# Patient Record
Sex: FEMALE | Race: WHITE | Employment: OTHER | URBAN - NONMETROPOLITAN AREA
[De-identification: names, ages, dates, MRNs, and addresses within clinical notes are randomized per-mention and may not be internally consistent; named-entity substitution may affect disease eponyms.]

---

## 2000-01-01 LAB
BDY SITE: NORMAL
COLLECTED BY:: NORMAL
SAO2 % BLD: 96 % (ref 94–97)

## 2017-07-06 ENCOUNTER — OFFICE VISIT (OUTPATIENT)
Dept: CARDIOLOGY | Age: 82
End: 2017-07-06

## 2017-07-06 VITALS
WEIGHT: 161.19 LBS | HEART RATE: 62 BPM | BODY MASS INDEX: 27.52 KG/M2 | DIASTOLIC BLOOD PRESSURE: 70 MMHG | HEIGHT: 64 IN | SYSTOLIC BLOOD PRESSURE: 148 MMHG

## 2017-07-06 DIAGNOSIS — I48.91 ATRIAL FIBRILLATION, UNSPECIFIED TYPE (HCC): Primary | ICD-10-CM

## 2017-07-06 DIAGNOSIS — R06.02 SOB (SHORTNESS OF BREATH): ICD-10-CM

## 2017-07-06 PROCEDURE — G8419 CALC BMI OUT NRM PARAM NOF/U: HCPCS | Performed by: INTERNAL MEDICINE

## 2017-07-06 PROCEDURE — 1090F PRES/ABSN URINE INCON ASSESS: CPT | Performed by: INTERNAL MEDICINE

## 2017-07-06 PROCEDURE — 99213 OFFICE O/P EST LOW 20 MIN: CPT | Performed by: INTERNAL MEDICINE

## 2017-07-06 PROCEDURE — 93000 ELECTROCARDIOGRAM COMPLETE: CPT | Performed by: INTERNAL MEDICINE

## 2017-07-06 PROCEDURE — G8598 ASA/ANTIPLAT THER USED: HCPCS | Performed by: INTERNAL MEDICINE

## 2017-07-06 PROCEDURE — 4040F PNEUMOC VAC/ADMIN/RCVD: CPT | Performed by: INTERNAL MEDICINE

## 2017-07-06 PROCEDURE — G8400 PT W/DXA NO RESULTS DOC: HCPCS | Performed by: INTERNAL MEDICINE

## 2017-07-06 PROCEDURE — 1123F ACP DISCUSS/DSCN MKR DOCD: CPT | Performed by: INTERNAL MEDICINE

## 2017-07-06 PROCEDURE — G8427 DOCREV CUR MEDS BY ELIG CLIN: HCPCS | Performed by: INTERNAL MEDICINE

## 2017-07-06 PROCEDURE — 1036F TOBACCO NON-USER: CPT | Performed by: INTERNAL MEDICINE

## 2017-07-06 RX ORDER — ISOSORBIDE DINITRATE 20 MG/1
TABLET ORAL
Qty: 60 TABLET | Refills: 3 | Status: SHIPPED | OUTPATIENT
Start: 2017-07-06 | End: 2017-07-07 | Stop reason: SDUPTHER

## 2017-07-06 RX ORDER — DOCUSATE SODIUM 100 MG/1
100 CAPSULE, LIQUID FILLED ORAL 2 TIMES DAILY PRN
COMMUNITY
End: 2017-09-28 | Stop reason: ALTCHOICE

## 2017-07-07 RX ORDER — ISOSORBIDE DINITRATE 20 MG/1
TABLET ORAL
Qty: 180 TABLET | Refills: 1 | Status: SHIPPED | OUTPATIENT
Start: 2017-07-07 | End: 2018-09-19

## 2017-07-12 ENCOUNTER — TELEPHONE (OUTPATIENT)
Dept: CARDIOLOGY | Age: 82
End: 2017-07-12

## 2017-07-12 RX ORDER — HYDROCHLOROTHIAZIDE 25 MG/1
25 TABLET ORAL DAILY
COMMUNITY

## 2017-07-25 ENCOUNTER — HOSPITAL ENCOUNTER (OUTPATIENT)
Dept: NON INVASIVE DIAGNOSTICS | Age: 82
Discharge: HOME | End: 2017-07-25
Payer: MEDICARE

## 2017-07-25 VITALS — BODY MASS INDEX: 27.31 KG/M2 | HEIGHT: 64 IN | WEIGHT: 160 LBS

## 2017-07-25 DIAGNOSIS — C21.0 MALIGNANT NEOPLASM OF ANUS (HCC): ICD-10-CM

## 2017-07-25 DIAGNOSIS — I48.91 ATRIAL FIBRILLATION, UNSPECIFIED TYPE (HCC): ICD-10-CM

## 2017-07-25 DIAGNOSIS — R06.02 SOB (SHORTNESS OF BREATH): ICD-10-CM

## 2017-07-25 LAB
LV EF: 60 %
LVEF MODALITY: NORMAL

## 2017-07-25 PROCEDURE — A9500 TC99M SESTAMIBI: HCPCS | Performed by: INTERNAL MEDICINE

## 2017-07-25 PROCEDURE — 93306 TTE W/DOPPLER COMPLETE: CPT

## 2017-07-25 PROCEDURE — 93017 CV STRESS TEST TRACING ONLY: CPT | Performed by: INTERNAL MEDICINE

## 2017-07-25 PROCEDURE — 78452 HT MUSCLE IMAGE SPECT MULT: CPT

## 2017-07-25 PROCEDURE — 3430000000 HC RX DIAGNOSTIC RADIOPHARMACEUTICAL: Performed by: INTERNAL MEDICINE

## 2017-07-25 PROCEDURE — 6360000002 HC RX W HCPCS

## 2017-07-25 RX ADMIN — Medication 8.9 MILLICURIE: at 12:25

## 2017-07-25 RX ADMIN — Medication 35 MILLICURIE: at 13:15

## 2017-07-26 ENCOUNTER — TELEPHONE (OUTPATIENT)
Dept: CARDIOLOGY CLINIC | Age: 82
End: 2017-07-26

## 2017-07-26 DIAGNOSIS — R94.39 ABNORMAL STRESS TEST: Primary | ICD-10-CM

## 2017-09-05 ENCOUNTER — OFFICE VISIT (OUTPATIENT)
Dept: CARDIOLOGY CLINIC | Age: 82
End: 2017-09-05
Payer: MEDICARE

## 2017-09-05 VITALS
BODY MASS INDEX: 27.28 KG/M2 | HEART RATE: 68 BPM | HEIGHT: 64 IN | SYSTOLIC BLOOD PRESSURE: 142 MMHG | DIASTOLIC BLOOD PRESSURE: 74 MMHG | WEIGHT: 159.8 LBS

## 2017-09-05 DIAGNOSIS — I20.0 UNSTABLE ANGINA (HCC): ICD-10-CM

## 2017-09-05 DIAGNOSIS — R94.39 ABNORMAL STRESS TEST: Primary | ICD-10-CM

## 2017-09-05 DIAGNOSIS — I25.10 ASHD (ARTERIOSCLEROTIC HEART DISEASE): ICD-10-CM

## 2017-09-05 DIAGNOSIS — I10 ESSENTIAL HYPERTENSION: ICD-10-CM

## 2017-09-05 PROCEDURE — G8400 PT W/DXA NO RESULTS DOC: HCPCS | Performed by: INTERNAL MEDICINE

## 2017-09-05 PROCEDURE — 1090F PRES/ABSN URINE INCON ASSESS: CPT | Performed by: INTERNAL MEDICINE

## 2017-09-05 PROCEDURE — 99213 OFFICE O/P EST LOW 20 MIN: CPT | Performed by: INTERNAL MEDICINE

## 2017-09-05 PROCEDURE — G8427 DOCREV CUR MEDS BY ELIG CLIN: HCPCS | Performed by: INTERNAL MEDICINE

## 2017-09-05 PROCEDURE — G8598 ASA/ANTIPLAT THER USED: HCPCS | Performed by: INTERNAL MEDICINE

## 2017-09-05 PROCEDURE — 1036F TOBACCO NON-USER: CPT | Performed by: INTERNAL MEDICINE

## 2017-09-05 PROCEDURE — 4040F PNEUMOC VAC/ADMIN/RCVD: CPT | Performed by: INTERNAL MEDICINE

## 2017-09-05 PROCEDURE — G8419 CALC BMI OUT NRM PARAM NOF/U: HCPCS | Performed by: INTERNAL MEDICINE

## 2017-09-05 PROCEDURE — 1123F ACP DISCUSS/DSCN MKR DOCD: CPT | Performed by: INTERNAL MEDICINE

## 2017-09-08 ENCOUNTER — PREP FOR PROCEDURE (OUTPATIENT)
Dept: CARDIOLOGY | Age: 82
End: 2017-09-08

## 2017-09-11 ENCOUNTER — HOSPITAL ENCOUNTER (OUTPATIENT)
Dept: INPATIENT UNIT | Age: 82
Discharge: HOME | End: 2017-09-11
Attending: INTERNAL MEDICINE | Admitting: INTERNAL MEDICINE
Payer: MEDICARE

## 2017-09-11 VITALS
RESPIRATION RATE: 16 BRPM | WEIGHT: 159 LBS | BODY MASS INDEX: 27.14 KG/M2 | SYSTOLIC BLOOD PRESSURE: 125 MMHG | TEMPERATURE: 97.6 F | DIASTOLIC BLOOD PRESSURE: 70 MMHG | HEIGHT: 64 IN | HEART RATE: 81 BPM | OXYGEN SATURATION: 97 %

## 2017-09-11 LAB
ABO: NORMAL
ANION GAP SERPL CALCULATED.3IONS-SCNC: 13 MEQ/L (ref 8–16)
ANTIBODY SCREEN: NORMAL
BUN BLDV-MCNC: 22 MG/DL (ref 7–22)
CALCIUM SERPL-MCNC: 9.8 MG/DL (ref 8.5–10.5)
CHLORIDE BLD-SCNC: 99 MEQ/L (ref 98–111)
CHOLESTEROL, TOTAL: 153 MG/DL (ref 100–199)
CO2: 27 MEQ/L (ref 23–33)
CREAT SERPL-MCNC: 0.9 MG/DL (ref 0.4–1.2)
EKG ATRIAL RATE: 56 BPM
EKG ATRIAL RATE: 66 BPM
EKG P AXIS: 62 DEGREES
EKG P AXIS: 67 DEGREES
EKG P-R INTERVAL: 206 MS
EKG P-R INTERVAL: 232 MS
EKG Q-T INTERVAL: 406 MS
EKG Q-T INTERVAL: 434 MS
EKG QRS DURATION: 82 MS
EKG QRS DURATION: 84 MS
EKG QTC CALCULATION (BAZETT): 418 MS
EKG QTC CALCULATION (BAZETT): 425 MS
EKG R AXIS: -12 DEGREES
EKG R AXIS: -18 DEGREES
EKG T AXIS: 16 DEGREES
EKG T AXIS: 21 DEGREES
EKG VENTRICULAR RATE: 56 BPM
EKG VENTRICULAR RATE: 66 BPM
GFR SERPL CREATININE-BSD FRML MDRD: 60 ML/MIN/1.73M2
GLUCOSE BLD-MCNC: 95 MG/DL (ref 70–108)
HCT VFR BLD CALC: 34.4 % (ref 37–47)
HDLC SERPL-MCNC: 52 MG/DL
HEMOGLOBIN: 11.6 GM/DL (ref 12–16)
LDL CHOLESTEROL CALCULATED: 77 MG/DL
MCH RBC QN AUTO: 29.6 PG (ref 27–31)
MCHC RBC AUTO-ENTMCNC: 33.6 GM/DL (ref 33–37)
MCV RBC AUTO: 88.2 FL (ref 81–99)
PDW BLD-RTO: 14.4 % (ref 11.5–14.5)
PLATELET # BLD: 162 THOU/MM3 (ref 130–400)
PMV BLD AUTO: 8.5 MCM (ref 7.4–10.4)
POTASSIUM SERPL-SCNC: 3.9 MEQ/L (ref 3.5–5.2)
RBC # BLD: 3.9 MILL/MM3 (ref 4.2–5.4)
RH FACTOR: NORMAL
SODIUM BLD-SCNC: 139 MEQ/L (ref 135–145)
TRIGL SERPL-MCNC: 122 MG/DL (ref 0–199)
WBC # BLD: 4.1 THOU/MM3 (ref 4.8–10.8)

## 2017-09-11 PROCEDURE — 36415 COLL VENOUS BLD VENIPUNCTURE: CPT

## 2017-09-11 PROCEDURE — 93571 IV DOP VEL&/PRESS C FLO 1ST: CPT | Performed by: INTERNAL MEDICINE

## 2017-09-11 PROCEDURE — C1887 CATHETER, GUIDING: HCPCS

## 2017-09-11 PROCEDURE — C1725 CATH, TRANSLUMIN NON-LASER: HCPCS

## 2017-09-11 PROCEDURE — 93458 L HRT ARTERY/VENTRICLE ANGIO: CPT | Performed by: INTERNAL MEDICINE

## 2017-09-11 PROCEDURE — 2500000003 HC RX 250 WO HCPCS

## 2017-09-11 PROCEDURE — 86850 RBC ANTIBODY SCREEN: CPT

## 2017-09-11 PROCEDURE — 92928 PRQ TCAT PLMT NTRAC ST 1 LES: CPT | Performed by: INTERNAL MEDICINE

## 2017-09-11 PROCEDURE — 6360000002 HC RX W HCPCS

## 2017-09-11 PROCEDURE — 6370000000 HC RX 637 (ALT 250 FOR IP)

## 2017-09-11 PROCEDURE — C1769 GUIDE WIRE: HCPCS

## 2017-09-11 PROCEDURE — 2780000010 HC IMPLANT OTHER

## 2017-09-11 PROCEDURE — 80061 LIPID PANEL: CPT

## 2017-09-11 PROCEDURE — 86901 BLOOD TYPING SEROLOGIC RH(D): CPT

## 2017-09-11 PROCEDURE — 85027 COMPLETE CBC AUTOMATED: CPT

## 2017-09-11 PROCEDURE — 2580000003 HC RX 258: Performed by: INTERNAL MEDICINE

## 2017-09-11 PROCEDURE — 93005 ELECTROCARDIOGRAM TRACING: CPT

## 2017-09-11 PROCEDURE — 80048 BASIC METABOLIC PNL TOTAL CA: CPT

## 2017-09-11 PROCEDURE — C1894 INTRO/SHEATH, NON-LASER: HCPCS

## 2017-09-11 PROCEDURE — 6370000000 HC RX 637 (ALT 250 FOR IP): Performed by: INTERNAL MEDICINE

## 2017-09-11 PROCEDURE — C1874 STENT, COATED/COV W/DEL SYS: HCPCS

## 2017-09-11 PROCEDURE — 86900 BLOOD TYPING SEROLOGIC ABO: CPT

## 2017-09-11 PROCEDURE — C9600 PERC DRUG-EL COR STENT SING: HCPCS | Performed by: INTERNAL MEDICINE

## 2017-09-11 RX ORDER — CLOPIDOGREL BISULFATE 75 MG/1
75 TABLET ORAL DAILY
Status: DISCONTINUED | OUTPATIENT
Start: 2017-09-12 | End: 2017-09-12 | Stop reason: HOSPADM

## 2017-09-11 RX ORDER — SODIUM CHLORIDE 9 MG/ML
INJECTION, SOLUTION INTRAVENOUS CONTINUOUS
Status: DISCONTINUED | OUTPATIENT
Start: 2017-09-11 | End: 2017-09-11 | Stop reason: SDUPTHER

## 2017-09-11 RX ORDER — ATROPINE SULFATE 0.4 MG/ML
0.5 AMPUL (ML) INJECTION
Status: ACTIVE | OUTPATIENT
Start: 2017-09-11 | End: 2017-09-11

## 2017-09-11 RX ORDER — CLOPIDOGREL BISULFATE 75 MG/1
75 TABLET ORAL DAILY
Qty: 30 TABLET | Refills: 3 | Status: SHIPPED | OUTPATIENT
Start: 2017-09-12 | End: 2017-09-12 | Stop reason: SDUPTHER

## 2017-09-11 RX ORDER — ASPIRIN 81 MG/1
81 TABLET, CHEWABLE ORAL DAILY
Status: DISCONTINUED | OUTPATIENT
Start: 2017-09-12 | End: 2017-09-12 | Stop reason: HOSPADM

## 2017-09-11 RX ORDER — DIAZEPAM 5 MG/1
5 TABLET ORAL
Status: ACTIVE | OUTPATIENT
Start: 2017-09-11 | End: 2017-09-11

## 2017-09-11 RX ORDER — ACETAMINOPHEN 325 MG/1
650 TABLET ORAL EVERY 4 HOURS PRN
Status: DISCONTINUED | OUTPATIENT
Start: 2017-09-11 | End: 2017-09-12 | Stop reason: HOSPADM

## 2017-09-11 RX ORDER — SODIUM CHLORIDE 0.9 % (FLUSH) 0.9 %
10 SYRINGE (ML) INJECTION PRN
Status: DISCONTINUED | OUTPATIENT
Start: 2017-09-11 | End: 2017-09-12 | Stop reason: HOSPADM

## 2017-09-11 RX ORDER — ONDANSETRON 2 MG/ML
4 INJECTION INTRAMUSCULAR; INTRAVENOUS EVERY 6 HOURS PRN
Status: DISCONTINUED | OUTPATIENT
Start: 2017-09-11 | End: 2017-09-12 | Stop reason: HOSPADM

## 2017-09-11 RX ORDER — SODIUM CHLORIDE 0.9 % (FLUSH) 0.9 %
10 SYRINGE (ML) INJECTION EVERY 12 HOURS SCHEDULED
Status: DISCONTINUED | OUTPATIENT
Start: 2017-09-11 | End: 2017-09-11 | Stop reason: SDUPTHER

## 2017-09-11 RX ORDER — SODIUM CHLORIDE 0.9 % (FLUSH) 0.9 %
10 SYRINGE (ML) INJECTION EVERY 12 HOURS SCHEDULED
Status: DISCONTINUED | OUTPATIENT
Start: 2017-09-11 | End: 2017-09-12 | Stop reason: HOSPADM

## 2017-09-11 RX ORDER — MORPHINE SULFATE 2 MG/ML
2 INJECTION, SOLUTION INTRAMUSCULAR; INTRAVENOUS
Status: ACTIVE | OUTPATIENT
Start: 2017-09-11 | End: 2017-09-11

## 2017-09-11 RX ORDER — DIPHENHYDRAMINE HCL 25 MG
25 TABLET ORAL
Status: ACTIVE | OUTPATIENT
Start: 2017-09-11 | End: 2017-09-11

## 2017-09-11 RX ORDER — SODIUM CHLORIDE 9 MG/ML
75 INJECTION, SOLUTION INTRAVENOUS CONTINUOUS
Status: DISCONTINUED | OUTPATIENT
Start: 2017-09-11 | End: 2017-09-12 | Stop reason: HOSPADM

## 2017-09-11 RX ORDER — SODIUM CHLORIDE 0.9 % (FLUSH) 0.9 %
10 SYRINGE (ML) INJECTION PRN
Status: DISCONTINUED | OUTPATIENT
Start: 2017-09-11 | End: 2017-09-11 | Stop reason: SDUPTHER

## 2017-09-11 RX ADMIN — ACETAMINOPHEN 650 MG: 325 TABLET ORAL at 18:45

## 2017-09-11 RX ADMIN — SODIUM CHLORIDE: 9 INJECTION, SOLUTION INTRAVENOUS at 11:48

## 2017-09-11 ASSESSMENT — PAIN SCALES - GENERAL
PAINLEVEL_OUTOF10: 0
PAINLEVEL_OUTOF10: 7
PAINLEVEL_OUTOF10: 0

## 2017-09-12 RX ORDER — CLOPIDOGREL BISULFATE 75 MG/1
75 TABLET ORAL DAILY
Qty: 30 TABLET | Refills: 1 | Status: SHIPPED | OUTPATIENT
Start: 2017-09-12 | End: 2018-01-22 | Stop reason: SDUPTHER

## 2017-09-22 LAB
BILIRUBIN URINE: NORMAL MG/DL
BLOOD, URINE: NORMAL
CLARITY: CLEAR
COLOR: NORMAL
GLUCOSE URINE: NORMAL
KETONES, URINE: NEGATIVE
LEUKOCYTE ESTERASE, URINE: NORMAL
NITRITE, URINE: POSITIVE
PH UA: 6 (ref 4.5–8)
PROTEIN UA: NORMAL
SPECIFIC GRAVITY UA: 1 (ref 1–1.03)
UROBILINOGEN, URINE: NORMAL

## 2017-09-28 ENCOUNTER — OFFICE VISIT (OUTPATIENT)
Dept: CARDIOLOGY CLINIC | Age: 82
End: 2017-09-28
Payer: MEDICARE

## 2017-09-28 VITALS
BODY MASS INDEX: 26.99 KG/M2 | HEIGHT: 64 IN | SYSTOLIC BLOOD PRESSURE: 144 MMHG | DIASTOLIC BLOOD PRESSURE: 62 MMHG | HEART RATE: 62 BPM | WEIGHT: 158.1 LBS

## 2017-09-28 DIAGNOSIS — Z95.820 S/P ANGIOPLASTY WITH STENT: Primary | ICD-10-CM

## 2017-09-28 PROCEDURE — G8419 CALC BMI OUT NRM PARAM NOF/U: HCPCS | Performed by: INTERNAL MEDICINE

## 2017-09-28 PROCEDURE — G8427 DOCREV CUR MEDS BY ELIG CLIN: HCPCS | Performed by: INTERNAL MEDICINE

## 2017-09-28 PROCEDURE — G8400 PT W/DXA NO RESULTS DOC: HCPCS | Performed by: INTERNAL MEDICINE

## 2017-09-28 PROCEDURE — 1123F ACP DISCUSS/DSCN MKR DOCD: CPT | Performed by: INTERNAL MEDICINE

## 2017-09-28 PROCEDURE — G8598 ASA/ANTIPLAT THER USED: HCPCS | Performed by: INTERNAL MEDICINE

## 2017-09-28 PROCEDURE — 1090F PRES/ABSN URINE INCON ASSESS: CPT | Performed by: INTERNAL MEDICINE

## 2017-09-28 PROCEDURE — 1036F TOBACCO NON-USER: CPT | Performed by: INTERNAL MEDICINE

## 2017-09-28 PROCEDURE — 4040F PNEUMOC VAC/ADMIN/RCVD: CPT | Performed by: INTERNAL MEDICINE

## 2017-09-28 PROCEDURE — 99213 OFFICE O/P EST LOW 20 MIN: CPT | Performed by: INTERNAL MEDICINE

## 2017-09-28 PROCEDURE — 93000 ELECTROCARDIOGRAM COMPLETE: CPT | Performed by: INTERNAL MEDICINE

## 2017-09-28 RX ORDER — FAMOTIDINE 20 MG/1
20 TABLET, FILM COATED ORAL 2 TIMES DAILY
Qty: 180 TABLET | Refills: 3 | Status: SHIPPED | OUTPATIENT
Start: 2017-09-28 | End: 2017-09-28 | Stop reason: SDUPTHER

## 2017-09-28 RX ORDER — FAMOTIDINE 20 MG/1
20 TABLET, FILM COATED ORAL 2 TIMES DAILY
Qty: 60 TABLET | Refills: 0 | Status: SHIPPED | OUTPATIENT
Start: 2017-09-28 | End: 2017-11-28 | Stop reason: SDUPTHER

## 2017-09-28 RX ORDER — NITROGLYCERIN 0.4 MG/1
0.4 TABLET SUBLINGUAL EVERY 5 MIN PRN
COMMUNITY

## 2017-09-28 RX ORDER — NITROFURANTOIN 25; 75 MG/1; MG/1
100 CAPSULE ORAL 2 TIMES DAILY
COMMUNITY
Start: 2017-07-14 | End: 2017-10-18 | Stop reason: ALTCHOICE

## 2017-09-28 RX ORDER — FAMOTIDINE 20 MG/1
20 TABLET, FILM COATED ORAL 2 TIMES DAILY
COMMUNITY
End: 2019-04-19

## 2017-10-06 LAB
BILIRUBIN URINE: ABNORMAL MG/DL
BLOOD, URINE: ABNORMAL
CLARITY: ABNORMAL
COLOR: ABNORMAL
GLUCOSE URINE: ABNORMAL
KETONES, URINE: ABNORMAL
LEUKOCYTE ESTERASE, URINE: ABNORMAL
NITRITE, URINE: POSITIVE
PH UA: 5 (ref 4.5–8)
PROTEIN UA: ABNORMAL
SPECIFIC GRAVITY UA: 1.01 (ref 1–1.03)
UROBILINOGEN, URINE: ABNORMAL

## 2017-10-10 ENCOUNTER — HOSPITAL ENCOUNTER (OUTPATIENT)
Dept: CARDIAC REHAB | Age: 82
Setting detail: THERAPIES SERIES
Discharge: HOME | End: 2017-10-10
Payer: MEDICARE

## 2017-10-10 VITALS — WEIGHT: 154.44 LBS | HEIGHT: 64 IN | BODY MASS INDEX: 26.37 KG/M2

## 2017-10-10 PROCEDURE — G0422 INTENS CARDIAC REHAB W/EXERC: HCPCS

## 2017-10-10 PROCEDURE — G0423 INTENS CARDIAC REHAB NO EXER: HCPCS

## 2017-10-10 NOTE — PROGRESS NOTES
Video Education Report - ICR/CR      Name:  Shelby Duverney    Date:  10/10/2017  MRN: [id number]     Session #:  2  Session Length: 45 min    Recommended Videos    Additional Videos   [x]01 [employer] - Program Overview  []17 Hypertension & Heart Disease  []02 Overview of Pritikin Eating Plan   []18 Cooking Breakfasts and Snacks  []03 Becoming a Pritikin    []19 Planning Your Eating Strategy  []04 Diseases of Our Time - Part 1   []20 Label Reading - Part 2  []05 Calorie Density     []21 Cooking Soups and Desserts  []06 Label Reading - Part 1    []22 How Our Thoughts Can Heal Our Hearts  []07 Move it      []23 Targeting Your Nutrition Priorities  []08 Healthy Minds, Bodies, Hearts   []24 Healthy Salads & Dressings  []09 Dining Out - Part 1    []25 Dining Out - Part 2  []10 Heart Disease Risk Reduction   []26 Cooking Dinner and Sides  []71 Metabolic Syndrome and Belly Fat  []27 Sleep Disorders  []12 Facts on Fat     []28 Menu Workshop  []13 Diseases of Our Time - Part 2   []29 Decoding Lab Results  []14 Biology of Weight Control   []30 Vitamins and Minerals  []15 Biomechanical Limitations   []31 Exercise Action Plan  []16 Nutrition Action Plan    []32 Body Composition         []33 Improving Performance         []34 Fueling a Healthy Body         []35 Introduction to Yoga         []36 Aging- Enhancing the Quality of Your Life         []37 Smoking Cessation      Comments:  Completed video, reviewed patient binder          Electronically signed by Sung Abdalla on 10/10/2017 at 11:01 AM  Staff Signature/Date/Time

## 2017-10-16 ENCOUNTER — HOSPITAL ENCOUNTER (OUTPATIENT)
Dept: CARDIAC REHAB | Age: 82
Setting detail: THERAPIES SERIES
Discharge: HOME | End: 2017-10-16
Payer: MEDICARE

## 2017-10-16 PROCEDURE — G0422 INTENS CARDIAC REHAB W/EXERC: HCPCS

## 2017-10-16 PROCEDURE — G0423 INTENS CARDIAC REHAB NO EXER: HCPCS

## 2017-10-18 ENCOUNTER — APPOINTMENT (OUTPATIENT)
Dept: CARDIAC REHAB | Age: 82
End: 2017-10-18
Payer: MEDICARE

## 2017-10-18 ENCOUNTER — HOSPITAL ENCOUNTER (EMERGENCY)
Age: 82
Discharge: HOME | End: 2017-10-18
Payer: MEDICARE

## 2017-10-18 VITALS
RESPIRATION RATE: 16 BRPM | WEIGHT: 159 LBS | TEMPERATURE: 97 F | OXYGEN SATURATION: 97 % | DIASTOLIC BLOOD PRESSURE: 95 MMHG | SYSTOLIC BLOOD PRESSURE: 137 MMHG | HEIGHT: 64 IN | HEART RATE: 68 BPM | BODY MASS INDEX: 27.14 KG/M2

## 2017-10-18 DIAGNOSIS — R11.2 NON-INTRACTABLE VOMITING WITH NAUSEA, UNSPECIFIED VOMITING TYPE: ICD-10-CM

## 2017-10-18 DIAGNOSIS — N39.0 RECURRENT UTI: ICD-10-CM

## 2017-10-18 DIAGNOSIS — R30.0 DYSURIA: Primary | ICD-10-CM

## 2017-10-18 LAB
BACTERIA: ABNORMAL /HPF
BILIRUBIN URINE: NEGATIVE
BLOOD, URINE: ABNORMAL
CASTS 2: ABNORMAL /LPF
CASTS UA: ABNORMAL /LPF
CHARACTER, URINE: CLEAR
COLOR: ABNORMAL
CRYSTALS, UA: ABNORMAL
EPITHELIAL CELLS, UA: ABNORMAL /HPF
GLUCOSE URINE: NEGATIVE MG/DL
KETONES, URINE: NEGATIVE
LEUKOCYTE ESTERASE, URINE: ABNORMAL
MISCELLANEOUS 2: ABNORMAL
NITRITE, URINE: POSITIVE
PH UA: 6.5
PROTEIN UA: NEGATIVE
RBC URINE: ABNORMAL /HPF
RENAL EPITHELIAL, UA: ABNORMAL
SPECIFIC GRAVITY, URINE: 1.01 (ref 1–1.03)
UROBILINOGEN, URINE: 0.2 EU/DL
WBC UA: ABNORMAL /HPF
YEAST: ABNORMAL

## 2017-10-18 PROCEDURE — 99213 OFFICE O/P EST LOW 20 MIN: CPT

## 2017-10-18 PROCEDURE — 81003 URINALYSIS AUTO W/O SCOPE: CPT

## 2017-10-18 PROCEDURE — 81001 URINALYSIS AUTO W/SCOPE: CPT

## 2017-10-18 PROCEDURE — 87086 URINE CULTURE/COLONY COUNT: CPT

## 2017-10-18 PROCEDURE — 99213 OFFICE O/P EST LOW 20 MIN: CPT | Performed by: NURSE PRACTITIONER

## 2017-10-18 RX ORDER — CEFDINIR 300 MG/1
300 CAPSULE ORAL 2 TIMES DAILY
Qty: 20 CAPSULE | Refills: 0 | Status: SHIPPED | OUTPATIENT
Start: 2017-10-18 | End: 2017-10-28

## 2017-10-18 RX ORDER — ONDANSETRON 4 MG/1
4 TABLET, ORALLY DISINTEGRATING ORAL EVERY 8 HOURS PRN
Qty: 12 TABLET | Refills: 0 | Status: SHIPPED | OUTPATIENT
Start: 2017-10-18 | End: 2017-10-31

## 2017-10-18 ASSESSMENT — ENCOUNTER SYMPTOMS: ABDOMINAL PAIN: 0

## 2017-10-18 ASSESSMENT — PAIN SCALES - GENERAL: PAINLEVEL_OUTOF10: 6

## 2017-10-18 NOTE — ED NOTES
Pt. Released in stable condition, ambulated per self to private car. Instructed pt to follow-up with family doctor as needed for recheck or go directly to the emergency department for any concerns/worsening conditions. Pt. Verbalized understanding of instructions. No questions at this time. RX in hand.       Jade Sagastume RN  10/18/17 [id number]

## 2017-10-18 NOTE — ED PROVIDER NOTES
[facility]  Urgent Care Encounter      CHIEF COMPLAINT       Chief Complaint   Patient presents with    Hematuria     frequency, has appointment with urologist 10/31, 9/22 was given macrobid and on 10/6 was given cefuroxime       Nurses Notes reviewed and I agree except as noted in the HPI. HISTORY OF PRESENT ILLNESS   Hodan Hauser is a 80 y.o. female who presents With complaints of urinary frequency and dysuria. Onset between 3 and 4 weeks ago, unchanged. Symptoms intermittent. Denies hematuria. Denies abdominal pain. Denies back pain. Past medical history of recurrent UTI. Patient has completed Macrobid and cefuroxime without relief. Minimal relief with over-the-counter medicine. Patient has appointment with [address] [place] [address] urology on 10/31/17. REVIEW OF SYSTEMS     Review of Systems   Constitutional: Negative for fever. Gastrointestinal: Negative for abdominal pain. Genitourinary: Positive for dysuria and frequency. Negative for decreased urine volume, difficulty urinating, flank pain, genital sores, hematuria, menstrual problem, pelvic pain, urgency, vaginal bleeding, vaginal discharge and vaginal pain. PAST MEDICAL HISTORY         Diagnosis Date    Anesthesia 2012    after hemorrhoid surgery felt like \"elephant on my chest\" heart cath done    CAD (coronary artery disease) 2012    Cancer ([facility] [address]     cervical rectal    GERD (gastroesophageal reflux disease)     Hyperlipidemia     Hypertension     Osteoarthritis     Shortness of breath 09/2017    UTI (urinary tract infection) 05/20/2017    [facility]inic in [address] [place] [address]     Patient  has a past surgical history that includes Hemorrhoid surgery (1989, 08/17/2012); Colonoscopy (5/25/12); Hysterectomy (1966); Appendectomy (1969); other surgical history (1/3/2014); Rectal surgery; Cardiac catheterization (8/2012); and Coronary angioplasty with stent (09/11/2017).     CURRENT MEDICATIONS       Previous Medications ASPIRIN 81 MG EC TABLET    Take 81 mg by mouth daily. CALCIUM CARBONATE-VITAMIN D (CALCIUM + D) 600-200 MG-UNIT TABS    Take 1 tablet by mouth as needed     CHOLECALCIFEROL (VITAMIN D3) 5000 UNITS TABS    Take by mouth as needed     CLOPIDOGREL (PLAVIX) 75 MG TABLET    Take 1 tablet by mouth daily    COENZYME Q10 (COQ-10 PO)    Take 200 mg by mouth as needed     CRANBERRY PO    Take 1 tablet by mouth as needed     FAMOTIDINE (PEPCID) 20 MG TABLET    Take 20 mg by mouth 2 times daily    FAMOTIDINE (PEPCID) 20 MG TABLET    Take 1 tablet by mouth 2 times daily    FEXOFENADINE HCL (ALLEGRA ALLERGY PO)    Take 180 mg by mouth daily    HYDROCHLOROTHIAZIDE (HYDRODIURIL) 25 MG TABLET    Take 25 mg by mouth daily    IBUPROFEN (ADVIL PO)    Take by mouth as needed     ISOSORBIDE DINITRATE (ISORDIL) 20 MG TABLET    Take 1 tablet by mouth 3  times daily    MAGNESIUM (MAGNESIUM-OXIDE) 250 MG TABS TABLET    Take 250 mg by mouth daily     METHYLSULFONYLMETHANE (MSM) 1000 MG CAPS    Take by mouth as needed     METOPROLOL TARTRATE (LOPRESSOR) 25 MG TABLET    Take 1 tablet by mouth 2 times daily    NITROGLYCERIN (NITROSTAT) 0.4 MG SL TABLET    Place 0.4 mg under the tongue every 5 minutes as needed for Chest pain up to max of 3 total doses. If no relief after 1 dose, call 911. NONFORMULARY    as needed Home defense supplement     POLYETHYLENE GLYCOL 3350 (MIRALAX PO)    Take by mouth as needed    PRAMIPEXOLE (MIRAPEX) 0.125 MG TABLET    Take by mouth nightly 4 tab    SIMVASTATIN (ZOCOR) 40 MG TABLET    Take 0.5 tablets by mouth nightly    ZOLPIDEM (AMBIEN) 5 MG TABLET    Take 10 mg by mouth nightly as needed        ALLERGIES     Patient is is allergic to other; lipitor; ramipril; and sulfa antibiotics. FAMILY HISTORY     Patient's family history includes Alzheimer's Disease in her mother; Arthritis in her father; Cancer in her brother; Diabetes in her maternal aunt and maternal aunt;  Heart Disease in her brother, brother, mother, sister, and sister; Stroke in her sister. SOCIAL HISTORY     Patient  reports that she quit smoking about 45 years ago. Her smoking use included Cigarettes. She has a 12.00 pack-year smoking history. She has never used smokeless tobacco. She reports that she does not drink alcohol or use drugs. PHYSICAL EXAM     ED TRIAGE VITALS  BP: (!) 137/95, Temp: 97 °F (36.1 °C), Pulse: 68, Resp: 16, SpO2: 97 %  Physical Exam   Constitutional: She appears well-developed and well-nourished. Non-toxic appearance. She does not have a sickly appearance. She does not appear ill. No distress. HENT:   Head: Normocephalic and atraumatic. Abdominal: Soft. Normal appearance. She exhibits no distension and no mass. There is no tenderness. There is no CVA tenderness. Skin: She is not diaphoretic. Nursing note and vitals reviewed. DIAGNOSTIC RESULTS   Labs:No results found for this visit on 10/18/17. IMAGING:  No orders to display     URGENT CARE COURSE:     Vitals:    10/18/17 1629   BP: (!) 137/95   Pulse: 68   Resp: 16   Temp: 97 °F (36.1 °C)   TempSrc: Temporal   SpO2: 97%   Weight: 159 lb (72.1 kg)   Height: 5' 4\" (1.626 m)       Medications - No data to display  PROCEDURES:  None  FINAL IMPRESSION      1. Dysuria    2. Recurrent UTI    3. Non-intractable vomiting with nausea, unspecified vomiting type        DISPOSITION/PLAN   DISPOSITION Decision to Discharge     Urine sent to main lab. Unable to run UA in office due to [address]. Rx Omnicef. Patient states she did not notice the warning to not take anti-acid medications with her most recent antibiotic. Rx Omnicef. Patient allergic to Bactrim. Fluoroquinolones not a good choice due to H/o afib and CKD. She has already been on Macrobid w/o relief. Advised to keep f/u with Urology. If worse go to ER. PATIENT REFERRED TO:  Tobias Valenzuela MD  [facility] [address] [facility] [address] [phone]    In 1 week  Follow up if no better.   Follow up with Urology. Increase fluids. if worse go to ER.     DISCHARGE MEDICATIONS:  New Prescriptions    CEFDINIR (OMNICEF) 300 MG CAPSULE    Take 1 capsule by mouth 2 times daily for 10 days    ONDANSETRON (ZOFRAN ODT) 4 MG DISINTEGRATING TABLET    Take 1 tablet by mouth every 8 hours as needed for Nausea or Vomiting     Current Discharge Medication List          CATRINA Boss NP  10/18/17 7939

## 2017-10-20 ENCOUNTER — HOSPITAL ENCOUNTER (OUTPATIENT)
Dept: CARDIAC REHAB | Age: 82
Setting detail: THERAPIES SERIES
Discharge: HOME | End: 2017-10-20
Payer: MEDICARE

## 2017-10-20 LAB — URINE CULTURE REFLEX: NORMAL

## 2017-10-20 PROCEDURE — G0422 INTENS CARDIAC REHAB W/EXERC: HCPCS

## 2017-10-20 PROCEDURE — G0423 INTENS CARDIAC REHAB NO EXER: HCPCS

## 2017-10-20 NOTE — PROGRESS NOTES
Jhon MENDENHALL.:  1935    Acct Number: [de-identified]   MRN:  [id number]                             [facility] COOKING SCHOOL WORKSHOP               Date: 10/20/2017    Session # ________    Omero Lozano class covered:      () Adding Flavor     () Fast Breakfasts     () Salads and Dressings     (x) Soups and Sauces     () Simple Sides     () Appetizers and Snacks     () Delicious Desserts     () Plant Based Proteins     () Fast Evening Meals     () Weekend Breakfasts     () Efficiency Cooking          Patients were shown how to choose, prep, and cook; substitutions and other options were given. Samples were offered. Recipes were given and questions answered. The patient above was in the [employer] for 45 minutes.       Electronically signed by Roberto Lynch on 10/20/2017 at 3:10 PM

## 2017-10-23 ENCOUNTER — HOSPITAL ENCOUNTER (OUTPATIENT)
Dept: CARDIAC REHAB | Age: 82
Setting detail: THERAPIES SERIES
Discharge: HOME | End: 2017-10-23
Payer: MEDICARE

## 2017-10-23 PROCEDURE — G0423 INTENS CARDIAC REHAB NO EXER: HCPCS

## 2017-10-23 PROCEDURE — G0422 INTENS CARDIAC REHAB W/EXERC: HCPCS

## 2017-10-23 NOTE — PROGRESS NOTES
Hospital Facility-Based Program  Phase 2 Cardiac Rehab Weekly Progress Report      Patient prescribed exercise:  2:00 class. 3 times per week in rehab, 1-4 times per week at home for the amount of sessions/weeks specified by insurance. Current Levels: Treadmill: 1. 2mph/0% for 5 minutes, Schwinn Airdyne: Level 0.4 for 5 minutes, UBE: Level 0.3 for 5 minutes. Progression Discussion: Increase Aerobic exercise 15 minutes to work on endurance. Attempt to increase intensity by 5-20% for each modality this week. Try to increase intensities until Yenny rates the exercises a 13-17 on Nava RPE.

## 2017-10-25 ENCOUNTER — APPOINTMENT (OUTPATIENT)
Dept: CARDIAC REHAB | Age: 82
End: 2017-10-25
Payer: MEDICARE

## 2017-10-25 ENCOUNTER — HOSPITAL ENCOUNTER (OUTPATIENT)
Dept: CARDIAC REHAB | Age: 82
Setting detail: THERAPIES SERIES
Discharge: HOME | End: 2017-10-25
Payer: MEDICARE

## 2017-10-27 ENCOUNTER — HOSPITAL ENCOUNTER (OUTPATIENT)
Dept: CARDIAC REHAB | Age: 82
Setting detail: THERAPIES SERIES
Discharge: HOME | End: 2017-10-27
Payer: MEDICARE

## 2017-10-27 PROCEDURE — G0422 INTENS CARDIAC REHAB W/EXERC: HCPCS

## 2017-10-27 PROCEDURE — G0423 INTENS CARDIAC REHAB NO EXER: HCPCS

## 2017-10-27 NOTE — PROGRESS NOTES
Maximiliano WILSONO.B.:  1935    Acct Number: [de-identified]   MRN:  [id number]                             [facility] COOKING SCHOOL WORKSHOP               Date: 10/27/2017    Session # ________    [place] class covered:      () Adding Flavor     () Fast Breakfasts     () Salads and Dressings     () Soups and Sauces     (x) Simple Sides     () Appetizers and Snacks     () Delicious Desserts     () Plant Based Proteins     () Fast Evening Meals     () Weekend Breakfasts     () Efficiency Cooking          Patients were shown how to choose, prep, and cook; substitutions and other options were given. Samples were offered. Recipes were given and questions answered. The patient above was in the [employer] for 50 minutes.       Electronically signed by Elle Hernandez on 10/27/2017 at 3:26 PM

## 2017-10-30 ENCOUNTER — HOSPITAL ENCOUNTER (OUTPATIENT)
Dept: CARDIAC REHAB | Age: 82
Setting detail: THERAPIES SERIES
Discharge: HOME | End: 2017-10-30
Payer: MEDICARE

## 2017-10-30 PROCEDURE — G0423 INTENS CARDIAC REHAB NO EXER: HCPCS

## 2017-10-30 PROCEDURE — G0422 INTENS CARDIAC REHAB W/EXERC: HCPCS

## 2017-10-31 ENCOUNTER — OFFICE VISIT (OUTPATIENT)
Dept: UROLOGY | Age: 82
End: 2017-10-31
Payer: MEDICARE

## 2017-10-31 VITALS
SYSTOLIC BLOOD PRESSURE: 138 MMHG | BODY MASS INDEX: 26.29 KG/M2 | WEIGHT: 154 LBS | HEIGHT: 64 IN | DIASTOLIC BLOOD PRESSURE: 80 MMHG

## 2017-10-31 DIAGNOSIS — N39.46 MIXED INCONTINENCE: ICD-10-CM

## 2017-10-31 DIAGNOSIS — R39.15 URGENCY OF URINATION: ICD-10-CM

## 2017-10-31 DIAGNOSIS — N39.0 RECURRENT UTI: Primary | ICD-10-CM

## 2017-10-31 LAB
BILIRUBIN URINE: NEGATIVE
BLOOD URINE, POC: NORMAL
CHARACTER, URINE: CLEAR
COLOR, URINE: YELLOW
GLUCOSE URINE: NEGATIVE MG/DL
KETONES, URINE: NEGATIVE
LEUKOCYTE CLUMPS, URINE: NORMAL
NITRITE, URINE: NEGATIVE
PH, URINE: 6.5
POST VOID RESIDUAL (PVR): 15 ML
PROTEIN, URINE: NEGATIVE MG/DL
SPECIFIC GRAVITY, URINE: 1.01 (ref 1–1.03)
UROBILINOGEN, URINE: 0.2 EU/DL

## 2017-10-31 PROCEDURE — 1123F ACP DISCUSS/DSCN MKR DOCD: CPT | Performed by: NURSE PRACTITIONER

## 2017-10-31 PROCEDURE — G8484 FLU IMMUNIZE NO ADMIN: HCPCS | Performed by: NURSE PRACTITIONER

## 2017-10-31 PROCEDURE — 4040F PNEUMOC VAC/ADMIN/RCVD: CPT | Performed by: NURSE PRACTITIONER

## 2017-10-31 PROCEDURE — 0509F URINE INCON PLAN DOCD: CPT | Performed by: NURSE PRACTITIONER

## 2017-10-31 PROCEDURE — 1090F PRES/ABSN URINE INCON ASSESS: CPT | Performed by: NURSE PRACTITIONER

## 2017-10-31 PROCEDURE — 51798 US URINE CAPACITY MEASURE: CPT | Performed by: NURSE PRACTITIONER

## 2017-10-31 PROCEDURE — G8598 ASA/ANTIPLAT THER USED: HCPCS | Performed by: NURSE PRACTITIONER

## 2017-10-31 PROCEDURE — 1036F TOBACCO NON-USER: CPT | Performed by: NURSE PRACTITIONER

## 2017-10-31 PROCEDURE — G8419 CALC BMI OUT NRM PARAM NOF/U: HCPCS | Performed by: NURSE PRACTITIONER

## 2017-10-31 PROCEDURE — G8400 PT W/DXA NO RESULTS DOC: HCPCS | Performed by: NURSE PRACTITIONER

## 2017-10-31 PROCEDURE — G8427 DOCREV CUR MEDS BY ELIG CLIN: HCPCS | Performed by: NURSE PRACTITIONER

## 2017-10-31 PROCEDURE — 81003 URINALYSIS AUTO W/O SCOPE: CPT | Performed by: NURSE PRACTITIONER

## 2017-10-31 PROCEDURE — 99215 OFFICE O/P EST HI 40 MIN: CPT | Performed by: NURSE PRACTITIONER

## 2017-10-31 NOTE — PROGRESS NOTES
[facility] PROFESSIONAL SERVS  [facility]  [address]  [address] [facility]  Dept: [phone]  Loc: [phone]  Visit Date: 10/31/2017      HPI:     Hodan Hauser is a 80 y.o. with past medical history of Osteoarthritis, hypertension, hyperlipidemia, cervical & rectal cancer, CAD who present today for recurrent UTI. Onset of symptoms have occurred within the past year, she reports over 5 urinary tract infections within the past year. She reports dysuria when infection is present. She reports urinary frequency, nocturia of 2-3 times nightly, occasional leakage of urine which can occur throughout the day and with stress. She occasionally wears protection. Denies ever straining to get her stream started. She denies incomplete bladder emptying. She has been told that she has a prolapsed bladder. Has 3 children, vaginal delivery. Treatments tried include most recently started d-mannose & cranberry extract 3 days ago. Urinalysis today shows trace blood, moderate leukocytes. PVR was 15 ML. She received chemotherapy and radiation for her rectal cancer. She comes in today by herself. History is obtained from the patient the medical record    Current Outpatient Prescriptions   Medication Sig Dispense Refill    D-MANNOSE PO Take 500 mg by mouth daily 3-tablets daily      UNABLE TO FIND Take 50 mg by mouth Oregano Oil      Polyethylene Glycol 3350 (MIRALAX PO) Take by mouth as needed      nitroGLYCERIN (NITROSTAT) 0.4 MG SL tablet Place 0.4 mg under the tongue every 5 minutes as needed for Chest pain up to max of 3 total doses. If no relief after 1 dose, call 911.       famotidine (PEPCID) 20 MG tablet Take 20 mg by mouth 2 times daily      famotidine (PEPCID) 20 MG tablet Take 1 tablet by mouth 2 times daily 60 tablet 0    clopidogrel (PLAVIX) 75 MG tablet Take 1 tablet by mouth daily 30 tablet 1    hydrochlorothiazide (HYDRODIURIL) 25 MG tablet Take 25 mg by mouth daily      isosorbide dinitrate (ISORDIL) 20 MG tablet Take 1 tablet by mouth 3  times daily 180 tablet 1    metoprolol tartrate (LOPRESSOR) 25 MG tablet Take 1 tablet by mouth 2 times daily 180 tablet 3    simvastatin (ZOCOR) 40 MG tablet Take 0.5 tablets by mouth nightly 45 tablet 3    pramipexole (MIRAPEX) 0.125 MG tablet Take by mouth nightly 4 tab      NONFORMULARY as needed Home defense supplement       CRANBERRY PO Take 1 tablet by mouth as needed       Methylsulfonylmethane (MSM) 1000 MG CAPS Take by mouth as needed       Ibuprofen (ADVIL PO) Take by mouth as needed       Cholecalciferol (VITAMIN D3) 5000 UNITS TABS Take by mouth as needed       zolpidem (AMBIEN) 5 MG tablet Take 10 mg by mouth nightly as needed       magnesium (MAGNESIUM-OXIDE) 250 MG TABS tablet Take 250 mg by mouth daily       Calcium Carbonate-Vitamin D (CALCIUM + D) 600-200 MG-UNIT TABS Take 1 tablet by mouth as needed       Coenzyme Q10 (COQ-10 PO) Take 200 mg by mouth as needed       aspirin 81 MG EC tablet Take 81 mg by mouth daily. No current facility-administered medications for this visit. Past Medical History  Fiordaliza Ramirez  has a past medical history of Anesthesia; CAD (coronary artery disease); Cancer [facility]); GERD (gastroesophageal reflux disease); Hyperlipidemia; Hypertension; Osteoarthritis; Shortness of breath; and UTI (urinary tract infection). Past Surgical History  The patient  has a past surgical history that includes Hemorrhoid surgery (1989, 08/17/2012); Colonoscopy (5/25/12); Hysterectomy (1966); Appendectomy (1969); other surgical history (1/3/2014); Rectal surgery; Cardiac catheterization (8/2012); and Coronary angioplasty with stent (09/11/2017). Family History  This patient's family history includes Alzheimer's Disease in her mother; Arthritis in her father; Cancer in her brother; Diabetes in her maternal aunt and maternal aunt;  Heart Disease in her brother, brother, mother, sister, and sister; Stroke in her sister. Social History  Marlon Fortune  reports that she quit smoking about 45 years ago. Her smoking use included Cigarettes. She has a 12.00 pack-year smoking history. She has never used smokeless tobacco. She reports that she does not drink alcohol or use drugs. Subjective:     Review of Systems  No problems with ears, nose or throat. No problems with eyes. No chest pain, shortness of breath, abdominal pain, extremity pain or weakness, and no neurological deficits. No rashes.  symptoms per HPI. The remainder of the review of symptoms is negative. Objective:     PE:   Vitals:    10/31/17 1253   BP: 138/80   Weight: 154 lb (69.9 kg)   Height: 5' 4\" (1.626 m)       Constitutional: Alert and oriented times 3, no acute distress and cooperative to examination with appropriate mood and affect. HENT:   Head:        Normocephalic and atraumatic. Mouth/Throat:         Mucous membranes are normal.   Eyes:         EOM are normal. No scleral icterus. PERRLA. Neck:        Supple, symmetrical, trachea midline  Pulmonary/Chest:      Chest symmetric with normal A/P diameter,Normal respiratory rate and rhthym. No use of accessory muscles. Abdominal:         Soft. No tenderness. Bowel sounds present. Musculoskeletal:         Normal range of motion. No edema or tenderness of lower extremities. Extremities: No cyanosis, clubbing, or edema present. Neurological:        Alert and oriented. No cranial nerve deficit. [facility]:        Normal mood and affect.       Labs   Urine dip demonstrates   Results for POC orders placed in visit on 10/31/17   POCT Urinalysis No Micro (Auto)   Result Value Ref Range    Glucose, Ur Negative NEGATIVE mg/dl    Bilirubin Urine Negative     Ketones, Urine Negative NEGATIVE    Specific Gravity, Urine 1.015 1.002 - 1.03    Blood, UA POC Trace-lysed NEGATIVE    pH, Urine 6.50 5.0 - 9.0    Protein, Urine Negative NEGATIVE mg/dl    Urobilinogen, Urine 0.20 0.0 - 1.0 eu/dl Nitrite, Urine Negative NEGATIVE    Leukocyte Clumps, Urine Moderate NEGATIVE    Color, Urine Yellow YELLOW-STR    Character, Urine Clear [id number].JO   poct post void residual   Result Value Ref Range    post void residual 15 ml       Recent BUN/Creatinine:  Lab Results   Component Value Date    BUN 22 09/11/2017    CREATININE 0.9 09/11/2017       Radiology  The patient has had a CT abdomen pelvis with contrast which I have reviewed along with its accompanying report. The study demonstrates   FINDINGS: Lung bases:       No infiltrates or effusions. Large hiatal hernia. Coronary artery calcifications. Normal heart size. Abdomen pelvis   The liver, spleen, and pancreas are normal. Gallbladder is unremarkable.       The adrenals and kidneys are unremarkable other than mild pelviectasis bilaterally likely due to extrarenal pelves. No lymphadenopathy. Diverticulosis without evidence of diverticulitis. No evidence of bowel obstruction. Urinary bladder is unremarkable. Uterus is absent.       Bones   Severe levoscoliosis. No suspicious bone lesions. Impression   Stable CT abdomen pelvis. No lymphadenopathy. No acute findings       Assessment & Plan:     Recurrent UTI  Mixed incontinence  History of rectal and cervical cancer    Tamara Jones is a 72-year-old female with recurrent UTI. She has experienced over 5 documented urinary tract infections over the past year. Her urinalysis today shows trace blood, moderate leukocytes. Urine will be sent for culture. Will notify patient if culture is positive for infection. She most recently started d-mannose and cranberry extract. I recommended to continue taking this to see if this offers her benefit. As a next step, will try Hiprix. Reports some urinary leakage, incontinence. May be candidate for Myrbetriq, we'll touch base with this at next appointment. Return in about 4 weeks (around 11/28/2017) for recurrent UTI.     Kunal Hassan [address] [facility]

## 2017-11-01 ENCOUNTER — HOSPITAL ENCOUNTER (OUTPATIENT)
Dept: CARDIAC REHAB | Age: 82
Setting detail: THERAPIES SERIES
Discharge: HOME | End: 2017-11-01
Payer: MEDICARE

## 2017-11-01 PROCEDURE — G0422 INTENS CARDIAC REHAB W/EXERC: HCPCS

## 2017-11-01 PROCEDURE — G0423 INTENS CARDIAC REHAB NO EXER: HCPCS

## 2017-11-01 NOTE — PROGRESS NOTES
Win MENDENHALL.:  1935    Acct Number: [de-identified]   MRN:  [id number]                             [facility] HEALTHY MIND-SET WORKSHOP               Date: 2017    Session # __________      Jeanice Nissen class covered:    ( )  Stress and Health Workshop:  [facility] patient will learn about the body's adaptive response that is triggered by a variety of stressors. Patient will gain insight into the toll that chronic stress takes on their health, both emotionally and physically. (x ) Taking Charge of Stress Workshop:  [facility] patient will learn and practice a variety of stress management techniques. Patient will be able to effectively apply coping mechanisms in perceived stressful situations. ( ) New Thoughts New Behaviors Workshop: [facility] patient will learn and practice techniques for developing effective health and lifestyle goals. Patient will be able to effectively apply the goal setting process learned to develop at least one new personal goal.     ( ) Managing Moods & Relationships Workshop:  [facility] patient will learn how emotional and chronic stress factors can impact their hearts. They will learn healthy ways to handle stress and utilize positive coping mechanisms. In addition, [facility] patient will learn ways to improve communication skills. Oren Olson actively participated and verbalized understanding. Total time in the Healthy Mind-Set class was 60 minutes.       Electronically signed by AMANDA Purdy on 2017 at 2:56 PM

## 2017-11-03 ENCOUNTER — TELEPHONE (OUTPATIENT)
Dept: UROLOGY | Age: 82
End: 2017-11-03

## 2017-11-03 ENCOUNTER — HOSPITAL ENCOUNTER (OUTPATIENT)
Dept: CARDIAC REHAB | Age: 82
Setting detail: THERAPIES SERIES
Discharge: HOME | End: 2017-11-03
Payer: MEDICARE

## 2017-11-03 PROCEDURE — G0423 INTENS CARDIAC REHAB NO EXER: HCPCS

## 2017-11-03 PROCEDURE — G0422 INTENS CARDIAC REHAB W/EXERC: HCPCS

## 2017-11-03 RX ORDER — CIPROFLOXACIN 500 MG/1
500 TABLET, FILM COATED ORAL 2 TIMES DAILY
Qty: 20 TABLET | Refills: 0 | Status: SHIPPED | OUTPATIENT
Start: 2017-11-03 | End: 2017-11-13

## 2017-11-03 NOTE — TELEPHONE ENCOUNTER
Please let patient now that her urine culture was positive for infection. Antibiotics sent to pharmacy, take until completion.     [employer]

## 2017-11-06 ENCOUNTER — HOSPITAL ENCOUNTER (OUTPATIENT)
Dept: CARDIAC REHAB | Age: 82
Setting detail: THERAPIES SERIES
Discharge: HOME | End: 2017-11-06
Payer: MEDICARE

## 2017-11-06 PROCEDURE — G0422 INTENS CARDIAC REHAB W/EXERC: HCPCS

## 2017-11-06 PROCEDURE — G0423 INTENS CARDIAC REHAB NO EXER: HCPCS

## 2017-11-06 NOTE — TELEPHONE ENCOUNTER
Patient notified and voiced understanding. She stated that she is calling [facility] to cancel the cipro Rx. I called the prescription into [place] for her.

## 2017-11-06 NOTE — TELEPHONE ENCOUNTER
Recommend taking antibiotic to try and erradicate infection as she has a history of Recurrent UTI. No other susceptible oral antibiotics.

## 2017-11-06 NOTE — PROGRESS NOTES
Hospital Facility-Based Program  Phase 2 Cardiac Rehab Weekly Progress Report      Patient prescribed exercise:  2:00 class. 3 times per week in rehab, 1-4 times per week at home for the amount of sessions/weeks specified by insurance. Current Levels: Treadmill: 1. 4mph/0% for 10 minutes, Schwinn Airdyne: Level 0.5 for 10 minutes,UBE: Level 0.3 for 5 minutes. Progression Discussion: Increase Aerobic exercise 15 minutes to work on endurance. Attempt to increase intensity by 5-20% for each modality this week. Try to increase intensities until Yenny rates the exercises a 13-17 on Nava RPE.

## 2017-11-06 NOTE — PROGRESS NOTES
Video Education Report - ICR/CR      Name:  Win Zapata    Date:  11/6/2017  MRN: [id number]     Session #:    Session Length: 45 min    Recommended Videos    Additional Videos   []01 [employer] - Program Overview  []17 Hypertension & Heart Disease  []02 Overview of Pritikin Eating Plan   []18 Cooking Breakfasts and Snacks  []03 Becoming a Pritikin    []19 Planning Your Eating Strategy  [x]04 Diseases of Our Time - Part 1   []20 Label Reading - Part 2  []05 Calorie Density     []21 Cooking Soups and Desserts  []06 Label Reading - Part 1    []22 How Our Thoughts Can Heal Our Hearts  []07 Move it      []23 Targeting Your Nutrition Priorities  []08 Healthy Minds, Bodies, Hearts   []24 Healthy Salads & Dressings  []09 Dining Out - Part 1    []25 Dining Out - Part 2  []10 Heart Disease Risk Reduction   []26 Cooking Dinner and Sides  []94 Metabolic Syndrome and Belly Fat  []27 Sleep Disorders  []12 Facts on Fat     []28 Menu Workshop  []13 Diseases of Our Time - Part 2   []29 Decoding Lab Results  []14 Biology of Weight Control   []30 Vitamins and Minerals  []15 Biomechanical Limitations   []31 Exercise Action Plan  []16 Nutrition Action Plan    []32 Body Composition         []33 Improving Performance         []34 Fueling a Healthy Body         []35 Introduction to Yoga         []36 Aging- Enhancing the Quality of Your Life         []37 Smoking Cessation      Comments:  Video completed      Electronically signed by Alvino Gaxiola on 11/6/2017 at 1:18 PM  Staff Signature/Date/Time

## 2017-11-09 NOTE — PLAN OF CARE
DASI: 5.62 METS DASI: ** METS DASI: ** METS DASI: ** METS DASI: ** METS   Return to Work  [employer] on returning to work? [] Yes              [] No   [] Disabled     [x] Retired         Orthopedic Limitations/  [x] Yes    [] No  If yes please list:  arthritis     Orthopedic Limitations  *If patient has orthopedic issue:   Actions/  accomodations needed to make Aubrey Escobedo successful : Orthopedic Limitations   Orthopedic Limitations   Orthopedic Limitations     Fall Risk  Fall risk assessed? [x] Yes      [] No    Balance Issues? [] Yes      [x] No     [] Walker [] Cane    [x] Safety issues reviewed      Fall Risk  *If patient is a fall risk, action needed to accommodate: N/A [address] Exercise  [x] Yes    [] No  Type: walking  Frequency: 3x/wk  Duration: 15 min Home Exercise  [x] Yes    [] No  Type: walking  Frequency:3x/wk  Duration: 15 min Home Exercise  [] Yes    [] No  Type: **  Frequency: **  Duration: ** Home Exercise  [] Yes    [] No  Type: **  Frequency: **  Duration: ** Home Exercise  [] Yes    [] No  Type: **  Frequency: **  Duration: **   Angina with Activity? [] Yes    [x] No  Angina Management: na Angina with Activity? [] Yes    [x] No  Angina Management: ** Angina with Activity? [] Yes    [] No  Angina Management: ** Angina with Activity? [] Yes    [] No  Angina Management: ** Angina with Activity?   [] Yes    [] No  Angina Management: **   EXERCISE PLAN EXERCISE PLAN EXERCISE PLAN EXERCISE PLAN EXERCISE PLAN   *Interventions* *Interventions* *Interventions* *Interventions* *Interventions*   Exercise Prescription  (per physician & CR staff) Exercise Prescription  (per physician & CR staff) Exercise Prescription  (per physician & CR staff) Exercise Prescription  (per physician & CR staff) Exercise Prescription  (per physician & CR staff)   Cardiovascular Cardiovascular Cardiovascular Cardiovascular Cardiovascular   Mode:    [x] Treadmill (TM)  [x] Cm Choudhury (AD)  [x] Arms Ergometer (AE)  [] NuStep  [] Elliptical (E) MODE:    [x] Treadmill (TM)  [x] Schwinn Airdyne (AD)  [x] Arms Ergometer (AE)  [] NuStep  [] Elliptical (E) MODE:    [] Treadmill (TM)  [] Schwinn Airdyne (AD)  [] Arms Ergometer (AE)  [] NuStep  [] Elliptical (E) MODE:    [] Treadmill (TM)  [] Schwinn Airdyne (AD)  [] Arms Ergometer (AE)  [] NuStep  [] Elliptical (E) MODE:    [] Treadmill (TM)  [] Schwinn Airdyne (AD)  [] Arms Ergometer (AE)  [] NuStep  [] Elliptical (E)   Initial Workloads  TM: [email or web address] 1.6 METs  AD: 0.4 level = 1.8 METs  NS: 16  Niño= 1.6 METs  AE: 0.2 level = 1.4 METs Current Workloads  TM: 1.6 @ 0%= 2.2 METs  AD: 0.6 level = 2.2 METs  AE: 0.4 level = 1.8 METs Current Workloads  TM:  @ %=  METs  AD:  level =  METs  NS:   Niño=  METs  AE:  level =  METs Current Workloads  TM:  @ %=  METs  AD:  level =  METs  NS:   Niño=  METs  AE:  level =  METs Current Workloads  TM:  @ %=  METs  AD:  level =  METs  NS:   Niño=  METs  AE:  level =  METs     Frequency:    ICR: 3x/week  Home: 2-3x/wk Frequency:   ICR: 3x/week  Home: 3x/wk Frequency:  ICR: 3x/week  Home: 3-4x/wk Frequency:  ICR: 3x/week  Home: 3-4x/wk Frequency:  Tamara Ache will continue exercise at  5-7 days/week   Duration:   Total aerobic exercise = 20-25 min    5-8 min/mode Duration:  Total aerobic exercises = 25-30 min     10 min/mode Duration:  Total aerobic exercises = ** min     **min/mode Duration:  Total aerobic exercises = ** min     **min/mode Duration:  Total erobic exercise =  60-90 min   Intensity:   MET Level = 1.6-1.8  RPE = 12-15 Intensity:  Max MET Level = 2.2  RPE = 12-15 Intensity:  Max MET Level = **  RPE = 12-15 Intensity:  Max MET Level = **  RPE = 12-15 Intensity:  Max MET Level = ** RPE = 12-15   Progression: increase aerobic activity up to 12 min over next 4 weeks by increasing time 2-3 min/week.  Progression:  Increase time to 15 min over the next couple weeks Progression:   Progression: Progression:  Increase time/intensity when RPE <13, and HR is in [facility]   [x] Yes      [] No  Upper and Lower body strength training 2x/wk    Wt: 2#       Reps:  8-15    *Increase wt. after completing 15 reps with an RPE of <12/13. [x] Yes      [] No  Upper and Lower body strength training 2x/wk    Wt: 3#       Reps: 8-15    *Increase wt. after completing 15 reps with an RPE of <12/13. [] Yes      [] No  Upper and Lower body strength training 2x/wk    Wt: **#       Reps:  **    *Increase wt. after completing 15 reps with an RPE of <12/13. [] Yes      [] No  Upper and Lower body strength training 2x/wk    Wt: **#       Reps:  **    *Increase wt. after completing 15 reps with an RPE of <12/13. Continue Strength Training at home   [] Exercise Log & Strength training handout given     Wt: **#       Reps:  **    *Increase wt. after completing 15 reps with an RPE of <12/13. Flexibility Flexibility Flexibility Flexibility Flexibility   [x] Yes      [] No  25 min session of Core Strength & Flexibility 1x/per week  Attends Core Strength & Flexibility   [x] Yes      [] No Attends Core Strength & Flexibility   [] Yes      [] No Attends Core Strength & Flexibility   [] Yes      [] No Continue Core Strength & Flexibility at home   Home Exercise  *Yenny verbalizes planning to continue walking 3-4 days/week for 15-25 min on days not in rehab. Home Exercise  *Yenny verbalizes planning to continue home walking Home Exercise  *Yenny verbalizes planning to ** ** days/week for ** min on days not in rehab. Home Exercise  *Yenny verbalizes planning to ** ** days/week for ** min on days not in rehab.  Home Exercise  *Yenny verbalizes his/her plan to ** ** days/week for ** min @ **   *Education* *Education* *Education* *Education* *Education*   RPE Scale  [x] Yes      [] No  Exercise Safety  [x] Yes      [] No  Equipment Orientation  [x] Yes      [] No  S/S to Report  [x] Yes      [] No  Warm Up/Cool Down  [x] Yes      [] No  Physically Active  [x] Yes      [] No    All Exercise Education Completed  [] Yes      [] No   *Goals* *Goals* *Goals* *Goals* *Goals*   Initial Exercise Goals Exercise Goals  Exercise Goals   Exercise Goals  Exercise Goals   Yenny plans to:  [x] Attend exercise sessions 3x/wk  [x] initiate home exercise 2-3x/wk for 10-20 min  [x] Increase 6 min walk distance by 10%  [] ** Yenny plans to:  [x] Attend exercise sessions 3x/wk  [x] continue home exercise 2-3x/wk for 20-30 min  [] ** Yenny's plans to:  [x] Attend exercise sessions 3x/wk  [x] continue home exercise 3-4x/wk for 30-45 min  [x] Determine plan of exercise following rehab  [] ** Yenny's plans to:  **   Yenny achieved exercise goals?    Yes    [] No  If no, why?  **  [] Increased 6 min walk distance by 10%  [] Currently exercising 30-60 min/day, 5-7days/wk   [] Plans to continue exercise on own  [] Plans to join a local fitness center to continue exercise  [] Does not plan to continue to exercise after rehab   Return to ADL or Hobbies:  Magda He would like to improve strength and endurance so she is able to return to lifting Return to ADL or Hobbies:  Magda He would like to improve strength and endurance so he/she is able to return to lifting Return to ADL or Hobbies:  Magda He would like to improve strength and endurance so he/she is able to return to ** Return to ADL or Hobbies:  Magda He would like to improve strength and endurance so he/she is able to return to ** Return to ADL or Hobbies:  Magda He would like to improve strength and endurance so he/she is able to return to **             Individual Cardiac Treatment Plan - Nutrition  NUTRITION  ASSESSMENT/PLAN NUTRITION  REASSESSMENT NUTRITION   REASSESSMENT NUTRITION   REASSESSMENT NUTRITION  DISCHARGE/FOLLOW-UP   Stages of Change Stages of Change Stages of Change Stages of Change Stages of Change   [] Pre Contemplation  [] Management Referral  [] Other: Professional Referral  Please check if needed. [] Dietician Consult   [] Wt. Management Referral  [] Other:   *Education* *Education* *Education* *Education* *Education*   Nutritional Education Recommended    [x] 1:1 Registered Dietitian    Workshops  [x] Label Reading   [x] Menu  [x] Targeting Nutrition Priorities  [x] Fueling a Healthy Body   Nutritional Education Attended/Date    None yet Nutritional Education Attended/Date Nutritional Education Attended/Date All Sessions Completed? [] Yes  [] No   Cooking School    [x] 11 sessions recommended    Cooking School  Sessions Completed    10/20/17  [x]Soups & Simple     Sauces  10/27/17  [x]Simple Sides  11/3/17  [x]Appetizers &     [employer]   Cooking School  Sessions Completed    []Adding Flavor  []Fast & Healthy     Breakfasts  []Salads & Dressings  []Delicious Desserts  []Plant Proteins  []Fast Evening Meals  [] Weekend Breakfasts  []Cook once, Eat       twice [facility] [place]  Sessions Completed     Cooking School    # of sessions completed:  **   *Goals* *Goals* *Goals* *Goals* *Goals*   Yenny's nutritional goals are as follows:  Complete and return diet survey Yenny's nutritional goals are as follows:  [x] Attend Nutrition Workshops  [x] Attend 1:1   [x] Attend Cooking Classes  [] ** Yenny's nutritional goals are as follows:  [] Attend Nutrition Workshops  [] Attend 1:1   [] Attend Cooking Classes  [] Complete and return diet survey  [] ** Yenny's nutritional goals are as follows:  [] Attend Nutrition Workshops  [] Attend 1:1   [] Attend Cooking Classes  [] ** Yenny achieved nutritional goals   [] Yes    [] No  If no, why?   Use knowledge gained to continue Pritikin eating plan at home       Individual Cardiac Treatment Plan - Psychosocial  PSYCHOSOCIAL  ASSESSMENT/PLAN PSYCHOSOCIAL  REASSESSMENT PSYCHOSOCIAL   REASSESSMENT PSYCHOSOCIAL   REASSESSMENT PSYCHOSOCIAL  DISCHARGE/FOLLOW-UP   Stages of Change Stages of Change Stages of Change Stages of Change Stages of Change   [] Pre Contemplation  [] Contemplation  [] Preparation  [] Action  [x] Maintenance  [] Relapse [] Pre Contemplation  [] Contemplation  [] Preparation  [] Action  [x] Maintenance  [] Relapse [] Pre Contemplation  [] Contemplation  [] Preparation  [] Action  [] Maintenance  [] Relapse [] Pre Contemplation  [] Contemplation  [] Preparation  [] Action  [] Maintenance  [] Relapse [] Pre Contemplation  [] Contemplation  [] Preparation  [] Action  [] Maintenance  [] Relapse   PSYCHOSOCIAL ASSESSMENT PSYCHOSOCIAL ASSESSMENT PSYCHOSOCIAL ASSESSMENT PSYCHOSOCIAL ASSESSMENT PSYCHOSOCIAL ASSESSMENT   Behavioral Outcomes Behavioral Outcomes Behavioral Outcomes Behavioral Outcomes Behavioral Outcomes   Tool Used:  Beckie & Virgilio, Quality of Life Index, Cardiac Version IV  *Given to patient to complete. Tool Used:    Beckie & Virgilio, Quality of Life Index, Cardiac Version IV   QOL Index Score: 23.92  HF:23.18  S&E:23.57  P&S: 23.5  Family: 27.88   Tool Used:     Beckie & Virgilio, Quality of Life Index, Cardiac Version IV  QOL Index Score: **    HF:**  S&E:**  P&S: **  Family: **   PHQ-9 score 3  Depression Severity  [x]Minimal  []Mild   []Moderate  []Moderately Severe  []Severe    PHQ-9 score **  Depression Severity  []Minimal  []Mild   []Moderate  []Moderately Severe []Severe   Does patient have Family Support? [x] Yes      [] No  No signs of marital/family distress       Using a scale of 0-10, 0=none, 10=very:   Rate your depression: 0  Rate your anxiety:  0  Using a scale of 0-10, 0=none, 10=very:   Rate your depression: 0  Rate your anxiety:  0 Using a scale of 0-10, 0=none, 10=very:   Rate your depression: **  Rate your anxiety:  ** Using a scale of 0-10, 0=none, 10=very:   Rate your depression: **  Rate your anxiety:  ** Using a scale of 0-10, 0=none, 10=very:   Rate your depression: **  Rate your anxiety:  **   Signs and Symptoms of Depression Present?     [] Yes [x] No Signs and Symptoms of Depression Present? [] Yes      [x] No Signs and Symptoms of Depression Present? [] Yes      [] No  If yes, please explain:  ** Signs and Symptoms of Depression Present? [] Yes      [] No  If yes, please explain:  ** Signs and Symptoms of Depression Present? [] Yes      [] No  If yes, please explain:  **   Signs and Symptoms of Anxiety Present? [] Yes      [x] No   Signs and Symptoms of Anxiety Present? [] Yes      [x] No Signs and Symptoms of Anxiety Present? [] Yes      [] No  If yes, please explain:  ** Signs and Symptoms of Anxiety Present? [] Yes      [] No  If yes, please explain:  ** Signs and Symptoms of Anxiety Present? [] Yes      [] No  If yes, please explain:  **   [] Patient has poor eye contact   [] Flat affect present. [] Signs of anxiety, anger or hostility    [] Signs social isolation present. []  Signs of alcohol or substance abuse       PSYCHOSOCIAL PLAN PSYCHOSOCIAL PLAN PSYCHOSOCIAL PLAN PSYCHOSOCIAL PLAN PSYCHOSOCIAL PLAN   *Interventions* *Interventions* *Interventions* *Interventions* *Interventions*   *Please check if needed  [] Psych Consult  [] Physician Referral  [x] Stress Management Skills *Please check if needed  [] Psych Consult  [] Physician Referral  [x] Stress Management Skills *Please check if needed  [] Psych Consult  [] Physician Referral  [] Stress Management Skills *Please check if needed  [] Psych Consult  [] Physician Referral  [] Stress Management Skills *Please check if needed  [] Psych Consult  [] Physician Referral  [] Stress Management Skills   Is patient currently taking anti-depressant or anti-anxiety medications? [] Yes      [x] No Change in anti-depressant or anti-anxiety medications? [] Yes      [x] No   Change in anti-depressant or anti-anxiety medications? [] Yes      [] No  If yes, please list medications:  ** Change in anti-depressant or anti-anxiety medications?   [] Yes      [] No  If yes, please Preparation  [] Action  [] Maintenance  [] Relapse [] Pre Contemplation  [] Contemplation  [] Preparation  [] Action  [] Maintenance  [] Relapse   RISK FACTOR/EDUCATION ASSESSMENT RISK FACTOR/EDUCATION ASSESSMENT RISK FACTOR/EDUCATION ASSESSMENT RISK FACTOR/EDUCATION ASSESSMENT RISK FACTOR /EDUCATION ASSESSMENT   Hypertension  [x] Yes      [] No    Resting BP: 122/76  Peak Ex BP:124/60  Medication: metoprolol Hypertension  Resting BP: 132/82  Peak Ex BP:142/70  Medication Changes:  [] Yes      [x] No Hypertension  Resting BP: **  Peak Ex BP:**  Medication Changes:  [] Yes      [] No Hypertension  Resting BP: **  Peak Ex BP:**  Medication Changes:  [] Yes      [] No Hypertension  Resting BP: **  Peak Ex BP:**  Medication Changes:  [] Yes      [] No   Lipids  HLD/DLD  [x] Yes      [] No  TOTAL CHOL: 153  HDL:  53  LDL:  77  TRI  Medication: zocor Lipids  Medication Changes:  [] Yes      [x] No     Lipids  Medication Changes:  [] Yes      [] No     Lipids  Medication Changes:  [] Yes      [] No     Lipids    TOTAL CHOL: **  HDL:  **  LDL:  **  TRIG:  **  Medication Changes:  [] Yes      [] No   Diabetes  [] Yes      [x] No         Tobacco Use  [] Current  [x] Former  [] Never    Years smoked: 15    Date Quit:   Quit date set:  [] Yes      [] No    Smokeless Tobacco use:   [] Yes      [x] No   Tobacco Use  Change in smoking status   [] Yes      [x] No       Tobacco Use  Change in smoking status   [] Yes      [] No    Quit date: **   Tobacco Use  Change in smoking status   [] Yes      [] No    Quit date: ** Tobacco Use  Change in smoking status   [] Yes      [] No    Quit date: **            Learning Barriers  Please select one:  [] Speech  [] Literacy  [] Hearing  [] Cognitive  [] Vision  [x] Ready to Learn Learning Barriers Addressed:   Ready to learn   Learning Barriers Addressed:   [] Yes      [] No   Learning Barriers Addressed:  [] Yes      [] No Learning Barriers Addressed:  [] Yes      [] No     RISK FACTOR/EDUCATION PLAN RISK FACTOR/EDUCATION PLAN RISK FACTOR/EDUCATION PLAN RISK FACTOR/EDUCATION PLAN RISK FACTOR/EDUCATION PLAN   *Interventions* *Interventions* *Interventions* *Interventions* *Interventions*   Recommended Educational Videos    [x] Intake & The Pritikin Solution Program Overview  [x] Overview of The Pritikin Eating Plan  [x] Becoming A Pritikin   [x] Diseases of Our Time-Part 1  [x] Calorie Density  [x] Label Reading-Part 1  [x] Move It! [x] Healthy Minds, Bodies, Hearts  [x] Dining Out-Part 1  [x] Heart Disease Risk Reduction  [x] Metabolic Syndrome & Belly Fat  [x] Facts on Fat  [x] Diseases of Our Times-Part 2  [x] Biology of Weight Control  [x] Biomechanical Limitations  [x] Nurtition Action Plan   Completed Videos      10/10/17  Intake & The Pritikin Solution Program Overview    10/16/17  Heart Disease Risk Reduction    10/23/17  Overview of The Pritikin Eating Plan    10/30/17  Becoming A Pritikin     11/6/17  Diseases of Our Time-Part 1 Completed Videos Completed Videos Recommended Educational Videos Completed    [] Yes      [] No    **If not completed, Why? **          Smoking Cessation/Relaspe Prevention Intervention needed? [] Yes      [x] No       Professional Referrals:  *Please check if needed  [] Diabetes Clinic  [] Lipid Clinic   [] Other:     Professional Referrals:  *Please check if needed  [] Diabetes Clinic  [] Lipid Clinic   [] Other:   Preventative Medication Preventative Medication Preventative Medication Preventative Medication Preventative Medication   Aspirin  [x] Yes    [] No  Blood Thinner: Clopidogrel/Effient/Brillinta  [x] Yes    [] No  Beta Blocker  [x] Yes    [] No  Ace Inhibitor  [] Yes    [x] No  Statin/Lipid Lowering  [x] Yes    [] No Medication Changes? [] Yes    [x] No Medication Changes? [] Yes    [] No Medication Changes? [] Yes    [] No Medication Changes?   [] Yes    [] No   *Education* *Education* *Education* *Education* *Education*   Does education sessions  [x] Takes medications as prescribed 100% of the time   [] ** Yenny achieved risk factor goals?   [] Yes    [] No  If no, why?  **     Monitored telemetry has revealed NSR  [] documented arrhythmia at increasing workloads  [] associated symptoms  Monitored telemetry has revealed NSR  [] documented arrhythmia at increasing workloads  [] associated symptoms ** Monitored telemetry has revealed  [] documented arrhythmia at increasing workloads  [] associated symptoms ** Monitored telemetry has revealed **  [] documented arrhythmia at increasing workloads  [] associated symptoms ** Monitored telemetry has revealed **  [] documented arrhythmia at increasing workloads  [] associated symptoms **   Physician Response    [x] Cardiac rehab is reasonably and medically necessary for continuous cardiac monitoring surveillance  of patient's cardiac activity  [x] Initiate continuous telemerty monitoring and notify me with any concerns  [] Other   Physician Response    [x] Cardiac rehab is reasonably and medically necessary for continuous cardiac monitoring surveillance  of patient's cardiac activity  [x] Continue continuous telemerty monitoring and notify me with any concerns  [] Other     Physician Response      [x] Cardiac rehab is reasonably and medically necessary for continuous cardiac monitoring surveillance  of patient's cardiac activity  [x] Continue continuous telemerty monitoring and notify me with any concerns   [] Other     Physician Response    [x] Cardiac rehab is reasonably and medically necessary for continuous cardiac monitoring surveillance  of patient's cardiac activity  [x] Continue continuous telemerty monitoring and notify me with any concerns   [] Other      Electronically signed by Iliana Barcenas on 10/10/2017 at 10:59 AM  Rehab Staff Signature Electronically signed by Iliana Barcenas on 11/9/2017 at 8:22 AM    Rehab Staff Signature **  Rehab Staff   Signature **  Rehab Staff Signature **  Rehab Staff Signature

## 2017-11-13 NOTE — PROGRESS NOTES
[facility] Facility-Based Program  Phase 2 Cardiac Rehab Weekly Progress Report      Patient prescribed exercise:  1:00 class. 3 times per week in rehab, 1-4 times per week at home for the amount of sessions/weeks specified by insurance. Current Levels: Treadmill: 1. 6mph/0% for 10 minutes, Schwinn Airdyne: Level 0.6 for 10 minutes, UBE: Level 0.4 for 5 minutes. Progression Discussion: Increase Aerobic exercise 15 minutes to work on endurance. Attempt to increase intensity by 5-20% for each modality this week. Try to increase intensities until Yenny rates the exercises a 13-17 on Nava RPE.

## 2017-11-15 ENCOUNTER — HOSPITAL ENCOUNTER (OUTPATIENT)
Dept: CARDIAC REHAB | Age: 82
Setting detail: THERAPIES SERIES
Discharge: HOME | End: 2017-11-15
Payer: MEDICARE

## 2017-11-15 PROCEDURE — G0422 INTENS CARDIAC REHAB W/EXERC: HCPCS

## 2017-11-15 PROCEDURE — G0423 INTENS CARDIAC REHAB NO EXER: HCPCS

## 2017-11-15 NOTE — PROGRESS NOTES
Video Education Report - ICR/CR      Name:  Jose R Kumar     Date:  11/15/2017  MRN: [id number]     Session #:    Session Length: 45 min    Recommended Videos    Additional Videos   []01 Pritikin [employer] - Program Overview  []17 Hypertension & Heart Disease  []02 Overview of Pritikin Eating Plan   []18 Cooking Breakfasts and Snacks  []03 Becoming a Pritikin    []19 Planning Your Eating Strategy  []04 Diseases of Our Time - Part 1   [x]20 Label Reading - Part 2  []05 Calorie Density     []21 Cooking Soups and Desserts  []06 Label Reading - Part 1    []22 How Our Thoughts Can Heal Our Hearts  []07 Move it      []23 Targeting Your Nutrition Priorities  []08 Healthy Minds, Bodies, Hearts   []24 Healthy Salads & Dressings  []09 Dining Out - Part 1    []25 Dining Out - Part 2  []10 Heart Disease Risk Reduction   []26 Cooking Dinner and Sides  []41 Metabolic Syndrome and Belly Fat  []27 Sleep Disorders  []12 Facts on Fat     []28 Menu Workshop  []13 Diseases of Our Time - Part 2   []29 Decoding Lab Results  []14 Biology of Weight Control   []30 Vitamins and Minerals  []15 Biomechanical Limitations   []31 Exercise Action Plan  []16 Nutrition Action Plan    []32 Body Composition         []33 Improving Performance         []34 Fueling a Healthy Body         []35 Introduction to Yoga         []36 Aging- Enhancing the Quality of Your Life         []37 Smoking Cessation      Comments:  Video completed,        Electronically signed by Kai Velasquez on 11/15/2017 at 1:40 PM  Staff Signature/Date/Time

## 2017-11-17 ENCOUNTER — HOSPITAL ENCOUNTER (OUTPATIENT)
Dept: CARDIAC REHAB | Age: 82
Setting detail: THERAPIES SERIES
Discharge: HOME | End: 2017-11-17
Payer: MEDICARE

## 2017-11-17 PROCEDURE — G0422 INTENS CARDIAC REHAB W/EXERC: HCPCS

## 2017-11-17 PROCEDURE — G0423 INTENS CARDIAC REHAB NO EXER: HCPCS

## 2017-11-17 NOTE — PROGRESS NOTES
Maximiliano MCKENNA.O.B.:  1935    Acct Number: [de-identified]   MRN:  [id number]                             [facility] COOKING SCHOOL WORKSHOP             Date: 2017    Session # ________    Wilbert Waynoka class covered:      () Adding Flavor     () Fast Breakfasts     () Salads and Dressings     () Soups and Sauces     () Simple Sides     () Appetizers and Snacks     () Delicious Desserts     (x) Plant Based Proteins     () Fast Evening Meals     () Weekend Breakfasts     () Efficiency Cooking        Patients were shown how to choose, prep, and cook; substitutions and other options were given. Samples were offered. Recipes were given and questions answered. The patient above was in the [employer] for 45 minutes.       Electronically signed by Elle Hernandez on 2017 at 3:16 PM

## 2017-11-20 ENCOUNTER — HOSPITAL ENCOUNTER (OUTPATIENT)
Dept: CARDIAC REHAB | Age: 82
Setting detail: THERAPIES SERIES
Discharge: HOME | End: 2017-11-20
Payer: MEDICARE

## 2017-11-20 PROCEDURE — G0423 INTENS CARDIAC REHAB NO EXER: HCPCS

## 2017-11-20 PROCEDURE — G0422 INTENS CARDIAC REHAB W/EXERC: HCPCS

## 2017-11-20 NOTE — PROGRESS NOTES
Video Education Report - ICR/CR      Name:  Monika Brooks     Date:  11/20/2017  MRN: [id number]     Session #:    Session Length: 45 min    Recommended Videos    Additional Videos   []01 [employer] - Program Overview  []17 Hypertension & Heart Disease  []02 Overview of Pritikin Eating Plan   []18 Cooking Breakfasts and Snacks  []03 Becoming a Pritikin    []19 Planning Your Eating Strategy  []04 Diseases of Our Time - Part 1   []20 Label Reading - Part 2  [x]05 Calorie Density     []21 Cooking Soups and Desserts  []06 Label Reading - Part 1    []22 How Our Thoughts Can Heal Our Hearts  []07 Move it      []23 Targeting Your Nutrition Priorities  []08 Healthy Minds, Bodies, Hearts   []24 Healthy Salads & Dressings  []09 Dining Out - Part 1    []25 Dining Out - Part 2  []10 Heart Disease Risk Reduction   []26 Cooking Dinner and Sides  []42 Metabolic Syndrome and Belly Fat  []27 Sleep Disorders  []12 Facts on Fat     []28 Menu Workshop  []13 Diseases of Our Time - Part 2   []29 Decoding Lab Results  []14 Biology of Weight Control   []30 Vitamins and Minerals  []15 Biomechanical Limitations   []31 Exercise Action Plan  []16 Nutrition Action Plan    []32 Body Composition         []33 Improving Performance         []34 Fueling a Healthy Body         []35 Introduction to Yoga         []36 Aging- Enhancing the Quality of Your Life         []37 Smoking Cessation      Comments:  Video completed,       Electronically signed by Kimberly Limon on 11/20/2017 at 1:11 PM  Staff Signature/Date/Time

## 2017-11-20 NOTE — PROGRESS NOTES
Hospital Facility-Based Program  Phase 2 Cardiac Rehab Weekly Progress Report      Patient prescribed exercise:  2:00 class. 3 times per week in rehab, 1-4 times per week at home for the amount of sessions/weeks specified by insurance. Current Levels: Treadmill: 1. 4mph/0% for 10 minutes, Schwinn Airdyne: Level 0.5 for 10 minutes, UBE: Level 0.3 for 5 minutes. Progression Discussion: Maintain/Increase Aerobic exercise 25-30 minutes to work on endurance. Attempt to increase intensity by 5-20% for each modality this week. Try to increase intensities until Yenny rates the exercises a 13-17 on Nava RPE.

## 2017-11-21 ENCOUNTER — TELEPHONE (OUTPATIENT)
Dept: UROLOGY | Age: 82
End: 2017-11-21

## 2017-11-21 DIAGNOSIS — R35.0 FREQUENCY OF URINATION: Primary | ICD-10-CM

## 2017-11-21 NOTE — TELEPHONE ENCOUNTER
Pt. Honey Potash her last pill for antibiotic on Thursday 11/16/2017 for a UTI. Pt. Used a strip this morning and tested her urine and states is still turning purple. Pt. Is wondering what should do now. Pt. Is wondering if she can get a refill on antibiotic or what she should do. Pt[facility].     DOLV:10/31/2017  DONV:11/28/2017

## 2017-11-22 ENCOUNTER — APPOINTMENT (OUTPATIENT)
Dept: CARDIAC REHAB | Age: 82
End: 2017-11-22
Payer: MEDICARE

## 2017-11-24 ENCOUNTER — APPOINTMENT (OUTPATIENT)
Dept: CARDIAC REHAB | Age: 82
End: 2017-11-24
Payer: MEDICARE

## 2017-11-27 ENCOUNTER — HOSPITAL ENCOUNTER (OUTPATIENT)
Dept: CARDIAC REHAB | Age: 82
Setting detail: THERAPIES SERIES
Discharge: HOME | End: 2017-11-27
Payer: MEDICARE

## 2017-11-27 ENCOUNTER — TELEPHONE (OUTPATIENT)
Dept: UROLOGY | Age: 82
End: 2017-11-27

## 2017-11-27 PROCEDURE — G0422 INTENS CARDIAC REHAB W/EXERC: HCPCS

## 2017-11-27 PROCEDURE — G0423 INTENS CARDIAC REHAB NO EXER: HCPCS

## 2017-11-27 NOTE — TELEPHONE ENCOUNTER
11/27/17  Patient checking status of urine culture done early last week at [facility] to see if she needs to start any antibiotic prior to 11/28/17 appointment? Please advise.   [facility]   Thanks/blm

## 2017-11-27 NOTE — PROGRESS NOTES
Hospital Facility-Based Program  Phase 2 Cardiac Rehab Weekly Progress Report      Patient prescribed exercise:  2:00 class. 3 times per week in rehab, 1-4 times per week at home for the amount of sessions/weeks specified by insurance. Current Levels: Treadmill: 1. 4mph/2% for 12 minutes, Schwinn Airdyne: Level 0.5 for 12 minutes, UBE: Level 0.3 for 5 minutes. Progression Discussion: Increase Aerobic exercise 15 minutes to work on endurance. Attempt to increase intensity by 5-20% for each modality this week. Try to increase intensities until Yenny rates the exercises a 13-17 on Nava RPE.

## 2017-11-28 ENCOUNTER — OFFICE VISIT (OUTPATIENT)
Dept: UROLOGY | Age: 82
End: 2017-11-28
Payer: MEDICARE

## 2017-11-28 VITALS — WEIGHT: 154 LBS | HEIGHT: 64 IN | BODY MASS INDEX: 26.29 KG/M2

## 2017-11-28 DIAGNOSIS — R35.0 URINARY FREQUENCY: ICD-10-CM

## 2017-11-28 DIAGNOSIS — N39.0 RECURRENT UTI: Primary | ICD-10-CM

## 2017-11-28 LAB
BILIRUBIN URINE: NEGATIVE
BLOOD URINE, POC: NEGATIVE
CHARACTER, URINE: CLEAR
COLOR, URINE: YELLOW
GLUCOSE URINE: NEGATIVE MG/DL
KETONES, URINE: NEGATIVE
LEUKOCYTE CLUMPS, URINE: NORMAL
NITRITE, URINE: NEGATIVE
PH, URINE: 6
PROTEIN, URINE: NEGATIVE MG/DL
SPECIFIC GRAVITY, URINE: 1.01 (ref 1–1.03)
UROBILINOGEN, URINE: 0.2 EU/DL

## 2017-11-28 PROCEDURE — G8484 FLU IMMUNIZE NO ADMIN: HCPCS | Performed by: NURSE PRACTITIONER

## 2017-11-28 PROCEDURE — 1036F TOBACCO NON-USER: CPT | Performed by: NURSE PRACTITIONER

## 2017-11-28 PROCEDURE — 81003 URINALYSIS AUTO W/O SCOPE: CPT | Performed by: NURSE PRACTITIONER

## 2017-11-28 PROCEDURE — G8400 PT W/DXA NO RESULTS DOC: HCPCS | Performed by: NURSE PRACTITIONER

## 2017-11-28 PROCEDURE — G8427 DOCREV CUR MEDS BY ELIG CLIN: HCPCS | Performed by: NURSE PRACTITIONER

## 2017-11-28 PROCEDURE — G8598 ASA/ANTIPLAT THER USED: HCPCS | Performed by: NURSE PRACTITIONER

## 2017-11-28 PROCEDURE — G8419 CALC BMI OUT NRM PARAM NOF/U: HCPCS | Performed by: NURSE PRACTITIONER

## 2017-11-28 PROCEDURE — 1123F ACP DISCUSS/DSCN MKR DOCD: CPT | Performed by: NURSE PRACTITIONER

## 2017-11-28 PROCEDURE — 99213 OFFICE O/P EST LOW 20 MIN: CPT | Performed by: NURSE PRACTITIONER

## 2017-11-28 PROCEDURE — 4040F PNEUMOC VAC/ADMIN/RCVD: CPT | Performed by: NURSE PRACTITIONER

## 2017-11-28 PROCEDURE — 1090F PRES/ABSN URINE INCON ASSESS: CPT | Performed by: NURSE PRACTITIONER

## 2017-11-28 NOTE — PROGRESS NOTES
defense supplement       CRANBERRY PO Take 1 tablet by mouth as needed       Methylsulfonylmethane (MSM) 1000 MG CAPS Take by mouth as needed       Ibuprofen (ADVIL PO) Take by mouth as needed       Cholecalciferol (VITAMIN D3) 5000 UNITS TABS Take by mouth as needed       zolpidem (AMBIEN) 5 MG tablet Take 10 mg by mouth nightly as needed       magnesium (MAGNESIUM-OXIDE) 250 MG TABS tablet Take 250 mg by mouth daily       Calcium Carbonate-Vitamin D (CALCIUM + D) 600-200 MG-UNIT TABS Take 1 tablet by mouth as needed       Coenzyme Q10 (COQ-10 PO) Take 200 mg by mouth as needed       aspirin 81 MG EC tablet Take 81 mg by mouth daily. No current facility-administered medications for this visit. Past Medical History  Christy Mendez  has a past medical history of Anesthesia; CAD (coronary artery disease); Cancer [facility]); GERD (gastroesophageal reflux disease); Hyperlipidemia; Hypertension; Osteoarthritis; Shortness of breath; and UTI (urinary tract infection). Past Surgical History  The patient  has a past surgical history that includes Hemorrhoid surgery (1989, 08/17/2012); Colonoscopy (5/25/12); Hysterectomy (1966); Appendectomy (1969); other surgical history (1/3/2014); Rectal surgery; Cardiac catheterization (8/2012); and Coronary angioplasty with stent (09/11/2017). Family History  This patient's family history includes Alzheimer's Disease in her mother; Arthritis in her father; Cancer in her brother; Diabetes in her maternal aunt and maternal aunt; Heart Disease in her brother, brother, mother, sister, and sister; Stroke in her sister. Social History  Christy Mendez  reports that she quit smoking about 45 years ago. Her smoking use included Cigarettes. She has a 12.00 pack-year smoking history. She has never used smokeless tobacco. She reports that she does not drink alcohol or use drugs. Subjective:     Review of Systems  No problems with ears, nose or throat. No problems with eyes.   No chest pain, shortness of breath, abdominal pain, extremity pain or weakness, and no neurological deficits. No rashes.  symptoms per HPI. The remainder of the review of symptoms is negative. Objective:     PE:   Vitals:    11/28/17 1403   Weight: 154 lb (69.9 kg)   Height: 5' 4\" (1.626 m)       Constitutional: Alert and oriented times 3, no acute distress and cooperative to examination with appropriate mood and affect. HENT:   Head:        Normocephalic and atraumatic. Mouth/Throat:         Mucous membranes are normal.   Eyes:         EOM are normal. No scleral icterus. PERRLA. Neck:        Supple, symmetrical, trachea midline  Pulmonary/Chest:      Chest symmetric with normal A/P diameter,Normal respiratory rate and rhthym. No use of accessory muscles. Abdominal:         Soft. No tenderness. Bowel sounds present. Musculoskeletal:         Normal range of motion. No edema or tenderness of lower extremities. Extremities: No cyanosis, clubbing, or edema present. Neurological:        Alert and oriented. No cranial nerve deficit. [facility]:        Normal mood and affect.       Labs   Urine dip demonstrates   Results for POC orders placed in visit on 11/28/17   POCT Urinalysis No Micro (Auto)   Result Value Ref Range    Glucose, Ur Negative NEGATIVE mg/dl    Bilirubin Urine Negative     Ketones, Urine Negative NEGATIVE    Specific Gravity, Urine 1.010 1.002 - 1.03    Blood, UA POC Negative NEGATIVE    pH, Urine 6.00 5.0 - 9.0    Protein, Urine Negative NEGATIVE mg/dl    Urobilinogen, Urine 0.20 0.0 - 1.0 eu/dl    Nitrite, Urine Negative NEGATIVE    Leukocyte Clumps, Urine Moderate NEGATIVE    Color, Urine Yellow YELLOW-STR    Character, Urine Clear [id number].JO       Recent BUN/Creatinine:  Lab Results   Component Value Date    BUN 22 09/11/2017    CREATININE 0.9 09/11/2017       Radiology  The patient has had a CT abdomen pelvis with contrast which I have reviewed along with its accompanying report. The study demonstrates   FINDINGS: Lung bases:       No infiltrates or effusions. Large hiatal hernia. Coronary artery calcifications. Normal heart size. Abdomen pelvis   The liver, spleen, and pancreas are normal. Gallbladder is unremarkable.       The adrenals and kidneys are unremarkable other than mild pelviectasis bilaterally likely due to extrarenal pelves. No lymphadenopathy. Diverticulosis without evidence of diverticulitis. No evidence of bowel obstruction. Urinary bladder is unremarkable. Uterus is absent.       Bones   Severe levoscoliosis. No suspicious bone lesions. Impression   Stable CT abdomen pelvis. No lymphadenopathy. No acute findings       Assessment & Plan:     Recurrent UTI  Mixed incontinence  History of rectal and cervical cancer    Yenny follows up today for recurrent UTI, frequency and urgency. She is doing about the same. She had a negative urine culture performed last week, however she continued to take an old antibiotic she had from several years ago. Currently she complains of dull achy pain, no dysuria. Urinalysis shows moderate leukocytes, will send for culture. I advised her if she is experiencing UTI type symptoms to call our office, we can order a urine culture and if there is a positive infection we can identify the organism and treat properly with antibiotics. Discussed that this is a more sensitive, specific test compared to her home urine test.     Samples of Myrbetriq 25 mg given for frequency, urgency    Return in about 3 months (around 2/28/2018) for Recurrent UTI, OAB.     Berna Manzo CNP  Urology

## 2017-11-29 ENCOUNTER — HOSPITAL ENCOUNTER (OUTPATIENT)
Dept: CARDIAC REHAB | Age: 82
Setting detail: THERAPIES SERIES
Discharge: HOME | End: 2017-11-29
Payer: MEDICARE

## 2017-11-29 PROCEDURE — G0423 INTENS CARDIAC REHAB NO EXER: HCPCS

## 2017-11-29 PROCEDURE — G0422 INTENS CARDIAC REHAB W/EXERC: HCPCS

## 2017-11-29 NOTE — PROGRESS NOTES
Video Education Report - ICR/CR      Name:  Naomi Vizcarra     Date:  11/29/2017  MRN: [id number]     Session #:    Session Length: 40 min    Recommended Videos    Additional Videos   []01 [employer] - Program Overview  []17 Hypertension & Heart Disease  []02 Overview of Pritikin Eating Plan   []18 Cooking Breakfasts and Snacks  []03 Becoming a Pritikin    []19 Planning Your Eating Strategy  []04 Diseases of Our Time - Part 1   []20 Label Reading - Part 2  []05 Calorie Density     []21 Cooking Soups and Desserts  []06 Label Reading - Part 1    []22 How Our Thoughts Can Heal Our Hearts  []07 Move it      []23 Targeting Your Nutrition Priorities  []08 Healthy Minds, Bodies, Hearts   []24 Healthy Salads & Dressings  [x]09 Dining Out - Part 1    []25 Dining Out - Part 2  []10 Heart Disease Risk Reduction   []26 Cooking Dinner and Sides  []85 Metabolic Syndrome and Belly Fat  []27 Sleep Disorders  []12 Facts on Fat     []28 Menu Workshop  []13 Diseases of Our Time - Part 2   []29 Decoding Lab Results  []14 Biology of Weight Control   []30 Vitamins and Minerals  []15 Biomechanical Limitations   []31 Exercise Action Plan  []16 Nutrition Action Plan    []32 Body Composition         []33 Improving Performance         []34 Fueling a Healthy Body         []35 Introduction to Yoga         []36 Aging- Enhancing the Quality of Your Life         []37 Smoking Cessation      Comments:  Video completed.       Electronically signed by Jhon Valdivia RD, SHEKHAR on 11/29/2017 at 3:21 PM  Staff Signature/Date/Time

## 2017-12-01 ENCOUNTER — HOSPITAL ENCOUNTER (OUTPATIENT)
Dept: CARDIAC REHAB | Age: 82
Setting detail: THERAPIES SERIES
Discharge: HOME | End: 2017-12-01
Payer: MEDICARE

## 2017-12-01 PROCEDURE — G0423 INTENS CARDIAC REHAB NO EXER: HCPCS

## 2017-12-01 PROCEDURE — G0422 INTENS CARDIAC REHAB W/EXERC: HCPCS

## 2017-12-01 NOTE — PROGRESS NOTES
Alyse Duverney YOB: 1935    Acct Number: [de-identified]   MRN:  [id number]                             [facility] COOKING SCHOOL WORKSHOP             Date: 2017    Session # ________    [place] class covered:      () Adding Flavor     () Fast Breakfasts     () Salads and Dressings     () Soups and Sauces     () Simple Sides     () Appetizers and Snacks     () Delicious Desserts     () Plant Based Proteins     (x) Fast Evening Meals     () Weekend Breakfasts     () Efficiency Cooking        Patients were shown how to choose, prep, and cook; substitutions and other options were given. Samples were offered. Recipes were given and questions answered. The patient above was in the [employer] for 45 minutes.       Electronically signed by Kelley Costa on 2017 at 3:16 PM

## 2017-12-04 ENCOUNTER — HOSPITAL ENCOUNTER (OUTPATIENT)
Dept: CARDIAC REHAB | Age: 82
Setting detail: THERAPIES SERIES
Discharge: HOME | End: 2017-12-04
Payer: MEDICARE

## 2017-12-04 PROCEDURE — G0422 INTENS CARDIAC REHAB W/EXERC: HCPCS

## 2017-12-04 PROCEDURE — G0423 INTENS CARDIAC REHAB NO EXER: HCPCS

## 2017-12-04 NOTE — PLAN OF CARE
[address] [place] [address] Facility-Based Program  Individualized Cardiac Treatment Plan    Patient Name:  Win Zapata  :  1935  Age:  80 y.o. MRN:  [id number]  Diagnosis: PCI to LAD  Date of Event: 17   Physician:  Dr. Leonela Briseno  Next Office Visit:  3/2018  Date Entered Program: 10/10/17  Risk Stratifications: [] Low [x] Intermediate [] High  Allergies: Allergies   Allergen Reactions    Other Other (See Comments)     Movi Prep,    Facial and lip swelling    Lipitor Swelling    Ramipril Swelling     tongue    Sulfa Antibiotics Rash       Individual Cardiac Treatment Plan -EXERCISE  INITIAL 30 DAY 60 DAY 90 DAY FINAL DAY   EXERCISE  ASSESSMENT/PLAN EXERCISE  REASSESSMENT EXERCISE   REASSESSMENT EXERCISE   REASSESSMENT EXERCISE  DISCHARGE/FOLLOW-UP   Date: 10/10/17 Date:  17 Date: 17 Date: Date:   Session #1 Session # 25 Session # 32 Session # ** Session # **  Last session completed on **   Stages of Change Stages of Change Stages of Change Stages of Change Stages of Change   [] Pre Contemplation  [] Contemplation  [x] Preparation  [] Action  [] Maintenance  [] Relapse [] Pre Contemplation  [] Contemplation  [] Preparation  [x] Action  [] Maintenance  [] Relapse [] Pre Contemplation  [] Contemplation  [] Preparation  [x] Action  [] Maintenance  [] Relapse [] Pre Contemplation  [] Contemplation  [] Preparation  [] Action  [] Maintenance  [] Relapse [] Pre Contemplation  [] Contemplation  [] Preparation  [] Action  [] Maintenance  [] Relapse   EXERCISE ASSESSMENT EXERCISE ASSESSMENT EXERCISE ASSESSMENT EXERCISE ASSESSMENT EXERCISE ASSESSMENT   6 Min Walk Test  Distance walked:   0.07 miles  370 ft.  1.54 METs  Max HR:65 BPM      RPE:  12    THR:    Rhythm:  Sinus Leandra Hayward was not in rehab today due to illness.    6 Min Walk Test  Distance walked:   ** miles  ** ft  ** METs  Max HR:** BPM      RPE:  **  %Change ft= **    Rhythm: Progression:  Increase time to 15 min over the next couple weeks Progression:  Increase time to 15 min/mode over the next 4 weeks. Progression: Progression:  Increase time/intensity when RPE <13, and HR is in [facility]   [x] Yes      [] No  Upper and Lower body strength training 2x/wk    Wt: 2#       Reps:  8-15    *Increase wt. after completing 15 reps with an RPE of <12/13. [x] Yes      [] No  Upper and Lower body strength training 2x/wk    Wt: 3#       Reps: 8-15    *Increase wt. after completing 15 reps with an RPE of <12/13. [x] Yes      [] No  Upper and Lower body strength training 2x/wk    Wt: 3#       Reps:  8-15    *Increase wt. after completing 15 reps with an RPE of <12/13. [] Yes      [] No  Upper and Lower body strength training 2x/wk    Wt: **#       Reps:  **    *Increase wt. after completing 15 reps with an RPE of <12/13. Continue Strength Training at home   [] Exercise Log & Strength training handout given     Wt: **#       Reps:  **    *Increase wt. after completing 15 reps with an RPE of <12/13. Flexibility Flexibility Flexibility Flexibility Flexibility   [x] Yes      [] No  25 min session of Core Strength & Flexibility 1x/per week  Attends Core Strength & Flexibility   [x] Yes      [] No Attends Core Strength & Flexibility   [x] Yes      [] No Attends Core Strength & Flexibility   [] Yes      [] No Continue Core Strength & Flexibility at home   Home Exercise  Jese verbalizes planning to continue walking 3-4 days/week for 15-25 min on days not in rehab. Home Exercise  *Yenny verbalizes planning to continue home walking [address] verbalizes she is not planning to exercise on days not in rehab. Home Exercise  *Yenny verbalizes planning to ** ** days/week for ** min on days not in rehab.  Home Exercise  *Yenny verbalizes his/her plan to ** ** days/week for ** min @ **   *Education* *Education* *Education* *Education* *Education*   RPE Scale  [x] Yes      [] No  Exercise Safety  [x] Yes      [] No  Equipment Orientation  [x] Yes      [] No  S/S to Report  [x] Yes      [] No  Warm Up/Cool Down  [x] Yes      [] No  Physically Active  [x] Yes      [] No    All Exercise Education Completed  [] Yes      [] No   *Goals* *Goals* *Goals* *Goals* *Goals*   Initial Exercise Goals Exercise Goals  Exercise Goals   Exercise Goals  Exercise Goals   Yenny plans to:  [x] Attend exercise sessions 3x/wk  [x] initiate home exercise 2-3x/wk for 10-20 min  [x] Increase 6 min walk distance by 10%  [] ** Yenny plans to:  [x] Attend exercise sessions 3x/wk  [x] continue home exercise 2-3x/wk for 20-30 min  [] ** Yenny's plans to:  [x] Attend exercise sessions 3x/wk  [x] continue home exercise 3-4x/wk for 30-45 min  [x] Determine plan of exercise following rehab  [] ** Yenny's plans to:  **   Yenny achieved exercise goals?    Yes    [] No  If no, why?  **  [] Increased 6 min walk distance by 10%  [] Currently exercising 30-60 min/day, 5-7days/wk   [] Plans to continue exercise on own  [] Plans to join a local fitness center to continue exercise  [] Does not plan to continue to exercise after rehab   Return to ADL or Hobbies:  Graciela Zhus would like to improve strength and endurance so she is able to return to lifting Return to ADL or Hobbies:  Graciela Hipps would like to improve strength and endurance so he/she is able to return to lifting Return to ADL or Hobbies:  Graciela Hipps would like to improve strength and endurance so he/she is able to return to doing daily household activities Return to ADL or Hobbies:  Toccopola Hipps would like to improve strength and endurance so he/she is able to return to ** Return to ADL or Hobbies:  Graciela Hipps would like to improve strength and endurance so he/she is able to return to **             Individual Cardiac Treatment Plan - Nutrition  NUTRITION  ASSESSMENT/PLAN NUTRITION  REASSESSMENT NUTRITION   REASSESSMENT NUTRITION   REASSESSMENT NUTRITION  DISCHARGE/FOLLOW-UP   Stages of Change Stages of Change Stages of Change Stages of Change Stages of Change   [] Pre Contemplation  [] Contemplation  [x] Preparation  [] Action  [] Maintenance  [] Relapse [] Pre Contemplation  [] Contemplation  [] Preparation  [x] Action  [] Maintenance  [] Relapse [] Pre Contemplation  [] Contemplation  [] Preparation  [x] Action  [] Maintenance  [] Relapse [] Pre Contemplation  [] Contemplation  [] Preparation  [] Action  [] Maintenance  [] Relapse [] Pre Contemplation  [] Contemplation  [] Preparation  [] Action  [] Maintenance  [] Relapse   NUTRITION ASSESSMENT NUTRITION ASSESSMENT NUTRITION ASSESSMENT NUTRITION ASSESSMENT NUTRITION ASSESSMENT   Weight Management  Weight: 154.7        Height: 5'4   BMI: 26.6  Weight Management  Weight: 156.8                 Weight Management  Weight: 155.4                 Weight Management  Weight: ** Weight Management  Weight: **                    BMI: **   Eating Plan  Current eating habits: none Eating Plan  Changes: same Eating Plan  Changes: Consume less salt and fat Eating Plan  Changes: Eating Plan Improvements:   Alcohol Use  [x] none          [] daily  [] weekly      [] special        Diet Assessment Tool:  RATE YOUR PLATE  *Given to patient to complete and return. Diet Assessment Tool:    Score: 45/69       Diet Assessment Tool: RATE YOUR PLATE  Score: **/63   NUTRITION PLAN NUTRITION PLAN NUTRITION PLAN NUTRITION PLAN NUTRITION PLAN   *Interventions* *Interventions* *Interventions* *Interventions* *Interventions*   Initial Survey given Goal Setting Discussion:   [x] Yes      [] No  Increase fruits and veggies, reduce snacks, lower fat intake   Follow Up Survey Reviewed & Goals Updated:     Professional Referral  Please check if needed. [] Dietician Consult   [] Wt. Management Referral  [] Other:  Professional Referral  Please check if needed.   [] Dietician Consult   [] Wt. Change in anti-depressant or anti-anxiety medications? [] Yes      [x] No   Change in anti-depressant or anti-anxiety medications? [] Yes      [] No  If yes, please list medications:  ** Change in anti-depressant or anti-anxiety medications? [] Yes      [] No  If yes, please list medications:  **   *Education* *Education* *Education* *Education* *Education*   Healthy Mind-Set Workshops Recommended  [x] Stress & Health  [x] Taking Charge of Stress  [x] New Thoughts, New Behaviors  [x] Managing Moods & Relationships Healthy Mind-Set Workshops Attended/Date    11/1/17  Taking Charge of Stress Healthy Mind-Set Workshops Attended/Date    NA Healthy Mind-Set Workshops Attended/Date Healthy Mind-Set Workshops  Completed  [] Yes      [] No   *Goals* *Goals* *Goals* *Goals* *Goals*   Yenny's psychosocial goals are as follows:  Complete HADS & Beckie Poole, Quality of Life Index, Cardiac Version IV Yenny's psychosocial goals are as follows:  [] Attend Healthy Mind-Set Workshops  [] Reduce depression symptom severity by 1 level  [] ** Yenny's psychosocial goals are as follows:  [x] Attend Healthy Mind-Set Workshops  [] Reduce depression symptom severity by 1 level  [] ** Yenny's psychosocial goals are as follows:  [] Attend Healthy Mind-Set Workshops  [] Reduce depression symptom severity by 1 level  [] ** Yenny achieved psychosocial goals?   [] Yes    [] No  If no, why?  **  [] Use methods learned to continue to reduce depression symptom severity by 1 level  [] **     Individual Cardiac Treatment Plan - Other:  Risk Factor/Education  RISK FACTOR  ASSESSMENT/PLAN RISK FACTOR  REASSESSMENT  RISK FACTOR  REASSESSMENT RISK FACTOR  REASSESSMENT RISK FACTOR   DISCHARGE/FOLLOW-UP   Stages of Change Stages of Change Stages of Change Stages of Change Stages of Change   [] Pre Contemplation  [] Contemplation  [x] Preparation  [] Action  [] Maintenance  [] Relapse [] Pre Contemplation  [] Contemplation  [] follows:    [x] Optimal BP <140/90  [] Blood Sugar <120  [x] Attend recommended video education sessions  [x] Takes medications as prescribed 100% of the time   [] ** Yenny's risk factor/education goals are as follows:    [x] Optimal BP <140/90  [] Blood Sugar <120  [x] Attend recommended video education sessions  [x] Takes medications as prescribed 100% of the time   [] ** Yenny achieved risk factor goals?   [] Yes    [] No  If no, why?  **     Monitored telemetry has revealed NSR  [] documented arrhythmia at increasing workloads  [] associated symptoms  Monitored telemetry has revealed NSR  [] documented arrhythmia at increasing workloads  [] associated symptoms ** Monitored telemetry has revealed NSR  [] documented arrhythmia at increasing workloads  [] associated symptoms ** Monitored telemetry has revealed **  [] documented arrhythmia at increasing workloads  [] associated symptoms ** Monitored telemetry has revealed **  [] documented arrhythmia at increasing workloads  [] associated symptoms **   Physician Response    [x] Cardiac rehab is reasonably and medically necessary for continuous cardiac monitoring surveillance  of patient's cardiac activity  [x] Initiate continuous telemerty monitoring and notify me with any concerns  [] Other   Physician Response    [x] Cardiac rehab is reasonably and medically necessary for continuous cardiac monitoring surveillance  of patient's cardiac activity  [x] Continue continuous telemerty monitoring and notify me with any concerns  [] Other     Physician Response      [x] Cardiac rehab is reasonably and medically necessary for continuous cardiac monitoring surveillance  of patient's cardiac activity  [x] Continue continuous telemerty monitoring and notify me with any concerns   [] Other     Physician Response    [x] Cardiac rehab is reasonably and medically necessary for continuous cardiac monitoring surveillance  of patient's cardiac activity  [x] Continue continuous

## 2017-12-06 ENCOUNTER — HOSPITAL ENCOUNTER (OUTPATIENT)
Dept: CARDIAC REHAB | Age: 82
Setting detail: THERAPIES SERIES
Discharge: HOME | End: 2017-12-06
Payer: MEDICARE

## 2017-12-06 PROCEDURE — G0422 INTENS CARDIAC REHAB W/EXERC: HCPCS

## 2017-12-06 PROCEDURE — G0423 INTENS CARDIAC REHAB NO EXER: HCPCS

## 2017-12-06 NOTE — PROGRESS NOTES
Alyse Duverney YOB: 1935    Acct Number: [de-identified]   MRN:  [id number]                             [facility] NUTRITION WORKSHOP             Date: 2017    Session # __________    Flynn Donald class covered:    1. () 1:1 Counseling Session  Receptiveness to education/goals:  ( ) Agreeable ( ) No Interest   ( ) Refused  Evaluation of education:  ( ) Indicates understanding   ( ) Needs reinforcement   () Unsuccessful   Readiness to change:    ( ) Pre-contemplative   ( ) Contemplative - ambivalent about change    ( ) Action - ready to set action plan and implement   ( ) Maintenance - has made change and is trying, and or practicing different alternative behaviors   Notes:    2. () Label Reading          3. (X) Menus      4. () Targeting Nutrition Priorities        5. () Fueling a Healthy Body    Tamara Jones was in the Workshop with the Dietitian for 50 minutes. The content was presented via PowerPoint, lecture, and patient participation based format. Patient voiced understanding.      Electronically signed by Irvin Garza RD, LD on 2017 at 2:58 PM

## 2017-12-08 ENCOUNTER — HOSPITAL ENCOUNTER (OUTPATIENT)
Dept: CARDIAC REHAB | Age: 82
Setting detail: THERAPIES SERIES
Discharge: HOME | End: 2017-12-08
Payer: MEDICARE

## 2017-12-08 PROCEDURE — G0423 INTENS CARDIAC REHAB NO EXER: HCPCS

## 2017-12-08 PROCEDURE — G0422 INTENS CARDIAC REHAB W/EXERC: HCPCS

## 2017-12-11 ENCOUNTER — HOSPITAL ENCOUNTER (OUTPATIENT)
Dept: CARDIAC REHAB | Age: 82
Setting detail: THERAPIES SERIES
Discharge: HOME | End: 2017-12-11
Payer: MEDICARE

## 2017-12-11 PROCEDURE — G0422 INTENS CARDIAC REHAB W/EXERC: HCPCS

## 2017-12-11 PROCEDURE — G0423 INTENS CARDIAC REHAB NO EXER: HCPCS

## 2017-12-11 NOTE — PROGRESS NOTES
Hospital Facility-Based Program  Phase 2 Cardiac Rehab Weekly Progress Report      Patient prescribed exercise:  1:00 class. 3 times per week in rehab, 1-4 times per week at home for the amount of sessions/weeks specified by insurance. Current Levels: Treadmill: 1. 8mph/2% for 15 minutes, Schwinn Airdyne: Level 0.6 for 15 minutes, NuStep:    Niño for   minutes, UBE: Level 0.4 for 5 minutes. Progression Discussion: Maintain/Increase Aerobic exercise to work on endurance. Attempt to increase intensity by 5-20% for each modality this week. Try to increase intensities until Yenny rates the exercises a 13-17 on Nava RPE.

## 2017-12-13 ENCOUNTER — HOSPITAL ENCOUNTER (OUTPATIENT)
Dept: CARDIAC REHAB | Age: 82
Setting detail: THERAPIES SERIES
Discharge: HOME | End: 2017-12-13
Payer: MEDICARE

## 2017-12-13 PROCEDURE — G0423 INTENS CARDIAC REHAB NO EXER: HCPCS

## 2017-12-13 PROCEDURE — G0422 INTENS CARDIAC REHAB W/EXERC: HCPCS

## 2017-12-15 ENCOUNTER — APPOINTMENT (OUTPATIENT)
Dept: CARDIAC REHAB | Age: 82
End: 2017-12-15
Payer: MEDICARE

## 2017-12-15 ENCOUNTER — HOSPITAL ENCOUNTER (OUTPATIENT)
Dept: CARDIAC REHAB | Age: 82
Setting detail: THERAPIES SERIES
Discharge: HOME | End: 2017-12-15
Payer: MEDICARE

## 2017-12-18 ENCOUNTER — HOSPITAL ENCOUNTER (OUTPATIENT)
Dept: CARDIAC REHAB | Age: 82
Setting detail: THERAPIES SERIES
Discharge: HOME | End: 2017-12-18
Payer: MEDICARE

## 2017-12-18 PROCEDURE — G0423 INTENS CARDIAC REHAB NO EXER: HCPCS

## 2017-12-18 PROCEDURE — G0422 INTENS CARDIAC REHAB W/EXERC: HCPCS

## 2017-12-20 ENCOUNTER — HOSPITAL ENCOUNTER (OUTPATIENT)
Dept: CARDIAC REHAB | Age: 82
Setting detail: THERAPIES SERIES
Discharge: HOME | End: 2017-12-20
Payer: MEDICARE

## 2017-12-22 ENCOUNTER — HOSPITAL ENCOUNTER (OUTPATIENT)
Dept: CARDIAC REHAB | Age: 82
Setting detail: THERAPIES SERIES
Discharge: HOME | End: 2017-12-22
Payer: MEDICARE

## 2017-12-22 PROCEDURE — G0422 INTENS CARDIAC REHAB W/EXERC: HCPCS

## 2017-12-22 PROCEDURE — G0423 INTENS CARDIAC REHAB NO EXER: HCPCS

## 2017-12-25 ENCOUNTER — APPOINTMENT (OUTPATIENT)
Dept: CARDIAC REHAB | Age: 82
End: 2017-12-25
Payer: MEDICARE

## 2017-12-27 ENCOUNTER — HOSPITAL ENCOUNTER (OUTPATIENT)
Dept: CARDIAC REHAB | Age: 82
Setting detail: THERAPIES SERIES
Discharge: HOME | End: 2017-12-27
Payer: MEDICARE

## 2017-12-27 ENCOUNTER — APPOINTMENT (OUTPATIENT)
Dept: CARDIAC REHAB | Age: 82
End: 2017-12-27
Payer: MEDICARE

## 2017-12-29 ENCOUNTER — HOSPITAL ENCOUNTER (OUTPATIENT)
Dept: CARDIAC REHAB | Age: 82
Setting detail: THERAPIES SERIES
Discharge: HOME | End: 2017-12-29
Payer: MEDICARE

## 2017-12-29 ENCOUNTER — APPOINTMENT (OUTPATIENT)
Dept: CARDIAC REHAB | Age: 82
End: 2017-12-29
Payer: MEDICARE

## 2018-01-03 ENCOUNTER — HOSPITAL ENCOUNTER (OUTPATIENT)
Dept: CARDIAC REHAB | Age: 83
Setting detail: THERAPIES SERIES
Discharge: HOME | End: 2018-01-03
Payer: MEDICARE

## 2018-01-03 NOTE — PLAN OF CARE
10/10/2017 at 10:59 AM  Rehab Staff Signature Electronically signed by Guadalupe Adler on 11/9/2017 at 8:22 AM     Rehab Staff Signature Electronically signed by Romi Garcia on 12/4/2017 at 1:18 PM  Rehab Staff   Signature **  Rehab Staff Signature            Cosigned by: Steffanie Sicard, DO at 10/11/2017 11:18 AM   Revision History      Date/Time User Provider Type Action   10/11/2017 11:18 AM Steffanie Sicard, DO Physician Cosign   10/10/2017 10:59 AM Guadalupe Adler Exercise Physiologist Sign       Cosigned by: Steffanie Sicard, DO at 11/9/2017  5:54 PM   Revision History      Date/Time User Provider Type Action   11/9/2017  5:54 PM Steffanie Sicard, DO Physician Cosign   11/9/2017  8:28 AM Guadalupe Adler Exercise Physiologist Addend   11/9/2017  8:22 AM Guadalupe Adler Exercise Physiologist Sign   View Details Report        Cosigned by: Steffanie Sicard, DO at 12/5/2017  5:38 PM   Revision History      Date/Time User Provider Type Action   12/5/2017  5:38 PM Steffanie Sicard, DO Physician [address]   12/4/2017  1:44 PM Susan Esteves Exercise Physiologist Sign

## 2018-01-05 ENCOUNTER — APPOINTMENT (OUTPATIENT)
Dept: CARDIAC REHAB | Age: 83
End: 2018-01-05
Payer: MEDICARE

## 2018-01-08 ENCOUNTER — APPOINTMENT (OUTPATIENT)
Dept: CARDIAC REHAB | Age: 83
End: 2018-01-08
Payer: MEDICARE

## 2018-01-10 ENCOUNTER — APPOINTMENT (OUTPATIENT)
Dept: CARDIAC REHAB | Age: 83
End: 2018-01-10
Payer: MEDICARE

## 2018-01-12 ENCOUNTER — APPOINTMENT (OUTPATIENT)
Dept: CARDIAC REHAB | Age: 83
End: 2018-01-12
Payer: MEDICARE

## 2018-01-22 RX ORDER — CLOPIDOGREL BISULFATE 75 MG/1
TABLET ORAL
Qty: 30 TABLET | Refills: 4 | Status: SHIPPED | OUTPATIENT
Start: 2018-01-22 | End: 2018-07-09 | Stop reason: SDUPTHER

## 2018-02-09 ENCOUNTER — OFFICE VISIT (OUTPATIENT)
Dept: UROLOGY | Age: 83
End: 2018-02-09
Payer: MEDICARE

## 2018-02-09 VITALS
BODY MASS INDEX: 26.29 KG/M2 | HEIGHT: 64 IN | DIASTOLIC BLOOD PRESSURE: 89 MMHG | SYSTOLIC BLOOD PRESSURE: 150 MMHG | WEIGHT: 154 LBS

## 2018-02-09 DIAGNOSIS — N39.46 MIXED INCONTINENCE: ICD-10-CM

## 2018-02-09 DIAGNOSIS — N39.0 RECURRENT UTI: Primary | ICD-10-CM

## 2018-02-09 DIAGNOSIS — N32.81 OVERACTIVE BLADDER: ICD-10-CM

## 2018-02-09 LAB
BILIRUBIN URINE: NEGATIVE
BLOOD URINE, POC: NEGATIVE
CHARACTER, URINE: CLEAR
COLOR, URINE: YELLOW
GLUCOSE URINE: NEGATIVE MG/DL
KETONES, URINE: NEGATIVE
LEUKOCYTE CLUMPS, URINE: NORMAL
NITRITE, URINE: NEGATIVE
PH, URINE: 5.5
POST VOID RESIDUAL (PVR): 0 ML
PROTEIN, URINE: NEGATIVE MG/DL
SPECIFIC GRAVITY, URINE: 1.01 (ref 1–1.03)
UROBILINOGEN, URINE: 0.2 EU/DL

## 2018-02-09 PROCEDURE — 4040F PNEUMOC VAC/ADMIN/RCVD: CPT | Performed by: NURSE PRACTITIONER

## 2018-02-09 PROCEDURE — 51798 US URINE CAPACITY MEASURE: CPT | Performed by: NURSE PRACTITIONER

## 2018-02-09 PROCEDURE — 1123F ACP DISCUSS/DSCN MKR DOCD: CPT | Performed by: NURSE PRACTITIONER

## 2018-02-09 PROCEDURE — G8400 PT W/DXA NO RESULTS DOC: HCPCS | Performed by: NURSE PRACTITIONER

## 2018-02-09 PROCEDURE — 0509F URINE INCON PLAN DOCD: CPT | Performed by: NURSE PRACTITIONER

## 2018-02-09 PROCEDURE — 81003 URINALYSIS AUTO W/O SCOPE: CPT | Performed by: NURSE PRACTITIONER

## 2018-02-09 PROCEDURE — 1090F PRES/ABSN URINE INCON ASSESS: CPT | Performed by: NURSE PRACTITIONER

## 2018-02-09 PROCEDURE — 99213 OFFICE O/P EST LOW 20 MIN: CPT | Performed by: NURSE PRACTITIONER

## 2018-02-09 PROCEDURE — 1036F TOBACCO NON-USER: CPT | Performed by: NURSE PRACTITIONER

## 2018-02-09 PROCEDURE — G8427 DOCREV CUR MEDS BY ELIG CLIN: HCPCS | Performed by: NURSE PRACTITIONER

## 2018-02-09 PROCEDURE — G8598 ASA/ANTIPLAT THER USED: HCPCS | Performed by: NURSE PRACTITIONER

## 2018-02-09 PROCEDURE — G8484 FLU IMMUNIZE NO ADMIN: HCPCS | Performed by: NURSE PRACTITIONER

## 2018-02-09 PROCEDURE — G8419 CALC BMI OUT NRM PARAM NOF/U: HCPCS | Performed by: NURSE PRACTITIONER

## 2018-02-09 NOTE — PROGRESS NOTES
[facility] PROFESSIONAL SERVS  [facility]  [address] [address]  [address]  Dept: [phone]  Loc: [phone]  Visit Date: 2/9/2018      HPI:     Miguel Ángel Way follows up today for recurrent UTI, urinary frequency/urgency. Symptoms are the same. Denies any UTI's since previous visit. She remains on cranberry extract and d-mannose. She was given samples of Myrbetriq at previous visit for urinary frequency, urgency and incontinence, however she did not take the medication due to not wanting to take another pill. Urinalysis shows trace leukocytes. PVR is 0 ml. She received chemotherapy and radiation for her rectal cancer in the past.  She comes in today by herself. History is obtained from the patient the medical record    Current Outpatient Prescriptions   Medication Sig Dispense Refill    clopidogrel (PLAVIX) 75 MG tablet TAKE 1 TABLET BY MOUTH ONE TIME A DAY  30 tablet 4    Probiotic Product (SOLUBLE FIBER/PROBIOTICS PO) Take by mouth      OIL OF OREGANO PO Take by mouth      D-MANNOSE PO Take 500 mg by mouth daily 3-tablets daily      UNABLE TO FIND Take 50 mg by mouth Oregano Oil      Polyethylene Glycol 3350 (MIRALAX PO) Take by mouth as needed      nitroGLYCERIN (NITROSTAT) 0.4 MG SL tablet Place 0.4 mg under the tongue every 5 minutes as needed for Chest pain up to max of 3 total doses. If no relief after 1 dose, call 911.       famotidine (PEPCID) 20 MG tablet Take 20 mg by mouth 2 times daily      hydrochlorothiazide (HYDRODIURIL) 25 MG tablet Take 25 mg by mouth daily      isosorbide dinitrate (ISORDIL) 20 MG tablet Take 1 tablet by mouth 3  times daily 180 tablet 1    metoprolol tartrate (LOPRESSOR) 25 MG tablet Take 1 tablet by mouth 2 times daily 180 tablet 3    simvastatin (ZOCOR) 40 MG tablet Take 0.5 tablets by mouth nightly 45 tablet 3    pramipexole (MIRAPEX) 0.125 MG tablet Take by mouth nightly 4 tab      NONFORMULARY as needed Home defense supplement      

## 2018-03-01 ENCOUNTER — TELEPHONE (OUTPATIENT)
Dept: UROLOGY | Age: 83
End: 2018-03-01

## 2018-03-01 RX ORDER — NITROFURANTOIN 25; 75 MG/1; MG/1
100 CAPSULE ORAL 2 TIMES DAILY
Qty: 10 CAPSULE | Refills: 0 | Status: SHIPPED | OUTPATIENT
Start: 2018-03-01 | End: 2018-03-05 | Stop reason: ALTCHOICE

## 2018-03-02 ENCOUNTER — HOSPITAL ENCOUNTER (OUTPATIENT)
Age: 83
Discharge: HOME | End: 2018-03-02
Payer: MEDICARE

## 2018-03-02 DIAGNOSIS — N39.0 RECURRENT UTI: ICD-10-CM

## 2018-03-02 DIAGNOSIS — R35.0 URINARY FREQUENCY: ICD-10-CM

## 2018-03-02 PROCEDURE — 87186 SC STD MICRODIL/AGAR DIL: CPT

## 2018-03-02 PROCEDURE — 87086 URINE CULTURE/COLONY COUNT: CPT

## 2018-03-02 PROCEDURE — 87147 CULTURE TYPE IMMUNOLOGIC: CPT

## 2018-03-02 PROCEDURE — 87077 CULTURE AEROBIC IDENTIFY: CPT

## 2018-03-05 ENCOUNTER — TELEPHONE (OUTPATIENT)
Dept: UROLOGY | Age: 83
End: 2018-03-05

## 2018-03-05 LAB
ORGANISM: ABNORMAL
URINE CULTURE, ROUTINE: ABNORMAL
URINE CULTURE, ROUTINE: ABNORMAL

## 2018-03-05 RX ORDER — DOXYCYCLINE HYCLATE 100 MG
100 TABLET ORAL 2 TIMES DAILY
Qty: 20 TABLET | Refills: 0 | Status: SHIPPED | OUTPATIENT
Start: 2018-03-05 | End: 2018-03-15

## 2018-03-16 ENCOUNTER — OFFICE VISIT (OUTPATIENT)
Dept: CARDIOLOGY CLINIC | Age: 83
End: 2018-03-16
Payer: MEDICARE

## 2018-03-16 VITALS
DIASTOLIC BLOOD PRESSURE: 74 MMHG | HEIGHT: 64 IN | BODY MASS INDEX: 26.19 KG/M2 | WEIGHT: 153.4 LBS | SYSTOLIC BLOOD PRESSURE: 138 MMHG | HEART RATE: 66 BPM

## 2018-03-16 DIAGNOSIS — I25.10 CAD IN NATIVE ARTERY: ICD-10-CM

## 2018-03-16 DIAGNOSIS — Z95.820 S/P ANGIOPLASTY WITH STENT: ICD-10-CM

## 2018-03-16 DIAGNOSIS — Z01.810 PREOP CARDIOVASCULAR EXAM: Primary | ICD-10-CM

## 2018-03-16 PROCEDURE — G8598 ASA/ANTIPLAT THER USED: HCPCS | Performed by: INTERNAL MEDICINE

## 2018-03-16 PROCEDURE — 99213 OFFICE O/P EST LOW 20 MIN: CPT | Performed by: INTERNAL MEDICINE

## 2018-03-16 PROCEDURE — 93000 ELECTROCARDIOGRAM COMPLETE: CPT | Performed by: INTERNAL MEDICINE

## 2018-03-16 PROCEDURE — 1090F PRES/ABSN URINE INCON ASSESS: CPT | Performed by: INTERNAL MEDICINE

## 2018-03-16 PROCEDURE — G8400 PT W/DXA NO RESULTS DOC: HCPCS | Performed by: INTERNAL MEDICINE

## 2018-03-16 PROCEDURE — G8484 FLU IMMUNIZE NO ADMIN: HCPCS | Performed by: INTERNAL MEDICINE

## 2018-03-16 PROCEDURE — 1036F TOBACCO NON-USER: CPT | Performed by: INTERNAL MEDICINE

## 2018-03-16 PROCEDURE — G8427 DOCREV CUR MEDS BY ELIG CLIN: HCPCS | Performed by: INTERNAL MEDICINE

## 2018-03-16 PROCEDURE — 4040F PNEUMOC VAC/ADMIN/RCVD: CPT | Performed by: INTERNAL MEDICINE

## 2018-03-16 PROCEDURE — 1123F ACP DISCUSS/DSCN MKR DOCD: CPT | Performed by: INTERNAL MEDICINE

## 2018-03-16 PROCEDURE — G8419 CALC BMI OUT NRM PARAM NOF/U: HCPCS | Performed by: INTERNAL MEDICINE

## 2018-03-16 RX ORDER — FEXOFENADINE HCL 180 MG/1
180 TABLET ORAL DAILY
COMMUNITY
End: 2020-03-03

## 2018-03-16 ASSESSMENT — ENCOUNTER SYMPTOMS
SHORTNESS OF BREATH: 0
EYE REDNESS: 0
ABDOMINAL DISTENTION: 0
APNEA: 0
SORE THROAT: 0
EYE ITCHING: 0
DIARRHEA: 0
COUGH: 0
BACK PAIN: 0
BLOOD IN STOOL: 0
ABDOMINAL PAIN: 0
CHEST TIGHTNESS: 0
EYE PAIN: 0
CONSTIPATION: 0
EYE DISCHARGE: 0
NAUSEA: 0
SINUS PRESSURE: 0

## 2018-03-16 NOTE — PROGRESS NOTES
New patient here - former Shelby Solum patient -  needing cardiac clearance - EGD and sigmoidscopy with Dr. Jade Glasgow 3-23-18    EKG done today    Patient denies cp, sob, dizziness and palpitations     Patient complains of peripheral edema

## 2018-03-16 NOTE — PROGRESS NOTES
daily, Disp: 180 tablet, Rfl: 3    simvastatin (ZOCOR) 40 MG tablet, Take 0.5 tablets by mouth nightly, Disp: 45 tablet, Rfl: 3    pramipexole (MIRAPEX) 0.125 MG tablet, Take by mouth nightly 4 tab, Disp: , Rfl:     NONFORMULARY, as needed Home defense supplement , Disp: , Rfl:     CRANBERRY PO, Take 1 tablet by mouth as needed , Disp: , Rfl:     Methylsulfonylmethane (MSM) 1000 MG CAPS, Take by mouth as needed , Disp: , Rfl:     Cholecalciferol (VITAMIN D3) 5000 UNITS TABS, Take by mouth as needed , Disp: , Rfl:     zolpidem (AMBIEN) 5 MG tablet, Take 10 mg by mouth nightly as needed , Disp: , Rfl:     magnesium (MAGNESIUM-OXIDE) 250 MG TABS tablet, Take 250 mg by mouth daily , Disp: , Rfl:     Calcium Carbonate-Vitamin D (CALCIUM + D) 600-200 MG-UNIT TABS, Take 1 tablet by mouth as needed , Disp: , Rfl:     Coenzyme Q10 (COQ-10 PO), Take 200 mg by mouth as needed , Disp: , Rfl:     aspirin 81 MG EC tablet, Take 81 mg by mouth daily. , Disp: , Rfl:     Past Medical History  Annie Taylor  has a past medical history of Anesthesia; CAD (coronary artery disease); Cancer [facility]); GERD (gastroesophageal reflux disease); Hyperlipidemia; Hypertension; Osteoarthritis; Shortness of breath; and UTI (urinary tract infection). Social History  Annie Taylor  reports that she quit smoking about 45 years ago. Her smoking use included Cigarettes. She has a 12.00 pack-year smoking history. She has never used smokeless tobacco. She reports that she does not drink alcohol or use drugs. Family History  Yenny family history includes Alzheimer's Disease in her mother; Arthritis in her father; Cancer in her brother; Diabetes in her maternal aunt and maternal aunt; Heart Disease in her brother, brother, mother, sister, and sister; Stroke in her sister. There is no family history of bicuspid aortic valve, aneurysms, heart transplant, pacemakers, defibrillators, or sudden cardiac death.       Past Surgical History   Past Surgical The maximum aortic valve gradient is 16 mmHg, the mean gradient is 9 mmHg,   and the peak velocity is 197 cm/s. Structurally normal mitral valve. Mild mitral regurgitation is present. Tricuspid valve is structurally normal.   Mild tricuspid regurgitation. Signature      ----------------------------------------------------------------   Electronically signed by Lulú Stern DO (Interpreting   physician) on 07/25/2017 at 08:24 PM   ----------------------------------------------------------------      Findings      Mitral Valve   Structurally normal mitral valve. Mild mitral regurgitation is present. Aortic Valve      Leaflets exhibited mildly increased thickness and mildly reduced cuspal   separation of the aortic valve. Trivial aortic regurgitation is noted. There is mild aortic stenosis with valve area of 1.64 sq cm. The maximum aortic valve gradient is 16 mmHg, the mean gradient is 9 mmHg,   and the peak velocity is 197 cm/s. Tricuspid Valve   Tricuspid valve is structurally normal.   Mild tricuspid regurgitation. Pulmonic Valve   Pulmonic valve is structurally normal.   Trivial pulmonic regurgitation visualized. Left Atrium   Normal size left atrium. Left Ventricle   Systolic function was normal.   Ejection fraction is visually estimated at 60%. Right Atrium   The right atrium is of normal size. Right Ventricle   Right ventricular systolic pressure of 41 mm Hg consistent with mild   pulmonary hypertension. Pericardial Effusion   There is a trivial pericardial effusion noted.       Aorta / Great Vessels   Dilation of ascending aorta at 4.0 cm     M-Mode/2D Measurements & Calculations      LV Diastolic    LV Systolic Dimension: 2.9  AV Cusp Separation: 0.9 cmLA   Dimension: 4.7  cm                          Dimension: 3.2 cmAO Root   cm              LV Volume Diastolic: 405 ml Dimension: 2.9 cm   LV FS:38.3 %    LV Volume Systolic: 75.2 ml   LV PW           LV EDV/LV EDV Index: 471   Diastolic: 1.1  VA/76 V^6MS ESV/LV ESV   cm              Index: 32.2 ml/18 m^2       RV Diastolic Dimension: 2.6 cm   Septum          EF Calculated: 12.9 %   Diastolic: 1 cm                             LA/Aorta: 1.1                                               Ascending Aorta: 4 cm                      LVOT: 2 cm     Doppler Measurements & Calculations      MV Peak E-Wave:     AV Peak Velocity: 197   LVOT Peak Velocity: 103 cm/s   79.5 cm/s           cm/s                    LVOT Mean Velocity: 76.5 cm/s   MV Peak A-Wave:     AV Peak Gradient: 15.52 LVOT Peak Gradient: 4 mmHgLVOT   90.8 cm/s           mmHg                    Mean Gradient: 3 mmHg   MV E/A Ratio: 0.88  AV Mean Velocity: 143   MV Peak Gradient:   cm/s                    TV Peak E-Wave: 41.5 cm/s   2.53 mmHg           AV Mean Gradient: 9     TV Peak A-Wave: 42.4 cm/s                       mmHg   MV Deceleration     AV VTI: 45 cm           TV Peak Gradient: 0.69 mmHg   Time: 183 msec      AV Area                 TR Velocity:278 cm/s                       (Continuity):1.7 cm^2   TR Gradient:30.91 mmHg      MV E' Septal        LVOT VTI: 24.4 cm   Velocity: 4 cm/s    AV P1/2t: 1015 msec   MV A' Septal        IVRT: 113 msec          MA ED Velocity: 71.8 cm/s   Velocity: 7.99 cm/s   MV E' Lateral   Velocity: 4.68 cm/s AV DVI (VTI): 0.54AV   MV A' Lateral       DVI (Vmax):0.52   Velocity: 10.4 cm/s   E/E' septal: 19.88   E/E' lateral: 16.99   MR Velocity: 285   cm/s     [email or web address] [id number]%3d       Assessment/Plan   Pre-operative CV exam  Possible upcoming EGD/Colonoscopy, unclear date (Low risk)  RCRI = 1 (Low to intermediate risk patient)  No active cardiac complaints. No cardiac issues on exams. Able to do > 4 METS.   Patient cannot stop DAPT with recent stent to her LAD in 9/2017--may proceed without further

## 2018-04-20 ENCOUNTER — HOSPITAL ENCOUNTER (OUTPATIENT)
Age: 83
Discharge: HOME | End: 2018-04-20
Payer: MEDICARE

## 2018-04-20 ENCOUNTER — TELEPHONE (OUTPATIENT)
Dept: UROLOGY | Age: 83
End: 2018-04-20

## 2018-04-20 DIAGNOSIS — R35.0 FREQUENT URINATION: Primary | ICD-10-CM

## 2018-04-20 DIAGNOSIS — R35.0 FREQUENT URINATION: ICD-10-CM

## 2018-04-20 LAB
BACTERIA: ABNORMAL /HPF
BILIRUBIN URINE: NEGATIVE
BLOOD, URINE: ABNORMAL
CASTS 2: ABNORMAL /LPF
CASTS UA: ABNORMAL /LPF
CHARACTER, URINE: ABNORMAL
COLOR: ABNORMAL
CRYSTALS, UA: ABNORMAL
EPITHELIAL CELLS, UA: ABNORMAL /HPF
GLUCOSE URINE: NEGATIVE MG/DL
KETONES, URINE: NEGATIVE
LEUKOCYTE ESTERASE, URINE: ABNORMAL
MISCELLANEOUS 2: ABNORMAL
NITRITE, URINE: POSITIVE
PH UA: 5
PROTEIN UA: 30
RBC URINE: ABNORMAL /HPF
RENAL EPITHELIAL, UA: ABNORMAL
SPECIFIC GRAVITY, URINE: 1.02 (ref 1–1.03)
UROBILINOGEN, URINE: 1 EU/DL
WBC UA: > 200 /HPF
YEAST: ABNORMAL

## 2018-04-20 PROCEDURE — 87186 SC STD MICRODIL/AGAR DIL: CPT

## 2018-04-20 PROCEDURE — 87086 URINE CULTURE/COLONY COUNT: CPT

## 2018-04-20 PROCEDURE — 87077 CULTURE AEROBIC IDENTIFY: CPT

## 2018-04-20 PROCEDURE — 87184 SC STD DISK METHOD PER PLATE: CPT

## 2018-04-20 PROCEDURE — 81001 URINALYSIS AUTO W/SCOPE: CPT

## 2018-04-20 RX ORDER — CIPROFLOXACIN 500 MG/1
500 TABLET, FILM COATED ORAL 2 TIMES DAILY
Qty: 14 TABLET | Refills: 0 | Status: SHIPPED | OUTPATIENT
Start: 2018-04-20 | End: 2018-04-23 | Stop reason: ALTCHOICE

## 2018-04-23 ENCOUNTER — TELEPHONE (OUTPATIENT)
Dept: UROLOGY | Age: 83
End: 2018-04-23

## 2018-04-23 LAB
ORGANISM: ABNORMAL
ORGANISM: ABNORMAL
URINE CULTURE REFLEX: ABNORMAL
URINE CULTURE REFLEX: ABNORMAL

## 2018-04-23 RX ORDER — DOXYCYCLINE HYCLATE 100 MG
100 TABLET ORAL 2 TIMES DAILY
Qty: 20 TABLET | Refills: 0 | Status: SHIPPED | OUTPATIENT
Start: 2018-04-23 | End: 2018-05-03

## 2018-05-02 ENCOUNTER — HOSPITAL ENCOUNTER (OUTPATIENT)
Dept: CT IMAGING | Age: 83
Discharge: HOME | End: 2018-05-02
Payer: MEDICARE

## 2018-05-02 DIAGNOSIS — C21.0 MALIGNANT NEOPLASM OF ANUS (HCC): ICD-10-CM

## 2018-05-02 LAB
ALBUMIN SERPL-MCNC: 4.3 G/DL (ref 3.5–5.1)
ALP BLD-CCNC: 63 U/L (ref 38–126)
ALT SERPL-CCNC: 12 U/L (ref 11–66)
ANION GAP SERPL CALCULATED.3IONS-SCNC: 14 MEQ/L (ref 8–16)
ANISOCYTOSIS: ABNORMAL
AST SERPL-CCNC: 17 U/L (ref 5–40)
BASOPHILS # BLD: 0.9 %
BASOPHILS ABSOLUTE: 0 THOU/MM3 (ref 0–0.1)
BILIRUB SERPL-MCNC: 0.4 MG/DL (ref 0.3–1.2)
BUN BLDV-MCNC: 28 MG/DL (ref 7–22)
CALCIUM SERPL-MCNC: 9.6 MG/DL (ref 8.5–10.5)
CHLORIDE BLD-SCNC: 97 MEQ/L (ref 98–111)
CO2: 25 MEQ/L (ref 23–33)
CREAT SERPL-MCNC: 1 MG/DL (ref 0.4–1.2)
EOSINOPHIL # BLD: 2.2 %
EOSINOPHILS ABSOLUTE: 0.1 THOU/MM3 (ref 0–0.4)
GFR SERPL CREATININE-BSD FRML MDRD: 53 ML/MIN/1.73M2
GLUCOSE BLD-MCNC: 93 MG/DL (ref 70–108)
HCT VFR BLD CALC: 33.4 % (ref 37–47)
HEMOGLOBIN: 11.3 GM/DL (ref 12–16)
LYMPHOCYTES # BLD: 19.4 %
LYMPHOCYTES ABSOLUTE: 0.8 THOU/MM3 (ref 1–4.8)
MCH RBC QN AUTO: 28.8 PG (ref 27–31)
MCHC RBC AUTO-ENTMCNC: 33.8 GM/DL (ref 33–37)
MCV RBC AUTO: 85.3 FL (ref 81–99)
MONOCYTES # BLD: 9.4 %
MONOCYTES ABSOLUTE: 0.4 THOU/MM3 (ref 0.4–1.3)
NUCLEATED RED BLOOD CELLS: 0 /100 WBC
PDW BLD-RTO: 14.7 % (ref 11.5–14.5)
PLATELET # BLD: 198 THOU/MM3 (ref 130–400)
PMV BLD AUTO: 8 FL (ref 7.4–10.4)
POC CREATININE WHOLE BLOOD: 1 MG/DL (ref 0.5–1.2)
POTASSIUM SERPL-SCNC: 4.2 MEQ/L (ref 3.5–5.2)
RBC # BLD: 3.91 MILL/MM3 (ref 4.2–5.4)
SEG NEUTROPHILS: 68.1 %
SEGMENTED NEUTROPHILS ABSOLUTE COUNT: 2.9 THOU/MM3 (ref 1.8–7.7)
SODIUM BLD-SCNC: 136 MEQ/L (ref 135–145)
TOTAL PROTEIN: 7.7 G/DL (ref 6.1–8)
WBC # BLD: 4.2 THOU/MM3 (ref 4.8–10.8)

## 2018-05-02 PROCEDURE — 82565 ASSAY OF CREATININE: CPT

## 2018-05-02 PROCEDURE — 80053 COMPREHEN METABOLIC PANEL: CPT

## 2018-05-02 PROCEDURE — 85025 COMPLETE CBC W/AUTO DIFF WBC: CPT

## 2018-05-02 PROCEDURE — 6360000004 HC RX CONTRAST MEDICATION: Performed by: INTERNAL MEDICINE

## 2018-05-02 PROCEDURE — 74177 CT ABD & PELVIS W/CONTRAST: CPT

## 2018-05-02 PROCEDURE — 36415 COLL VENOUS BLD VENIPUNCTURE: CPT

## 2018-05-02 RX ADMIN — IOPAMIDOL 85 ML: 755 INJECTION, SOLUTION INTRAVENOUS at 12:18

## 2018-05-02 RX ADMIN — IOHEXOL 50 ML: 240 INJECTION, SOLUTION INTRATHECAL; INTRAVASCULAR; INTRAVENOUS; ORAL at 12:18

## 2018-06-16 ENCOUNTER — HOSPITAL ENCOUNTER (EMERGENCY)
Age: 83
Discharge: HOME | End: 2018-06-16
Payer: MEDICARE

## 2018-06-16 VITALS
BODY MASS INDEX: 26.61 KG/M2 | DIASTOLIC BLOOD PRESSURE: 90 MMHG | OXYGEN SATURATION: 96 % | HEART RATE: 67 BPM | SYSTOLIC BLOOD PRESSURE: 183 MMHG | TEMPERATURE: 98.1 F | RESPIRATION RATE: 16 BRPM | WEIGHT: 155 LBS

## 2018-06-16 DIAGNOSIS — N39.3 STRESS INCONTINENCE IN FEMALE: ICD-10-CM

## 2018-06-16 DIAGNOSIS — N30.01 ACUTE CYSTITIS WITH HEMATURIA: Primary | ICD-10-CM

## 2018-06-16 LAB
BILIRUBIN URINE: NEGATIVE
BLOOD, URINE: ABNORMAL
CHARACTER, URINE: ABNORMAL
COLOR: YELLOW
GLUCOSE, URINE: NEGATIVE MG/DL
KETONES, URINE: NEGATIVE
LEUKOCYTES, UA: ABNORMAL
NITRATE, UA: NEGATIVE
PH UA: 5.5 (ref 5–9)
PROTEIN UA: 30 MG/DL
REFLEX TO URINE C & S: ABNORMAL
SPECIFIC GRAVITY UA: 1.02 (ref 1–1.03)
UROBILINOGEN, URINE: 0.2 EU/DL (ref 0–1)

## 2018-06-16 PROCEDURE — 99213 OFFICE O/P EST LOW 20 MIN: CPT

## 2018-06-16 PROCEDURE — 87186 SC STD MICRODIL/AGAR DIL: CPT

## 2018-06-16 PROCEDURE — 87184 SC STD DISK METHOD PER PLATE: CPT

## 2018-06-16 PROCEDURE — 99213 OFFICE O/P EST LOW 20 MIN: CPT | Performed by: NURSE PRACTITIONER

## 2018-06-16 PROCEDURE — 87077 CULTURE AEROBIC IDENTIFY: CPT

## 2018-06-16 PROCEDURE — 87086 URINE CULTURE/COLONY COUNT: CPT

## 2018-06-16 PROCEDURE — 81003 URINALYSIS AUTO W/O SCOPE: CPT

## 2018-06-16 RX ORDER — DOXYCYCLINE HYCLATE 100 MG
100 TABLET ORAL 2 TIMES DAILY
Qty: 14 TABLET | Refills: 0 | Status: SHIPPED | OUTPATIENT
Start: 2018-06-16 | End: 2018-06-23

## 2018-06-16 RX ORDER — NICOTINE 14MG/24HR
1 PATCH, TRANSDERMAL 24 HOURS TRANSDERMAL 2 TIMES DAILY
Qty: 28 CAPSULE | Refills: 0 | COMMUNITY
Start: 2018-06-16 | End: 2018-06-30

## 2018-06-16 ASSESSMENT — ENCOUNTER SYMPTOMS
SHORTNESS OF BREATH: 0
WHEEZING: 0
STRIDOR: 0
COUGH: 0
APNEA: 0
CHEST TIGHTNESS: 0
NAUSEA: 1
COLOR CHANGE: 0
CHOKING: 0

## 2018-06-16 ASSESSMENT — PAIN DESCRIPTION - DESCRIPTORS: DESCRIPTORS: ACHING;BURNING

## 2018-06-16 ASSESSMENT — PAIN DESCRIPTION - PAIN TYPE: TYPE: ACUTE PAIN

## 2018-06-16 ASSESSMENT — PAIN DESCRIPTION - LOCATION: LOCATION: ABDOMEN

## 2018-06-16 ASSESSMENT — PAIN SCALES - GENERAL: PAINLEVEL_OUTOF10: 4

## 2018-06-16 ASSESSMENT — PAIN DESCRIPTION - ORIENTATION: ORIENTATION: LOWER

## 2018-06-18 LAB
ORGANISM: ABNORMAL
URINE CULTURE REFLEX: ABNORMAL

## 2018-06-29 ENCOUNTER — TELEPHONE (OUTPATIENT)
Dept: UROLOGY | Age: 83
End: 2018-06-29

## 2018-06-29 RX ORDER — NITROFURANTOIN 25; 75 MG/1; MG/1
100 CAPSULE ORAL 2 TIMES DAILY
Qty: 20 CAPSULE | Refills: 0 | Status: SHIPPED | OUTPATIENT
Start: 2018-06-29 | End: 2018-07-02 | Stop reason: SDUPTHER

## 2018-06-29 NOTE — TELEPHONE ENCOUNTER
Sybil was sensitive to infection. Will trial Macrobid which is also sensitive, script sent to pharmacy.

## 2018-07-02 RX ORDER — NITROFURANTOIN 25; 75 MG/1; MG/1
100 CAPSULE ORAL 2 TIMES DAILY
Qty: 20 CAPSULE | Refills: 0 | Status: SHIPPED | OUTPATIENT
Start: 2018-07-02 | End: 2018-07-12

## 2018-07-02 NOTE — TELEPHONE ENCOUNTER
Patient called back information was given,her script went to [facility] and will not get it for a week. Sorry she wants you to send to [facility].  Thanks

## 2018-07-09 RX ORDER — CLOPIDOGREL BISULFATE 75 MG/1
TABLET ORAL
Qty: 90 TABLET | Refills: 2 | Status: SHIPPED | OUTPATIENT
Start: 2018-07-09 | End: 2018-10-08 | Stop reason: SDUPTHER

## 2018-07-20 ENCOUNTER — TELEPHONE (OUTPATIENT)
Dept: UROLOGY | Age: 83
End: 2018-07-20

## 2018-07-20 ENCOUNTER — HOSPITAL ENCOUNTER (OUTPATIENT)
Age: 83
Discharge: HOME | End: 2018-07-20
Payer: MEDICARE

## 2018-07-20 DIAGNOSIS — R35.0 FREQUENT URINATION: ICD-10-CM

## 2018-07-20 DIAGNOSIS — R35.0 FREQUENT URINATION: Primary | ICD-10-CM

## 2018-07-20 LAB
BACTERIA: ABNORMAL /HPF
BILIRUBIN URINE: NEGATIVE
BLOOD, URINE: NEGATIVE
CASTS 2: ABNORMAL /LPF
CASTS UA: ABNORMAL /LPF
CHARACTER, URINE: CLEAR
COLOR: YELLOW
CRYSTALS, UA: ABNORMAL
EPITHELIAL CELLS, UA: ABNORMAL /HPF
GLUCOSE URINE: NEGATIVE MG/DL
KETONES, URINE: NEGATIVE
LEUKOCYTE ESTERASE, URINE: ABNORMAL
MISCELLANEOUS 2: ABNORMAL
NITRITE, URINE: NEGATIVE
PH UA: 7
PROTEIN UA: NEGATIVE
RBC URINE: ABNORMAL /HPF
RENAL EPITHELIAL, UA: ABNORMAL
SPECIFIC GRAVITY, URINE: 1.01 (ref 1–1.03)
UROBILINOGEN, URINE: 0.2 EU/DL
WBC UA: ABNORMAL /HPF
YEAST: ABNORMAL

## 2018-07-20 PROCEDURE — 87186 SC STD MICRODIL/AGAR DIL: CPT

## 2018-07-20 PROCEDURE — 81001 URINALYSIS AUTO W/SCOPE: CPT

## 2018-07-20 PROCEDURE — 87086 URINE CULTURE/COLONY COUNT: CPT

## 2018-07-20 RX ORDER — CIPROFLOXACIN 500 MG/1
500 TABLET, FILM COATED ORAL 2 TIMES DAILY
Qty: 20 TABLET | Refills: 0 | Status: SHIPPED | OUTPATIENT
Start: 2018-07-20 | End: 2018-07-24

## 2018-07-20 NOTE — TELEPHONE ENCOUNTER
Patient called and is complaining of frequent urination and urgency with an achy feeling. Patient denies burning, fever or chills. She stated that she was given an antibiotic by Shy Cueva and has finished it last Friday. I advised the patient to go to the lab to have her urine tested and I will ask the provider on call if we can call in an antibiotic to [facility]. Patient voiced understanding.

## 2018-07-23 ENCOUNTER — TELEPHONE (OUTPATIENT)
Dept: UROLOGY | Age: 83
End: 2018-07-23

## 2018-07-23 DIAGNOSIS — N39.0 URINARY TRACT INFECTION WITHOUT HEMATURIA, SITE UNSPECIFIED: ICD-10-CM

## 2018-07-23 DIAGNOSIS — A49.8 PSEUDOMONAS AERUGINOSA INFECTION: Primary | ICD-10-CM

## 2018-07-23 LAB
ORGANISM: ABNORMAL
URINE CULTURE REFLEX: ABNORMAL
URINE CULTURE REFLEX: ABNORMAL

## 2018-07-23 NOTE — TELEPHONE ENCOUNTER
Rossi Kumar,    Patient states Cipro is causing nausea and tingling in her fingers. She needs a different antibiotic. I have added Cipro to her allergy list.    Please advise, thank you.

## 2018-07-24 RX ORDER — CEFDINIR 300 MG/1
300 CAPSULE ORAL 2 TIMES DAILY
Qty: 14 CAPSULE | Refills: 0 | Status: SHIPPED | OUTPATIENT
Start: 2018-07-24 | End: 2018-07-31

## 2018-07-24 NOTE — TELEPHONE ENCOUNTER
Please call patient and let her know that I have sent in Sergio to [place].     Thank you  Yuryi Nunes

## 2018-08-01 ENCOUNTER — TELEPHONE (OUTPATIENT)
Dept: CARDIOLOGY CLINIC | Age: 83
End: 2018-08-01

## 2018-08-31 ENCOUNTER — HOSPITAL ENCOUNTER (OUTPATIENT)
Age: 83
Discharge: HOME | End: 2018-08-31
Payer: MEDICARE

## 2018-08-31 ENCOUNTER — TELEPHONE (OUTPATIENT)
Dept: UROLOGY | Age: 83
End: 2018-08-31

## 2018-08-31 DIAGNOSIS — N39.0 URINARY TRACT INFECTION WITHOUT HEMATURIA, SITE UNSPECIFIED: ICD-10-CM

## 2018-08-31 DIAGNOSIS — N39.0 URINARY TRACT INFECTION WITHOUT HEMATURIA, SITE UNSPECIFIED: Primary | ICD-10-CM

## 2018-08-31 LAB
BACTERIA: ABNORMAL /HPF
BILIRUBIN URINE: NEGATIVE
BLOOD, URINE: ABNORMAL
CASTS 2: ABNORMAL /LPF
CASTS UA: ABNORMAL /LPF
CHARACTER, URINE: ABNORMAL
COLOR: YELLOW
CRYSTALS, UA: ABNORMAL
EPITHELIAL CELLS, UA: ABNORMAL /HPF
GLUCOSE URINE: NEGATIVE MG/DL
KETONES, URINE: NEGATIVE
LEUKOCYTE ESTERASE, URINE: ABNORMAL
MISCELLANEOUS 2: ABNORMAL
NITRITE, URINE: NEGATIVE
PH UA: 7
PROTEIN UA: 30
RBC URINE: ABNORMAL /HPF
RENAL EPITHELIAL, UA: ABNORMAL
SPECIFIC GRAVITY, URINE: 1.01 (ref 1–1.03)
UROBILINOGEN, URINE: 0.2 EU/DL
WBC UA: > 200 /HPF
YEAST: ABNORMAL

## 2018-08-31 PROCEDURE — 87077 CULTURE AEROBIC IDENTIFY: CPT

## 2018-08-31 PROCEDURE — 87186 SC STD MICRODIL/AGAR DIL: CPT

## 2018-08-31 PROCEDURE — 87184 SC STD DISK METHOD PER PLATE: CPT

## 2018-08-31 PROCEDURE — 81001 URINALYSIS AUTO W/SCOPE: CPT

## 2018-08-31 PROCEDURE — 87086 URINE CULTURE/COLONY COUNT: CPT

## 2018-08-31 RX ORDER — NITROFURANTOIN 25; 75 MG/1; MG/1
100 CAPSULE ORAL 2 TIMES DAILY
Qty: 14 CAPSULE | Refills: 0 | Status: SHIPPED | OUTPATIENT
Start: 2018-08-31 | End: 2018-09-06 | Stop reason: ALTCHOICE

## 2018-08-31 NOTE — TELEPHONE ENCOUNTER
Veronica Garner,    Patient was having some burning, discomfort, and strong odor in her urine. She took a home test and it is positive for infection. Can you please start her on an antibiotic? I ordered a urine reflex to culture and she will be heading to [facility] lab this afternoon. I instructed the patient to check at [facility] this evening for a script. Please advise, thank you.

## 2018-09-02 LAB
ORGANISM: ABNORMAL
URINE CULTURE REFLEX: ABNORMAL

## 2018-09-05 ENCOUNTER — TELEPHONE (OUTPATIENT)
Dept: UROLOGY | Age: 83
End: 2018-09-05

## 2018-09-05 NOTE — TELEPHONE ENCOUNTER
Can we call patient and see if her UTI symptoms are clearing with the Macrobid? ? Urine culture was intermediate for the macrobid, which means it may or may not clear infection, I want to see how she is responding.  Thanks    [employer]

## 2018-09-06 RX ORDER — DOXYCYCLINE HYCLATE 100 MG
100 TABLET ORAL 2 TIMES DAILY
Qty: 14 TABLET | Refills: 0 | Status: SHIPPED | OUTPATIENT
Start: 2018-09-06 | End: 2018-09-13

## 2018-09-06 NOTE — TELEPHONE ENCOUNTER
Called and spoke with patient, she is still having dysuria and feels that the [address] is not helping. Can we please send in new scripts.

## 2018-09-06 NOTE — TELEPHONE ENCOUNTER
Patient notified to stop Macrobid and start Doxy, stated she has one pill left! Advised to stop and start the Doxy. Instructions were also given.

## 2018-09-19 ENCOUNTER — OFFICE VISIT (OUTPATIENT)
Dept: CARDIOLOGY CLINIC | Age: 83
End: 2018-09-19
Payer: MEDICARE

## 2018-09-19 VITALS
DIASTOLIC BLOOD PRESSURE: 62 MMHG | HEIGHT: 64 IN | BODY MASS INDEX: 25.78 KG/M2 | WEIGHT: 151 LBS | SYSTOLIC BLOOD PRESSURE: 144 MMHG | HEART RATE: 62 BPM

## 2018-09-19 DIAGNOSIS — I25.10 CAD IN NATIVE ARTERY: Primary | ICD-10-CM

## 2018-09-19 DIAGNOSIS — I10 ESSENTIAL HYPERTENSION: ICD-10-CM

## 2018-09-19 PROCEDURE — 99214 OFFICE O/P EST MOD 30 MIN: CPT | Performed by: INTERNAL MEDICINE

## 2018-09-19 PROCEDURE — 1036F TOBACCO NON-USER: CPT | Performed by: INTERNAL MEDICINE

## 2018-09-19 PROCEDURE — 1090F PRES/ABSN URINE INCON ASSESS: CPT | Performed by: INTERNAL MEDICINE

## 2018-09-19 PROCEDURE — G8419 CALC BMI OUT NRM PARAM NOF/U: HCPCS | Performed by: INTERNAL MEDICINE

## 2018-09-19 PROCEDURE — 1101F PT FALLS ASSESS-DOCD LE1/YR: CPT | Performed by: INTERNAL MEDICINE

## 2018-09-19 PROCEDURE — G8598 ASA/ANTIPLAT THER USED: HCPCS | Performed by: INTERNAL MEDICINE

## 2018-09-19 PROCEDURE — G8427 DOCREV CUR MEDS BY ELIG CLIN: HCPCS | Performed by: INTERNAL MEDICINE

## 2018-09-19 PROCEDURE — G8400 PT W/DXA NO RESULTS DOC: HCPCS | Performed by: INTERNAL MEDICINE

## 2018-09-19 PROCEDURE — 1123F ACP DISCUSS/DSCN MKR DOCD: CPT | Performed by: INTERNAL MEDICINE

## 2018-09-19 PROCEDURE — 4040F PNEUMOC VAC/ADMIN/RCVD: CPT | Performed by: INTERNAL MEDICINE

## 2018-09-19 RX ORDER — AMLODIPINE BESYLATE 10 MG/1
10 TABLET ORAL DAILY
Qty: 30 TABLET | Refills: 3 | Status: SHIPPED | OUTPATIENT
Start: 2018-09-19 | End: 2018-09-19

## 2018-09-19 RX ORDER — ISOSORBIDE MONONITRATE 60 MG/1
60 TABLET, EXTENDED RELEASE ORAL DAILY
Qty: 30 TABLET | Refills: 3 | Status: SHIPPED | OUTPATIENT
Start: 2018-09-19 | End: 2018-12-15 | Stop reason: SDUPTHER

## 2018-09-19 NOTE — PROGRESS NOTES
(MIRAPEX) 0.125 MG tablet, Take by mouth nightly 4 tab, Disp: , Rfl:     NONFORMULARY, as needed Home defense supplement , Disp: , Rfl:     CRANBERRY PO, Take 1 tablet by mouth as needed , Disp: , Rfl:     Methylsulfonylmethane (MSM) 1000 MG CAPS, Take by mouth as needed , Disp: , Rfl:     Cholecalciferol (VITAMIN D3) 5000 UNITS TABS, Take by mouth as needed , Disp: , Rfl:     zolpidem (AMBIEN) 5 MG tablet, Take 10 mg by mouth nightly as needed , Disp: , Rfl:     magnesium (MAGNESIUM-OXIDE) 250 MG TABS tablet, Take 250 mg by mouth daily , Disp: , Rfl:     Calcium Carbonate-Vitamin D (CALCIUM + D) 600-200 MG-UNIT TABS, Take 1 tablet by mouth as needed , Disp: , Rfl:     Coenzyme Q10 (COQ-10 PO), Take 200 mg by mouth as needed , Disp: , Rfl:     aspirin 81 MG EC tablet, Take 81 mg by mouth daily. , Disp: , Rfl:     Past Medical History  Clement Moe  has a past medical history of Anesthesia; CAD (coronary artery disease); Cancer [facility]); GERD (gastroesophageal reflux disease); Hyperlipidemia; Hypertension; Osteoarthritis; Shortness of breath; and UTI (urinary tract infection). Social History  Clement Moe  reports that she quit smoking about 46 years ago. Her smoking use included Cigarettes. She has a 12.00 pack-year smoking history. She has never used smokeless tobacco. She reports that she does not drink alcohol or use drugs. Family History  Yenny family history includes Alzheimer's Disease in her mother; Arthritis in her father; Cancer in her brother; Diabetes in her maternal aunt and maternal aunt; Heart Disease in her brother, brother, mother, sister, and sister; Stroke in her sister. There is no family history of bicuspid aortic valve, aneurysms, heart transplant, pacemakers, defibrillators, or sudden cardiac death.       Past Surgical History   Past Surgical History:   Procedure Laterality Date   Jerardoppelinstr 70 CATHETERIZATION  8/2012    Noemi Riggins COLONOSCOPY  5/25/12    Dr. Yusef Lloyd distension. There is no tenderness. Musculoskeletal: Normal range of motion. No edema. Neurological: Alert and oriented to person, place, and time. No cranial nerve deficit. Coordination normal.   Skin: Skin is warm and dry. Psychiatric: Normal mood and affect.        Lab Results   Component Value Date    CKTOTAL 151 08/17/2012    CKTOTAL 114 08/17/2012    CKTOTAL 98 08/17/2012    CKMB 1.9 08/17/2012    CKMB 1.3 08/17/2012       Lab Results   Component Value Date    WBC 4.2 05/02/2018    RBC 3.91 05/02/2018    HGB 11.3 05/02/2018    HCT 33.4 05/02/2018    MCV 85.3 05/02/2018    MCH 28.8 05/02/2018    MCHC 33.8 05/02/2018    RDW 14.7 05/02/2018     05/02/2018    MPV 8.0 05/02/2018       Lab Results   Component Value Date     05/02/2018    K 4.2 05/02/2018    CL 97 05/02/2018    CO2 25 05/02/2018    BUN 28 05/02/2018    LABALBU 4.3 05/02/2018    CREATININE 1.0 05/02/2018    CALCIUM 9.6 05/02/2018    LABGLOM 53 05/02/2018    GLUCOSE 93 05/02/2018    GLUCOSE 86 11/01/2014       Lab Results   Component Value Date    ALKPHOS 63 05/02/2018    ALT 12 05/02/2018    AST 17 05/02/2018    PROT 7.7 05/02/2018    BILITOT 0.4 05/02/2018    BILIDIR <0.2 03/14/2017    LABALBU 4.3 05/02/2018       No results found for: MG    Lab Results   Component Value Date    INR 0.91 06/24/2015    INR 0.93 08/20/2012         No results found for: LABA1C    Lab Results   Component Value Date    TRIG 122 09/11/2017    HDL 52 09/11/2017    LDLCALC 77 09/11/2017       Lab Results   Component Value Date    TSH 8.38 11/01/2014         Testing Reviewed:      I have individually reviewed the cardiac test below:    ECHO:   Results for orders placed during the hospital encounter of 07/25/17   ECHO Complete 2D W Doppler W Color    Narrative Transthoracic Echocardiography Report (TTE)     Demographics      Patient Name  Osiris Tian Gender             Female                 J      MR #          [id number]     Race                physician) on 07/25/2017 at 08:24 PM   ----------------------------------------------------------------      Findings      Mitral Valve   Structurally normal mitral valve. Mild mitral regurgitation is present. Aortic Valve      Leaflets exhibited mildly increased thickness and mildly reduced cuspal   separation of the aortic valve. Trivial aortic regurgitation is noted. There is mild aortic stenosis with valve area of 1.64 sq cm. The maximum aortic valve gradient is 16 mmHg, the mean gradient is 9 mmHg,   and the peak velocity is 197 cm/s. Tricuspid Valve   Tricuspid valve is structurally normal.   Mild tricuspid regurgitation. Pulmonic Valve   Pulmonic valve is structurally normal.   Trivial pulmonic regurgitation visualized. Left Atrium   Normal size left atrium. Left Ventricle   Systolic function was normal.   Ejection fraction is visually estimated at 60%. Right Atrium   The right atrium is of normal size. Right Ventricle   Right ventricular systolic pressure of 41 mm Hg consistent with mild   pulmonary hypertension. Pericardial Effusion   There is a trivial pericardial effusion noted.       Aorta / Great Vessels   Dilation of ascending aorta at 4.0 cm     M-Mode/2D Measurements & Calculations      LV Diastolic    LV Systolic Dimension: 2.9  AV Cusp Separation: 0.9 cmLA   Dimension: 4.7  cm                          Dimension: 3.2 cmAO Root   cm              LV Volume Diastolic: 021 ml Dimension: 2.9 cm   LV FS:38.3 %    LV Volume Systolic: 98.0 ml   LV PW           LV EDV/LV EDV Index: 276   Diastolic: 1.1  HR/40 S^2AA ESV/LV ESV   cm              Index: 32.2 ml/18 m^2       RV Diastolic Dimension: 2.6 cm   Septum          EF Calculated: 38.4 %   Diastolic: 1 cm                             LA/Aorta: 1.1                                               Ascending Aorta: 4 cm                      LVOT: 2 cm     Doppler Measurements & Calculations      MV Peak E-Wave: AV Peak Velocity: 197   LVOT Peak Velocity: 103 cm/s   79.5 cm/s           cm/s                    LVOT Mean Velocity: 76.5 cm/s   MV Peak A-Wave:     AV Peak Gradient: 15.52 LVOT Peak Gradient: 4 mmHgLVOT   90.8 cm/s           mmHg                    Mean Gradient: 3 mmHg   MV E/A Ratio: 0.88  AV Mean Velocity: 143   MV Peak Gradient:   cm/s                    TV Peak E-Wave: 41.5 cm/s   2.53 mmHg           AV Mean Gradient: 9     TV Peak A-Wave: 42.4 cm/s                       mmHg   MV Deceleration     AV VTI: 45 cm           TV Peak Gradient: 0.69 mmHg   Time: 183 msec      AV Area                 TR Velocity:278 cm/s                       (Continuity):1.7 cm^2   TR Gradient:30.91 mmHg      MV E' Septal        LVOT VTI: 24.4 cm   Velocity: 4 cm/s    AV P1/2t: 1015 msec   MV A' Septal        IVRT: 113 msec          IA ED Velocity: 71.8 cm/s   Velocity: 7.99 cm/s   MV E' Lateral   Velocity: 4.68 cm/s AV DVI (VTI): 0.54AV   MV A' Lateral       DVI (Vmax):0.52   Velocity: 10.4 cm/s   E/E' septal: 19.88   E/E' lateral: 16.99   MR Velocity: 285   cm/s     [email or web address] [id number]%3d%3d        Assessment/Plan   S/p PCI of LAD 9/2017  No chest pain. Doing well. Tolerating DAPT. Needs colonscopy/EGD. Reasonable to hold Plavix 5-7 days if biopsies anticipated. Cont ASA. Restart Plavix post procedure as soon as possible. HTN -uncontrolled. Add Norvasc 10 mg q day, switch to Imdur 60 mg ER q day. The patient was advised on risk/benefits of the new Rx and she agreed to proceed with the medication(s). Mild AS - mean gradient 9 mmHg, LEODAN 1.6.   EF 60%    Disposition:  6-12 months    Electronically signed by Ignacio Mosher MD   9/19/2018 at 1:49 PM

## 2018-10-08 NOTE — TELEPHONE ENCOUNTER
Patient called and is asking if she is to continue taking Plavix? If so, she is requesting a prescription for plavix 75 mg one daily. She has a refill left with her mail order pharmacy but she wants to get it from [facility] since it costs less there. DOLV 9/19/18, DONV 3/20/19.

## 2018-10-09 RX ORDER — CLOPIDOGREL BISULFATE 75 MG/1
TABLET ORAL
Qty: 90 TABLET | Refills: 2 | Status: SHIPPED | OUTPATIENT
Start: 2018-10-09 | End: 2019-03-20 | Stop reason: SDUPTHER

## 2018-12-17 RX ORDER — ISOSORBIDE MONONITRATE 60 MG/1
60 TABLET, EXTENDED RELEASE ORAL DAILY
Qty: 30 TABLET | Refills: 3 | Status: SHIPPED | OUTPATIENT
Start: 2018-12-17 | End: 2019-03-20 | Stop reason: SDUPTHER

## 2019-01-05 ENCOUNTER — HOSPITAL ENCOUNTER (EMERGENCY)
Age: 84
Discharge: HOME | End: 2019-01-05
Payer: MEDICARE

## 2019-01-05 VITALS
SYSTOLIC BLOOD PRESSURE: 182 MMHG | OXYGEN SATURATION: 97 % | TEMPERATURE: 98.2 F | WEIGHT: 150 LBS | DIASTOLIC BLOOD PRESSURE: 82 MMHG | HEART RATE: 66 BPM | BODY MASS INDEX: 25.75 KG/M2 | RESPIRATION RATE: 16 BRPM

## 2019-01-05 DIAGNOSIS — I10 ESSENTIAL HYPERTENSION: ICD-10-CM

## 2019-01-05 DIAGNOSIS — K12.2 UVULITIS: Primary | ICD-10-CM

## 2019-01-05 PROCEDURE — 99212 OFFICE O/P EST SF 10 MIN: CPT

## 2019-01-05 PROCEDURE — 99213 OFFICE O/P EST LOW 20 MIN: CPT | Performed by: NURSE PRACTITIONER

## 2019-01-05 RX ORDER — CEFDINIR 300 MG/1
300 CAPSULE ORAL 2 TIMES DAILY
Qty: 20 CAPSULE | Refills: 0 | Status: SHIPPED | OUTPATIENT
Start: 2019-01-05 | End: 2019-01-15

## 2019-01-05 ASSESSMENT — ENCOUNTER SYMPTOMS
TROUBLE SWALLOWING: 1
STRIDOR: 0
SORE THROAT: 1
COUGH: 0
ABDOMINAL PAIN: 0
SINUS PAIN: 0
CHEST TIGHTNESS: 0

## 2019-01-05 ASSESSMENT — PAIN SCALES - GENERAL: PAINLEVEL_OUTOF10: 5

## 2019-01-05 ASSESSMENT — PAIN DESCRIPTION - PAIN TYPE: TYPE: ACUTE PAIN

## 2019-01-05 ASSESSMENT — PAIN DESCRIPTION - LOCATION: LOCATION: THROAT

## 2019-02-15 ENCOUNTER — OFFICE VISIT (OUTPATIENT)
Dept: UROLOGY | Age: 84
End: 2019-02-15
Payer: MEDICARE

## 2019-02-15 VITALS
HEIGHT: 64 IN | WEIGHT: 154.3 LBS | SYSTOLIC BLOOD PRESSURE: 134 MMHG | DIASTOLIC BLOOD PRESSURE: 82 MMHG | BODY MASS INDEX: 26.34 KG/M2

## 2019-02-15 DIAGNOSIS — R35.0 URINARY FREQUENCY: ICD-10-CM

## 2019-02-15 DIAGNOSIS — N81.9 FEMALE GENITAL PROLAPSE, UNSPECIFIED TYPE: ICD-10-CM

## 2019-02-15 DIAGNOSIS — N39.0 RECURRENT UTI: Primary | ICD-10-CM

## 2019-02-15 LAB
BILIRUBIN URINE: NEGATIVE
BLOOD URINE, POC: NEGATIVE
CHARACTER, URINE: CLEAR
COLOR, URINE: YELLOW
GLUCOSE URINE: NEGATIVE MG/DL
KETONES, URINE: NEGATIVE
LEUKOCYTE CLUMPS, URINE: NEGATIVE
NITRITE, URINE: NEGATIVE
PH, URINE: 6
POST VOID RESIDUAL (PVR): 7 ML
PROTEIN, URINE: NEGATIVE MG/DL
SPECIFIC GRAVITY, URINE: 1.01 (ref 1–1.03)
UROBILINOGEN, URINE: 0.2 EU/DL

## 2019-02-15 PROCEDURE — G8484 FLU IMMUNIZE NO ADMIN: HCPCS | Performed by: NURSE PRACTITIONER

## 2019-02-15 PROCEDURE — 4040F PNEUMOC VAC/ADMIN/RCVD: CPT | Performed by: NURSE PRACTITIONER

## 2019-02-15 PROCEDURE — 81003 URINALYSIS AUTO W/O SCOPE: CPT | Performed by: NURSE PRACTITIONER

## 2019-02-15 PROCEDURE — 1123F ACP DISCUSS/DSCN MKR DOCD: CPT | Performed by: NURSE PRACTITIONER

## 2019-02-15 PROCEDURE — G8400 PT W/DXA NO RESULTS DOC: HCPCS | Performed by: NURSE PRACTITIONER

## 2019-02-15 PROCEDURE — G8427 DOCREV CUR MEDS BY ELIG CLIN: HCPCS | Performed by: NURSE PRACTITIONER

## 2019-02-15 PROCEDURE — 1090F PRES/ABSN URINE INCON ASSESS: CPT | Performed by: NURSE PRACTITIONER

## 2019-02-15 PROCEDURE — G8598 ASA/ANTIPLAT THER USED: HCPCS | Performed by: NURSE PRACTITIONER

## 2019-02-15 PROCEDURE — 99213 OFFICE O/P EST LOW 20 MIN: CPT | Performed by: NURSE PRACTITIONER

## 2019-02-15 PROCEDURE — 1101F PT FALLS ASSESS-DOCD LE1/YR: CPT | Performed by: NURSE PRACTITIONER

## 2019-02-15 PROCEDURE — 51798 US URINE CAPACITY MEASURE: CPT | Performed by: NURSE PRACTITIONER

## 2019-02-15 PROCEDURE — G8419 CALC BMI OUT NRM PARAM NOF/U: HCPCS | Performed by: NURSE PRACTITIONER

## 2019-02-15 PROCEDURE — 1036F TOBACCO NON-USER: CPT | Performed by: NURSE PRACTITIONER

## 2019-02-15 RX ORDER — LANSOPRAZOLE 30 MG/1
15 CAPSULE, DELAYED RELEASE ORAL 2 TIMES DAILY
COMMUNITY

## 2019-03-07 ENCOUNTER — OFFICE VISIT (OUTPATIENT)
Dept: UROLOGY | Age: 84
End: 2019-03-07
Payer: MEDICARE

## 2019-03-07 VITALS
WEIGHT: 155 LBS | SYSTOLIC BLOOD PRESSURE: 138 MMHG | HEIGHT: 64 IN | BODY MASS INDEX: 26.46 KG/M2 | DIASTOLIC BLOOD PRESSURE: 86 MMHG

## 2019-03-07 DIAGNOSIS — N81.89 GENITAL PROLAPSE, OLD LACERATION OF MUSCLES OF PELVIC FLOOR: Primary | ICD-10-CM

## 2019-03-07 DIAGNOSIS — L30.9 VULVAR DERMATITIS: ICD-10-CM

## 2019-03-07 PROCEDURE — G8484 FLU IMMUNIZE NO ADMIN: HCPCS | Performed by: UROLOGY

## 2019-03-07 PROCEDURE — 1090F PRES/ABSN URINE INCON ASSESS: CPT | Performed by: UROLOGY

## 2019-03-07 PROCEDURE — 4040F PNEUMOC VAC/ADMIN/RCVD: CPT | Performed by: UROLOGY

## 2019-03-07 PROCEDURE — G8419 CALC BMI OUT NRM PARAM NOF/U: HCPCS | Performed by: UROLOGY

## 2019-03-07 PROCEDURE — G8598 ASA/ANTIPLAT THER USED: HCPCS | Performed by: UROLOGY

## 2019-03-07 PROCEDURE — G8427 DOCREV CUR MEDS BY ELIG CLIN: HCPCS | Performed by: UROLOGY

## 2019-03-07 PROCEDURE — 99214 OFFICE O/P EST MOD 30 MIN: CPT | Performed by: UROLOGY

## 2019-03-07 PROCEDURE — 1123F ACP DISCUSS/DSCN MKR DOCD: CPT | Performed by: UROLOGY

## 2019-03-07 PROCEDURE — G8400 PT W/DXA NO RESULTS DOC: HCPCS | Performed by: UROLOGY

## 2019-03-07 PROCEDURE — 1036F TOBACCO NON-USER: CPT | Performed by: UROLOGY

## 2019-03-07 RX ORDER — CLOTRIMAZOLE AND BETAMETHASONE DIPROPIONATE 10; .64 MG/G; MG/G
CREAM TOPICAL
Qty: 45 G | Refills: 1 | Status: SHIPPED | OUTPATIENT
Start: 2019-03-07 | End: 2019-10-20

## 2019-03-07 ASSESSMENT — ENCOUNTER SYMPTOMS
CHEST TIGHTNESS: 0
EYE PAIN: 0
COLOR CHANGE: 0
ABDOMINAL PAIN: 0
NAUSEA: 0
FACIAL SWELLING: 0
BACK PAIN: 1
SHORTNESS OF BREATH: 0
EYE REDNESS: 0

## 2019-03-07 NOTE — PROGRESS NOTES
Subjective:      Patient ID: Grant Villeda 80 y.o. female 1935    Chief Complaint   Patient presents with    Incontinence     discuss bladder prolapse, pelvic exam       Other   This is a new (pelvic floor prolapse) problem. The current episode started more than 1 year ago. The problem occurs constantly. The problem has been gradually worsening. Associated symptoms include headaches (seldom). Pertinent negatives include no abdominal pain, chest pain, chills, fever, joint swelling, nausea or rash. Nothing aggravates the symptoms. Treatments tried: pessary in past. The treatment provided mild relief. Past Medical History:   Diagnosis Date    Anesthesia     after hemorrhoid surgery felt like \"elephant on my chest\" heart cath done    CAD (coronary artery disease)     Cancer ([facility] [address]     cervical rectal    GERD (gastroesophageal reflux disease)     Hyperlipidemia     Hypertension     Osteoarthritis     Shortness of breath 2017    UTI (urinary tract infection) 2017    Cinic in [address] History     Socioeconomic History    Marital status:       Spouse name: Not on file    Number of children: Not on file    Years of education: Not on file    Highest education level: Not on file   Occupational History    Occupation: retired   Social Needs    Financial resource strain: Not on file    Food insecurity:     Worry: Not on file     Inability: Not on file   [employer] needs:     Medical: Not on file     Non-medical: Not on file   Tobacco Use    Smoking status: Former Smoker     Packs/day: 1.00     Years: 12.00     Pack years: 12.00     Types: Cigarettes     Last attempt to quit: 1972     Years since quittin.7    Smokeless tobacco: Never Used   Substance and Sexual Activity    Alcohol use: No    Drug use: No    Sexual activity: Not on file   Lifestyle    Physical activity:     Days per week: Not on file     Minutes per session: Not on file    Stress: Not on file   Relationships    Social connections:     Talks on phone: Not on file     Gets together: Not on file     Attends Rastafari service: Not on file     Active member of club or organization: Not on file     Attends meetings of clubs or organizations: Not on file     Relationship status: Not on file    Intimate partner violence:     Fear of current or ex partner: Not on file     Emotionally abused: Not on file     Physically abused: Not on file     Forced sexual activity: Not on file   Other Topics Concern    Not on file   Social History Narrative    Not on file       Family History   Problem Relation Age of Onset    Alzheimer's Disease Mother     Heart Disease Mother     Arthritis Father     Cancer Brother         esophagus    Heart Disease Brother     Heart Disease Sister     Heart Disease Brother         stent, pacemaker    Stroke Sister         hemorrhagic    Heart Disease Sister     Diabetes Maternal Aunt     Diabetes Maternal Aunt     High Blood Pressure Neg Hx     Miscarriages / Stillbirths Neg Hx        Past Surgical History:   Procedure Laterality Date   [address]  8/2012    [facility] COLONOSCOPY  5/25/12    Dr. Natalya Self  09/11/2017    [facility], 08/17/2012    Anal exam, hemorroidectomy, excision perianal skin lesion. total of 3    HYSTERECTOMY  1966    OTHER SURGICAL HISTORY  1/3/2014    LEFT [facility]-Dr. Allison Medina and removed    RECTAL SURGERY      for cancer       Allergies   Allergen Reactions    Other Other (See Comments)     Movi Prep,    Facial and lip swelling    Ciprofloxacin Nausea Only     Nausea and tingling in her fingers    Lipitor Swelling    Ramipril Swelling     tongue    Sulfa Antibiotics Rash         Current Outpatient Medications:     conjugated estrogens (PREMARIN) 0.625 MG/GM vaginal cream, Place 0.5 grams vaginally twice weekly. , Disp: 1 Tube, Rfl: 0    clotrimazole-betamethasone (LOTRISONE) 1-0.05 % cream, Apply topically to bilateral vulvar area 2 times daily x 2 weeks, Disp: 45 g, Rfl: 1    lansoprazole (PREVACID) 30 MG delayed release capsule, Take 30 mg by mouth daily, Disp: , Rfl:     fexofenadine (ALLEGRA ALLERGY) 180 MG tablet, Take 180 mg by mouth daily, Disp: , Rfl:     Probiotic Product (SOLUBLE FIBER/PROBIOTICS PO), Take by mouth, Disp: , Rfl:     OIL OF OREGANO PO, Take by mouth, Disp: , Rfl:     D-MANNOSE PO, Take 500 mg by mouth daily 3-tablets daily, Disp: , Rfl:     Polyethylene Glycol 3350 (MIRALAX PO), Take by mouth as needed, Disp: , Rfl:     nitroGLYCERIN (NITROSTAT) 0.4 MG SL tablet, Place 0.4 mg under the tongue every 5 minutes as needed for Chest pain up to max of 3 total doses. If no relief after 1 dose, call 911., Disp: , Rfl:     hydrochlorothiazide (HYDRODIURIL) 25 MG tablet, Take 25 mg by mouth daily, Disp: , Rfl:     simvastatin (ZOCOR) 40 MG tablet, Take 0.5 tablets by mouth nightly, Disp: 45 tablet, Rfl: 3    pramipexole (MIRAPEX) 0.125 MG tablet, Take by mouth nightly 4 tab, Disp: , Rfl:     NONFORMULARY, as needed Home defense supplement , Disp: , Rfl:     CRANBERRY PO, Take 1 tablet by mouth as needed , Disp: , Rfl:     Methylsulfonylmethane (MSM) 1000 MG CAPS, Take by mouth as needed , Disp: , Rfl:     Cholecalciferol (VITAMIN D3) 5000 UNITS TABS, Take by mouth as needed , Disp: , Rfl:     zolpidem (AMBIEN) 5 MG tablet, Take 10 mg by mouth nightly as needed , Disp: , Rfl:     magnesium (MAGNESIUM-OXIDE) 250 MG TABS tablet, Take 250 mg by mouth daily , Disp: , Rfl:     Calcium Carbonate-Vitamin D (CALCIUM + D) 600-200 MG-UNIT TABS, Take 1 tablet by mouth as needed , Disp: , Rfl:     Coenzyme Q10 (COQ-10 PO), Take 200 mg by mouth as needed , Disp: , Rfl:     aspirin 81 MG EC tablet, Take 81 mg by mouth daily.   , Disp: , Rfl:     isosorbide mononitrate (IMDUR) 60 MG extended release tablet, Take 1 tablet by mouth daily, Disp: 90 tablet, Rfl: 3    metoprolol tartrate (LOPRESSOR) 25 MG tablet, Take 1 tablet by mouth 2 times daily, Disp: 180 tablet, Rfl: 3    Review of Systems   Constitutional: Negative for chills and fever. HENT: Negative for ear pain and facial swelling. Eyes: Negative for pain and redness. Respiratory: Negative for chest tightness and shortness of breath. Cardiovascular: Positive for leg swelling (bilateral ankles). Negative for chest pain. Gastrointestinal: Negative for abdominal pain and nausea. Endocrine: Negative for cold intolerance and heat intolerance. Genitourinary: Positive for difficulty urinating (occasional), frequency and urgency. Musculoskeletal: Positive for back pain (often). Negative for joint swelling. Skin: Negative for color change and rash. Allergic/Immunologic: Negative for environmental allergies and food allergies. Neurological: Positive for headaches (seldom). Negative for dizziness. Hematological: Bruises/bleeds easily. /86   Ht 5' 4\" (1.626 m)   Wt 155 lb (70.3 kg)   BMI 26.61 kg/m²     Objective:   Physical Exam   Constitutional: She is oriented to person, place, and time. Vital signs are normal. She appears well-developed and well-nourished. No distress. HENT:   Head: Normocephalic and atraumatic. Mouth/Throat: Oropharynx is clear and moist and mucous membranes are normal. No oropharyngeal exudate. Eyes: Pupils are equal, round, and reactive to light. EOM are normal. Right eye exhibits no discharge. Left eye exhibits no discharge. No scleral icterus. Neck: Trachea normal. No JVD present. No tracheal deviation present. No thyroid mass present. Pulmonary/Chest: Effort normal. No respiratory distress. She has no wheezes. Abdominal: Soft. She exhibits no distension. There is no tenderness. There is no rebound and no CVA tenderness. Genitourinary: Pelvic exam was performed with patient supine.    Genitourinary Comments: Grade 3-4 cystocele. Musculoskeletal: She exhibits no edema or tenderness. Lymphadenopathy:        Right: No supraclavicular adenopathy present. Left: No supraclavicular adenopathy present. Neurological: She is alert and oriented to person, place, and time. Skin: Skin is warm and dry. She is not diaphoretic. Psychiatric: She has a normal mood and affect. Her behavior is normal.   Nursing note and vitals reviewed. Labs    No results found for this visit on 03/07/19. Lab Results   Component Value Date    CREATININE 1.0 05/02/2018    BUN 28 (H) 05/02/2018     05/02/2018    K 4.2 05/02/2018    CL 97 (L) 05/02/2018    CO2 25 05/02/2018       Assessment:      Bladder Prolapse    Ms. Robel Villegas presents today in follow-up for Genital prolapse, old laceration of muscles of pelvic floor [N81.89]. Lorenzo Patton is here today to discuss options for bladder prolapse. She has had a pessary in the past, but was uncomfortable for her. We will see if Dr. Tyson Culp can trial her on a different style or type of pessary. She remains on D-mannose and Cranberry extract, doing well. No documented UTI's in recent months. We would like to try and avoid surgery if at all possible. I have reviewed all notes sent along with this referral including notes from a recent visit to her primary care provider. These records demonstrated the following past medical history:  Past Medical History:   Diagnosis Date    Anesthesia 2012    after hemorrhoid surgery felt like \"elephant on my chest\" heart cath done    CAD (coronary artery disease) 2012    Cancer ([facility] [address]     cervical rectal    GERD (gastroesophageal reflux disease)     Hyperlipidemia     Hypertension     Osteoarthritis     Shortness of breath 09/2017    UTI (urinary tract infection) 05/20/2017    Cinic in [address]: We will have her follow up with Dr. Tyson Culp for a pessary fitting. I will have her follow up Lizz Chan in 6 weeks.

## 2019-03-08 DIAGNOSIS — N81.10 BLADDER PROLAPSE, FEMALE, ACQUIRED: Primary | ICD-10-CM

## 2019-03-20 ENCOUNTER — OFFICE VISIT (OUTPATIENT)
Dept: CARDIOLOGY CLINIC | Age: 84
End: 2019-03-20
Payer: MEDICARE

## 2019-03-20 VITALS
HEART RATE: 62 BPM | BODY MASS INDEX: 26.5 KG/M2 | WEIGHT: 155.2 LBS | HEIGHT: 64 IN | SYSTOLIC BLOOD PRESSURE: 136 MMHG | DIASTOLIC BLOOD PRESSURE: 84 MMHG

## 2019-03-20 DIAGNOSIS — I35.0 MILD AORTIC STENOSIS: ICD-10-CM

## 2019-03-20 DIAGNOSIS — I25.10 CAD IN NATIVE ARTERY: Primary | ICD-10-CM

## 2019-03-20 DIAGNOSIS — I10 ESSENTIAL HYPERTENSION: ICD-10-CM

## 2019-03-20 PROCEDURE — 1101F PT FALLS ASSESS-DOCD LE1/YR: CPT | Performed by: INTERNAL MEDICINE

## 2019-03-20 PROCEDURE — G8400 PT W/DXA NO RESULTS DOC: HCPCS | Performed by: INTERNAL MEDICINE

## 2019-03-20 PROCEDURE — 1090F PRES/ABSN URINE INCON ASSESS: CPT | Performed by: INTERNAL MEDICINE

## 2019-03-20 PROCEDURE — 99213 OFFICE O/P EST LOW 20 MIN: CPT | Performed by: INTERNAL MEDICINE

## 2019-03-20 PROCEDURE — 1036F TOBACCO NON-USER: CPT | Performed by: INTERNAL MEDICINE

## 2019-03-20 PROCEDURE — G8598 ASA/ANTIPLAT THER USED: HCPCS | Performed by: INTERNAL MEDICINE

## 2019-03-20 PROCEDURE — 93000 ELECTROCARDIOGRAM COMPLETE: CPT | Performed by: INTERNAL MEDICINE

## 2019-03-20 PROCEDURE — G8427 DOCREV CUR MEDS BY ELIG CLIN: HCPCS | Performed by: INTERNAL MEDICINE

## 2019-03-20 PROCEDURE — G8419 CALC BMI OUT NRM PARAM NOF/U: HCPCS | Performed by: INTERNAL MEDICINE

## 2019-03-20 PROCEDURE — 1123F ACP DISCUSS/DSCN MKR DOCD: CPT | Performed by: INTERNAL MEDICINE

## 2019-03-20 PROCEDURE — 4040F PNEUMOC VAC/ADMIN/RCVD: CPT | Performed by: INTERNAL MEDICINE

## 2019-03-20 PROCEDURE — G8484 FLU IMMUNIZE NO ADMIN: HCPCS | Performed by: INTERNAL MEDICINE

## 2019-03-20 RX ORDER — CLOPIDOGREL BISULFATE 75 MG/1
TABLET ORAL
Qty: 90 TABLET | Refills: 3 | Status: SHIPPED | OUTPATIENT
Start: 2019-03-20 | End: 2019-03-20

## 2019-03-20 RX ORDER — ISOSORBIDE MONONITRATE 60 MG/1
60 TABLET, EXTENDED RELEASE ORAL DAILY
Qty: 90 TABLET | Refills: 3 | Status: SHIPPED | OUTPATIENT
Start: 2019-03-20 | End: 2020-03-03 | Stop reason: SDUPTHER

## 2019-04-17 NOTE — PROGRESS NOTES
[address]  [address] [facility]  Dept: [phone]  Loc: [phone]  Visit Date: 4/19/2019      HPI:     Ricci Both f/u today for recurrent UTI. She is doing well with no UTI's in the past couple months. She remains on D-mannose and cranberry extract, also taking natural supplements. Tolerating well. She reports bladder prolapse which has been there for many years, however reports a sore on her bladder when she has visualized it, does note some intermittent blood from time to time. Has been fitted for a pessary. Not a candidate for sacral colpopexy due to radiation. She received chemotherapy and radiation for her rectal cancer in the past.  Urinalysis is negative today  PVR is 208 ml today  Urinalysis shows small leukocytes today   She comes in today by herself. Hx is obtained from the patient the medical record    Current Outpatient Medications   Medication Sig Dispense Refill    isosorbide mononitrate (IMDUR) 60 MG extended release tablet Take 1 tablet by mouth daily 90 tablet 3    metoprolol tartrate (LOPRESSOR) 25 MG tablet Take 1 tablet by mouth 2 times daily 180 tablet 3    conjugated estrogens (PREMARIN) 0.625 MG/GM vaginal cream Place 0.5 grams vaginally twice weekly.  1 Tube 0    clotrimazole-betamethasone (LOTRISONE) 1-0.05 % cream Apply topically to bilateral vulvar area 2 times daily x 2 weeks 45 g 1    lansoprazole (PREVACID) 30 MG delayed release capsule Take 30 mg by mouth daily      fexofenadine (ALLEGRA ALLERGY) 180 MG tablet Take 180 mg by mouth daily      Probiotic Product (SOLUBLE FIBER/PROBIOTICS PO) Take by mouth      OIL OF OREGANO PO Take by mouth      D-MANNOSE PO Take 500 mg by mouth daily 3-tablets daily      Polyethylene Glycol 3350 (MIRALAX PO) Take by mouth as needed      nitroGLYCERIN (NITROSTAT) 0.4 MG SL tablet Place 0.4 mg under the tongue every 5 minutes as needed for Chest pain up to max of 3 total doses. If no relief after 1 dose, call 911.  hydrochlorothiazide (HYDRODIURIL) 25 MG tablet Take 25 mg by mouth daily      simvastatin (ZOCOR) 40 MG tablet Take 0.5 tablets by mouth nightly 45 tablet 3    pramipexole (MIRAPEX) 0.125 MG tablet Take by mouth nightly 4 tab      NONFORMULARY as needed Home defense supplement       CRANBERRY PO Take 1 tablet by mouth as needed       Methylsulfonylmethane (MSM) 1000 MG CAPS Take by mouth as needed       Cholecalciferol (VITAMIN D3) 5000 UNITS TABS Take by mouth as needed       zolpidem (AMBIEN) 5 MG tablet Take 10 mg by mouth nightly as needed       magnesium (MAGNESIUM-OXIDE) 250 MG TABS tablet Take 250 mg by mouth daily       Calcium Carbonate-Vitamin D (CALCIUM + D) 600-200 MG-UNIT TABS Take 1 tablet by mouth as needed       Coenzyme Q10 (COQ-10 PO) Take 200 mg by mouth as needed       aspirin 81 MG EC tablet Take 81 mg by mouth daily. No current facility-administered medications for this visit. Past Medical History  Mik Hilario  has a past medical history of Anesthesia, CAD (coronary artery disease), Cancer ([facility] [address] GERD (gastroesophageal reflux disease), Hyperlipidemia, Hypertension, Osteoarthritis, Shortness of breath, and UTI (urinary tract infection). Past Surgical History  The patient  has a past surgical history that includes Hemorrhoid surgery (1989, 08/17/2012); Colonoscopy (5/25/12); Hysterectomy (1966); Appendectomy (1969); other surgical history (1/3/2014); Rectal surgery; Cardiac catheterization (8/2012); and Coronary angioplasty with stent (09/11/2017). Family History  This patient's family history includes Alzheimer's Disease in her mother; Arthritis in her father; Cancer in her brother; Diabetes in her maternal aunt and maternal aunt; Heart Disease in her brother, brother, mother, sister, and sister; Stroke in her sister. Social History  Mik Hilario  reports that she quit smoking about 46 years ago.  Her smoking use included cigarettes. She has a 12.00 pack-year smoking history. She has never used smokeless tobacco. She reports that she does not drink alcohol or use drugs. Subjective:     Review of Systems  No problems with ears, nose or throat. No problems with eyes. No chest pain, shortness of breath, abdominal pain, extremity pain or weakness, and no neurological deficits. No rashes.  symptoms per HPI. The remainder of the review of symptoms is negative. Objective:     PE:   Vitals:    04/19/19 1050   BP: 124/78   Weight: 159 lb (72.1 kg)   Height: 5' 4\" (1.626 m)       Constitutional: Alert and oriented times 3, no acute distress and cooperative to examination with appropriate mood and affect. HENT:   Head:        Normocephalic and atraumatic. Mouth/Throat:         Mucous membranes are normal.   Eyes:         EOM are normal. No scleral icterus. PERRLA. Neck:        Supple, symmetrical, trachea midline  Pulmonary/Chest:      Chest symmetric with normal A/P diameter,Normal respiratory rate and rhthym. No use of accessory muscles. Abdominal:         Soft. No tenderness. Bowel sounds present. Musculoskeletal:         Normal range of motion. No edema or tenderness of lower extremities. Extremities: No cyanosis, clubbing, or edema present. Neurological:        Alert and oriented. No cranial nerve deficit. [facility] Psychiatric:        Normal mood and affect.       Labs   Urine dip demonstrates   Results for POC orders placed in visit on 04/19/19   POCT Urinalysis No Micro (Auto)   Result Value Ref Range    Glucose, Ur Negative NEGATIVE mg/dl    Bilirubin Urine Negative     Ketones, Urine Negative NEGATIVE    Specific Gravity, Urine 1.015 1.002 - 1.03    Blood, UA POC Negative NEGATIVE    pH, Urine 7.00 5.0 - 9.0    Protein, Urine Negative NEGATIVE mg/dl    Urobilinogen, Urine 0.20 0.0 - 1.0 eu/dl    Nitrite, Urine Negative NEGATIVE    Leukocyte Clumps, Urine Small (A) NEGATIVE    Color, Urine Yellow YELLOW-STR    Character, Urine Clear [id number]   poct post void residual   Result Value Ref Range    post void residual 208 ml       Recent BUN/Creatinine:  Lab Results   Component Value Date    BUN 28 05/02/2018    CREATININE 1.0 05/02/2018       Radiology  The patient has had a CT abdomen pelvis with contrast which I have reviewed along with its accompanying report. The study demonstrates   FINDINGS: Lung bases:       No infiltrates or effusions. Large hiatal hernia. Coronary artery calcifications. Normal heart size. Abdomen pelvis   The liver, spleen, and pancreas are normal. Gallbladder is unremarkable.       The adrenals and kidneys are unremarkable other than mild pelviectasis bilaterally likely due to extrarenal pelves. No lymphadenopathy. Diverticulosis without evidence of diverticulitis. No evidence of bowel obstruction. Urinary bladder is unremarkable. Uterus is absent.       Bones   Severe levoscoliosis. No suspicious bone lesions. Impression   Stable CT abdomen pelvis. No lymphadenopathy. No acute findings       Assessment & Plan:     Recurrent UTI  Female Pelvic Prolapse   Mixed incontinence  Hx of Rectal & Cervical cancer    Stef Camilla f/u today for recurrent UTI. She remains on D-mannose and Cranberry extract, doing well. No documented UTI's in recent months     Not a candidate for sacral colpopexy due to hx of radiation. Is working with OBGYN with pessary fitting. PVR elevated today, 208 ml, encouraged to double void, reports she could empty more    Return in about 1 year (around 4/19/2020) for Recurrent UTI.     Azeb Cordova, APRN  Urology

## 2019-04-19 ENCOUNTER — OFFICE VISIT (OUTPATIENT)
Dept: UROLOGY | Age: 84
End: 2019-04-19
Payer: MEDICARE

## 2019-04-19 VITALS
BODY MASS INDEX: 27.14 KG/M2 | DIASTOLIC BLOOD PRESSURE: 78 MMHG | HEIGHT: 64 IN | SYSTOLIC BLOOD PRESSURE: 124 MMHG | WEIGHT: 159 LBS

## 2019-04-19 DIAGNOSIS — N39.46 MIXED INCONTINENCE: ICD-10-CM

## 2019-04-19 DIAGNOSIS — N39.0 RECURRENT UTI: Primary | ICD-10-CM

## 2019-04-19 LAB
BILIRUBIN URINE: NEGATIVE
BLOOD URINE, POC: NEGATIVE
CHARACTER, URINE: CLEAR
COLOR, URINE: YELLOW
GLUCOSE URINE: NEGATIVE MG/DL
KETONES, URINE: NEGATIVE
LEUKOCYTE CLUMPS, URINE: ABNORMAL
NITRITE, URINE: NEGATIVE
PH, URINE: 7 (ref 5–9)
POST VOID RESIDUAL (PVR): 208 ML
PROTEIN, URINE: NEGATIVE MG/DL
SPECIFIC GRAVITY, URINE: 1.01 (ref 1–1.03)
UROBILINOGEN, URINE: 0.2 EU/DL (ref 0–1)

## 2019-04-19 PROCEDURE — 4040F PNEUMOC VAC/ADMIN/RCVD: CPT | Performed by: NURSE PRACTITIONER

## 2019-04-19 PROCEDURE — 99213 OFFICE O/P EST LOW 20 MIN: CPT | Performed by: NURSE PRACTITIONER

## 2019-04-19 PROCEDURE — 51798 US URINE CAPACITY MEASURE: CPT | Performed by: NURSE PRACTITIONER

## 2019-04-19 PROCEDURE — G8400 PT W/DXA NO RESULTS DOC: HCPCS | Performed by: NURSE PRACTITIONER

## 2019-04-19 PROCEDURE — 81003 URINALYSIS AUTO W/O SCOPE: CPT | Performed by: NURSE PRACTITIONER

## 2019-04-19 PROCEDURE — 1090F PRES/ABSN URINE INCON ASSESS: CPT | Performed by: NURSE PRACTITIONER

## 2019-04-19 PROCEDURE — 1123F ACP DISCUSS/DSCN MKR DOCD: CPT | Performed by: NURSE PRACTITIONER

## 2019-04-19 PROCEDURE — G8598 ASA/ANTIPLAT THER USED: HCPCS | Performed by: NURSE PRACTITIONER

## 2019-04-19 PROCEDURE — G8427 DOCREV CUR MEDS BY ELIG CLIN: HCPCS | Performed by: NURSE PRACTITIONER

## 2019-04-19 PROCEDURE — G8419 CALC BMI OUT NRM PARAM NOF/U: HCPCS | Performed by: NURSE PRACTITIONER

## 2019-04-19 PROCEDURE — 1036F TOBACCO NON-USER: CPT | Performed by: NURSE PRACTITIONER

## 2019-04-19 PROCEDURE — 0509F URINE INCON PLAN DOCD: CPT | Performed by: NURSE PRACTITIONER

## 2019-05-11 ENCOUNTER — HOSPITAL ENCOUNTER (OUTPATIENT)
Dept: CT IMAGING | Age: 84
Discharge: HOME | End: 2019-05-11
Payer: MEDICARE

## 2019-05-11 DIAGNOSIS — C21.0 MALIGNANT NEOPLASM OF ANUS (HCC): ICD-10-CM

## 2019-05-11 PROCEDURE — 72193 CT PELVIS W/DYE: CPT

## 2019-05-11 PROCEDURE — 6360000004 HC RX CONTRAST MEDICATION: Performed by: INTERNAL MEDICINE

## 2019-05-11 RX ADMIN — IOHEXOL 50 ML: 240 INJECTION, SOLUTION INTRATHECAL; INTRAVASCULAR; INTRAVENOUS; ORAL at 08:52

## 2019-05-11 RX ADMIN — IOPAMIDOL 100 ML: 755 INJECTION, SOLUTION INTRAVENOUS at 08:50

## 2019-06-09 ENCOUNTER — HOSPITAL ENCOUNTER (EMERGENCY)
Age: 84
Discharge: HOME | End: 2019-06-09
Attending: EMERGENCY MEDICINE
Payer: MEDICARE

## 2019-06-09 VITALS
HEART RATE: 74 BPM | WEIGHT: 154 LBS | RESPIRATION RATE: 18 BRPM | SYSTOLIC BLOOD PRESSURE: 144 MMHG | TEMPERATURE: 98 F | DIASTOLIC BLOOD PRESSURE: 82 MMHG | OXYGEN SATURATION: 97 % | HEIGHT: 64 IN | BODY MASS INDEX: 26.29 KG/M2

## 2019-06-09 DIAGNOSIS — N30.00 ACUTE CYSTITIS WITHOUT HEMATURIA: Primary | ICD-10-CM

## 2019-06-09 LAB
BACTERIA: ABNORMAL /HPF
BILIRUBIN URINE: NEGATIVE
BLOOD, URINE: ABNORMAL
CASTS 2: ABNORMAL /LPF
CASTS UA: ABNORMAL /LPF
CHARACTER, URINE: ABNORMAL
COLOR: ABNORMAL
CRYSTALS, UA: ABNORMAL
EPITHELIAL CELLS, UA: ABNORMAL /HPF
GLUCOSE URINE: NEGATIVE MG/DL
KETONES, URINE: ABNORMAL
LEUKOCYTE ESTERASE, URINE: ABNORMAL
MISCELLANEOUS 2: ABNORMAL
NITRITE, URINE: ABNORMAL
PH UA: ABNORMAL (ref 5–9)
PROTEIN UA: ABNORMAL
RBC URINE: ABNORMAL /HPF
RENAL EPITHELIAL, UA: ABNORMAL
SPECIFIC GRAVITY, URINE: ABNORMAL (ref 1–1.03)
UROBILINOGEN, URINE: ABNORMAL EU/DL (ref 0–1)
WBC UA: > 100 /HPF
YEAST: ABNORMAL

## 2019-06-09 PROCEDURE — 87086 URINE CULTURE/COLONY COUNT: CPT

## 2019-06-09 PROCEDURE — 87077 CULTURE AEROBIC IDENTIFY: CPT

## 2019-06-09 PROCEDURE — 81001 URINALYSIS AUTO W/SCOPE: CPT

## 2019-06-09 PROCEDURE — 87186 SC STD MICRODIL/AGAR DIL: CPT

## 2019-06-09 PROCEDURE — 99213 OFFICE O/P EST LOW 20 MIN: CPT | Performed by: EMERGENCY MEDICINE

## 2019-06-09 PROCEDURE — 87184 SC STD DISK METHOD PER PLATE: CPT

## 2019-06-09 PROCEDURE — 99213 OFFICE O/P EST LOW 20 MIN: CPT

## 2019-06-09 RX ORDER — NITROFURANTOIN 25; 75 MG/1; MG/1
100 CAPSULE ORAL 2 TIMES DAILY
Qty: 10 CAPSULE | Refills: 0 | Status: SHIPPED | OUTPATIENT
Start: 2019-06-09 | End: 2019-06-14

## 2019-06-09 ASSESSMENT — ENCOUNTER SYMPTOMS
FACIAL SWELLING: 0
CONSTIPATION: 0
CHOKING: 0
WHEEZING: 0
ABDOMINAL PAIN: 0
DIARRHEA: 0
BACK PAIN: 0
VOICE CHANGE: 0
STRIDOR: 0
BLOOD IN STOOL: 0
VOMITING: 0
EYE PAIN: 0
SINUS PRESSURE: 0
SORE THROAT: 0
EYE DISCHARGE: 0
COUGH: 0
TROUBLE SWALLOWING: 0
EYE REDNESS: 0
NAUSEA: 0
SHORTNESS OF BREATH: 0

## 2019-06-09 ASSESSMENT — PAIN DESCRIPTION - PROGRESSION: CLINICAL_PROGRESSION: NOT CHANGED

## 2019-06-09 ASSESSMENT — PAIN - FUNCTIONAL ASSESSMENT: PAIN_FUNCTIONAL_ASSESSMENT: ACTIVITIES ARE NOT PREVENTED

## 2019-06-09 ASSESSMENT — PAIN DESCRIPTION - ONSET: ONSET: ON-GOING

## 2019-06-09 ASSESSMENT — PAIN DESCRIPTION - DESCRIPTORS: DESCRIPTORS: DISCOMFORT

## 2019-06-09 ASSESSMENT — PAIN DESCRIPTION - PAIN TYPE: TYPE: ACUTE PAIN

## 2019-06-09 ASSESSMENT — PAIN SCALES - GENERAL: PAINLEVEL_OUTOF10: 7

## 2019-06-09 ASSESSMENT — PAIN DESCRIPTION - FREQUENCY: FREQUENCY: CONTINUOUS

## 2019-06-09 ASSESSMENT — PAIN DESCRIPTION - LOCATION: LOCATION: ABDOMEN

## 2019-06-09 NOTE — ED TRIAGE NOTES
Pt to urgent care with c/o a possible UTI. New onset of symptoms started this morning. Pt states it is burning with urination.

## 2019-06-09 NOTE — ED PROVIDER NOTES
[id number]       Chief Complaint   Patient presents with    Urinary Tract Infection     Pain with urination    Abdominal Pain       Nurses Notes reviewed and I agree except as noted in the HPI. HISTORY OF PRESENT ILLNESS   Tyson Tomlin is a 80 y.o. female who presents with 12 hour history of dysuria, urinary frequency, urgency, suprapubic pressure. She has sigmoidoscopy scheduled for tomorrow. She rates dysuria at 7 out of 10 severity. Symptoms improved by Pyridium. No fever, vomiting, back pain, dizziness, syncope, hematuria. GFR 2018-53. No history of DM, pyelonephritis or urosepsis. Allergic to sulfa and Cipro. REVIEW OF SYSTEMS     Review of Systems   Constitutional: Negative for appetite change, chills, fatigue, fever and unexpected weight change. HENT: Negative for congestion, ear discharge, ear pain, facial swelling, hearing loss, nosebleeds, postnasal drip, sinus pressure, sore throat, trouble swallowing and voice change. Eyes: Negative for pain, discharge, redness and visual disturbance. Respiratory: Negative for cough, choking, shortness of breath, wheezing and stridor. Cardiovascular: Negative for chest pain and leg swelling. Gastrointestinal: Negative for abdominal pain, blood in stool, constipation, diarrhea, nausea and vomiting. Genitourinary: Positive for dysuria, frequency and urgency. Negative for flank pain, hematuria, vaginal bleeding and vaginal discharge. Musculoskeletal: Negative for arthralgias, back pain, neck pain and neck stiffness. Skin: Negative for rash. Neurological: Negative for dizziness, seizures, syncope, weakness, light-headedness and headaches. Hematological: Negative for adenopathy. Does not bruise/bleed easily. Psychiatric/Behavioral: Negative for confusion, sleep disturbance and suicidal ideas. The patient is not nervous/anxious.     All other systems reviewed and are negative. PAST MEDICAL HISTORY         Diagnosis Date    Anesthesia 2012    after hemorrhoid surgery felt like \"elephant on my chest\" heart cath done    CAD (coronary artery disease) 2012    Cancer ([facility] [address]     cervical rectal    GERD (gastroesophageal reflux disease)     Hyperlipidemia     Hypertension     Osteoarthritis     Shortness of breath 09/2017    UTI (urinary tract infection) 05/20/2017    Cinic in [address] [place] [address]     Patient  has a past surgical history that includes Hemorrhoid surgery (1989, 08/17/2012); Colonoscopy (5/25/12); Hysterectomy (1966); Appendectomy (1969); other surgical history (1/3/2014); Rectal surgery; Cardiac catheterization (8/2012); and Coronary angioplasty with stent (09/11/2017). CURRENT MEDICATIONS       Previous Medications    ASPIRIN 81 MG EC TABLET    Take 81 mg by mouth daily. CALCIUM CARBONATE-VITAMIN D (CALCIUM + D) 600-200 MG-UNIT TABS    Take 1 tablet by mouth as needed     CHOLECALCIFEROL (VITAMIN D3) 5000 UNITS TABS    Take by mouth as needed     CLOTRIMAZOLE-BETAMETHASONE (LOTRISONE) 1-0.05 % CREAM    Apply topically to bilateral vulvar area 2 times daily x 2 weeks    COENZYME Q10 (COQ-10 PO)    Take 200 mg by mouth as needed     CONJUGATED ESTROGENS (PREMARIN) 0.625 MG/GM VAGINAL CREAM    Place 0.5 grams vaginally twice weekly.     CRANBERRY PO    Take 1 tablet by mouth as needed     D-MANNOSE PO    Take 500 mg by mouth daily 3-tablets daily    FEXOFENADINE (ALLEGRA ALLERGY) 180 MG TABLET    Take 180 mg by mouth daily    HYDROCHLOROTHIAZIDE (HYDRODIURIL) 25 MG TABLET    Take 25 mg by mouth daily    ISOSORBIDE MONONITRATE (IMDUR) 60 MG EXTENDED RELEASE TABLET    Take 1 tablet by mouth daily    LANSOPRAZOLE (PREVACID) 30 MG DELAYED RELEASE CAPSULE    Take 30 mg by mouth daily    MAGNESIUM (MAGNESIUM-OXIDE) 250 MG TABS TABLET    Take 250 mg by mouth daily     METHYLSULFONYLMETHANE (MSM) 1000 MG CAPS    Take by mouth as needed     METOPROLOL TARTRATE (LOPRESSOR) 25 MG TABLET    Take 1 tablet by mouth 2 times daily    NITROGLYCERIN (NITROSTAT) 0.4 MG SL TABLET    Place 0.4 mg under the tongue every 5 minutes as needed for Chest pain up to max of 3 total doses. If no relief after 1 dose, call 911. NONFORMULARY    as needed Home defense supplement     OIL OF OREGANO PO    Take by mouth    POLYETHYLENE GLYCOL 3350 (MIRALAX PO)    Take by mouth as needed    PRAMIPEXOLE (MIRAPEX) 0.125 MG TABLET    Take by mouth nightly 4 tab    PROBIOTIC PRODUCT (SOLUBLE FIBER/PROBIOTICS PO)    Take by mouth    SIMVASTATIN (ZOCOR) 40 MG TABLET    Take 0.5 tablets by mouth nightly    ZOLPIDEM (AMBIEN) 5 MG TABLET    Take 10 mg by mouth nightly as needed        ALLERGIES     Patient is is allergic to other; ciprofloxacin; lipitor; ramipril; and sulfa antibiotics. FAMILY HISTORY     Patient'sfamily history includes Alzheimer's Disease in her mother; Arthritis in her father; Cancer in her brother; Diabetes in her maternal aunt and maternal aunt; Heart Disease in her brother, brother, mother, sister, and sister; Stroke in her sister. SOCIAL HISTORY     Patient  reports that she quit smoking about 46 years ago. Her smoking use included cigarettes. She has a 12.00 pack-year smoking history. She has never used smokeless tobacco. She reports that she does not drink alcohol or use drugs. PHYSICAL EXAM     ED TRIAGE VITALS  BP: (!) 144/82, Temp: 98 °F (36.7 °C), Pulse: 74, Resp: 18, SpO2: 97 %  Physical Exam   Constitutional: She is oriented to person, place, and time. She appears well-developed and well-nourished. No distress. Moist membranes, normal airway   HENT:   Head: Normocephalic and atraumatic. Right Ear: Tympanic membrane and external ear normal.   Left Ear: Tympanic membrane and external ear normal.   Nose: Nose normal. Right sinus exhibits no maxillary sinus tenderness and no frontal sinus tenderness.  Left sinus exhibits no maxillary sinus tenderness and no frontal sinus tenderness. Mouth/Throat: Oropharynx is clear and moist. No trismus in the jaw. No uvula swelling. No oropharyngeal exudate, posterior oropharyngeal edema, posterior oropharyngeal erythema or tonsillar abscesses. Eyes: Pupils are equal, round, and reactive to light. Conjunctivae and EOM are normal. Right eye exhibits no discharge. Left eye exhibits no discharge. No scleral icterus. Conjunctiva clear,   Neck: Normal range of motion. No JVD present. No thyromegaly present. No meningismus   Cardiovascular: Normal rate, regular rhythm, S1 normal, S2 normal, normal heart sounds, intact distal pulses and normal pulses. Exam reveals no gallop and no friction rub. No murmur heard. Pulmonary/Chest: Effort normal and breath sounds normal. No stridor. No respiratory distress. She has no wheezes. She has no rales. She exhibits no tenderness. No cough, lungs clear   Abdominal: Soft. Bowel sounds are normal. She exhibits no distension, no abdominal bruit, no pulsatile midline mass and no mass. There is no tenderness. There is no rebound, no guarding and no CVA tenderness. Flat soft nontender   Musculoskeletal: Normal range of motion. She exhibits no edema or tenderness. Right lower leg: Normal.        Left lower leg: Normal.   Lymphadenopathy:     She has cervical adenopathy. Right cervical: Superficial cervical adenopathy present. No deep cervical and no posterior cervical adenopathy present. Left cervical: Superficial cervical adenopathy present. No deep cervical and no posterior cervical adenopathy present. Neurological: She is alert and oriented to person, place, and time. She has normal reflexes. She displays normal reflexes. No cranial nerve deficit. She exhibits normal muscle tone. Coordination normal.   Appropriate, no focal findings   Skin: Skin is warm and dry. No rash noted. She is not diaphoretic. No erythema. No rash   Psychiatric: She has a normal mood and affect. Her behavior is normal. Judgment and thought content normal.   Nursing note and vitals reviewed. DIAGNOSTIC RESULTS   Labs: No results found for this visit on 06/09/19. Unable to perform U/A due to Azo interference  IMAGING:  No orders to display     URGENT CARE COURSE:     Vitals:    06/09/19 1122   BP: (!) 144/82   Pulse: 74   Resp: 18   Temp: 98 °F (36.7 °C)   TempSrc: Oral   SpO2: 97%   Weight: 154 lb (69.9 kg)   Height: 5' 4\" (1.626 m)       Medications - No data to display  PROCEDURES:  None  FINALIMPRESSION      1. Acute cystitis without hematuria        DISPOSITION/PLAN   DISPOSITION Decision To Discharge 06/09/2019 11:39:29 AM  Nontoxic, well-hydrated, normal airway. No airway abscess or epiglottitis, sepsis, CNS infection, pneumonia, hypoxia, bronchospasm. Abdomen nonsurgical.  Unable to perform UA due to Azo interference. Patient has acute cystitis. Will treat with Macrobid, Pyridium, Tylenol, increased oral clear liquids, rest.  Patient to notify physician of UTI prior to sigmoidoscopy planned for tomorrow. She understands to go to ED if worse. She will call her PCP in 4 days for culture results.   PATIENT REFERRED TO:  Recheck with your doctor    Schedule an appointment as soon as possible for a visit in 4 days  Recheck with your doctor if problems persist, go to emergency if worse    DISCHARGE MEDICATIONS:  New Prescriptions    NITROFURANTOIN, MACROCRYSTAL-MONOHYDRATE, (MACROBID) 100 MG CAPSULE    Take 1 capsule by mouth 2 times daily for 5 days     Current Discharge Medication List          MD Jimbo Fuller MD  06/09/19 1210

## 2019-06-09 NOTE — ED NOTES
Patient discharge instructions given to pt and pt verbalized understanding, 1 px given,  no other needs at this time, and pt left in stable condition.      Tracie Diaz RN  06/09/19 5494

## 2019-06-10 ENCOUNTER — HOSPITAL ENCOUNTER (OUTPATIENT)
Age: 84
Setting detail: OUTPATIENT SURGERY
Discharge: HOME | End: 2019-06-10
Attending: INTERNAL MEDICINE | Admitting: INTERNAL MEDICINE
Payer: MEDICARE

## 2019-06-10 VITALS
HEART RATE: 59 BPM | RESPIRATION RATE: 16 BRPM | TEMPERATURE: 97.4 F | SYSTOLIC BLOOD PRESSURE: 142 MMHG | DIASTOLIC BLOOD PRESSURE: 67 MMHG | OXYGEN SATURATION: 100 % | HEIGHT: 64 IN | WEIGHT: 155.4 LBS | BODY MASS INDEX: 26.53 KG/M2

## 2019-06-10 PROCEDURE — 2709999900 HC NON-CHARGEABLE SUPPLY: Performed by: INTERNAL MEDICINE

## 2019-06-10 PROCEDURE — 2580000003 HC RX 258: Performed by: INTERNAL MEDICINE

## 2019-06-10 PROCEDURE — 7100000000 HC PACU RECOVERY - FIRST 15 MIN: Performed by: INTERNAL MEDICINE

## 2019-06-10 PROCEDURE — 7100000001 HC PACU RECOVERY - ADDTL 15 MIN: Performed by: INTERNAL MEDICINE

## 2019-06-10 PROCEDURE — 6370000000 HC RX 637 (ALT 250 FOR IP): Performed by: INTERNAL MEDICINE

## 2019-06-10 PROCEDURE — 6370000000 HC RX 637 (ALT 250 FOR IP)

## 2019-06-10 PROCEDURE — 3609008400 HC SIGMOIDOSCOPY DIAGNOSTIC: Performed by: INTERNAL MEDICINE

## 2019-06-10 RX ORDER — SODIUM PHOSPHATE, DIBASIC AND SODIUM PHOSPHATE, MONOBASIC 7; 19 G/133ML; G/133ML
1 ENEMA RECTAL
Status: COMPLETED | OUTPATIENT
Start: 2019-06-10 | End: 2019-06-10

## 2019-06-10 RX ORDER — SODIUM CHLORIDE 450 MG/100ML
INJECTION, SOLUTION INTRAVENOUS CONTINUOUS
Status: DISCONTINUED | OUTPATIENT
Start: 2019-06-10 | End: 2019-06-10 | Stop reason: HOSPADM

## 2019-06-10 RX ADMIN — SODIUM CHLORIDE: 4.5 INJECTION, SOLUTION INTRAVENOUS at 10:48

## 2019-06-10 RX ADMIN — SODIUM PHOSPHATE 1 ENEMA: 7; 19 ENEMA RECTAL at 10:27

## 2019-06-10 ASSESSMENT — PAIN - FUNCTIONAL ASSESSMENT: PAIN_FUNCTIONAL_ASSESSMENT: 0-10

## 2019-06-10 ASSESSMENT — PAIN SCALES - GENERAL: PAINLEVEL_OUTOF10: 0

## 2019-06-10 NOTE — BRIEF OP NOTE
Brief Postoperative Note  ______________________________________________________________    Patient: Oralia Zapata  YOB: 1935  MRN: [id number]  Date of Procedure: 6/10/2019    Pre-Op Diagnosis: RECTAL LESION, RECTAL BLEEDING, CONSTIPATION, MALIGNANT NEOPLASM OF RECTUM    Post-Op Diagnosis: Same       Procedure(s):  SIGMOIDOSCOPY DIAGNOSTIC FLEXIBLE    Anesthesia: Anesthesia type not filed in the log. Surgeon(s):  Izzy Shearer MD    Assistant: yes    Estimated Blood Loss (mL): less than 50     Complications: None    Specimens:   * No specimens in log *    Implants:  * No implants in log *      Drains: * No LDAs found *    Findings: normal sigmoidoscopy.     Izzy Shearer MD  Date: 6/10/2019  Time: 11:22 AM

## 2019-06-10 NOTE — OP NOTE
[address] [address] Endoscopy    CONSCIOUS SEDATION      Patient: Jean Carlos Kay  : 1935  Acct#: [de-identified]    PLANNED PROCEDURE   []EGD  []Colonoscopy [x]Flex Sigmoid  []Other:  Indication: Pain control for GI procedure    Consent: I have discussed with the patient and/or the patient representative the indication, alternatives, and the possible risks and/or complications of the planned procedure and the anesthesia methods. The patient and/or patient representative appear to understand and agree to proceed. Pre-Sedation Documentation and Exam: I have personally completed a history, physical exam & review of systems for this patient (see notes). Airway Assessment: Mallampati Class I - (soft palate, fauces, uvula & anterior/posterior tonsillar pillars are visible)    Prior History of Anesthesia Complications: none    ASA Classification: Class 2 - A normal healthy patient with mild systemic disease    Sedation/ Anesthesia Plan: none      SEDATION  Planned agent:[]Midazolam []Meperidine []Sublimaze []Morphine    Monitoring and Safety: The patient will be placed on a cardiac monitor and vital signs, pulse oximetry and level of consciousness will be continuously evaluated throughout the procedure. The patient will be closely monitored until recovery from the medications is complete and the patient has returned to baseline status. Respiratory therapy will be on standby during the procedure. I have reviewed with the patient and/or family the risks, benefits, and alternatives to the procedure.     Dong Vora MD, CNSP  6/10/2019, 11:21 AM    Post-Sedation Vital Signs: Vital signs were reviewed and were stable after the procedure (see flow sheet for vitals)            Post-Sedation Exam: Lungs: clear           Complications: none     Dong Vora MD, CNSP  6/10/2019,

## 2019-06-10 NOTE — H&P
[address] [id number]                       PREOPERATIVE HISTORY AND PHYSICAL    PATIENT NAME: Dyllan Rice                    :        1935  MED REC NO:   [id number]                           ROOM:  ACCOUNT NO:   [de-identified]                           ADMIT DATE: 06/10/2019  PROVIDER:     Mark Aly M.D. PROCEDURE:  Sigmoidoscopy. INDICATION:  The patient is an 51-year-old pleasant female, who had  squamous cell carcinoma of the rectum, status post rectal surgery, who  complains of constipation, some rectal bleeding. She is undergoing  followup sigmoidoscope, but denied any abdominal pain. Denied any  nausea or vomiting. PAST MEDICAL HISTORY:  Coronary artery disease, rectal cancer,  gastroesophageal reflux disease, hyperlipidemia, hypertension,  osteoarthritis, urinary tract infection. PAST SURGICAL HISTORY:  Appendectomy, colonoscopy, coronary angioplasty  with stent placement, hemorrhoidectomy, hysterectomy, left MediPort  placement, rectal surgery. MEDICATIONS:  Reviewed. PHYSICAL EXAMINATION:  VITAL SIGNS:  Afebrile. Vital signs are stable. Temperature 98.7,  pulse 63, respiratory rate 15, blood pressure 151/71. HEART:  Regular. Normal S1 and S2. No S3.  LUNGS:   Equal to expansion on inspection. ABDOMEN:  Soft. Normoactive bowel sounds. ASA CLASSIFICATION:  As per anesthesia. Please anesthesia note for  details. IMPRESSION:  An 51-year-old pleasant female who had personal history of  rectal cancer. She is undergoing sigmoidoscope because of the  constipation and rectal bleeding. RECOMMENDATIONS:  Keep the patient nothing by mouth. Proceed with  sigmoidoscopy as planned. The risks and benefits explained.         Radha Guzman M.D.    D: 06/10/2019 11:35:20       T: 06/10/2019 11:54:13     [email or web address]  Job#: [id number]     Doc#: [id number]    CC:

## 2019-06-11 LAB
ORGANISM: ABNORMAL
URINE CULTURE REFLEX: ABNORMAL

## 2019-06-11 NOTE — PROCEDURES
shown in picture numbers A7 and A8. No evidence of any  residual tumor noted. Again, there are moderate-to-large internal  hemorrhoids noted as shown in picture number A9. Air was withdrawn as  the scope was removed. The patient tolerated the procedure well without  any immediate complications. Vitals including blood pressure, heart  rate, and pulse ox were stable. IMPRESSION:  Flexible sigmoidoscopy to the mid sigmoid colon. The  rectum looks clean. There is a moderate-to-large internal hemorrhoids  which could be accounting for blood in the stool. No evidence of any  recurrent squamous cell cancer of the rectum. RECOMMENDATIONS:  High-fiber diet. Fiber supplements. Yogurt one cup  p.o b.i.d. Preparation H as needed for hemorrhoidal symptoms. Please  call me if any further GI problems. Tori Gold M.D.    D: 06/10/2019 11:38:26       T: 06/10/2019 15:24:30     [email or web address]  Job#: [id number]     Doc#: [id number]    CC:   Pavel Vega M.D.

## 2019-06-12 ENCOUNTER — TELEPHONE (OUTPATIENT)
Dept: UROLOGY | Age: 84
End: 2019-06-12

## 2019-06-12 ENCOUNTER — CARE COORDINATION (OUTPATIENT)
Dept: CASE MANAGEMENT | Age: 84
End: 2019-06-12

## 2019-06-12 RX ORDER — CEFDINIR 300 MG/1
300 CAPSULE ORAL 2 TIMES DAILY
Qty: 14 CAPSULE | Refills: 0 | Status: SHIPPED | OUTPATIENT
Start: 2019-06-12 | End: 2019-06-19

## 2019-06-12 NOTE — TELEPHONE ENCOUNTER
Patient was in urgent care on Sunday and was diagnosed with a UTI. She was given macrobid, but doesn't feel like it is helping. The patient states she took some pyridium today just to get some relief because the pain is not going away. She is still having urinary frequency, urgency, and dysuria. Please advise, thank you.

## 2019-06-12 NOTE — CARE COORDINATION
Care Transition  ED Follow up Call    Reason for ED visit:  UTI   How are you feeling? \"okay\"  Do you have any questions related to your discharge instructions? no    Review of Instructions:    Discharged with new prescription? yes - macrobid, changed to omnicef   Review Medications:  Yes   Do you have any questions related to your medications? no    Understands what to report/when to return?:  Yes   Do you have a follow up appointment scheduled? No  Specific review if indicated (symptom, services, etc): Spoke with Felisa York, introduced self/role. Patient has been in contact with Urology for ongoing symptoms. Antibiotic changed to [facility] today. Reviewed s/s to report to urology, verbalized understanding. Patient does not have PCP at this time, previously followed with Dr. Janeen Fernandez. Patient interested in obtaining a PCP at [facility] practice as she resides close to office. Patient given [facility] contact information and hours to schedule new patient appointment. Denies further needs/assistance/concerns/questions at this time.        Do you have any needs or concerns I can assist you with? no     FU appts/Provider:    Future Appointments   Date Time Provider Ju Billy   4/28/2020 10:45 AM ZACHARY Weston - [address] Urology [facility] - [place], [place]  [phone]

## 2019-07-01 ENCOUNTER — TELEPHONE (OUTPATIENT)
Dept: UROLOGY | Age: 84
End: 2019-07-01

## 2019-07-01 ENCOUNTER — NURSE ONLY (OUTPATIENT)
Dept: LAB | Age: 84
End: 2019-07-01

## 2019-07-01 DIAGNOSIS — N39.0 RECURRENT UTI: Primary | ICD-10-CM

## 2019-07-01 LAB
BACTERIA: ABNORMAL /HPF
BILIRUBIN URINE: NEGATIVE
BLOOD, URINE: NEGATIVE
CASTS 2: ABNORMAL /LPF
CASTS UA: ABNORMAL /LPF
CHARACTER, URINE: ABNORMAL
COLOR: YELLOW
CRYSTALS, UA: ABNORMAL
EPITHELIAL CELLS, UA: ABNORMAL /HPF
GLUCOSE URINE: NEGATIVE MG/DL
KETONES, URINE: NEGATIVE
LEUKOCYTE ESTERASE, URINE: ABNORMAL
MISCELLANEOUS 2: ABNORMAL
NITRITE, URINE: NEGATIVE
PH UA: 5.5 (ref 5–9)
PROTEIN UA: NEGATIVE
RBC URINE: ABNORMAL /HPF
RENAL EPITHELIAL, UA: ABNORMAL
SPECIFIC GRAVITY, URINE: 1.01 (ref 1–1.03)
UROBILINOGEN, URINE: 0.2 EU/DL (ref 0–1)
WBC UA: ABNORMAL /HPF
YEAST: ABNORMAL

## 2019-07-01 RX ORDER — NITROFURANTOIN 25; 75 MG/1; MG/1
100 CAPSULE ORAL 2 TIMES DAILY
Qty: 14 CAPSULE | Refills: 0 | Status: SHIPPED | OUTPATIENT
Start: 2019-07-01 | End: 2019-07-08

## 2019-07-03 LAB — URINE CULTURE REFLEX: NORMAL

## 2019-07-03 RX ORDER — PHENAZOPYRIDINE HYDROCHLORIDE 100 MG/1
100 TABLET, FILM COATED ORAL 3 TIMES DAILY PRN
Qty: 10 TABLET | Refills: 0 | Status: SHIPPED | OUTPATIENT
Start: 2019-07-03 | End: 2020-03-03

## 2019-07-03 NOTE — TELEPHONE ENCOUNTER
Patient called in wanting to know results of urine culture, I let her know they were negative. She states that she is still having burning and urinary urgency. She was given macrobid on 7/1/19 by Young Morales. She doesn't feel like it is helping. I advised her that she could try AZO for the symptoms and she voiced understanding. She said over the weekend she did take ceftin for three days because she had it left over, she said that helped more than the macrobid is. Please advise, thank you.

## 2019-07-22 ENCOUNTER — HOSPITAL ENCOUNTER (OUTPATIENT)
Age: 84
Discharge: HOME | End: 2019-07-22
Payer: MEDICARE

## 2019-07-22 ENCOUNTER — TELEPHONE (OUTPATIENT)
Dept: UROLOGY | Age: 84
End: 2019-07-22

## 2019-07-22 DIAGNOSIS — R35.0 URINARY FREQUENCY: ICD-10-CM

## 2019-07-22 DIAGNOSIS — R35.0 URINARY FREQUENCY: Primary | ICD-10-CM

## 2019-07-22 LAB
BACTERIA: ABNORMAL /HPF
BILIRUBIN URINE: NEGATIVE
BLOOD, URINE: ABNORMAL
CASTS 2: ABNORMAL /LPF
CASTS UA: ABNORMAL /LPF
CHARACTER, URINE: ABNORMAL
COLOR: YELLOW
CRYSTALS, UA: ABNORMAL
EPITHELIAL CELLS, UA: ABNORMAL /HPF
GLUCOSE URINE: NEGATIVE MG/DL
KETONES, URINE: NEGATIVE
LEUKOCYTE ESTERASE, URINE: ABNORMAL
MISCELLANEOUS 2: ABNORMAL
NITRITE, URINE: POSITIVE
PH UA: 6 (ref 5–9)
PROTEIN UA: NEGATIVE
RBC URINE: ABNORMAL /HPF
RENAL EPITHELIAL, UA: ABNORMAL
SPECIFIC GRAVITY, URINE: 1.01 (ref 1–1.03)
UROBILINOGEN, URINE: 0.2 EU/DL (ref 0–1)
WBC UA: > 200 /HPF
YEAST: ABNORMAL

## 2019-07-22 PROCEDURE — 87086 URINE CULTURE/COLONY COUNT: CPT

## 2019-07-22 PROCEDURE — 81003 URINALYSIS AUTO W/O SCOPE: CPT

## 2019-07-22 PROCEDURE — 81001 URINALYSIS AUTO W/SCOPE: CPT

## 2019-07-22 PROCEDURE — 87186 SC STD MICRODIL/AGAR DIL: CPT

## 2019-07-22 PROCEDURE — 87077 CULTURE AEROBIC IDENTIFY: CPT

## 2019-07-22 RX ORDER — NITROFURANTOIN 25; 75 MG/1; MG/1
100 CAPSULE ORAL 2 TIMES DAILY
Qty: 14 CAPSULE | Refills: 0 | Status: SHIPPED | OUTPATIENT
Start: 2019-07-22 | End: 2019-07-25 | Stop reason: ALTCHOICE

## 2019-07-25 ENCOUNTER — TELEPHONE (OUTPATIENT)
Dept: UROLOGY | Age: 84
End: 2019-07-25

## 2019-07-25 LAB
ORGANISM: ABNORMAL
URINE CULTURE REFLEX: ABNORMAL

## 2019-07-25 RX ORDER — CIPROFLOXACIN 500 MG/1
500 TABLET, FILM COATED ORAL 2 TIMES DAILY
Qty: 14 TABLET | Refills: 0 | Status: SHIPPED | OUTPATIENT
Start: 2019-07-25 | End: 2019-08-01

## 2019-07-25 RX ORDER — GRANULES FOR ORAL 3 G/1
3 POWDER ORAL
Qty: 3 EACH | Refills: 0 | Status: SHIPPED | OUTPATIENT
Start: 2019-07-25 | End: 2019-10-20

## 2019-07-25 NOTE — TELEPHONE ENCOUNTER
Please let pt know her urine was positive for infection. She needs to stop macrobid as it is resistant to bacteria. Says she has allergy to sulfa and cipro. These are the only oral options for infection. Can she tolerate cipro? . Says nausea only.      Thank you

## 2019-07-25 NOTE — TELEPHONE ENCOUNTER
Dipika Sierra, are there any other options?     She cannot afford that she stated when she told me the cost.

## 2019-07-29 NOTE — TELEPHONE ENCOUNTER
I have personally verified, reviewed, and approved these actions. .    Call us if symptoms do not improve.

## 2019-08-21 ENCOUNTER — NURSE ONLY (OUTPATIENT)
Dept: LAB | Age: 84
End: 2019-08-21

## 2019-08-21 LAB
AVERAGE GLUCOSE: 111 MG/DL (ref 70–126)
BILIRUBIN URINE: NEGATIVE
BLOOD, URINE: NEGATIVE
CHARACTER, URINE: CLEAR
COLOR: YELLOW
CREATININE URINE: 62.7 MG/DL
FERRITIN: 43 NG/ML (ref 10–291)
GLUCOSE URINE: NEGATIVE MG/DL
HBA1C MFR BLD: 5.7 % (ref 4.4–6.4)
KETONES, URINE: NEGATIVE
LEUKOCYTE ESTERASE, URINE: NEGATIVE
NITRITE, URINE: NEGATIVE
PH UA: 6 (ref 5–9)
PROT/CREAT RATIO, UR: 0.12
PROTEIN UA: NEGATIVE
PROTEIN, URINE: 7.7 MG/DL
SPECIFIC GRAVITY, URINE: 1.01 (ref 1–1.03)
TOTAL IRON BINDING CAPACITY: 334 UG/DL (ref 171–450)
TSH SERPL DL<=0.05 MIU/L-ACNC: 3.28 UIU/ML (ref 0.4–4.2)
UROBILINOGEN, URINE: 0.2 EU/DL (ref 0–1)
VITAMIN D 25-HYDROXY: 43 NG/ML (ref 30–100)

## 2019-08-28 ENCOUNTER — TELEPHONE (OUTPATIENT)
Dept: UROLOGY | Age: 84
End: 2019-08-28

## 2019-08-28 ENCOUNTER — NURSE ONLY (OUTPATIENT)
Dept: LAB | Age: 84
End: 2019-08-28

## 2019-08-28 DIAGNOSIS — R39.15 URGENCY OF URINATION: ICD-10-CM

## 2019-08-28 DIAGNOSIS — R35.0 URINARY FREQUENCY: Primary | ICD-10-CM

## 2019-08-28 DIAGNOSIS — R35.0 URINARY FREQUENCY: ICD-10-CM

## 2019-08-28 LAB
BACTERIA: ABNORMAL /HPF
BILIRUBIN URINE: NEGATIVE
BLOOD, URINE: ABNORMAL
CASTS 2: ABNORMAL /LPF
CASTS UA: ABNORMAL /LPF
CHARACTER, URINE: ABNORMAL
COLOR: YELLOW
CRYSTALS, UA: ABNORMAL
EPITHELIAL CELLS, UA: ABNORMAL /HPF
GLUCOSE URINE: NEGATIVE MG/DL
KETONES, URINE: NEGATIVE
LEUKOCYTE ESTERASE, URINE: ABNORMAL
MISCELLANEOUS 2: ABNORMAL
MUCUS: ABNORMAL
NITRITE, URINE: POSITIVE
PH UA: 5.5 (ref 5–9)
PROTEIN UA: 100
RBC URINE: > 200 /HPF
RENAL EPITHELIAL, UA: ABNORMAL
SPECIFIC GRAVITY, URINE: 1.01 (ref 1–1.03)
UROBILINOGEN, URINE: 0.2 EU/DL (ref 0–1)
WBC UA: > 200 /HPF
YEAST: ABNORMAL

## 2019-08-28 RX ORDER — DOXYCYCLINE HYCLATE 100 MG
100 TABLET ORAL 2 TIMES DAILY
Qty: 20 TABLET | Refills: 0 | Status: SHIPPED | OUTPATIENT
Start: 2019-08-28 | End: 2019-09-07

## 2019-08-28 RX ORDER — GRANULES FOR ORAL 3 G/1
3 POWDER ORAL
Qty: 2 EACH | Refills: 0 | Status: SHIPPED | OUTPATIENT
Start: 2019-08-28 | End: 2019-10-20

## 2019-08-28 NOTE — TELEPHONE ENCOUNTER
Patient did a strip test and it turned purple for a UTI, she is having hurting, frequently. Wondering if something can be sent in to [facility]. She is stating that she has a friend that takes a pill daily and it is called Methenamine 1 gr 2 times daily. She is wondering if she would be a canidate for that medications. Please advise at [phone] or she is doing some running so you can call cell as well.  [phone]

## 2019-08-28 NOTE — TELEPHONE ENCOUNTER
The patient was advised to take the doxycycline. She voiced understanding. I called [facility] and spoke to Tammy Lynch. She voiced understanding to cancel the prescription for monurol.

## 2019-08-30 ENCOUNTER — TELEPHONE (OUTPATIENT)
Dept: UROLOGY | Age: 84
End: 2019-08-30

## 2019-08-30 LAB
ORGANISM: ABNORMAL
URINE CULTURE REFLEX: ABNORMAL

## 2019-08-30 NOTE — TELEPHONE ENCOUNTER
The patient was advised of the message from Jeremiah Kwong CNP. Urine culture is back. Infection is sensitive to this. Continue complete course of Doxy.

## 2019-10-20 ENCOUNTER — HOSPITAL ENCOUNTER (EMERGENCY)
Age: 84
Discharge: HOME | End: 2019-10-20
Payer: MEDICARE

## 2019-10-20 VITALS
RESPIRATION RATE: 16 BRPM | HEIGHT: 64 IN | BODY MASS INDEX: 25.61 KG/M2 | TEMPERATURE: 98.5 F | WEIGHT: 150 LBS | DIASTOLIC BLOOD PRESSURE: 78 MMHG | SYSTOLIC BLOOD PRESSURE: 139 MMHG | HEART RATE: 68 BPM | OXYGEN SATURATION: 98 %

## 2019-10-20 DIAGNOSIS — R30.0 DYSURIA: Primary | ICD-10-CM

## 2019-10-20 LAB
BILIRUBIN URINE: NEGATIVE
BLOOD, URINE: ABNORMAL
CHARACTER, URINE: ABNORMAL
COLOR: YELLOW
GLUCOSE, URINE: NEGATIVE MG/DL
KETONES, URINE: NEGATIVE
LEUKOCYTES, UA: ABNORMAL
NITRATE, UA: POSITIVE
PH UA: 5.5 (ref 5–9)
PROTEIN UA: 100 MG/DL
REFLEX TO URINE C & S: ABNORMAL
SPECIFIC GRAVITY UA: 1.02 (ref 1–1.03)
UROBILINOGEN, URINE: 0.2 EU/DL (ref 0–1)

## 2019-10-20 PROCEDURE — 99213 OFFICE O/P EST LOW 20 MIN: CPT

## 2019-10-20 PROCEDURE — 87086 URINE CULTURE/COLONY COUNT: CPT

## 2019-10-20 PROCEDURE — 87186 SC STD MICRODIL/AGAR DIL: CPT

## 2019-10-20 PROCEDURE — 87077 CULTURE AEROBIC IDENTIFY: CPT

## 2019-10-20 PROCEDURE — 81003 URINALYSIS AUTO W/O SCOPE: CPT

## 2019-10-20 PROCEDURE — 99214 OFFICE O/P EST MOD 30 MIN: CPT | Performed by: NURSE PRACTITIONER

## 2019-10-20 RX ORDER — DOXYCYCLINE HYCLATE 100 MG
100 TABLET ORAL 2 TIMES DAILY
Qty: 20 TABLET | Refills: 0 | Status: SHIPPED | OUTPATIENT
Start: 2019-10-20 | End: 2019-10-30

## 2019-10-20 ASSESSMENT — ENCOUNTER SYMPTOMS
WHEEZING: 0
ABDOMINAL DISTENTION: 1
EYES NEGATIVE: 1
COUGH: 0
ALLERGIC/IMMUNOLOGIC NEGATIVE: 1
CHEST TIGHTNESS: 0

## 2019-10-21 ENCOUNTER — CARE COORDINATION (OUTPATIENT)
Dept: CASE MANAGEMENT | Age: 84
End: 2019-10-21

## 2019-10-22 LAB
ORGANISM: ABNORMAL
URINE CULTURE REFLEX: ABNORMAL

## 2019-10-23 ENCOUNTER — TELEPHONE (OUTPATIENT)
Dept: UROLOGY | Age: 84
End: 2019-10-23

## 2019-10-29 ENCOUNTER — TELEPHONE (OUTPATIENT)
Dept: UROLOGY | Age: 84
End: 2019-10-29

## 2019-10-29 ENCOUNTER — OFFICE VISIT (OUTPATIENT)
Dept: UROLOGY | Age: 84
End: 2019-10-29
Payer: MEDICARE

## 2019-10-29 VITALS
HEIGHT: 64 IN | DIASTOLIC BLOOD PRESSURE: 88 MMHG | SYSTOLIC BLOOD PRESSURE: 138 MMHG | BODY MASS INDEX: 26.4 KG/M2 | WEIGHT: 154.6 LBS

## 2019-10-29 DIAGNOSIS — N39.0 URINARY TRACT INFECTION WITH HEMATURIA, SITE UNSPECIFIED: Primary | ICD-10-CM

## 2019-10-29 DIAGNOSIS — R35.0 URINARY FREQUENCY: ICD-10-CM

## 2019-10-29 DIAGNOSIS — R39.15 URGENCY OF URINATION: ICD-10-CM

## 2019-10-29 DIAGNOSIS — R31.9 URINARY TRACT INFECTION WITH HEMATURIA, SITE UNSPECIFIED: Primary | ICD-10-CM

## 2019-10-29 LAB
BILIRUBIN, POC: NEGATIVE
BLOOD URINE, POC: NEGATIVE
CLARITY, POC: CLEAR
COLOR, POC: YELLOW
GLUCOSE URINE, POC: NEGATIVE
KETONES, POC: NEGATIVE
LEUKOCYTE EST, POC: NEGATIVE
NITRITE, POC: NEGATIVE
PH, POC: 5
POST VOID RESIDUAL (PVR): 373 ML
PROTEIN, POC: NEGATIVE
SPECIFIC GRAVITY, POC: 1.01
UROBILINOGEN, POC: 0.2

## 2019-10-29 PROCEDURE — G8484 FLU IMMUNIZE NO ADMIN: HCPCS | Performed by: NURSE PRACTITIONER

## 2019-10-29 PROCEDURE — 1123F ACP DISCUSS/DSCN MKR DOCD: CPT | Performed by: NURSE PRACTITIONER

## 2019-10-29 PROCEDURE — 81003 URINALYSIS AUTO W/O SCOPE: CPT | Performed by: NURSE PRACTITIONER

## 2019-10-29 PROCEDURE — G8400 PT W/DXA NO RESULTS DOC: HCPCS | Performed by: NURSE PRACTITIONER

## 2019-10-29 PROCEDURE — 51798 US URINE CAPACITY MEASURE: CPT | Performed by: NURSE PRACTITIONER

## 2019-10-29 PROCEDURE — 1090F PRES/ABSN URINE INCON ASSESS: CPT | Performed by: NURSE PRACTITIONER

## 2019-10-29 PROCEDURE — G8417 CALC BMI ABV UP PARAM F/U: HCPCS | Performed by: NURSE PRACTITIONER

## 2019-10-29 PROCEDURE — 99214 OFFICE O/P EST MOD 30 MIN: CPT | Performed by: NURSE PRACTITIONER

## 2019-10-29 PROCEDURE — 4040F PNEUMOC VAC/ADMIN/RCVD: CPT | Performed by: NURSE PRACTITIONER

## 2019-10-29 PROCEDURE — G8427 DOCREV CUR MEDS BY ELIG CLIN: HCPCS | Performed by: NURSE PRACTITIONER

## 2019-10-29 PROCEDURE — 1036F TOBACCO NON-USER: CPT | Performed by: NURSE PRACTITIONER

## 2019-10-29 PROCEDURE — G8598 ASA/ANTIPLAT THER USED: HCPCS | Performed by: NURSE PRACTITIONER

## 2019-11-04 ENCOUNTER — TELEPHONE (OUTPATIENT)
Dept: UROLOGY | Age: 84
End: 2019-11-04

## 2019-11-25 ENCOUNTER — TELEPHONE (OUTPATIENT)
Dept: UROLOGY | Age: 84
End: 2019-11-25

## 2019-11-25 RX ORDER — NITROFURANTOIN 25; 75 MG/1; MG/1
100 CAPSULE ORAL 2 TIMES DAILY
Qty: 10 CAPSULE | Refills: 0 | Status: SHIPPED | OUTPATIENT
Start: 2019-11-25 | End: 2019-11-30

## 2019-11-26 ENCOUNTER — NURSE ONLY (OUTPATIENT)
Dept: UROLOGY | Age: 84
End: 2019-11-26
Payer: MEDICARE

## 2019-11-26 DIAGNOSIS — R35.0 URINARY FREQUENCY: Primary | ICD-10-CM

## 2019-11-26 LAB
BILIRUBIN URINE: NEGATIVE
BLOOD URINE, POC: ABNORMAL
CHARACTER, URINE: ABNORMAL
COLOR, URINE: YELLOW
GLUCOSE URINE: NEGATIVE MG/DL
KETONES, URINE: NEGATIVE
LEUKOCYTE CLUMPS, URINE: ABNORMAL
NITRITE, URINE: NEGATIVE
PH, URINE: 6.5 (ref 5–9)
POST VOID RESIDUAL (PVR): 248 ML
PROTEIN, URINE: NEGATIVE MG/DL
SPECIFIC GRAVITY, URINE: 1.01 (ref 1–1.03)
UROBILINOGEN, URINE: 0.2 EU/DL (ref 0–1)

## 2019-11-26 PROCEDURE — 81003 URINALYSIS AUTO W/O SCOPE: CPT | Performed by: NURSE PRACTITIONER

## 2019-11-26 PROCEDURE — 51798 US URINE CAPACITY MEASURE: CPT | Performed by: NURSE PRACTITIONER

## 2019-11-28 LAB
ORGANISM: ABNORMAL
URINE CULTURE, ROUTINE: ABNORMAL

## 2019-11-29 ENCOUNTER — TELEPHONE (OUTPATIENT)
Dept: UROLOGY | Age: 84
End: 2019-11-29

## 2019-11-29 RX ORDER — SULFAMETHOXAZOLE AND TRIMETHOPRIM 800; 160 MG/1; MG/1
1 TABLET ORAL 2 TIMES DAILY
Qty: 10 TABLET | Refills: 0 | Status: SHIPPED | OUTPATIENT
Start: 2019-11-29 | End: 2019-12-04

## 2019-12-04 ENCOUNTER — OFFICE VISIT (OUTPATIENT)
Dept: UROLOGY | Age: 84
End: 2019-12-04
Payer: MEDICARE

## 2019-12-04 VITALS
DIASTOLIC BLOOD PRESSURE: 74 MMHG | SYSTOLIC BLOOD PRESSURE: 126 MMHG | BODY MASS INDEX: 26.29 KG/M2 | HEIGHT: 64 IN | WEIGHT: 154 LBS

## 2019-12-04 DIAGNOSIS — R35.0 URINARY FREQUENCY: Primary | ICD-10-CM

## 2019-12-04 DIAGNOSIS — N81.10 BLADDER PROLAPSE, FEMALE, ACQUIRED: ICD-10-CM

## 2019-12-04 DIAGNOSIS — N39.46 MIXED INCONTINENCE: ICD-10-CM

## 2019-12-04 DIAGNOSIS — R39.15 URGENCY OF URINATION: ICD-10-CM

## 2019-12-04 LAB — POST VOID RESIDUAL (PVR): 209 ML

## 2019-12-04 PROCEDURE — 4040F PNEUMOC VAC/ADMIN/RCVD: CPT | Performed by: NURSE PRACTITIONER

## 2019-12-04 PROCEDURE — 51798 US URINE CAPACITY MEASURE: CPT | Performed by: NURSE PRACTITIONER

## 2019-12-04 PROCEDURE — 1090F PRES/ABSN URINE INCON ASSESS: CPT | Performed by: NURSE PRACTITIONER

## 2019-12-04 PROCEDURE — 1036F TOBACCO NON-USER: CPT | Performed by: NURSE PRACTITIONER

## 2019-12-04 PROCEDURE — G8427 DOCREV CUR MEDS BY ELIG CLIN: HCPCS | Performed by: NURSE PRACTITIONER

## 2019-12-04 PROCEDURE — G8400 PT W/DXA NO RESULTS DOC: HCPCS | Performed by: NURSE PRACTITIONER

## 2019-12-04 PROCEDURE — 1123F ACP DISCUSS/DSCN MKR DOCD: CPT | Performed by: NURSE PRACTITIONER

## 2019-12-04 PROCEDURE — G8598 ASA/ANTIPLAT THER USED: HCPCS | Performed by: NURSE PRACTITIONER

## 2019-12-04 PROCEDURE — 99213 OFFICE O/P EST LOW 20 MIN: CPT | Performed by: NURSE PRACTITIONER

## 2019-12-04 PROCEDURE — 0509F URINE INCON PLAN DOCD: CPT | Performed by: NURSE PRACTITIONER

## 2019-12-04 PROCEDURE — G8484 FLU IMMUNIZE NO ADMIN: HCPCS | Performed by: NURSE PRACTITIONER

## 2019-12-04 PROCEDURE — G8417 CALC BMI ABV UP PARAM F/U: HCPCS | Performed by: NURSE PRACTITIONER

## 2019-12-16 ENCOUNTER — TELEPHONE (OUTPATIENT)
Dept: UROLOGY | Age: 84
End: 2019-12-16

## 2019-12-16 ENCOUNTER — NURSE ONLY (OUTPATIENT)
Dept: LAB | Age: 84
End: 2019-12-16

## 2019-12-16 DIAGNOSIS — R35.0 URINARY FREQUENCY: ICD-10-CM

## 2019-12-16 DIAGNOSIS — R35.0 URINARY FREQUENCY: Primary | ICD-10-CM

## 2019-12-16 LAB
BACTERIA: ABNORMAL /HPF
BILIRUBIN URINE: NEGATIVE
BLOOD, URINE: NEGATIVE
CASTS 2: ABNORMAL /LPF
CASTS UA: ABNORMAL /LPF
CHARACTER, URINE: CLEAR
COLOR: YELLOW
CRYSTALS, UA: ABNORMAL
EPITHELIAL CELLS, UA: ABNORMAL /HPF
GLUCOSE URINE: NEGATIVE MG/DL
KETONES, URINE: NEGATIVE
LEUKOCYTE ESTERASE, URINE: ABNORMAL
MISCELLANEOUS 2: ABNORMAL
NITRITE, URINE: NEGATIVE
PH UA: 5.5 (ref 5–9)
PROTEIN UA: NEGATIVE
RBC URINE: ABNORMAL /HPF
RENAL EPITHELIAL, UA: ABNORMAL
SPECIFIC GRAVITY, URINE: 1.01 (ref 1–1.03)
UROBILINOGEN, URINE: 0.2 EU/DL (ref 0–1)
WBC UA: ABNORMAL /HPF
YEAST: ABNORMAL

## 2019-12-16 RX ORDER — CEFDINIR 300 MG/1
300 CAPSULE ORAL 2 TIMES DAILY
Qty: 14 CAPSULE | Refills: 0 | Status: SHIPPED | OUTPATIENT
Start: 2019-12-16 | End: 2019-12-23

## 2019-12-18 ENCOUNTER — TELEPHONE (OUTPATIENT)
Dept: UROLOGY | Age: 84
End: 2019-12-18

## 2019-12-18 LAB
ORGANISM: ABNORMAL
URINE CULTURE REFLEX: ABNORMAL

## 2020-01-09 ENCOUNTER — TELEPHONE (OUTPATIENT)
Dept: UROLOGY | Age: 85
End: 2020-01-09

## 2020-01-09 RX ORDER — SULFAMETHOXAZOLE AND TRIMETHOPRIM 800; 160 MG/1; MG/1
1 TABLET ORAL 2 TIMES DAILY
Qty: 10 TABLET | Refills: 0 | Status: SHIPPED | OUTPATIENT
Start: 2020-01-09 | End: 2020-01-14

## 2020-01-09 NOTE — TELEPHONE ENCOUNTER
I called and spoke with the patient and notified the patient that the results showed a low colony count. Patient stated that she is having burning with urination, frequency and urgency. Patient used [facility] as her pharmacy. Patient would like something called in. See other telephone encounter.

## 2020-01-28 ENCOUNTER — TELEPHONE (OUTPATIENT)
Dept: UROLOGY | Age: 85
End: 2020-01-28

## 2020-01-28 ENCOUNTER — NURSE ONLY (OUTPATIENT)
Dept: LAB | Age: 85
End: 2020-01-28

## 2020-01-28 LAB
BACTERIA: ABNORMAL /HPF
BILIRUBIN URINE: NEGATIVE
BLOOD, URINE: ABNORMAL
CASTS 2: ABNORMAL /LPF
CASTS UA: ABNORMAL /LPF
CHARACTER, URINE: ABNORMAL
COLOR: YELLOW
CRYSTALS, UA: ABNORMAL
EPITHELIAL CELLS, UA: ABNORMAL /HPF
GLUCOSE URINE: NEGATIVE MG/DL
KETONES, URINE: NEGATIVE
LEUKOCYTE ESTERASE, URINE: ABNORMAL
MISCELLANEOUS 2: ABNORMAL
NITRITE, URINE: NEGATIVE
PH UA: 5.5 (ref 5–9)
PROTEIN UA: NEGATIVE
RBC URINE: ABNORMAL /HPF
RENAL EPITHELIAL, UA: ABNORMAL
SPECIFIC GRAVITY, URINE: 1.01 (ref 1–1.03)
UROBILINOGEN, URINE: 0.2 EU/DL (ref 0–1)
WBC UA: ABNORMAL /HPF
YEAST: ABNORMAL

## 2020-01-28 RX ORDER — SULFAMETHOXAZOLE AND TRIMETHOPRIM 800; 160 MG/1; MG/1
1 TABLET ORAL 2 TIMES DAILY
Qty: 14 TABLET | Refills: 0 | Status: SHIPPED | OUTPATIENT
Start: 2020-01-28 | End: 2020-02-04

## 2020-01-28 NOTE — TELEPHONE ENCOUNTER
Ok to get urine. Will start on bactrim, if needs changed will call her. Increase fluid intake. We will most likely need to place her on daily antibiotics.

## 2020-01-28 NOTE — TELEPHONE ENCOUNTER
Patient voiced understanding to start the Bactrim after giving the specimen to the lab and to increase her fluid intake.

## 2020-01-28 NOTE — TELEPHONE ENCOUNTER
Patient c/o having another UTI. She has frequency, urgency, and burning. The urine looked milky when she cath this am. She denies fever or chills. I placed an order for a urine. Please advise. Thank you.

## 2020-01-29 ENCOUNTER — TELEPHONE (OUTPATIENT)
Dept: UROLOGY | Age: 85
End: 2020-01-29

## 2020-01-29 LAB
ORGANISM: ABNORMAL
URINE CULTURE REFLEX: ABNORMAL

## 2020-01-29 NOTE — TELEPHONE ENCOUNTER
----- Message from ZACHARY Rosa CNP sent at 1/29/2020  7:51 AM EST -----  Urine showed mixed growth. How are symptoms?

## 2020-01-29 NOTE — TELEPHONE ENCOUNTER
The patient stated she is not feeling to bad. She is on her 3rd dose of bactrim. She still has some discomfort in the bladder area but it has improved. She denies burning, fever, or chills. The GYN put in a new pessary 01/17/2020. She is doing better voiding and emptying her bladder. She only gets 1 ounce or less when she caths. The pessary is more comfortable. Thank you.

## 2020-02-05 ENCOUNTER — NURSE ONLY (OUTPATIENT)
Dept: LAB | Age: 85
End: 2020-02-05

## 2020-02-05 LAB
FERRITIN: 45 NG/ML (ref 10–291)
FOLATE: 12.6 NG/ML (ref 4.8–24.2)
TOTAL IRON BINDING CAPACITY: 346 UG/DL (ref 171–450)
VITAMIN B-12: 269 PG/ML (ref 211–911)

## 2020-03-03 ENCOUNTER — OFFICE VISIT (OUTPATIENT)
Dept: CARDIOLOGY CLINIC | Age: 85
End: 2020-03-03
Payer: MEDICARE

## 2020-03-03 VITALS
WEIGHT: 154 LBS | HEART RATE: 61 BPM | DIASTOLIC BLOOD PRESSURE: 67 MMHG | SYSTOLIC BLOOD PRESSURE: 132 MMHG | HEIGHT: 64 IN | BODY MASS INDEX: 26.29 KG/M2

## 2020-03-03 PROCEDURE — 4040F PNEUMOC VAC/ADMIN/RCVD: CPT | Performed by: PHYSICIAN ASSISTANT

## 2020-03-03 PROCEDURE — G8427 DOCREV CUR MEDS BY ELIG CLIN: HCPCS | Performed by: PHYSICIAN ASSISTANT

## 2020-03-03 PROCEDURE — 1090F PRES/ABSN URINE INCON ASSESS: CPT | Performed by: PHYSICIAN ASSISTANT

## 2020-03-03 PROCEDURE — 1123F ACP DISCUSS/DSCN MKR DOCD: CPT | Performed by: PHYSICIAN ASSISTANT

## 2020-03-03 PROCEDURE — G8400 PT W/DXA NO RESULTS DOC: HCPCS | Performed by: PHYSICIAN ASSISTANT

## 2020-03-03 PROCEDURE — G8417 CALC BMI ABV UP PARAM F/U: HCPCS | Performed by: PHYSICIAN ASSISTANT

## 2020-03-03 PROCEDURE — 99213 OFFICE O/P EST LOW 20 MIN: CPT | Performed by: PHYSICIAN ASSISTANT

## 2020-03-03 PROCEDURE — 93000 ELECTROCARDIOGRAM COMPLETE: CPT | Performed by: PHYSICIAN ASSISTANT

## 2020-03-03 PROCEDURE — 1036F TOBACCO NON-USER: CPT | Performed by: PHYSICIAN ASSISTANT

## 2020-03-03 PROCEDURE — G8484 FLU IMMUNIZE NO ADMIN: HCPCS | Performed by: PHYSICIAN ASSISTANT

## 2020-03-03 RX ORDER — ISOSORBIDE MONONITRATE 60 MG/1
60 TABLET, EXTENDED RELEASE ORAL DAILY
Qty: 90 TABLET | Refills: 3 | Status: SHIPPED | OUTPATIENT
Start: 2020-03-03 | End: 2021-03-10 | Stop reason: SDUPTHER

## 2020-03-03 NOTE — PROGRESS NOTES
Swelling in bilat lower legs and feet, fatigue    Pt Denies CP, SOB, HA, Dizziness,Palpitations      Questioning if should be taking plavix 75mg
Order Specific Question:   Reason for Exam?     Answer: Other     Continue Dr Eva Hassan current treatment plan    Continue same current medications and with constant vigilance to changes in symptoms and also any potential side effects. Return for care if become worse or seek medical attention immediately. The patient is educated on symptoms of heart disease that include chest pain and passing out, dizziness, etc and to report them if there is any change or go to emergency room.        Follow up with Dr Anni Butcher as scheduled or sooner if needed  (Please note that portions of this note were completed with a voice recognition program.  Efforts were made to edit the dictation but occasionally words are mis-transcribed.)      Pipe Mae PA-C

## 2020-03-04 ENCOUNTER — OFFICE VISIT (OUTPATIENT)
Dept: UROLOGY | Age: 85
End: 2020-03-04
Payer: MEDICARE

## 2020-03-04 VITALS
BODY MASS INDEX: 26.46 KG/M2 | HEIGHT: 64 IN | DIASTOLIC BLOOD PRESSURE: 82 MMHG | WEIGHT: 155 LBS | SYSTOLIC BLOOD PRESSURE: 136 MMHG

## 2020-03-04 LAB
BILIRUBIN URINE: NEGATIVE
BLOOD URINE, POC: ABNORMAL
CHARACTER, URINE: ABNORMAL
COLOR, URINE: YELLOW
GLUCOSE URINE: NEGATIVE MG/DL
KETONES, URINE: NEGATIVE
LEUKOCYTE CLUMPS, URINE: ABNORMAL
NITRITE, URINE: NEGATIVE
PH, URINE: 7 (ref 5–9)
POST VOID RESIDUAL (PVR): 17 ML
PROTEIN, URINE: NEGATIVE MG/DL
SPECIFIC GRAVITY, URINE: 1.02 (ref 1–1.03)
UROBILINOGEN, URINE: 0.2 EU/DL (ref 0–1)

## 2020-03-04 PROCEDURE — 1123F ACP DISCUSS/DSCN MKR DOCD: CPT | Performed by: NURSE PRACTITIONER

## 2020-03-04 PROCEDURE — G8427 DOCREV CUR MEDS BY ELIG CLIN: HCPCS | Performed by: NURSE PRACTITIONER

## 2020-03-04 PROCEDURE — 1090F PRES/ABSN URINE INCON ASSESS: CPT | Performed by: NURSE PRACTITIONER

## 2020-03-04 PROCEDURE — 99213 OFFICE O/P EST LOW 20 MIN: CPT | Performed by: NURSE PRACTITIONER

## 2020-03-04 PROCEDURE — G8400 PT W/DXA NO RESULTS DOC: HCPCS | Performed by: NURSE PRACTITIONER

## 2020-03-04 PROCEDURE — G8417 CALC BMI ABV UP PARAM F/U: HCPCS | Performed by: NURSE PRACTITIONER

## 2020-03-04 PROCEDURE — 81003 URINALYSIS AUTO W/O SCOPE: CPT | Performed by: NURSE PRACTITIONER

## 2020-03-04 PROCEDURE — 1036F TOBACCO NON-USER: CPT | Performed by: NURSE PRACTITIONER

## 2020-03-04 PROCEDURE — 4040F PNEUMOC VAC/ADMIN/RCVD: CPT | Performed by: NURSE PRACTITIONER

## 2020-03-04 PROCEDURE — G8484 FLU IMMUNIZE NO ADMIN: HCPCS | Performed by: NURSE PRACTITIONER

## 2020-03-04 PROCEDURE — 51798 US URINE CAPACITY MEASURE: CPT | Performed by: NURSE PRACTITIONER

## 2020-03-04 NOTE — PROGRESS NOTES
100 [facility],[address]  [address] [place], [place] [id number]  Dept: [phone]  Loc: [phone]  Visit Date: 3/4/2020      HPI:     Stephanie Hughes f/u today for recurrent UTI. She has had positive Urine culture every month since June of this year. She most recently had Serratia Marcescens UTI and treated with Bactrim. She has prolapse of bladder and most recently had pessary exchanged for different size due to it causing irritation of vaginal mucosa. She has been doing well. No UTIs or symptoms for month of February! She was doing CIC TID due to elevated residuals when pessary wasn't in place, she is no longer cathing due to residuals only being 1 -1.5 oz. She denies urgency, frequency or dysuria or fever or chills. She remains on D-mannose and cranberry extract, also taking natural supplements, oil of oregeno. Previously, she was seen by Dr. Kristie Miranda in Feb of this year and he reports grade 3-4 cystocele, but not a candidate for sacral colpopexy due to history of radiation. She has been fitted for pessary by OBGYN, but unable to tolerate, said it makes bladder sore. She received chemotherapy and radiation for her rectal cancer in the past.    She comes in today by herself. Hx is obtained from the patient the medical record    Current Outpatient Medications   Medication Sig Dispense Refill    isosorbide mononitrate (IMDUR) 60 MG extended release tablet Take 1 tablet by mouth daily 90 tablet 3    metoprolol tartrate (LOPRESSOR) 25 MG tablet Take 1 tablet by mouth 2 times daily 180 tablet 3    conjugated estrogens (PREMARIN) 0.625 MG/GM vaginal cream Place 0.5 grams vaginally twice weekly.  1 Tube 0    lansoprazole (PREVACID) 30 MG delayed release capsule Take 30 mg by mouth daily      OIL OF OREGANO PO Take by mouth      D-MANNOSE PO Take 500 mg by mouth daily 3-tablets daily      Polyethylene Glycol 3350 (MIRALAX PO) Take by mouth as needed smoking about 47 years ago. Her smoking use included cigarettes. She has a 12.00 pack-year smoking history. She has never used smokeless tobacco. She reports that she does not drink alcohol or use drugs. Subjective:     Review of Systems  No problems with ears, nose or throat. No problems with eyes. No chest pain, shortness of breath, abdominal pain, extremity pain or weakness, and no neurological deficits. No rashes.  symptoms per HPI. The remainder of the review of symptoms is negative. Objective:     PE:   Vitals:    03/04/20 1059   BP: 136/82   Weight: 155 lb (70.3 kg)   Height: 5' 4\" (1.626 m)       Constitutional: Alert and oriented times 3, no acute distress and cooperative to examination with appropriate mood and affect. HENT:   Head:        Normocephalic and atraumatic. Mouth/Throat:         Mucous membranes are normal.   Eyes:         EOM are normal. No scleral icterus. PERRLA. Neck:        Supple, symmetrical, trachea midline  Pulmonary/Chest:      Chest symmetric with normal A/P diameter,Normal respiratory rate and rhthym. No use of accessory muscles. Abdominal:         Soft. No tenderness. Bowel sounds present. Musculoskeletal:         Normal range of motion. No edema or tenderness of lower extremities. Extremities: No cyanosis, clubbing, or edema present. Neurological:        Alert and oriented. Psychiatric:        Normal mood and affect.         Labs   Urine dip demonstrates   Results for POC orders placed in visit on 03/04/20   POCT Urinalysis No Micro (Auto)   Result Value Ref Range    Glucose, Ur Negative NEGATIVE mg/dl    Bilirubin Urine Negative     Ketones, Urine Negative NEGATIVE    Specific Gravity, Urine 1.025 1.002 - 1.03    Blood, UA POC Trace-intact NEGATIVE    pH, Urine 7.00 5.0 - 9.0    Protein, Urine Negative NEGATIVE mg/dl    Urobilinogen, Urine 0.20 0.0 - 1.0 eu/dl    Nitrite, Urine Negative NEGATIVE    Leukocyte Clumps, Urine Small (A) NEGATIVE    Color, Urine Yellow YELLOW-STR    Character, Urine Slightly Cloudy [id number]   poct post void residual   Result Value Ref Range    post void residual 17 ml       Recent BUN/Creatinine:  Lab Results   Component Value Date    BUN 28 05/02/2018    CREATININE 1.0 05/02/2018       Radiology  No imaging reviewed. Assessment & Plan:     Recurrent UTI  Female Pelvic Prolapse   Mixed incontinence  Hx of Rectal & Cervical cancer    Shawna Bradbobby f/u today for recurrent UTI. She remains on D-mannose and Cranberry extract. Had UTI every month for last several months, she recently finished course of Bactrim in January. She had pessary exchanged due to irritation to vaginal mucosa, and has not had any problems since. No longer doing CIC due to low residuals, and no UTI symptoms today. She wants to avoid surgery on her bladder and is not a candidate for sacral colpopexy, per Dr. Melanie Luong, due to history of radiation for rectal and cervical cancer. Follow up in 6 months with [facility], she will call sooner with symptoms of UTI. She sees GYN next month.     Return in about 6 months (around 9/4/2020) for recurrent utis, ZACHARY Fatima  Urology

## 2020-04-30 ENCOUNTER — NURSE ONLY (OUTPATIENT)
Dept: LAB | Age: 85
End: 2020-04-30

## 2020-04-30 ENCOUNTER — TELEPHONE (OUTPATIENT)
Dept: UROLOGY | Age: 85
End: 2020-04-30

## 2020-04-30 RX ORDER — NITROFURANTOIN 25; 75 MG/1; MG/1
100 CAPSULE ORAL 2 TIMES DAILY
Qty: 10 CAPSULE | Refills: 0 | Status: SHIPPED | OUTPATIENT
Start: 2020-04-30 | End: 2020-05-05

## 2020-04-30 NOTE — TELEPHONE ENCOUNTER
I have personally verified, reviewed, and approved these actions. Please check urine with reflex culture and start Macrobid empirically pending culture results.

## 2020-04-30 NOTE — TELEPHONE ENCOUNTER
Patient c/o pressure, frequency, and slight burning. The bladder aches. She used a home test that was positive for UTI. She denies fever or chills. I placed an order for a urine. Please advise. Thank you.

## 2020-05-02 LAB
ORGANISM: ABNORMAL
URINE CULTURE REFLEX: ABNORMAL

## 2020-05-04 ENCOUNTER — TELEPHONE (OUTPATIENT)
Dept: UROLOGY | Age: 85
End: 2020-05-04

## 2020-05-04 NOTE — TELEPHONE ENCOUNTER
Patient advised urine culture positive for E coli. She voiced understanding to take Macrobid until completed.

## 2020-05-04 NOTE — TELEPHONE ENCOUNTER
----- Message from ZACHARY Kahn CNP sent at 5/3/2020  1:53 PM EDT -----  Pt with E coli in urine, 10,000-50,000 CFU/ml only. macrobid was sensitive to it. Ok to finish course.

## 2020-05-06 ENCOUNTER — NURSE ONLY (OUTPATIENT)
Dept: LAB | Age: 85
End: 2020-05-06

## 2020-05-06 LAB
BACTERIA: ABNORMAL /HPF
BILIRUBIN URINE: NEGATIVE
BLOOD, URINE: NEGATIVE
CASTS 2: ABNORMAL /LPF
CASTS UA: ABNORMAL /LPF
CHARACTER, URINE: CLEAR
COLOR: YELLOW
CRYSTALS, UA: ABNORMAL
EPITHELIAL CELLS, UA: ABNORMAL /HPF
GLUCOSE URINE: NEGATIVE MG/DL
KETONES, URINE: NEGATIVE
LEUKOCYTE ESTERASE, URINE: ABNORMAL
MISCELLANEOUS 2: ABNORMAL
NITRITE, URINE: NEGATIVE
PH UA: 6.5 (ref 5–9)
PROTEIN UA: NEGATIVE
RBC URINE: ABNORMAL /HPF
RENAL EPITHELIAL, UA: ABNORMAL
SPECIFIC GRAVITY, URINE: 1.01 (ref 1–1.03)
UROBILINOGEN, URINE: 0.2 EU/DL (ref 0–1)
WBC UA: ABNORMAL /HPF
YEAST: ABNORMAL

## 2020-05-06 RX ORDER — CEFDINIR 300 MG/1
300 CAPSULE ORAL 2 TIMES DAILY
Qty: 10 CAPSULE | Refills: 0 | Status: SHIPPED | OUTPATIENT
Start: 2020-05-06 | End: 2020-05-11

## 2020-05-06 NOTE — TELEPHONE ENCOUNTER
The patient is wearing the pessary and feels like she is emptying her bladder ok. She has increased her fluid intake. She was looking at her old records and has it marked the macrobid did not work any longer. She was then prescribed cefdinir which did work. Please advise. Thank you.

## 2020-05-07 ENCOUNTER — TELEPHONE (OUTPATIENT)
Dept: UROLOGY | Age: 85
End: 2020-05-07

## 2020-05-07 NOTE — TELEPHONE ENCOUNTER
----- Message from ZACHARY Cadena CNP sent at 5/7/2020 12:24 PM EDT -----  Let pt know her urine is negative for infection. Please increase fluids. Can stop antibiotic.

## 2020-08-05 ENCOUNTER — NURSE ONLY (OUTPATIENT)
Dept: LAB | Age: 85
End: 2020-08-05

## 2020-08-05 LAB
ALBUMIN SERPL-MCNC: 4.5 G/DL (ref 3.5–5.1)
ALP BLD-CCNC: 58 U/L (ref 38–126)
ALT SERPL-CCNC: 12 U/L (ref 11–66)
ANION GAP SERPL CALCULATED.3IONS-SCNC: 13 MEQ/L (ref 8–16)
AST SERPL-CCNC: 19 U/L (ref 5–40)
BILIRUB SERPL-MCNC: 0.3 MG/DL (ref 0.3–1.2)
BUN BLDV-MCNC: 16 MG/DL (ref 7–22)
CALCIUM SERPL-MCNC: 9.8 MG/DL (ref 8.5–10.5)
CHLORIDE BLD-SCNC: 98 MEQ/L (ref 98–111)
CHOLESTEROL, TOTAL: 126 MG/DL (ref 100–199)
CO2: 26 MEQ/L (ref 23–33)
CREAT SERPL-MCNC: 0.9 MG/DL (ref 0.4–1.2)
CREATININE URINE: 60.7 MG/DL
ERYTHROCYTE [DISTWIDTH] IN BLOOD BY AUTOMATED COUNT: 13.6 % (ref 11.5–14.5)
ERYTHROCYTE [DISTWIDTH] IN BLOOD BY AUTOMATED COUNT: 45.8 FL (ref 35–45)
GFR SERPL CREATININE-BSD FRML MDRD: 59 ML/MIN/1.73M2
GLUCOSE BLD-MCNC: 90 MG/DL (ref 70–108)
HCT VFR BLD CALC: 38.2 % (ref 37–47)
HDLC SERPL-MCNC: 53 MG/DL
HEMOGLOBIN: 11.8 GM/DL (ref 12–16)
LDL CHOLESTEROL CALCULATED: 48 MG/DL
MAGNESIUM: 1.9 MG/DL (ref 1.6–2.4)
MCH RBC QN AUTO: 28.2 PG (ref 26–33)
MCHC RBC AUTO-ENTMCNC: 30.9 GM/DL (ref 32.2–35.5)
MCV RBC AUTO: 91.4 FL (ref 81–99)
PLATELET # BLD: 170 THOU/MM3 (ref 130–400)
PMV BLD AUTO: 10.6 FL (ref 9.4–12.4)
POTASSIUM SERPL-SCNC: 4.2 MEQ/L (ref 3.5–5.2)
PROT/CREAT RATIO, UR: 0.14
PROTEIN, URINE: 8.7 MG/DL
RBC # BLD: 4.18 MILL/MM3 (ref 4.2–5.4)
SODIUM BLD-SCNC: 137 MEQ/L (ref 135–145)
TOTAL PROTEIN: 7.7 G/DL (ref 6.1–8)
TRIGL SERPL-MCNC: 127 MG/DL (ref 0–199)
WBC # BLD: 3.4 THOU/MM3 (ref 4.8–10.8)

## 2020-08-06 LAB
ORGANISM: ABNORMAL
URINE CULTURE, ROUTINE: ABNORMAL

## 2020-09-03 ENCOUNTER — NURSE ONLY (OUTPATIENT)
Dept: LAB | Age: 85
End: 2020-09-03

## 2020-09-03 ENCOUNTER — TELEPHONE (OUTPATIENT)
Dept: UROLOGY | Age: 85
End: 2020-09-03

## 2020-09-03 LAB
AMORPHOUS: ABNORMAL
BACTERIA: ABNORMAL
BILIRUBIN URINE: NEGATIVE
BLOOD, URINE: ABNORMAL
CASTS: ABNORMAL /LPF
CASTS: ABNORMAL /LPF
CHARACTER, URINE: ABNORMAL
COLOR: YELLOW
CRYSTALS: ABNORMAL
EPITHELIAL CELLS, UA: ABNORMAL /HPF
GLUCOSE, URINE: NEGATIVE MG/DL
KETONES, URINE: NEGATIVE
LEUKOCYTE EST, POC: ABNORMAL
MISCELLANEOUS LAB TEST RESULT: ABNORMAL
NITRITE, URINE: NEGATIVE
PH UA: 5 (ref 5–9)
PROTEIN UA: NEGATIVE MG/DL
RBC URINE: ABNORMAL /HPF
RENAL EPITHELIAL, UA: ABNORMAL
SPECIFIC GRAVITY UA: 1.01 (ref 1–1.03)
UROBILINOGEN, URINE: 0.2 EU/DL (ref 0–1)
WBC UA: ABNORMAL /HPF
YEAST: ABNORMAL

## 2020-09-03 RX ORDER — CEFDINIR 300 MG/1
300 CAPSULE ORAL 2 TIMES DAILY
Qty: 14 CAPSULE | Refills: 0 | Status: SHIPPED | OUTPATIENT
Start: 2020-09-03 | End: 2020-09-10

## 2020-09-03 NOTE — TELEPHONE ENCOUNTER
Patient stated she has another UTI and the Macrobid did not work the last time. She c/o pain with urination, frequency, urgency, and burning. She denies fever or chills. Order placed for urine. Please advise. Thank you.

## 2020-09-03 NOTE — TELEPHONE ENCOUNTER
Patient advised to start the ALVIN BREWER after giving the urine specimen to the lab. She voiced understanding.

## 2020-09-04 LAB
ORGANISM: ABNORMAL
URINE CULTURE, ROUTINE: ABNORMAL

## 2020-09-08 ENCOUNTER — TELEPHONE (OUTPATIENT)
Dept: UROLOGY | Age: 85
End: 2020-09-08

## 2020-09-08 NOTE — TELEPHONE ENCOUNTER
----- Message from ZACHARY Vazquez CNP sent at 9/5/2020  2:20 PM EDT -----  Has UTI, continue omnicef

## 2020-09-08 NOTE — PROGRESS NOTES
100 [facility],[address]  [address] [place] [id number]  Dept: [phone]  Loc: [phone]  Visit Date: 9/9/2020      HPI:     Jesus Reeves f/u today for recurrent UTI. In the past she had positive Urine culture every month since June of last year. She has prolapse of bladder and most recently had pessary exchanged for different size due to it causing irritation of vaginal mucosa. She has been doing well. She did have UTI in May and this past week. She reports taking pessary out for a week after cleaning it. Shes not sure if this caused infection. She is feeling better now. On Omnicef for 7 days for  E coli UTI. She denies urgency, frequency or dysuria or fever or chills. She remains on D-mannose and cranberry extract, also taking natural supplements, oil of oregeno. Previously, she was seen by Dr. Karyle Stabile in Feb 2019 and he reports grade 3-4 cystocele, but not a candidate for sacral colpopexy due to history of radiation. She received chemotherapy and radiation for her rectal cancer in the past.    She comes in today by herself. Hx is obtained from the patient the medical record    Current Outpatient Medications   Medication Sig Dispense Refill    cefdinir (OMNICEF) 300 MG capsule Take 1 capsule by mouth 2 times daily for 7 days 14 capsule 0    isosorbide mononitrate (IMDUR) 60 MG extended release tablet Take 1 tablet by mouth daily 90 tablet 3    metoprolol tartrate (LOPRESSOR) 25 MG tablet Take 1 tablet by mouth 2 times daily 180 tablet 3    conjugated estrogens (PREMARIN) 0.625 MG/GM vaginal cream Place 0.5 grams vaginally twice weekly.  1 Tube 0    lansoprazole (PREVACID) 30 MG delayed release capsule Take 30 mg by mouth daily      OIL OF OREGANO PO Take by mouth      D-MANNOSE PO Take 500 mg by mouth daily 3-tablets daily      Polyethylene Glycol 3350 (MIRALAX PO) Take by mouth as needed      nitroGLYCERIN (NITROSTAT) 0.4 MG SL tablet Place 0.4 mg under the tongue every 5 minutes as needed for Chest pain up to max of 3 total doses. If no relief after 1 dose, call 911.  hydrochlorothiazide (HYDRODIURIL) 25 MG tablet Take 25 mg by mouth daily      simvastatin (ZOCOR) 40 MG tablet Take 0.5 tablets by mouth nightly 45 tablet 3    pramipexole (MIRAPEX) 0.125 MG tablet Take by mouth nightly 4 tab      CRANBERRY PO Take 1 tablet by mouth as needed       Cholecalciferol (VITAMIN D3) 5000 UNITS TABS Take by mouth as needed       zolpidem (AMBIEN) 5 MG tablet Take 10 mg by mouth nightly as needed       magnesium (MAGNESIUM-OXIDE) 250 MG TABS tablet Take 250 mg by mouth daily       Calcium Carbonate-Vitamin D (CALCIUM + D) 600-200 MG-UNIT TABS Take 1 tablet by mouth as needed       Coenzyme Q10 (COQ-10 PO) Take 200 mg by mouth as needed       aspirin 81 MG EC tablet Take 81 mg by mouth daily. No current facility-administered medications for this visit. Past Medical History  Kashif Andrews  has a past medical history of Anesthesia, CAD (coronary artery disease), Cancer ([facility] [address] GERD (gastroesophageal reflux disease), Hyperlipidemia, Hypertension, Osteoarthritis, Shortness of breath, and UTI (urinary tract infection). Past Surgical History  The patient  has a past surgical history that includes Hemorrhoid surgery (1989, 08/17/2012); Colonoscopy (5/25/12); Hysterectomy (1966); Appendectomy (1969); other surgical history (1/3/2014); Rectal surgery; Cardiac catheterization (8/2012); Coronary angioplasty with stent (09/11/2017); and sigmoidoscopy (N/A, 6/10/2019). Family History  This patient's family history includes Alzheimer's Disease in her mother; Arthritis in her father; Cancer in her brother; Diabetes in her maternal aunt and maternal aunt; Heart Disease in her brother, brother, mother, sister, and sister; Stroke in her sister. Social History  Kashif Andrews  reports that she quit smoking about 48 years ago.  Her smoking use included cigarettes. She has a 12.00 pack-year smoking history. She has never used smokeless tobacco. She reports that she does not drink alcohol or use drugs. Subjective:     Review of Systems  No problems with ears, nose or throat. No problems with eyes. No chest pain, shortness of breath, abdominal pain, extremity pain or weakness, and no neurological deficits. No rashes.  symptoms per HPI. The remainder of the review of symptoms is negative. Objective:     PE:   Vitals:    09/09/20 1042   Temp: 97 °F (36.1 °C)   Weight: 152 lb (68.9 kg)   Height: 5' 2.5\" (1.588 m)       Constitutional: Alert and oriented times 3, no acute distress and cooperative to examination with appropriate mood and affect. HENT:   Head:        Normocephalic and atraumatic. Mouth/Throat:         Mucous membranes are normal.   Eyes:         EOM are normal. No scleral icterus. PERRLA. Neck:        Supple, symmetrical, trachea midline  Pulmonary/Chest:      Chest symmetric with normal A/P diameter,Normal respiratory rate and rhthym. No use of accessory muscles. Abdominal:         Soft. No tenderness. Bowel sounds present. Musculoskeletal:         Normal range of motion. No edema or tenderness of lower extremities. Extremities: No cyanosis, clubbing, or edema present. Neurological:        Alert and oriented. Psychiatric:        Normal mood and affect.         Labs   Urine dip demonstrates   Results for POC orders placed in visit on 09/09/20   POCT Urinalysis No Micro (Auto)   Result Value Ref Range    Glucose, Ur Negative NEGATIVE mg/dl    Bilirubin Urine Negative     Ketones, Urine Negative NEGATIVE    Specific Gravity, Urine 1.020 1.002 - 1.030    Blood, UA POC Trace-intact NEGATIVE    pH, Urine 6.00 5.0 - 9.0    Protein, Urine Negative NEGATIVE mg/dl    Urobilinogen, Urine 0.20 0.0 - 1.0 eu/dl    Nitrite, Urine Negative NEGATIVE    Leukocyte Clumps, Urine Trace (A) NEGATIVE    Color, Urine Yellow YELLOW-STRAW Character, Urine Clear [id number].[id number]   poct post void residual   Result Value Ref Range    post void residual 67 ml       Recent BUN/Creatinine:  Lab Results   Component Value Date    BUN 16 08/05/2020    CREATININE 0.9 08/05/2020       Radiology  No imaging reviewed. Assessment & Plan:     Recurrent UTI  Female Pelvic Prolapse   Mixed incontinence  Hx of Rectal & Cervical cancer    Madeline Khalil f/u today for recurrent UTI. She remains on D-mannose and Cranberry extract. She had pessary exchanged due to irritation to vaginal mucosa, and has not had any problems since. No longer doing CIC due to low residuals, and no UTI symptoms today. Finishing course of Omnicef now, had taken pessary out a week prior to UTI symptoms. Will extend 3 more days. Follow up in 6 months with [facility], she will call sooner with symptoms of UTI. Return in about 6 months (around 3/9/2021) for recurrent UTI, pvr,  telephone visit.  ZACHARY Peraza  Urology

## 2020-09-09 ENCOUNTER — OFFICE VISIT (OUTPATIENT)
Dept: UROLOGY | Age: 85
End: 2020-09-09
Payer: MEDICARE

## 2020-09-09 VITALS — TEMPERATURE: 97 F | BODY MASS INDEX: 26.93 KG/M2 | HEIGHT: 63 IN | WEIGHT: 152 LBS

## 2020-09-09 LAB
BILIRUBIN URINE: NEGATIVE
BLOOD URINE, POC: ABNORMAL
CHARACTER, URINE: CLEAR
COLOR, URINE: YELLOW
GLUCOSE URINE: NEGATIVE MG/DL
KETONES, URINE: NEGATIVE
LEUKOCYTE CLUMPS, URINE: ABNORMAL
NITRITE, URINE: NEGATIVE
PH, URINE: 6 (ref 5–9)
POST VOID RESIDUAL (PVR): 67 ML
PROTEIN, URINE: NEGATIVE MG/DL
SPECIFIC GRAVITY, URINE: 1.02 (ref 1–1.03)
UROBILINOGEN, URINE: 0.2 EU/DL (ref 0–1)

## 2020-09-09 PROCEDURE — G8400 PT W/DXA NO RESULTS DOC: HCPCS | Performed by: NURSE PRACTITIONER

## 2020-09-09 PROCEDURE — G8427 DOCREV CUR MEDS BY ELIG CLIN: HCPCS | Performed by: NURSE PRACTITIONER

## 2020-09-09 PROCEDURE — 81003 URINALYSIS AUTO W/O SCOPE: CPT | Performed by: NURSE PRACTITIONER

## 2020-09-09 PROCEDURE — 4040F PNEUMOC VAC/ADMIN/RCVD: CPT | Performed by: NURSE PRACTITIONER

## 2020-09-09 PROCEDURE — 1123F ACP DISCUSS/DSCN MKR DOCD: CPT | Performed by: NURSE PRACTITIONER

## 2020-09-09 PROCEDURE — 1090F PRES/ABSN URINE INCON ASSESS: CPT | Performed by: NURSE PRACTITIONER

## 2020-09-09 PROCEDURE — G8417 CALC BMI ABV UP PARAM F/U: HCPCS | Performed by: NURSE PRACTITIONER

## 2020-09-09 PROCEDURE — 1036F TOBACCO NON-USER: CPT | Performed by: NURSE PRACTITIONER

## 2020-09-09 PROCEDURE — 51798 US URINE CAPACITY MEASURE: CPT | Performed by: NURSE PRACTITIONER

## 2020-09-09 PROCEDURE — 99213 OFFICE O/P EST LOW 20 MIN: CPT | Performed by: NURSE PRACTITIONER

## 2020-09-18 ENCOUNTER — NURSE ONLY (OUTPATIENT)
Dept: LAB | Age: 85
End: 2020-09-18

## 2020-09-18 ENCOUNTER — TELEPHONE (OUTPATIENT)
Dept: UROLOGY | Age: 85
End: 2020-09-18

## 2020-09-18 LAB
AMORPHOUS: ABNORMAL
BACTERIA: ABNORMAL /HPF
BILIRUBIN URINE: NEGATIVE
BLOOD, URINE: ABNORMAL
CASTS 2: ABNORMAL /LPF
CASTS UA: ABNORMAL /LPF
CHARACTER, URINE: ABNORMAL
COLOR: ABNORMAL
CRYSTALS, UA: ABNORMAL
EPITHELIAL CELLS, UA: ABNORMAL /HPF
GLUCOSE URINE: 100 MG/DL
KETONES, URINE: NEGATIVE
LEUKOCYTE ESTERASE, URINE: ABNORMAL
MISCELLANEOUS 2: ABNORMAL
MUCUS: ABNORMAL
NITRITE, URINE: POSITIVE
PH UA: 5.5 (ref 5–9)
PROTEIN UA: 30
RBC URINE: ABNORMAL /HPF
RENAL EPITHELIAL, UA: ABNORMAL
SPECIFIC GRAVITY, URINE: 1.01 (ref 1–1.03)
UROBILINOGEN, URINE: 4 EU/DL (ref 0–1)
WBC UA: > 200 /HPF
YEAST: ABNORMAL

## 2020-09-18 NOTE — TELEPHONE ENCOUNTER
Patient stated that she already took an old script of Macrobid on hand and taken Azo, advised per Juan Luis Vasques not to continue old script of Macrobid. Would like for her to give urine sample today at lab and then when results come in we will notify her. In the     She can take AZO as need and to push water as much as she can.

## 2020-09-20 LAB
ORGANISM: ABNORMAL
URINE CULTURE REFLEX: ABNORMAL

## 2020-09-21 RX ORDER — NITROFURANTOIN 25; 75 MG/1; MG/1
100 CAPSULE ORAL 2 TIMES DAILY
Qty: 14 CAPSULE | Refills: 0 | Status: SHIPPED | OUTPATIENT
Start: 2020-09-21 | End: 2020-09-28

## 2020-09-21 NOTE — TELEPHONE ENCOUNTER
The patient would like Tracy Polo ordered. She still has pain in the bladder and burning. She still has urgency and frequency. She denies fever or chills. Please order. Thank you.

## 2020-09-21 NOTE — TELEPHONE ENCOUNTER
How is pt doing. Urine showed E coli infection. Sensitive to macrobid or omnicef.    I can send new script for macrobid

## 2020-09-22 NOTE — TELEPHONE ENCOUNTER
Received E-Prescribing error. Stephanie Ramirez at [facility] stated they received the prescription for the [facility].

## 2020-10-08 ENCOUNTER — NURSE ONLY (OUTPATIENT)
Dept: LAB | Age: 85
End: 2020-10-08

## 2020-10-08 ENCOUNTER — TELEPHONE (OUTPATIENT)
Dept: UROLOGY | Age: 85
End: 2020-10-08

## 2020-10-08 LAB
BACTERIA: ABNORMAL
BILIRUBIN URINE: NEGATIVE
BLOOD, URINE: ABNORMAL
CASTS: ABNORMAL /LPF
CASTS: ABNORMAL /LPF
CHARACTER, URINE: ABNORMAL
COLOR: YELLOW
CRYSTALS: ABNORMAL
EPITHELIAL CELLS, UA: ABNORMAL /HPF
GLUCOSE, URINE: NEGATIVE MG/DL
KETONES, URINE: NEGATIVE
LEUKOCYTE ESTERASE, URINE: ABNORMAL
MISCELLANEOUS LAB TEST RESULT: ABNORMAL
NITRITE, URINE: NEGATIVE
PH UA: 5.5 (ref 5–9)
PROTEIN UA: NEGATIVE MG/DL
RBC URINE: ABNORMAL /HPF
RENAL EPITHELIAL, UA: ABNORMAL
SPECIFIC GRAVITY UA: 1.01 (ref 1–1.03)
UROBILINOGEN, URINE: 0.2 EU/DL (ref 0–1)
WBC UA: > 200 /HPF
YEAST: ABNORMAL

## 2020-10-08 RX ORDER — CEFDINIR 300 MG/1
300 CAPSULE ORAL 2 TIMES DAILY
Qty: 14 CAPSULE | Refills: 0 | Status: SHIPPED | OUTPATIENT
Start: 2020-10-08 | End: 2020-10-15

## 2020-10-08 NOTE — TELEPHONE ENCOUNTER
Please have pt repeat urine culture and start Omnicef empirically. Trial Azo available over the counter for discomfort. Pt follows with Ute.  Will review case with Ute when back in the office and see if she is agreeable with starting pt on methenamine or if she has other recommendations.

## 2020-10-08 NOTE — TELEPHONE ENCOUNTER
Patient last seen on 09/09/2020. Was treated with Omnicef and Macrobid for UTI. Still c/o dysuria, aches in her groin, feels like menstrual cramps, urgency, and frequency. The urine has bubbles in it. She denies fever or chills. She finished the antibiotics 5 days ago. She was wondering if she should be started on methenamine. Order placed to check a urine. Please advise. Thank you.

## 2020-10-08 NOTE — TELEPHONE ENCOUNTER
Patient advised message was sent regarding the methenamine. She voiced understanding to start omnicef and azo after giving urine sample.

## 2020-10-09 NOTE — TELEPHONE ENCOUNTER
Patient advised to apply estrogen cream pea size amountt to urethra, weekly for 1 week, then can do 2-3 times a week. She voiced understanding.

## 2020-10-09 NOTE — TELEPHONE ENCOUNTER
She only sees the bubbles in the toilet. Her OB/GYN has her using the estrogen cream on the vagina. She had a raw sore she was using it on. Do you want her to apply it to urethra? Please advise. Thank you.

## 2020-10-10 LAB
ORGANISM: ABNORMAL
URINE CULTURE, ROUTINE: ABNORMAL

## 2020-10-13 NOTE — TELEPHONE ENCOUNTER
pts urine positive for infection sensitive to cipro and bactrim, only oral options. How is she doing?

## 2020-10-13 NOTE — TELEPHONE ENCOUNTER
Attempted to call the patient on the home and mobile.  Voicemail left to return the call to the office

## 2020-10-14 NOTE — TELEPHONE ENCOUNTER
Patient states she feels better but it is not gone. She still has achey feeling in the groin and the urine is cloudy. She also has frequency and urgency. It doesn't burn but it's not comfortable. She does not want the cipro. She wants to finish Omnicef and sees if it continues to help. She is taking D-Mannose and uva ursi. Please advise. Thank you.

## 2020-10-15 RX ORDER — SULFAMETHOXAZOLE AND TRIMETHOPRIM 800; 160 MG/1; MG/1
1 TABLET ORAL 2 TIMES DAILY
Qty: 14 TABLET | Refills: 0 | Status: SHIPPED | OUTPATIENT
Start: 2020-10-15 | End: 2020-10-22

## 2020-10-15 NOTE — TELEPHONE ENCOUNTER
I sent bactrim to her pharmacy, please instruct to stop taking it if she develops rash, sob or any other signs/symptoms of reaction

## 2020-10-15 NOTE — TELEPHONE ENCOUNTER
Patient left a message stating she would like to try the Bactrim even though she has it listed as an allergy for a rash. She still has pain and symptoms. Please advise. Thank you.

## 2020-10-29 ENCOUNTER — TELEPHONE (OUTPATIENT)
Dept: UROLOGY | Age: 85
End: 2020-10-29

## 2020-10-29 ENCOUNTER — NURSE ONLY (OUTPATIENT)
Dept: LAB | Age: 85
End: 2020-10-29

## 2020-10-29 RX ORDER — METHENAMINE HIPPURATE 1000 MG/1
1 TABLET ORAL 2 TIMES DAILY WITH MEALS
Qty: 60 TABLET | Refills: 11 | Status: SHIPPED | OUTPATIENT
Start: 2020-10-29 | End: 2021-03-17 | Stop reason: SDUPTHER

## 2020-10-29 RX ORDER — SULFAMETHOXAZOLE AND TRIMETHOPRIM 800; 160 MG/1; MG/1
1 TABLET ORAL 2 TIMES DAILY
Qty: 14 TABLET | Refills: 0 | Status: SHIPPED | OUTPATIENT
Start: 2020-10-29 | End: 2020-11-02 | Stop reason: ALTCHOICE

## 2020-10-29 NOTE — TELEPHONE ENCOUNTER
I called and notified the patient that [facility] would like her to get a urine and she will treat her with Bactrim. We will call her after the results are final to see if Bactrim is the correct antibiotic. After she finishes Bactrim she can start taking Hiprex. Patient advised not to take bactrim and hiprex together. Patient is currently using estrogen cream and is wearing her pessary. Patient voiced understanding.

## 2020-10-29 NOTE — TELEPHONE ENCOUNTER
Patient called stating that she thinks she has a UTI. She is currently having frequent urination, urgency, burning with urination and pain. Patient denies fever or chills. Patient stated that everytime the medication is finished her symptoms return.

## 2020-10-30 LAB
ORGANISM: ABNORMAL
URINE CULTURE, ROUTINE: ABNORMAL

## 2020-11-02 RX ORDER — AMPICILLIN 500 MG/1
500 CAPSULE ORAL 4 TIMES DAILY
Qty: 28 CAPSULE | Refills: 0 | Status: SHIPPED | OUTPATIENT
Start: 2020-11-02 | End: 2020-11-09

## 2020-12-04 ENCOUNTER — HOSPITAL ENCOUNTER (EMERGENCY)
Age: 85
Discharge: HOME | End: 2020-12-05
Payer: MEDICARE

## 2020-12-04 LAB
ALBUMIN SERPL-MCNC: 4.3 G/DL (ref 3.5–5.1)
ALP BLD-CCNC: 63 U/L (ref 38–126)
ALT SERPL-CCNC: 17 U/L (ref 11–66)
ANION GAP SERPL CALCULATED.3IONS-SCNC: 12 MEQ/L (ref 8–16)
AST SERPL-CCNC: 20 U/L (ref 5–40)
BASOPHILS # BLD: 0.6 %
BASOPHILS ABSOLUTE: 0 THOU/MM3 (ref 0–0.1)
BILIRUB SERPL-MCNC: < 0.2 MG/DL (ref 0.3–1.2)
BUN BLDV-MCNC: 23 MG/DL (ref 7–22)
CALCIUM SERPL-MCNC: 9.8 MG/DL (ref 8.5–10.5)
CHLORIDE BLD-SCNC: 93 MEQ/L (ref 98–111)
CO2: 25 MEQ/L (ref 23–33)
CREAT SERPL-MCNC: 1.4 MG/DL (ref 0.4–1.2)
EOSINOPHIL # BLD: 2.3 %
EOSINOPHILS ABSOLUTE: 0.1 THOU/MM3 (ref 0–0.4)
ERYTHROCYTE [DISTWIDTH] IN BLOOD BY AUTOMATED COUNT: 14.3 % (ref 11.5–14.5)
ERYTHROCYTE [DISTWIDTH] IN BLOOD BY AUTOMATED COUNT: 47 FL (ref 35–45)
GFR SERPL CREATININE-BSD FRML MDRD: 36 ML/MIN/1.73M2
GLUCOSE BLD-MCNC: 105 MG/DL (ref 70–108)
HCT VFR BLD CALC: 35 % (ref 37–47)
HEMOGLOBIN: 11.5 GM/DL (ref 12–16)
IMMATURE GRANS (ABS): 0.01 THOU/MM3 (ref 0–0.07)
IMMATURE GRANULOCYTES: 0.2 %
LYMPHOCYTES # BLD: 23.6 %
LYMPHOCYTES ABSOLUTE: 1.2 THOU/MM3 (ref 1–4.8)
MCH RBC QN AUTO: 29.6 PG (ref 26–33)
MCHC RBC AUTO-ENTMCNC: 32.9 GM/DL (ref 32.2–35.5)
MCV RBC AUTO: 90.2 FL (ref 81–99)
MONOCYTES # BLD: 11.7 %
MONOCYTES ABSOLUTE: 0.6 THOU/MM3 (ref 0.4–1.3)
NUCLEATED RED BLOOD CELLS: 0 /100 WBC
OSMOLALITY CALCULATION: 264.8 MOSMOL/KG (ref 275–300)
PLATELET # BLD: 182 THOU/MM3 (ref 130–400)
PMV BLD AUTO: 10.4 FL (ref 9.4–12.4)
POTASSIUM SERPL-SCNC: 3.7 MEQ/L (ref 3.5–5.2)
RBC # BLD: 3.88 MILL/MM3 (ref 4.2–5.4)
SEG NEUTROPHILS: 61.6 %
SEGMENTED NEUTROPHILS ABSOLUTE COUNT: 3 THOU/MM3 (ref 1.8–7.7)
SODIUM BLD-SCNC: 130 MEQ/L (ref 135–145)
TOTAL PROTEIN: 7.2 G/DL (ref 6.1–8)
WBC # BLD: 4.9 THOU/MM3 (ref 4.8–10.8)

## 2020-12-04 PROCEDURE — 86850 RBC ANTIBODY SCREEN: CPT

## 2020-12-04 PROCEDURE — 86900 BLOOD TYPING SEROLOGIC ABO: CPT

## 2020-12-04 PROCEDURE — 85025 COMPLETE CBC W/AUTO DIFF WBC: CPT

## 2020-12-04 PROCEDURE — 86901 BLOOD TYPING SEROLOGIC RH(D): CPT

## 2020-12-04 PROCEDURE — 36415 COLL VENOUS BLD VENIPUNCTURE: CPT

## 2020-12-04 PROCEDURE — 80053 COMPREHEN METABOLIC PANEL: CPT

## 2020-12-04 PROCEDURE — 99283 EMERGENCY DEPT VISIT LOW MDM: CPT

## 2020-12-05 VITALS
OXYGEN SATURATION: 96 % | SYSTOLIC BLOOD PRESSURE: 143 MMHG | DIASTOLIC BLOOD PRESSURE: 86 MMHG | HEART RATE: 62 BPM | RESPIRATION RATE: 18 BRPM | TEMPERATURE: 97.8 F

## 2020-12-05 LAB
ABO: NORMAL
ANTIBODY SCREEN: NORMAL
RH FACTOR: NORMAL

## 2020-12-05 ASSESSMENT — ENCOUNTER SYMPTOMS
COUGH: 0
PHOTOPHOBIA: 0
SORE THROAT: 0
VOMITING: 0
NAUSEA: 0
SHORTNESS OF BREATH: 0
DIARRHEA: 0
BACK PAIN: 0
RHINORRHEA: 0
ABDOMINAL PAIN: 0

## 2020-12-05 NOTE — ED NOTES
Upon first contact with patient this RN receives bedside shift report from MADHU Telles. Pt resting comfortably in cot. Pelvic exam completed by PA and pt aware of POC to wait results. Pt breathing easy and unlabored. Pt alert and oriented. VS updated. Will continue to monitor.         Georgi Conn RN  12/05/20 [id number]

## 2020-12-05 NOTE — ED PROVIDER NOTES
Anesthesia, CAD (coronary artery disease), Cancer ([facility] [address] GERD (gastroesophageal reflux disease), Hyperlipidemia, Hypertension, Osteoarthritis, Shortness of breath, and UTI (urinary tract infection). SURGICAL HISTORY      has a past surgical history that includes Hemorrhoid surgery (1989, 08/17/2012); Colonoscopy (5/25/12); Hysterectomy (1966); Appendectomy (1969); other surgical history (1/3/2014); Rectal surgery; Cardiac catheterization (8/2012); Coronary angioplasty with stent (09/11/2017); and sigmoidoscopy (N/A, 6/10/2019). CURRENT MEDICATIONS       Previous Medications    ASPIRIN 81 MG EC TABLET    Take 81 mg by mouth daily. CALCIUM CARBONATE-VITAMIN D (CALCIUM + D) 600-200 MG-UNIT TABS    Take 1 tablet by mouth as needed     CHOLECALCIFEROL (VITAMIN D3) 5000 UNITS TABS    Take by mouth as needed     COENZYME Q10 (COQ-10 PO)    Take 200 mg by mouth as needed     CONJUGATED ESTROGENS (PREMARIN) 0.625 MG/GM VAGINAL CREAM    Place 0.5 grams vaginally twice weekly. CRANBERRY PO    Take 1 tablet by mouth as needed     D-MANNOSE PO    Take 500 mg by mouth daily 3-tablets daily    HYDROCHLOROTHIAZIDE (HYDRODIURIL) 25 MG TABLET    Take 25 mg by mouth daily    ISOSORBIDE MONONITRATE (IMDUR) 60 MG EXTENDED RELEASE TABLET    Take 1 tablet by mouth daily    LANSOPRAZOLE (PREVACID) 30 MG DELAYED RELEASE CAPSULE    Take 30 mg by mouth daily    MAGNESIUM (MAGNESIUM-OXIDE) 250 MG TABS TABLET    Take 250 mg by mouth daily     METHENAMINE (HIPREX) 1 G TABLET    Take 1 tablet by mouth 2 times daily (with meals)    METOPROLOL TARTRATE (LOPRESSOR) 25 MG TABLET    Take 1 tablet by mouth 2 times daily    NITROGLYCERIN (NITROSTAT) 0.4 MG SL TABLET    Place 0.4 mg under the tongue every 5 minutes as needed for Chest pain up to max of 3 total doses. If no relief after 1 dose, call 911.     OIL OF OREGANO PO    Take by mouth    POLYETHYLENE GLYCOL 3350 (MIRALAX PO)    Take by mouth as needed    PRAMIPEXOLE (MIRAPEX) 0.125 MG TABLET    Take by mouth nightly 4 tab    SIMVASTATIN (ZOCOR) 40 MG TABLET    Take 0.5 tablets by mouth nightly    ZOLPIDEM (AMBIEN) 5 MG TABLET    Take 10 mg by mouth nightly as needed        ALLERGIES     is allergic to other; ciprofloxacin; lipitor; ramipril; and sulfa antibiotics. FAMILY HISTORY     She indicated that her mother is . She indicated that her father is . She indicated that only one of her two sisters is alive. She indicated that only one of her two brothers is alive. She indicated that her maternal grandmother is . She indicated that her maternal grandfather is . She indicated that her paternal grandmother is . She indicated that her paternal grandfather is . She indicated that her paternal aunt is . She indicated that her paternal uncle is . She indicated that the status of her neg hx is unknown.   family history includes Alzheimer's Disease in her mother; Arthritis in her father; Cancer in her brother; Diabetes in her maternal aunt and maternal aunt; Heart Disease in her brother, brother, mother, sister, and sister; Stroke in her sister. SOCIAL HISTORY    reports that she quit smoking about 48 years ago. Her smoking use included cigarettes. She has a 12.00 pack-year smoking history. She has never used smokeless tobacco. She reports that she does not drink alcohol or use drugs. PHYSICAL EXAM     INITIAL VITALS:  oral temperature is 97.8 °F (36.6 °C). Her blood pressure is 143/86 (abnormal) and her pulse is 62. Her respiration is 18 and oxygen saturation is 96%. Physical Exam  Vitals signs and nursing note reviewed. Exam conducted with a chaperone present. Constitutional:       General: She is not in acute distress. Appearance: Normal appearance. She is well-developed. She is not toxic-appearing or diaphoretic. HENT:      Head: Normocephalic and atraumatic.       Right Ear: Hearing normal.      Left Ear: Hearing normal.      Nose: Nose normal. No rhinorrhea. Mouth/Throat:      Lips: Pink. Mouth: Mucous membranes are moist.      Pharynx: Uvula midline. Eyes:      General: Lids are normal. No scleral icterus. Extraocular Movements: Extraocular movements intact. Conjunctiva/sclera: Conjunctivae normal.      Pupils: Pupils are equal, round, and reactive to light. Neck:      Musculoskeletal: No neck rigidity. Vascular: No JVD. Trachea: Trachea normal. No tracheal deviation. Cardiovascular:      Rate and Rhythm: Normal rate and regular rhythm. Heart sounds: Normal heart sounds. Pulmonary:      Effort: Pulmonary effort is normal. No respiratory distress. Breath sounds: Normal breath sounds. No decreased breath sounds or wheezing. Abdominal:      General: Bowel sounds are normal. There is no distension. Palpations: Abdomen is soft. Abdomen is not rigid. There is no pulsatile mass. Tenderness: There is no abdominal tenderness. There is no right CVA tenderness, left CVA tenderness, guarding or rebound. Negative signs include Lincoln's sign and McBurney's sign. Hernia: No hernia is present. Genitourinary:     Exam position: Lithotomy position. Labia:         Right: No tenderness, lesion or injury. Left: No tenderness, lesion or injury. Vagina: Signs of injury (4 to 5 mm laceration with mild active bleeding) present. Bleeding (2 large clots removed and blood cleared with 5 cotton-tipped swabs) present. Comments: Status post hysterectomy  Musculoskeletal: Normal range of motion. Comments: Movement normal as observed   Lymphadenopathy:      Cervical: No cervical adenopathy. Skin:     General: Skin is warm and dry. Coloration: Skin is not pale. Findings: No rash. Neurological:      General: No focal deficit present. Mental Status: She is alert and oriented to person, place, and time.       Gait: Gait normal.   Psychiatric: Mood and Affect: Mood normal.         Speech: Speech normal.         Behavior: Behavior normal.         Thought Content: Thought content normal.         Cognition and Memory: Cognition normal.         DIFFERENTIAL DIAGNOSIS:   Including but not limited to: Vaginal laceration, vaginal injury, coagulopathy, anemia, atrophic vaginal tissue, bladder prolapse    DIAGNOSTIC RESULTS     EKG: All EKG's are interpreted by the[facility] Phys[facility] who either signs or Co-signs this chart in the absence of a cardiologist.  None    RADIOLOGY: non-plain film images(s) such as CT,Ultrasound and MRI are read by the radiologist.  Plain radiographic images are visualized and preliminarily interpreted by the emergency physician unless otherwise stated below.   No orders to display       LABS:   Labs Reviewed   CBC WITH AUTO DIFFERENTIAL - Abnormal; Notable for the following components:       Result Value    RBC 3.88 (*)     Hemoglobin 11.5 (*)     Hematocrit 35.0 (*)     RDW-SD 47.0 (*)     All other components within normal limits   COMPREHENSIVE METABOLIC PANEL - Abnormal; Notable for the following components:    CREATININE 1.4 (*)     BUN 23 (*)     Sodium 130 (*)     Chloride 93 (*)     Total Bilirubin <0.2 (*)     All other components within normal limits   GLOMERULAR FILTRATION RATE, ESTIMATED - Abnormal; Notable for the following components:    Est, Glom Filt Rate 36 (*)     All other components within normal limits   OSMOLALITY - Abnormal; Notable for the following components:    Osmolality Calc 264.8 (*)     All other components within normal limits   ANION GAP   TYPE AND SCREEN       EMERGENCY DEPARTMENT COURSE:   Vitals:    Vitals:    12/04/20 2215 12/04/20 2318 12/05/20 0023 12/05/20 0104   BP: (!) 204/92 (!) 169/89 (!) 169/85 (!) 143/86   Pulse: 80 64 63 62   Resp: 16 15 15 18   Temp: 97.8 °F (36.6 °C)      TempSrc: Oral      SpO2: 97% 97% 95% 96%     0029: Consulted Dr. Tanika Weber who advised estrogen cream and have her follow-up with Dr. Daisy Hernandez next week    MDM:  The patient was seen by me in the emergency department for vaginal bleeding. Patient has had hysterectomy but has pessary ring placed today for bladder prolapse. Physical exam revealed 2 large clots in the vaginal vault that were removed. Upon clearing blood a small injury to the vaginal wall was noted. Bleeding seemed to decrease. Appropriate labs were obtained and the patient was observed. Patient is a history of chronic kidney disease with slightly bumped creatinine of 1.4. Remainder values were unremarkable. Hemoglobin was stable at 11.5. Dr. Sommer Wilder was consulted and advised discharging the patient on estrogen cream and follow-up with Dr. Daisy Hernandez. After observation bleeding was noted to slow down and nearly stopped. The patient was comfortable with plan of discharge home and follow-up with Dr. Daisy Hernandez next week. I have given the patient strict written and verbal instructions about care at home, follow-up, and signs and symptoms of worsening of condition and they did verbalize understanding. CRITICAL CARE:   None    CONSULTS:  None    PROCEDURES:  None    FINAL IMPRESSION      1. Vaginal bleeding          DISPOSITION/PLAN     1.  Vaginal bleeding        PATIENT REFERRED TO:  Tarun Lester MD  [address] [place] [address],[address] [facility]  [phone]    Schedule an appointment as soon as possible for a visit         DISCHARGE MEDICATIONS:  New Prescriptions    No medications on file       (Please note that portions of this note were completed with a voice recognition program.  Efforts were made to edit the dictations but occasionally words are mis-transcribed.)    Lazarus Ishihara, PA-C 12/05/20 1:29 AM    Lazarus Ishihara, PA-C Lazarus Ishihara, PA-C  12/05/20 [id number]

## 2020-12-05 NOTE — ED NOTES
Patient lying in bed talking with family at this time. Patient respirations are regular and unlabored. Patient appears to be in no current distress. Patient VSS. Call light is within reach. Patient expresses no needs at this time.        Alexsander Beal RN  12/04/20 8814

## 2020-12-05 NOTE — ED TRIAGE NOTES
Patient presents with vaginal bleeding. Patient states this began a few hours ago. Patient has on a pad and has blood through her underwear and in her pants. Patient states this is the pad she put on when this started. She denies any pain. Patient has history or cervical cancer and rectal cancer. She did recently have a procedure for a spot in her uterus that came back benign. Patient saw the OB/GYN today and had an exam. Denies any other problems at this time. States this has never happened before.

## 2020-12-05 NOTE — ED NOTES
Bedside shift report given to this RN at this time by Clif Torre RN. Provider at the bedside. Patient is up in bed and updated on plan of care at this time. Patient is alert and oriented x4 and respirations are regular and unlabored. Call light within reach       Wilton Estrella RN  12/04/20 [id number]

## 2020-12-05 NOTE — ED NOTES
Patient lying in bed with blankets watching tv at this time. Patient respirations are regular and unlabored. Patient appears to be in no current distress. Patient VSS. Call light is within reach. Patient expresses no needs at this time.        Beck Antonio RN  12/05/20 [id number]

## 2020-12-05 NOTE — ED NOTES
Ambulated pt to restroom per PA request. Pt had small amount of blood in brief and then small approx quarter size clot when she wiped. Pt states she feels comfortable to go home and follow up with OB. Pt and daughter aware of POC. Will continue to monitor and DC pt with follow up instructions.       Aislinn Wadsworth, RN  12/05/20 8669

## 2021-03-10 ENCOUNTER — OFFICE VISIT (OUTPATIENT)
Dept: CARDIOLOGY CLINIC | Age: 86
End: 2021-03-10
Payer: MEDICARE

## 2021-03-10 VITALS
HEIGHT: 63 IN | BODY MASS INDEX: 27.14 KG/M2 | HEART RATE: 70 BPM | WEIGHT: 153.2 LBS | DIASTOLIC BLOOD PRESSURE: 64 MMHG | SYSTOLIC BLOOD PRESSURE: 120 MMHG

## 2021-03-10 DIAGNOSIS — I10 ESSENTIAL HYPERTENSION: ICD-10-CM

## 2021-03-10 DIAGNOSIS — I25.10 CORONARY ARTERY DISEASE INVOLVING NATIVE CORONARY ARTERY OF NATIVE HEART WITHOUT ANGINA PECTORIS: Primary | ICD-10-CM

## 2021-03-10 DIAGNOSIS — I35.0 MILD AORTIC STENOSIS: ICD-10-CM

## 2021-03-10 PROCEDURE — 1036F TOBACCO NON-USER: CPT | Performed by: INTERNAL MEDICINE

## 2021-03-10 PROCEDURE — 93000 ELECTROCARDIOGRAM COMPLETE: CPT | Performed by: INTERNAL MEDICINE

## 2021-03-10 PROCEDURE — 1090F PRES/ABSN URINE INCON ASSESS: CPT | Performed by: INTERNAL MEDICINE

## 2021-03-10 PROCEDURE — 99213 OFFICE O/P EST LOW 20 MIN: CPT | Performed by: INTERNAL MEDICINE

## 2021-03-10 PROCEDURE — 4040F PNEUMOC VAC/ADMIN/RCVD: CPT | Performed by: INTERNAL MEDICINE

## 2021-03-10 PROCEDURE — 1123F ACP DISCUSS/DSCN MKR DOCD: CPT | Performed by: INTERNAL MEDICINE

## 2021-03-10 PROCEDURE — G8400 PT W/DXA NO RESULTS DOC: HCPCS | Performed by: INTERNAL MEDICINE

## 2021-03-10 PROCEDURE — G8427 DOCREV CUR MEDS BY ELIG CLIN: HCPCS | Performed by: INTERNAL MEDICINE

## 2021-03-10 PROCEDURE — G8417 CALC BMI ABV UP PARAM F/U: HCPCS | Performed by: INTERNAL MEDICINE

## 2021-03-10 PROCEDURE — G8484 FLU IMMUNIZE NO ADMIN: HCPCS | Performed by: INTERNAL MEDICINE

## 2021-03-10 RX ORDER — ISOSORBIDE MONONITRATE 60 MG/1
60 TABLET, EXTENDED RELEASE ORAL DAILY
Qty: 90 TABLET | Refills: 3 | Status: SHIPPED | OUTPATIENT
Start: 2021-03-10 | End: 2022-03-18 | Stop reason: SDUPTHER

## 2021-03-10 NOTE — PROGRESS NOTES
[address] [facility] [address]  [place] [address]  Dept: [phone]  Dept Fax: [phone]  Loc: [phone]    Visit Date: 3/10/2021    Ms. Angeline Mccoy is a 80 y.o. female  who presented for:  Chief Complaint   Patient presents with    1 Year Follow Up       HPI:   HPI   79 yo F s/p LUCIEN to the LAD 9/2017 who presents for follow-up. No chest pain, angina, ZARATE, orthopnea, PND, sob at rest, palpitations, LE edema, or syncope. Can do all ADLs. No NTG SL prn. Current Outpatient Medications:     Methylsulfonylmethane 1000 MG CAPS, methylsulfonylmethane Methylsulfonylmethane Active 1 CAP ORAL Daily August 25th, 2020 10:35am 08-  Emy 1153 ([id number]), Disp: , Rfl:     NONFORMULARY, daily Water pill + Potassium 20mg, Disp: , Rfl:     methenamine (HIPREX) 1 g tablet, Take 1 tablet by mouth 2 times daily (with meals), Disp: 60 tablet, Rfl: 11    isosorbide mononitrate (IMDUR) 60 MG extended release tablet, Take 1 tablet by mouth daily, Disp: 90 tablet, Rfl: 3    metoprolol tartrate (LOPRESSOR) 25 MG tablet, Take 1 tablet by mouth 2 times daily, Disp: 180 tablet, Rfl: 3    conjugated estrogens (PREMARIN) 0.625 MG/GM vaginal cream, Place 0.5 grams vaginally twice weekly. , Disp: 1 Tube, Rfl: 0    lansoprazole (PREVACID) 30 MG delayed release capsule, Take 15 mg by mouth 2 times daily , Disp: , Rfl:     OIL OF OREGANO PO, Take by mouth, Disp: , Rfl:     D-MANNOSE PO, Take 500 mg by mouth daily 3-tablets daily, Disp: , Rfl:     Polyethylene Glycol 3350 (MIRALAX PO), Take by mouth as needed, Disp: , Rfl:     nitroGLYCERIN (NITROSTAT) 0.4 MG SL tablet, Place 0.4 mg under the tongue every 5 minutes as needed for Chest pain up to max of 3 total doses.  If no relief after 1 dose, call 911., Disp: , Rfl:     hydrochlorothiazide (HYDRODIURIL) 25 MG tablet, Take 25 mg by mouth daily, Disp: , Rfl:     simvastatin (ZOCOR) 40 MG tablet, Take 0.5 tablets by mouth nightly, Disp: 45 tablet, Rfl: 3    pramipexole (MIRAPEX) 0.125 MG tablet, Take by mouth nightly 4 tab, Disp: , Rfl:     CRANBERRY PO, Take 1 tablet by mouth as needed , Disp: , Rfl:     Cholecalciferol (VITAMIN D3) 5000 UNITS TABS, Take by mouth as needed , Disp: , Rfl:     zolpidem (AMBIEN) 5 MG tablet, Take 10 mg by mouth nightly as needed , Disp: , Rfl:     magnesium (MAGNESIUM-OXIDE) 250 MG TABS tablet, Take 250 mg by mouth daily , Disp: , Rfl:     Calcium Carbonate-Vitamin D (CALCIUM + D) 600-200 MG-UNIT TABS, Take 1 tablet by mouth as needed , Disp: , Rfl:     Coenzyme Q10 (COQ-10 PO), Take 200 mg by mouth as needed , Disp: , Rfl:     aspirin 81 MG EC tablet, Take 81 mg by mouth daily. , Disp: , Rfl:     Past Medical History  Alondra Llanes  has a past medical history of Anesthesia, CAD (coronary artery disease), Cancer ([facility] [address] GERD (gastroesophageal reflux disease), Hyperlipidemia, Hypertension, Osteoarthritis, Shortness of breath, and UTI (urinary tract infection). Social History  Alondra Llanes  reports that she quit smoking about 48 years ago. Her smoking use included cigarettes. She has a 12.00 pack-year smoking history. She has never used smokeless tobacco. She reports that she does not drink alcohol or use drugs. Family History  [place] family history includes Alzheimer's Disease in her mother; Arthritis in her father; Cancer in her brother; Diabetes in her maternal aunt and maternal aunt; Heart Disease in her brother, brother, mother, sister, and sister; Stroke in her sister. There is no family history of bicuspid aortic valve, aneurysms, heart transplant, pacemakers, defibrillators, or sudden cardiac death.       Past Surgical History   Past Surgical History:   Procedure Laterality Date    APPENDECTOMY  1969    CARDIAC [facility]ATION  8/2012    [facility]    COLONOSCOPY  5/25/12    Dr. Karl Gutierrez  09/11/2017    [facility], 08/17/2012    Anal exam, hemorroidectomy, excision perianal skin lesion. total of 3    HYSTERECTOMY  1966    OTHER SURGICAL HISTORY  1/3/2014    LEFT MEDIPORT-Dr. Caroline Juarez and removed    RECTAL SURGERY      for cancer    SIGMOIDOSCOPY N/A 6/10/2019    SIGMOIDOSCOPY DIAGNOSTIC FLEXIBLE performed by Cherelle Mercado MD at [facility]       Review of Systems   Constitutional: Negative for chills and fever  HENT: Negative for congestion, sinus pressure, sneezing and sore throat. Eyes: Negative for pain, discharge, redness and itching. Respiratory: Negative for apnea, cough  Gastrointestinal: Negative for blood in stool, constipation, diarrhea   Endocrine: Negative for cold intolerance, heat intolerance, polydipsia. Genitourinary: Negative for dysuria, enuresis, flank pain and hematuria. Musculoskeletal: Negative for arthralgias, joint swelling and neck pain. Neurological: Negative for numbness and headaches. Psychiatric/Behavioral: Negative for agitation, confusion, decreased concentration and dysphoric mood. Objective:     /64   Pulse 70   Ht 5' 3\" (1.6 m)   Wt 153 lb 3.2 oz (69.5 kg)   BMI 27.14 kg/m²     Wt Readings from Last 3 Encounters:   03/10/21 153 lb 3.2 oz (69.5 kg)   09/09/20 152 lb (68.9 kg)   03/04/20 155 lb (70.3 kg)     BP Readings from Last 3 Encounters:   03/10/21 120/64   12/05/20 (!) 143/86   03/04/20 136/82       Nursing note and vitals reviewed. Physical Exam   Constitutional: Oriented to person, place, and time. Appears well-developed and well-nourished. HENT:   Head: Normocephalic and atraumatic. Eyes: EOM are normal. Pupils are equal, round, and reactive to light. Neck: Normal range of motion. Neck supple. No JVD present. Cardiovascular: Normal rate, regular rhythm, normal heart sounds and intact distal pulses. 2/6 NIKKI. Pulmonary/Chest: Effort normal and breath sounds normal. No respiratory distress. No wheezes. No rales. Abdominal: Soft. Bowel sounds are normal. No distension. There is no tenderness. Musculoskeletal: Normal range of motion. 1+ edema. Neurological: Alert and oriented to person, place, and time. No cranial nerve deficit. Coordination normal.   Skin: Skin is warm and dry. Psychiatric: Normal mood and affect.        Lab Results   Component Value Date    CKTOTAL 151 08/17/2012    CKTOTAL 114 08/17/2012    CKTOTAL 98 08/17/2012    CKMB 1.9 08/17/2012    CKMB 1.3 08/17/2012       Lab Results   Component Value Date    WBC 4.4 02/10/2021    RBC 4.44 02/10/2021    HGB 12.6 02/10/2021    HCT 40.4 02/10/2021    MCV 91.0 02/10/2021    MCH 28.4 02/10/2021    MCHC 31.2 02/10/2021    RDW 14.7 05/02/2018     02/10/2021    MPV 10.3 02/10/2021       Lab Results   Component Value Date     02/10/2021    K 4.0 02/10/2021    CL 98 02/10/2021    CO2 28 02/10/2021    BUN 23 02/10/2021    LABALBU 4.6 02/10/2021    CREATININE 0.8 02/10/2021    CALCIUM 9.8 02/10/2021    LABGLOM 68 02/10/2021    GLUCOSE 85 02/10/2021    GLUCOSE 86 11/01/2014       Lab Results   Component Value Date    ALKPHOS 68 02/10/2021    ALT 14 02/10/2021    AST 20 02/10/2021    PROT 8.0 02/10/2021    BILITOT 0.3 02/10/2021    BILIDIR <0.2 03/14/2017    LABALBU 4.6 02/10/2021       Lab Results   Component Value Date    MG 1.9 08/05/2020       Lab Results   Component Value Date    INR 0.91 06/24/2015    INR 0.93 08/20/2012         Lab Results   Component Value Date    LABA1C 5.6 02/10/2021       Lab Results   Component Value Date    TRIG 124 02/10/2021    HDL 60 02/10/2021    LDLCALC 53 02/10/2021       Lab Results   Component Value Date    TSH 4.550 02/10/2021         Testing Reviewed:      I have individually reviewed the cardiac test below:    ECHO:   Results for orders placed during the hospital encounter of 07/25/17   ECHO Complete 2D W Doppler W Color    Narrative Transthoracic Echocardiography Report (TTE)     Demographics      Patient Name  Pavel Servin DAGOBERTO Gender             Female                 J      MR #          [id number]     Race                                                 Ethnicity      Account #     [de-identified]     Room Number      Accession     [id number]     Date of Study      07/25/2017   Number      Date of Birth 1935    Referring          Zenia Homans DO                               Physician          Kevin Carreon MD      Age           80 year(s)    Sonographer        KATIE Berger, RDCS,                                                  RDMS, RVT                                  Interpreting       Zenia Homans DO                               Physician     Procedure    Type of Study      TTE procedure:ECHOCARDIOGRAM COMPLETE 2D W DOPPLER W COLOR. Procedure Date  Date: 07/25/2017 Start: 11:02 AM    Study Location: Echo Lab  Technical Quality: Adequate visualization    Indications:Shortness of breath. Additional Medical History:hypertension, hyperlipidemia, atrial  fibrillation, atherosclerotic heart disease, chest pain, gastroesophageal  reflux disease, coronary artery disease, shortness of breath    Patient Status: Routine    Height: 64 inches Weight: 161 pounds BSA: 1.78 m^2 BMI: 27.64 kg/m^2    BP: 148/70 mmHg     Conclusions      Summary   Systolic function was normal.   Ejection fraction is visually estimated at 60%. Right ventricular systolic pressure of 41 mm Hg consistent with mild   pulmonary hypertension. Leaflets exhibited mildly increased thickness and mildly reduced cuspal   separation of the aortic valve. Trivial aortic regurgitation is noted. There is mild aortic stenosis with valve area of 1.64 sq cm. The maximum aortic valve gradient is 16 mmHg, the mean gradient is 9 mmHg,   and the peak velocity is 197 cm/s. Structurally normal mitral valve. Mild mitral regurgitation is present. Tricuspid valve is structurally normal.   Mild tricuspid regurgitation.       Signature ----------------------------------------------------------------   Electronically signed by Deepak Rios DO (Interpreting   physician) on 07/25/2017 at 08:24 PM   ----------------------------------------------------------------      Findings      Mitral Valve   Structurally normal mitral valve. Mild mitral regurgitation is present. Aortic Valve      Leaflets exhibited mildly increased thickness and mildly reduced cuspal   separation of the aortic valve. Trivial aortic regurgitation is noted. There is mild aortic stenosis with valve area of 1.64 sq cm. The maximum aortic valve gradient is 16 mmHg, the mean gradient is 9 mmHg,   and the peak velocity is 197 cm/s. Tricuspid Valve   Tricuspid valve is structurally normal.   Mild tricuspid regurgitation. Pulmonic Valve   Pulmonic valve is structurally normal.   Trivial pulmonic regurgitation visualized. Left Atrium   Normal size left atrium. Left Ventricle   Systolic function was normal.   Ejection fraction is visually estimated at 60%. Right Atrium   The right atrium is of normal size. Right Ventricle   Right ventricular systolic pressure of 41 mm Hg consistent with mild   pulmonary hypertension. Pericardial Effusion   There is a trivial pericardial effusion noted.       Aorta / Great Vessels   Dilation of ascending aorta at 4.0 cm     M-Mode/2D Measurements & Calculations      LV Diastolic    LV Systolic Dimension: 2.9  AV Cusp Separation: 0.9 cmLA   Dimension: 4.7  cm                          Dimension: 3.2 cmAO Root   cm              LV Volume Diastolic: 904 ml Dimension: 2.9 cm   LV FS:38.3 %    LV Volume Systolic: 65.8 ml   LV PW           LV EDV/LV EDV Index: 541   Diastolic: 1.1  SD/73 Z^3FR ESV/LV ESV   cm              Index: 32.2 ml/18 m^2       RV Diastolic Dimension: 2.6 cm   Septum          EF Calculated: 25.8 %   Diastolic: 1 cm                             LA/Aorta: 1.1 Ascending Aorta: 4 cm                      LVOT: 2 cm     Doppler Measurements & Calculations      MV Peak E-Wave:     AV Peak Velocity: 197   LVOT Peak Velocity: 103 cm/s   79.5 cm/s           cm/s                    LVOT Mean Velocity: 76.5 cm/s   MV Peak A-Wave:     AV Peak Gradient: 15.52 LVOT Peak Gradient: 4 mmHgLVOT   90.8 cm/s           mmHg                    Mean Gradient: 3 mmHg   MV E/A Ratio: 0.88  AV Mean Velocity: 143   MV Peak Gradient:   cm/s                    TV Peak E-Wave: 41.5 cm/s   2.53 mmHg           AV Mean Gradient: 9     TV Peak A-Wave: 42.4 cm/s                       mmHg   MV Deceleration     AV VTI: 45 cm           TV Peak Gradient: 0.69 mmHg   Time: 183 msec      AV Area                 TR Velocity:278 cm/s                       (Continuity):1.7 cm^2   TR Gradient:30.91 mmHg      MV E' Septal        LVOT VTI: 24.4 cm   Velocity: 4 cm/s    AV P1/2t: 1015 msec   MV A' Septal        IVRT: 113 msec          NC ED Velocity: 71.8 cm/s   Velocity: 7.99 cm/s   MV E' Lateral   Velocity: 4.68 cm/s AV DVI (VTI): 0.54AV   MV A' Lateral       DVI (Vmax):0.52   Velocity: 10.4 cm/s   E/E' septal: 19.88   E/E' lateral: 16.99   MR Velocity: 285   cm/s     [email or web address] [id number]%3d        Assessment/Plan   s/p PCI of the LAD, 9/2017  HTN  Mild AS  EF 60%, 7/2017  Cr 0.8  No symptoms, doing well, no NTG SL prn. Continue ASA. LDL 53.  BP and HR well controlled. RF management. Compression stockings for ankle edema. Discussed diet/exercise/BP/weight loss/health lifestyle choices/lipids; the patient understands the goals and will try to comply.     Disposition:  1 year         Electronically signed by Tk Higgins MD   3/10/2021 at 3:32 PM EST

## 2021-03-17 ENCOUNTER — VIRTUAL VISIT (OUTPATIENT)
Dept: UROLOGY | Age: 86
End: 2021-03-17
Payer: MEDICARE

## 2021-03-17 DIAGNOSIS — N39.0 RECURRENT UTI: ICD-10-CM

## 2021-03-17 PROCEDURE — 99441 PR PHYS/QHP TELEPHONE EVALUATION 5-10 MIN: CPT | Performed by: NURSE PRACTITIONER

## 2021-03-17 RX ORDER — METHENAMINE HIPPURATE 1000 MG/1
1 TABLET ORAL 2 TIMES DAILY WITH MEALS
Qty: 180 TABLET | Refills: 3 | Status: SHIPPED | OUTPATIENT
Start: 2021-03-17 | End: 2022-03-10

## 2021-03-17 NOTE — PROGRESS NOTES
[address] [address] [facility]  [address]  [facility] [address]  Dept: [phone]  Loc: [phone]  Visit Date: 3/17/2021      HPI:     Ariela Contreras f/u today for recurrent UTI. Last UTI in October of 2020. She was started on Hiprex at that time and has done well since. She has had no symptoms since that time. She remains on D-mannose and cranberry extract, also taking natural supplements, oil of oregeno as well as estrogen cream when she remembers to use it. In the past she had positive Urine culture every month since June of last year. She has prolapse of bladder and most recently had pessary exchanged for different size due to it causing irritation of vaginal mucosa. In December she was in ER for vaginal bleeding. Had a laceration per ER notes. She reports she is still using pessary without any issues. Has some nocturia at night time 2-3 times but she feels like she empties bladder and symptoms are stable. Previously, she was seen by Dr. Dennise Martinez in Feb 2019 and he reports grade 3-4 cystocele, but not a candidate for sacral colpopexy due to history of radiation. She received chemotherapy and radiation for her rectal cancer in the past.    She comes in today by herself.  Hx is obtained from the patient the medical record    Current Outpatient Medications   Medication Sig Dispense Refill    Methylsulfonylmethane 1000 MG CAPS methylsulfonylmethane Methylsulfonylmethane Active 1 CAP ORAL Daily August 25th, 2020 10:35am 08-  [address] ([id number])      isosorbide mononitrate (IMDUR) 60 MG extended release tablet Take 1 tablet by mouth daily 90 tablet 3    metoprolol tartrate (LOPRESSOR) 25 MG tablet Take 1 tablet by mouth 2 times daily 180 tablet 3    NONFORMULARY daily Water pill + Potassium 20mg      methenamine (HIPREX) 1 g tablet Take 1 tablet by mouth 2 times daily (with meals) 60 tablet 11    conjugated estrogens (PREMARIN) 0.625 MG/GM vaginal cream Place 0.5 grams vaginally twice weekly. 1 Tube 0    lansoprazole (PREVACID) 30 MG delayed release capsule Take 15 mg by mouth 2 times daily       OIL OF OREGANO PO Take by mouth as needed       D-MANNOSE PO Take 500 mg by mouth daily 3-tablets daily      Polyethylene Glycol 3350 (MIRALAX PO) Take by mouth as needed      nitroGLYCERIN (NITROSTAT) 0.4 MG SL tablet Place 0.4 mg under the tongue every 5 minutes as needed for Chest pain up to max of 3 total doses. If no relief after 1 dose, call 911.  hydrochlorothiazide (HYDRODIURIL) 25 MG tablet Take 25 mg by mouth daily      simvastatin (ZOCOR) 40 MG tablet Take 0.5 tablets by mouth nightly 45 tablet 3    pramipexole (MIRAPEX) 0.125 MG tablet Take by mouth nightly 4 tab      CRANBERRY PO Take 1 tablet by mouth as needed       Cholecalciferol (VITAMIN D3) 5000 UNITS TABS Take by mouth as needed       zolpidem (AMBIEN) 5 MG tablet Take 10 mg by mouth nightly as needed       magnesium (MAGNESIUM-OXIDE) 250 MG TABS tablet Take 250 mg by mouth daily       Calcium Carbonate-Vitamin D (CALCIUM + D) 600-200 MG-UNIT TABS Take 1 tablet by mouth as needed       Coenzyme Q10 (COQ-10 PO) Take 200 mg by mouth as needed       aspirin 81 MG EC tablet Take 81 mg by mouth daily. No current facility-administered medications for this visit. Past Medical History  Kirsty Amezcua  has a past medical history of Anesthesia, CAD (coronary artery disease), Cancer ([facility] [address] GERD (gastroesophageal reflux disease), Hyperlipidemia, Hypertension, Osteoarthritis, Shortness of breath, and UTI (urinary tract infection). Past Surgical History  The patient  has a past surgical history that includes Hemorrhoid surgery (1989, 08/17/2012); Colonoscopy (5/25/12); Hysterectomy (1966); Appendectomy (1969); other surgical history (1/3/2014); Rectal surgery; Cardiac catheterization (8/2012);  Coronary angioplasty with stent (09/11/2017); and sigmoidoscopy (N/A, 6/10/2019). Family History  This patient's family history includes Alzheimer's Disease in her mother; Arthritis in her father; Cancer in her brother; Diabetes in her maternal aunt and maternal aunt; Heart Disease in her brother, brother, mother, sister, and sister; Stroke in her sister. Social History  Pro Maldonado  reports that she quit smoking about 48 years ago. Her smoking use included cigarettes. She has a 12.00 pack-year smoking history. She has never used smokeless tobacco. She reports that she does not drink alcohol or use drugs. Subjective:     Review of Systems  No problems with ears, nose or throat. No problems with eyes. No chest pain, shortness of breath, abdominal pain, extremity pain or weakness, and no neurological deficits. No rashes.  symptoms per HPI. The remainder of the review of symptoms is negative. Assessment & Plan:     Recurrent UTI  Female Pelvic Prolapse   Mixed incontinence  Hx of Rectal & Cervical cancer    Doing well on Hiprex. No UTIs since October. She is happy. Tolerating pessary well. Follow up in 6 months with [facility], she will call sooner with symptoms of UTI. Return in about 6 months (around 9/17/2021) for recurent uti. ZACHARY Lambert  Urology      Ruiz Pisano is a 80 y.o. female evaluated via telephone on 3/17/2021. Consent:  She and/or health care decision maker is aware that that she may receive a bill for this telephone service, depending on her insurance coverage, and has provided verbal consent to proceed: Yes      Documentation:  I communicated with the patient and/or health care decision maker about recurrent uti. Details of this discussion including any medical advice provided: Continue Hiprex      I affirm this is a Patient Initiated Episode with a Patient who has not had a related appointment within my department in the past 7 days or scheduled within the next 24 hours.     Patient identification was verified at the start of the visit: Yes    Total Time: minutes: 5-10 minutes    The visit was conducted pursuant to the emergency declaration under the [address] [place] [address] and the "[employer] and [employer] Act. Patient identification was verified, and a caregiver was present when appropriate. The patient was located in a state where the provider was credentialed to provide care.     Note: not billable if this call serves to triage the patient into an appointment for the relevant concern      Lester Barlow

## 2021-04-16 ENCOUNTER — TELEPHONE (OUTPATIENT)
Dept: CARDIOLOGY CLINIC | Age: 86
End: 2021-04-16

## 2021-04-16 NOTE — TELEPHONE ENCOUNTER
Patient is having back pain and she saw [facility] and they told her she needs to check with Cardio to see what she can take. Patient wants to know if she can take Aleve for a few days because tylenol is not helping at all.  Please advise

## 2021-06-18 ENCOUNTER — TELEPHONE (OUTPATIENT)
Dept: UROLOGY | Age: 86
End: 2021-06-18

## 2021-06-18 ENCOUNTER — NURSE ONLY (OUTPATIENT)
Dept: LAB | Age: 86
End: 2021-06-18

## 2021-06-18 DIAGNOSIS — R35.0 URINARY FREQUENCY: ICD-10-CM

## 2021-06-18 DIAGNOSIS — R39.15 URGENCY OF URINATION: ICD-10-CM

## 2021-06-18 DIAGNOSIS — R39.15 URGENCY OF URINATION: Primary | ICD-10-CM

## 2021-06-18 DIAGNOSIS — N30.00 ACUTE CYSTITIS WITHOUT HEMATURIA: ICD-10-CM

## 2021-06-18 LAB
BACTERIA: ABNORMAL
BILIRUBIN URINE: NEGATIVE
BLOOD, URINE: ABNORMAL
CASTS: ABNORMAL /LPF
CASTS: ABNORMAL /LPF
CHARACTER, URINE: ABNORMAL
COLOR: ABNORMAL
CRYSTALS: ABNORMAL
EPITHELIAL CELLS, UA: ABNORMAL /HPF
GLUCOSE, URINE: NEGATIVE MG/DL
KETONES, URINE: NEGATIVE
LEUKOCYTE ESTERASE, URINE: ABNORMAL
MISCELLANEOUS LAB TEST RESULT: ABNORMAL
NITRITE, URINE: POSITIVE
PH UA: 6.5 (ref 5–9)
PROTEIN UA: 100 MG/DL
RBC URINE: ABNORMAL /HPF
RENAL EPITHELIAL, UA: ABNORMAL
SPECIFIC GRAVITY UA: 1.02 (ref 1–1.03)
UROBILINOGEN, URINE: 0.2 EU/DL (ref 0–1)
WBC UA: > 200 /HPF
YEAST: ABNORMAL

## 2021-06-18 RX ORDER — AMPICILLIN 500 MG/1
500 CAPSULE ORAL 4 TIMES DAILY
Qty: 40 CAPSULE | Refills: 0 | Status: SHIPPED | OUTPATIENT
Start: 2021-06-18 | End: 2021-06-28

## 2021-06-18 NOTE — TELEPHONE ENCOUNTER
Patient c/o having another infection. C/o dysuria, urgency, and frequency. She tried to stop the methenamine. Last dose was a month ago. She would like to have something ordered today. Order placed for a urine. Patient will go to the [facility]. Please advise. Thank you.

## 2021-06-19 LAB
ORGANISM: ABNORMAL
URINE CULTURE, ROUTINE: ABNORMAL

## 2021-06-21 ENCOUNTER — TELEPHONE (OUTPATIENT)
Dept: UROLOGY | Age: 86
End: 2021-06-21

## 2021-09-09 ENCOUNTER — NURSE ONLY (OUTPATIENT)
Dept: LAB | Age: 86
End: 2021-09-09

## 2021-09-09 ENCOUNTER — TELEPHONE (OUTPATIENT)
Dept: UROLOGY | Age: 86
End: 2021-09-09

## 2021-09-09 DIAGNOSIS — R35.0 URINARY FREQUENCY: ICD-10-CM

## 2021-09-09 DIAGNOSIS — R30.0 DYSURIA: ICD-10-CM

## 2021-09-09 DIAGNOSIS — R39.15 URGENCY OF URINATION: ICD-10-CM

## 2021-09-09 DIAGNOSIS — R30.0 DYSURIA: Primary | ICD-10-CM

## 2021-09-09 LAB
BACTERIA: ABNORMAL
BILIRUBIN URINE: NEGATIVE
BLOOD, URINE: ABNORMAL
CASTS: ABNORMAL /LPF
CASTS: ABNORMAL /LPF
CHARACTER, URINE: ABNORMAL
COLOR: YELLOW
CRYSTALS: ABNORMAL
EPITHELIAL CELLS, UA: ABNORMAL /HPF
GLUCOSE, URINE: NEGATIVE MG/DL
KETONES, URINE: NEGATIVE
LEUKOCYTE ESTERASE, URINE: ABNORMAL
MISCELLANEOUS LAB TEST RESULT: ABNORMAL
NITRITE, URINE: POSITIVE
PH UA: 5.5 (ref 5–9)
PROTEIN UA: 30 MG/DL
RBC URINE: ABNORMAL /HPF
RENAL EPITHELIAL, UA: ABNORMAL
SPECIFIC GRAVITY UA: 1.01 (ref 1–1.03)
UROBILINOGEN, URINE: 0.2 EU/DL (ref 0–1)
WBC UA: > 200 /HPF
YEAST: ABNORMAL

## 2021-09-09 RX ORDER — CEFDINIR 300 MG/1
300 CAPSULE ORAL 2 TIMES DAILY
Qty: 14 CAPSULE | Refills: 0 | Status: SHIPPED | OUTPATIENT
Start: 2021-09-09 | End: 2021-09-16

## 2021-09-09 NOTE — TELEPHONE ENCOUNTER
Patient c/o UTI with symptoms of dysuria, frequency, and urgency. She denies fever or chills. She checked her urine at home and it was positive. Order placed for an urine. She is still taking the hiprex. Please advise. Thank you. Narda Sweeney)

## 2021-09-10 LAB
ORGANISM: ABNORMAL
URINE CULTURE, ROUTINE: ABNORMAL

## 2021-09-13 ENCOUNTER — TELEPHONE (OUTPATIENT)
Dept: UROLOGY | Age: 86
End: 2021-09-13

## 2021-09-13 NOTE — TELEPHONE ENCOUNTER
----- Message from ZACHARY Dubon CNP sent at 9/13/2021  1:41 PM EDT -----  E coli.  Continue antibiotics

## 2021-09-13 NOTE — TELEPHONE ENCOUNTER
Patient advised of the urine results and take the antibiotics until completed. She voiced understanding.

## 2021-09-22 ENCOUNTER — OFFICE VISIT (OUTPATIENT)
Dept: UROLOGY | Age: 86
End: 2021-09-22
Payer: MEDICARE

## 2021-09-22 VITALS
WEIGHT: 149 LBS | SYSTOLIC BLOOD PRESSURE: 134 MMHG | DIASTOLIC BLOOD PRESSURE: 78 MMHG | BODY MASS INDEX: 25.44 KG/M2 | HEIGHT: 64 IN

## 2021-09-22 DIAGNOSIS — N39.0 RECURRENT UTI: ICD-10-CM

## 2021-09-22 DIAGNOSIS — R35.0 URINARY FREQUENCY: Primary | ICD-10-CM

## 2021-09-22 LAB
BILIRUBIN URINE: NEGATIVE
BLOOD URINE, POC: ABNORMAL
CHARACTER, URINE: CLEAR
COLOR, URINE: YELLOW
GLUCOSE URINE: NEGATIVE MG/DL
KETONES, URINE: NEGATIVE
LEUKOCYTE CLUMPS, URINE: ABNORMAL
NITRITE, URINE: NEGATIVE
PH, URINE: 6 (ref 5–9)
POST VOID RESIDUAL (PVR): 111 ML
PROTEIN, URINE: NEGATIVE MG/DL
SPECIFIC GRAVITY, URINE: 1.02 (ref 1–1.03)
UROBILINOGEN, URINE: 0.2 EU/DL (ref 0–1)

## 2021-09-22 PROCEDURE — 99214 OFFICE O/P EST MOD 30 MIN: CPT | Performed by: NURSE PRACTITIONER

## 2021-09-22 PROCEDURE — 51798 US URINE CAPACITY MEASURE: CPT | Performed by: NURSE PRACTITIONER

## 2021-09-22 PROCEDURE — G8427 DOCREV CUR MEDS BY ELIG CLIN: HCPCS | Performed by: NURSE PRACTITIONER

## 2021-09-22 PROCEDURE — 1123F ACP DISCUSS/DSCN MKR DOCD: CPT | Performed by: NURSE PRACTITIONER

## 2021-09-22 PROCEDURE — 4040F PNEUMOC VAC/ADMIN/RCVD: CPT | Performed by: NURSE PRACTITIONER

## 2021-09-22 PROCEDURE — G8417 CALC BMI ABV UP PARAM F/U: HCPCS | Performed by: NURSE PRACTITIONER

## 2021-09-22 PROCEDURE — 1090F PRES/ABSN URINE INCON ASSESS: CPT | Performed by: NURSE PRACTITIONER

## 2021-09-22 PROCEDURE — 1036F TOBACCO NON-USER: CPT | Performed by: NURSE PRACTITIONER

## 2021-09-22 RX ORDER — NITROFURANTOIN 25; 75 MG/1; MG/1
100 CAPSULE ORAL 2 TIMES DAILY
Qty: 20 CAPSULE | Refills: 0 | Status: SHIPPED | OUTPATIENT
Start: 2021-09-22 | End: 2021-10-02

## 2021-09-22 NOTE — PROGRESS NOTES
[id number] [address] [facility].  [address]  [facility] [address]  Dept: [phone]  Loc: [phone]  Visit Date: 9/22/2021      HPI:     Miguel Robertson f/u today for recurrent UTI. Most recently had E Coli on 9-9-2021 treated with 7 days of Omnicef, doesn't feel like it was long enough, still with dysuria and pressure and bladder pain. Also with UTI in June. Thinks UTIs came on due to being incontinent of stool. She remains on D-mannose and cranberry extract, also taking natural supplements, oil of oregeno. Still wears pessary. Previously, she was seen by Dr. Nyla Bravo in Feb 2019 and he reports grade 3-4 cystocele, but not a candidate for sacral colpopexy due to history of radiation. She received chemotherapy and radiation for her rectal cancer in the past.    She comes in today by herself. Hx is obtained from the patient the medical record    Current Outpatient Medications   Medication Sig Dispense Refill    methenamine (HIPREX) 1 g tablet Take 1 tablet by mouth 2 times daily (with meals) 180 tablet 3    Methylsulfonylmethane 1000 MG CAPS methylsulfonylmethane Methylsulfonylmethane Active 1 CAP ORAL Daily August 25th, 2020 10:35am 08-  Emy 1153 ([address]00)      isosorbide mononitrate (IMDUR) 60 MG extended release tablet Take 1 tablet by mouth daily 90 tablet 3    metoprolol tartrate (LOPRESSOR) 25 MG tablet Take 1 tablet by mouth 2 times daily 180 tablet 3    NONFORMULARY daily Water pill + Potassium 20mg      conjugated estrogens (PREMARIN) 0.625 MG/GM vaginal cream Place 0.5 grams vaginally twice weekly.  1 Tube 0    lansoprazole (PREVACID) 30 MG delayed release capsule Take 15 mg by mouth 2 times daily       OIL OF OREGANO PO Take by mouth as needed       D-MANNOSE PO Take 500 mg by mouth daily 3-tablets daily      Polyethylene Glycol 3350 (MIRALAX PO) Take by mouth as needed      nitroGLYCERIN (NITROSTAT) 0.4 MG SL tablet Place 0.4 mg under the tongue every 5 minutes as needed for Chest pain up to max of 3 total doses. If no relief after 1 dose, call 911.  hydrochlorothiazide (HYDRODIURIL) 25 MG tablet Take 25 mg by mouth daily      simvastatin (ZOCOR) 40 MG tablet Take 0.5 tablets by mouth nightly 45 tablet 3    pramipexole (MIRAPEX) 0.125 MG tablet Take by mouth nightly 4 tab      CRANBERRY PO Take 1 tablet by mouth as needed       Cholecalciferol (VITAMIN D3) 5000 UNITS TABS Take by mouth as needed       magnesium (MAGNESIUM-OXIDE) 250 MG TABS tablet Take 250 mg by mouth daily       Calcium Carbonate-Vitamin D (CALCIUM + D) 600-200 MG-UNIT TABS Take 1 tablet by mouth as needed       Coenzyme Q10 (COQ-10 PO) Take 200 mg by mouth as needed       aspirin 81 MG EC tablet Take 81 mg by mouth daily.  zolpidem (AMBIEN) 5 MG tablet Take 10 mg by mouth nightly as needed  (Patient not taking: Reported on 9/22/2021)       No current facility-administered medications for this visit. Past Medical History  Altaf Carrillo  has a past medical history of Anesthesia, CAD (coronary artery disease), Cancer ([facility] [address] GERD (gastroesophageal reflux disease), Hyperlipidemia, Hypertension, Osteoarthritis, Shortness of breath, and UTI (urinary tract infection). Past Surgical History  The patient  has a past surgical history that includes Hemorrhoid surgery (1989, 08/17/2012); Colonoscopy (5/25/12); Hysterectomy (1966); Appendectomy (1969); other surgical history (1/3/2014); Rectal surgery; Cardiac catheterization (8/2012); Coronary angioplasty with stent (09/11/2017); and sigmoidoscopy (N/A, 6/10/2019). Family History  This patient's family history includes Alzheimer's Disease in her mother; Arthritis in her father; Cancer in her brother; Diabetes in her maternal aunt and maternal aunt; Heart Disease in her brother, brother, mother, sister, and sister; Stroke in her sister.     Social History  Altaf Carrillo reports that she quit smoking about 49 years ago. Her smoking use included cigarettes. She has a 12.00 pack-year smoking history. She has never used smokeless tobacco. She reports that she does not drink alcohol and does not use drugs. Subjective:     Review of Systems  No problems with ears, nose or throat. No problems with eyes. No chest pain, shortness of breath, abdominal pain, extremity pain or weakness, and no neurological deficits. No rashes.  symptoms per HPI. The remainder of the review of symptoms is negative. Objective:     PE:   Vitals:    09/22/21 1030   BP: 134/78   Weight: 149 lb (67.6 kg)   Height: 5' 4\" (1.626 m)       Constitutional: Alert and oriented times 3, no acute distress and cooperative to examination with appropriate mood and affect. HENT:   Head:        Normocephalic and atraumatic. Mouth/Throat:         Mucous membranes are normal.   Eyes:         EOM are normal. No scleral icterus. PERRLA. Neck:        Supple, symmetrical, trachea midline  Pulmonary/Chest:      Chest symmetric with normal A/P diameter,Normal respiratory rate and rhthym. No use of accessory muscles. Abdominal:         Soft. No tenderness. Bowel sounds present. Scar from hysterectomy without erythema or induration. Musculoskeletal:         Normal range of motion. No edema or tenderness of lower extremities. Extremities: No cyanosis, clubbing, or edema present. Neurological:        Alert and oriented. Psychiatric:        Normal mood and affect.         Labs   Urine dip demonstrates   Results for POC orders placed in visit on 09/22/21   POCT Urinalysis No Micro (Auto)   Result Value Ref Range    Glucose, Ur Negative NEGATIVE mg/dl    Bilirubin Urine Negative     Ketones, Urine Negative NEGATIVE    Specific Gravity, Urine 1.020 1.002 - 1.030    Blood, UA POC Trace-lysed NEGATIVE    pH, Urine 6.00 5.0 - 9.0    Protein, Urine Negative NEGATIVE mg/dl    Urobilinogen, Urine 0.20 0.0 - 1.0 eu/dl

## 2021-09-23 ENCOUNTER — TELEPHONE (OUTPATIENT)
Dept: UROLOGY | Age: 86
End: 2021-09-23

## 2021-09-23 RX ORDER — CEFDINIR 300 MG/1
300 CAPSULE ORAL 2 TIMES DAILY
Qty: 20 CAPSULE | Refills: 0 | Status: SHIPPED | OUTPATIENT
Start: 2021-09-23 | End: 2021-10-03

## 2021-09-23 NOTE — TELEPHONE ENCOUNTER
Patient states the Mattie Mario has not worked in the last 3 years when it was prescribed and always ended up being changed. She reviewed her papers at home. Can she have something different? Can she have Omnicef instead? Please advise. Thank you.

## 2021-09-24 ENCOUNTER — TELEPHONE (OUTPATIENT)
Dept: UROLOGY | Age: 86
End: 2021-09-24

## 2021-09-24 LAB
ORGANISM: ABNORMAL
URINE CULTURE, ROUTINE: ABNORMAL

## 2021-09-24 RX ORDER — CIPROFLOXACIN 500 MG/1
500 TABLET, FILM COATED ORAL 2 TIMES DAILY
Qty: 14 TABLET | Refills: 0 | Status: SHIPPED | OUTPATIENT
Start: 2021-09-24 | End: 2021-10-01

## 2021-09-24 RX ORDER — ONDANSETRON 4 MG/1
4 TABLET, FILM COATED ORAL 3 TIMES DAILY PRN
Qty: 15 TABLET | Refills: 0 | Status: SHIPPED | OUTPATIENT
Start: 2021-09-24

## 2021-09-24 NOTE — TELEPHONE ENCOUNTER
----- Message from ZACHARY Soler CNP sent at 9/24/2021 12:56 PM EDT -----  Pt needs cipro, omnicef wont cover her pseudomonas. Has listed as allergy,/nausea. I can send in zofran with it. Stop omnicef.

## 2021-09-24 NOTE — TELEPHONE ENCOUNTER
Patient advised of the urine results. She voiced understanding to stop the omnicef and to take zofran 30 minutes prior to taking cipro. If she does not tolerate the cipro, she will call the office.

## 2021-10-19 ENCOUNTER — NURSE ONLY (OUTPATIENT)
Dept: LAB | Age: 86
End: 2021-10-19

## 2021-10-26 LAB — CYTOLOGY THIN PREP PAP: NORMAL

## 2021-11-03 ENCOUNTER — TELEPHONE (OUTPATIENT)
Dept: UROLOGY | Age: 86
End: 2021-11-03

## 2021-11-03 ENCOUNTER — NURSE ONLY (OUTPATIENT)
Dept: LAB | Age: 86
End: 2021-11-03

## 2021-11-03 DIAGNOSIS — N39.0 RECURRENT UTI: ICD-10-CM

## 2021-11-03 DIAGNOSIS — R30.0 DYSURIA: Primary | ICD-10-CM

## 2021-11-03 DIAGNOSIS — R30.0 DYSURIA: ICD-10-CM

## 2021-11-03 LAB
BACTERIA: ABNORMAL
BILIRUBIN URINE: NEGATIVE
BLOOD, URINE: NEGATIVE
CASTS: ABNORMAL /LPF
CASTS: ABNORMAL /LPF
CHARACTER, URINE: CLEAR
COLOR: YELLOW
CRYSTALS: ABNORMAL
EPITHELIAL CELLS, UA: ABNORMAL /HPF
GLUCOSE, URINE: NEGATIVE MG/DL
KETONES, URINE: NEGATIVE
LEUKOCYTE ESTERASE, URINE: NEGATIVE
MISCELLANEOUS LAB TEST RESULT: ABNORMAL
NITRITE, URINE: NEGATIVE
PH UA: 5 (ref 5–9)
PROTEIN UA: ABNORMAL MG/DL
RBC URINE: ABNORMAL /HPF
RENAL EPITHELIAL, UA: ABNORMAL
SPECIFIC GRAVITY UA: 1.01 (ref 1–1.03)
UROBILINOGEN, URINE: 0.2 EU/DL (ref 0–1)
WBC UA: ABNORMAL /HPF
YEAST: ABNORMAL

## 2021-11-03 RX ORDER — CEPHALEXIN 500 MG/1
500 CAPSULE ORAL 3 TIMES DAILY
Qty: 21 CAPSULE | Refills: 0 | Status: SHIPPED | OUTPATIENT
Start: 2021-11-03 | End: 2021-11-10

## 2021-11-03 NOTE — TELEPHONE ENCOUNTER
Patient c/o UTI. Pain with urination, urgency, and frequency. She is taking methenamine. She had to remove the pessary. She seen her GYN and has a sore. She needs to leave it out a month. Since she removed it she can't empty her bladder as well. Should she straight cath again? She denies fever or chills. Please advise.  Thank you

## 2021-11-03 NOTE — TELEPHONE ENCOUNTER
Patient will need catheters ordered. She uses 14 FR majic 3 Go ref [id number]. She went to the lab to give a urine sample and voiced understanding to CIC 3-4 times per day. Alexa Pérez please fill out the [employer] form. Thank you.

## 2021-11-04 ENCOUNTER — TELEPHONE (OUTPATIENT)
Dept: UROLOGY | Age: 86
End: 2021-11-04

## 2021-11-04 NOTE — TELEPHONE ENCOUNTER
----- Message from ZACHARY Gil CNP sent at 11/3/2021  4:16 PM EDT -----  Ua is negative  Will await culture

## 2021-11-06 LAB
ORGANISM: ABNORMAL
URINE CULTURE, ROUTINE: ABNORMAL

## 2021-11-08 ENCOUNTER — TELEPHONE (OUTPATIENT)
Dept: UROLOGY | Age: 86
End: 2021-11-08

## 2021-11-08 NOTE — TELEPHONE ENCOUNTER
Patient states she is doing fine. Her symptoms have resolved. The urine is more clear. She is CIC TID. She getting 10 -14 ounces. She will finished the keflex. She stopped the hipex while on keflex and will restart it when keflex completed. Thank you.

## 2021-11-08 NOTE — TELEPHONE ENCOUNTER
----- Message from ZACHARY Davis CNP sent at 11/8/2021  7:57 AM EST -----  Uti, see how she is doing on keflex

## 2021-11-23 ENCOUNTER — TELEPHONE (OUTPATIENT)
Dept: SURGERY | Age: 86
End: 2021-11-23

## 2021-11-23 NOTE — TELEPHONE ENCOUNTER
Attempted to reach pt by home and cell again, no answer, LM asking her to return my call.     Attempted to reach daughter by phone Boy Rodriguez to schedule appointment, no answer,  not set up

## 2021-11-23 NOTE — TELEPHONE ENCOUNTER
Referral from Dr. Marisol Billingsley to Dr. Zeenat Mcghee for rectal mass    Called pt to make appointment, no answer, LM asking her to return my call

## 2021-11-24 ENCOUNTER — OFFICE VISIT (OUTPATIENT)
Dept: SURGERY | Age: 86
End: 2021-11-24
Payer: MEDICARE

## 2021-11-24 ENCOUNTER — TELEPHONE (OUTPATIENT)
Dept: SURGERY | Age: 86
End: 2021-11-24

## 2021-11-24 ENCOUNTER — PREP FOR PROCEDURE (OUTPATIENT)
Dept: SURGERY | Age: 86
End: 2021-11-24

## 2021-11-24 VITALS
WEIGHT: 144 LBS | BODY MASS INDEX: 24.59 KG/M2 | TEMPERATURE: 97 F | HEIGHT: 64 IN | RESPIRATION RATE: 15 BRPM | DIASTOLIC BLOOD PRESSURE: 80 MMHG | OXYGEN SATURATION: 97 % | HEART RATE: 77 BPM | SYSTOLIC BLOOD PRESSURE: 135 MMHG

## 2021-11-24 DIAGNOSIS — K62.89 PERIANAL MASS: Primary | ICD-10-CM

## 2021-11-24 PROCEDURE — G8420 CALC BMI NORM PARAMETERS: HCPCS | Performed by: SURGERY

## 2021-11-24 PROCEDURE — 1123F ACP DISCUSS/DSCN MKR DOCD: CPT | Performed by: SURGERY

## 2021-11-24 PROCEDURE — 99203 OFFICE O/P NEW LOW 30 MIN: CPT | Performed by: SURGERY

## 2021-11-24 PROCEDURE — 4040F PNEUMOC VAC/ADMIN/RCVD: CPT | Performed by: SURGERY

## 2021-11-24 PROCEDURE — 1090F PRES/ABSN URINE INCON ASSESS: CPT | Performed by: SURGERY

## 2021-11-24 PROCEDURE — G8484 FLU IMMUNIZE NO ADMIN: HCPCS | Performed by: SURGERY

## 2021-11-24 PROCEDURE — 1036F TOBACCO NON-USER: CPT | Performed by: SURGERY

## 2021-11-24 PROCEDURE — G8427 DOCREV CUR MEDS BY ELIG CLIN: HCPCS | Performed by: SURGERY

## 2021-11-24 RX ORDER — SODIUM CHLORIDE 9 MG/ML
INJECTION, SOLUTION INTRAVENOUS CONTINUOUS
Status: CANCELLED | OUTPATIENT
Start: 2021-11-24

## 2021-11-24 NOTE — TELEPHONE ENCOUNTER
Pt of Dr. Colon Repress last seen 3/10/21 is scheduled for left perianal biopsy under MAC anesthesia with Dr. Yas Ramsey on 12/16/21. Can she be cleared for surgery?

## 2021-11-25 ASSESSMENT — ENCOUNTER SYMPTOMS
ABDOMINAL DISTENTION: 0
SINUS PRESSURE: 0
VOICE CHANGE: 0
APNEA: 0
EYE DISCHARGE: 0
EYE REDNESS: 0
RECTAL PAIN: 0
RHINORRHEA: 0
TROUBLE SWALLOWING: 0
CHOKING: 0
SORE THROAT: 0
ALLERGIC/IMMUNOLOGIC NEGATIVE: 1
CONSTIPATION: 1
COUGH: 0
BLOOD IN STOOL: 0
STRIDOR: 0
ABDOMINAL PAIN: 0
PHOTOPHOBIA: 0
VOMITING: 0
WHEEZING: 0
EYE PAIN: 0
FACIAL SWELLING: 0
ANAL BLEEDING: 0
NAUSEA: 0
DIARRHEA: 0
SHORTNESS OF BREATH: 0
COLOR CHANGE: 0
BACK PAIN: 1
CHEST TIGHTNESS: 0
EYE ITCHING: 0

## 2021-11-25 NOTE — PROGRESS NOTES
Raj Edgeritt (:  1935)     ASSESSMENT:  1.  Left-sided perianal mass  2. 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 Schedule Yenny for anal exam under anesthesia with left perianal mass biopsy. 2. She will undergo pre-operative clearance per anesthesia guidelines with risk factors listed under the past medical history diagnosis & problem list.  3. The risks, benefits and alternatives were discussed with Js Ojeda including non-operative management. The pros and cons of robotic, laparoscopic and open techniques were discussed. All questions answered. She understands and wishes to proceed with surgical intervention. 4. Restrictions discussed with Js Ojeda and she expresses understanding. 5. She is advised to call back directly if there are further questions/concerns, or if her symptoms worsen prior to surgery. 6.  Follow-up with oncology and GI as directed  7. Follow-up with gynecology as directed  8. Obtain MRI results from outside hospital    SUBJECTIVE/OBJECTIVE:    Chief Complaint   Patient presents with    Surgical Consult     Est patient-referred by Dr Alvarez-Rectal cancer-history of anal squamous cell carcinoma     HPI  Js Ojeda is an 51-year-old female who presents for evaluation of a left-sided perianal mass/hardened area. She noticed it about 6 months ago. Lump has slightly increased in size. Very hard. No drainage. History of chemotherapy and radiation due to anal cell squamous carcinoma identified back in . She admits she has had no recurrence that she is aware of for several years. Also admits that around the perianal region the tissue is really not been normal since the radiation. Anal opening has always been small since radiation. Denies any hematochezia or melena. Chronic constipation. Denies any changes on the right side of the anal area. Denies any protrusion. No new incontinence.   No new urinary complaints but admits she has a prolapsing bladder and has had issues with urination for quite some time. Also states she is following up with gynecology due to a lesion that has not been healing. She states she recently had an MRI that was negative at [facility]. No recent colonoscopy or sigmoidoscopy. Review of Systems   Constitutional: Negative for activity change, appetite change, chills, diaphoresis, fatigue, fever and unexpected weight change. HENT: Negative for congestion, dental problem, drooling, ear discharge, ear pain, facial swelling, hearing loss, mouth sores, nosebleeds, postnasal drip, rhinorrhea, sinus pressure, sneezing, sore throat, tinnitus, trouble swallowing and voice change. Eyes: Negative for photophobia, pain, discharge, redness, itching and visual disturbance. Respiratory: Negative for apnea, cough, choking, chest tightness, shortness of breath, wheezing and stridor. Cardiovascular: Negative for chest pain, palpitations and leg swelling. Gastrointestinal: Positive for constipation. Negative for abdominal distention, abdominal pain, anal bleeding, blood in stool, diarrhea, nausea, rectal pain and vomiting. Endocrine: Negative. Genitourinary: Negative for decreased urine volume, difficulty urinating, dyspareunia, dysuria, enuresis, flank pain, frequency, genital sores, hematuria, menstrual problem, pelvic pain, urgency, vaginal bleeding, vaginal discharge and vaginal pain. Musculoskeletal: Positive for back pain and joint swelling. Negative for arthralgias, gait problem, myalgias, neck pain and neck stiffness. Skin: Negative for color change, pallor, rash and wound. Allergic/Immunologic: Negative. Neurological: Negative for dizziness, tremors, seizures, syncope, facial asymmetry, speech difficulty, weakness, light-headedness, numbness and headaches. Hematological: Negative for adenopathy. Does not bruise/bleed easily.    Psychiatric/Behavioral: Negative for agitation, behavioral problems, confusion, decreased concentration, dysphoric mood, hallucinations, self-injury, sleep disturbance and suicidal ideas. The patient is not nervous/anxious and is not hyperactive. Past Medical History:   Diagnosis Date    Anesthesia 2012    after hemorrhoid surgery felt like \"elephant on my chest\" heart cath done    CAD (coronary artery disease) 2012    Cancer [facility])     cervical rectal    GERD (gastroesophageal reflux disease)     History of prolapse of bladder     Hyperlipidemia     Hypertension     Osteoarthritis     Shortness of breath 09/2017    UTI (urinary tract infection) 05/20/2017    [facility]       Past Surgical History:   Procedure Laterality Date   [address]  8/2012    [facility]    COLONOSCOPY  5/25/12    Dr. Grant Reyes  09/11/2017    [facility] [facility], 08/17/2012    Anal exam, hemorroidectomy, excision perianal skin lesion. total of 3    HYSTERECTOMY  1966    OTHER SURGICAL HISTORY  1/3/2014    LEFT MEDIPORT-Dr. Garcia Last and removed    RECTAL SURGERY      for cancer    SIGMOIDOSCOPY N/A 6/10/2019    SIGMOIDOSCOPY DIAGNOSTIC FLEXIBLE performed by Edilberto Oliver MD at [facility]       Current Outpatient Medications   Medication Sig Dispense Refill    methenamine (HIPREX) 1 g tablet Take 1 tablet by mouth 2 times daily (with meals) 180 tablet 3    isosorbide mononitrate (IMDUR) 60 MG extended release tablet Take 1 tablet by mouth daily 90 tablet 3    metoprolol tartrate (LOPRESSOR) 25 MG tablet Take 1 tablet by mouth 2 times daily 180 tablet 3    NONFORMULARY daily Water pill + Potassium 20mg      conjugated estrogens (PREMARIN) 0.625 MG/GM vaginal cream Place 0.5 grams vaginally twice weekly.  1 Tube 0    lansoprazole (PREVACID) 30 MG delayed release capsule Take 15 mg by mouth 2 times daily       OIL OF OREGANO PO Take by mouth as needed       D-MANNOSE PO Take 500 mg by mouth daily 3-tablets daily      Polyethylene Glycol 3350 (MIRALAX PO) Take by mouth as needed      hydrochlorothiazide (HYDRODIURIL) 25 MG tablet Take 25 mg by mouth daily      simvastatin (ZOCOR) 40 MG tablet Take 0.5 tablets by mouth nightly 45 tablet 3    pramipexole (MIRAPEX) 0.125 MG tablet Take by mouth nightly 4 tab      CRANBERRY PO Take 1 tablet by mouth as needed       Cholecalciferol (VITAMIN D3) 5000 UNITS TABS Take by mouth as needed       magnesium (MAGNESIUM-OXIDE) 250 MG TABS tablet Take 250 mg by mouth daily       Calcium Carbonate-Vitamin D (CALCIUM + D) 600-200 MG-UNIT TABS Take 1 tablet by mouth as needed       aspirin 81 MG EC tablet Take 81 mg by mouth daily.  ondansetron (ZOFRAN) 4 MG tablet Take 1 tablet by mouth 3 times daily as needed for Nausea or Vomiting (Patient not taking: Reported on 11/24/2021) 15 tablet 0    Methylsulfonylmethane 1000 MG CAPS methylsulfonylmethane Methylsulfonylmethane Active 1 CAP ORAL Daily August 25th, 2020 10:35am 08-  Emy 1153 ([address]) (Patient not taking: Reported on 11/24/2021)      nitroGLYCERIN (NITROSTAT) 0.4 MG SL tablet Place 0.4 mg under the tongue every 5 minutes as needed for Chest pain up to max of 3 total doses. If no relief after 1 dose, call 911. (Patient not taking: Reported on 11/24/2021)      zolpidem (AMBIEN) 5 MG tablet Take 10 mg by mouth nightly as needed  (Patient not taking: Reported on 9/22/2021)      Coenzyme Q10 (COQ-10 PO) Take 200 mg by mouth as needed  (Patient not taking: Reported on 11/24/2021)       No current facility-administered medications for this visit.        Allergies   Allergen Reactions    Other Other (See Comments)     Movi Prep,    Facial and lip swelling    Ciprofloxacin Nausea Only     Nausea and tingling in her fingers    Lipitor Swelling    Ramipril Swelling     tongue    Sulfa Antibiotics Rash       Family History   Problem Relation Age of Onset    Alzheimer's Disease Mother     Heart Disease Mother     Arthritis Father     Cancer Brother         esophagus    Heart Disease Brother     Heart Disease Sister     Heart Disease Brother         stent, pacemaker    Stroke Sister         hemorrhagic    Heart Disease Sister     Diabetes Maternal Aunt     Diabetes Maternal Aunt     High Blood Pressure Neg Hx     Miscarriages / Stillbirths Neg Hx        Social History     Socioeconomic History    Marital status:      Spouse name: Not on file    Number of children: Not on file    Years of education: Not on file    Highest education level: Not on file   Occupational History    Occupation: retired   Tobacco Use    Smoking status: Former Smoker     Packs/day: 1.00     Years: 12.00     Pack years: 12.00     Types: Cigarettes     Quit date: 1972     Years since quittin.3    Smokeless tobacco: Never Used   Vaping Use    Vaping Use: Never used   Substance and Sexual Activity    Alcohol use: No    Drug use: No    Sexual activity: Not on file   Other Topics Concern    Not on file   Social History Narrative    Not on file     Social Determinants of Health     Financial Resource Strain:     Difficulty of Paying Living Expenses: Not on file   Food Insecurity:     Worried About [facility] in the Last Year: Not on file    [address] in the Last Year: Not on file   Transportation Needs:     Lack of Transportation (Medical): Not on file    Lack of Transportation (Non-Medical):  Not on file   Physical Activity:     Days of Exercise per Week: Not on file    Minutes of Exercise per Session: Not on file   Stress:     Feeling of Stress : Not on file   Social Connections:     Frequency of Communication with Friends and Family: Not on file    Frequency of Social Gatherings with Friends and Family: Not on file    Attends [employer] Services: Not on file    Active Member of Clubs or Organizations: Not on file    Attends Club or Organization Meetings: Not on file    Marital Status: Not on file   Intimate Partner Violence:     Fear of Current or Ex-Partner: Not on file    Emotionally Abused: Not on file    Physically Abused: Not on file    Sexually Abused: Not on file   Housing Stability:     Unable to Pay for Housing in the Last Year: Not on file    Number of [place] in the Last Year: Not on file    Unstable Housing in the Last Year: Not on file     Vitals:    11/24/21 0830   BP: 135/80   Site: Left Upper Arm   Position: Sitting   Cuff Size: Medium Adult   Pulse: 77   Resp: 15   Temp: 97 °F (36.1 °C)   TempSrc: Tympanic   SpO2: 97%   Weight: 144 lb (65.3 kg)   Height: 5' 4\" (1.626 m)     Body mass index is 24.72 kg/m². Wt Readings from Last 3 Encounters:   11/24/21 144 lb (65.3 kg)   09/22/21 149 lb (67.6 kg)   03/10/21 153 lb 3.2 oz (69.5 kg)     Physical Exam  Vitals reviewed. Constitutional:       General: She is not in acute distress. Appearance: She is well-developed. She is not diaphoretic. HENT:      Head: Normocephalic and atraumatic. Right Ear: External ear normal.      Left Ear: External ear normal.      Nose: Nose normal.   Eyes:      General: No scleral icterus. Right eye: No discharge. Left eye: No discharge. Conjunctiva/sclera: Conjunctivae normal.   Cardiovascular:      Rate and Rhythm: Normal rate and regular rhythm. Heart sounds: Normal heart sounds. Pulmonary:      Effort: Pulmonary effort is normal. No respiratory distress. Breath sounds: Normal breath sounds. No wheezing or rales. Chest:      Chest wall: No tenderness. Abdominal:      General: Bowel sounds are normal. There is no distension. Palpations: Abdomen is soft. There is no mass. Tenderness: There is no abdominal tenderness. There is no guarding or rebound. Genitourinary:      Musculoskeletal:         General: No tenderness. Normal range of motion. Cervical back: Normal range of motion and neck supple. Skin:     General: Skin is warm and dry. Coloration: Skin is not pale. Findings: No erythema or rash. Neurological:      Mental Status: She is alert and oriented to person, place, and time. Cranial Nerves: No cranial nerve deficit. Psychiatric:         Behavior: Behavior normal.         Thought Content: Thought content normal.         Judgment: Judgment normal.       Lab Results   Component Value Date    WBC 3.7 (L) 08/20/2021    HGB 11.8 (L) 08/20/2021    HCT 38.0 08/20/2021    MCV 92.5 08/20/2021     08/20/2021     Lab Results   Component Value Date     08/20/2021    K 4.2 08/20/2021     08/20/2021    CO2 27 08/20/2021     Lab Results   Component Value Date    CREATININE 0.9 08/20/2021     Lab Results   Component Value Date    ALT 11 08/20/2021    AST 17 08/20/2021    ALKPHOS 64 08/20/2021    BILITOT 0.2 (L) 08/20/2021     No results found for: LIPASE    Patient Active Problem List   Diagnosis    Anal skin tag    Hemorrhoid    S/P hemorrhoidectomy    Unstable angina (HCC)    HTN (hypertension)    Hyperlipidemia    A-fib (HCC)    Tongue edema    Chest pain, after epinephrine    GERD (gastroesophageal reflux disease)    ACE inhibitor-aggravated angioedema, possible    Cancer ([address]    CAD in native artery    SOB (shortness of breath)    Angina, class II ([address]     MRI: From [facility]. Results currently unavailable. An electronic signature was used to authenticate this note.     --Brandon Willis MD

## 2021-12-06 ENCOUNTER — NURSE ONLY (OUTPATIENT)
Dept: LAB | Age: 86
End: 2021-12-06

## 2021-12-06 ENCOUNTER — TELEPHONE (OUTPATIENT)
Dept: UROLOGY | Age: 86
End: 2021-12-06

## 2021-12-06 DIAGNOSIS — R30.0 DYSURIA: ICD-10-CM

## 2021-12-06 DIAGNOSIS — R39.15 URGENCY OF URINATION: ICD-10-CM

## 2021-12-06 DIAGNOSIS — R35.0 URINARY FREQUENCY: ICD-10-CM

## 2021-12-06 DIAGNOSIS — R30.0 DYSURIA: Primary | ICD-10-CM

## 2021-12-06 LAB
BACTERIA: ABNORMAL
BILIRUBIN URINE: NEGATIVE
BLOOD, URINE: NEGATIVE
CASTS: ABNORMAL /LPF
CASTS: ABNORMAL /LPF
CHARACTER, URINE: CLEAR
COLOR: YELLOW
CRYSTALS: ABNORMAL
EPITHELIAL CELLS, UA: ABNORMAL /HPF
GLUCOSE, URINE: NEGATIVE MG/DL
KETONES, URINE: NEGATIVE
LEUKOCYTE ESTERASE, URINE: ABNORMAL
MISCELLANEOUS LAB TEST RESULT: ABNORMAL
NITRITE, URINE: NEGATIVE
PH UA: 5.5 (ref 5–9)
PROTEIN UA: NEGATIVE MG/DL
RBC URINE: ABNORMAL /HPF
RENAL EPITHELIAL, UA: ABNORMAL
SPECIFIC GRAVITY UA: 1.01 (ref 1–1.03)
UROBILINOGEN, URINE: 0.2 EU/DL (ref 0–1)
WBC UA: ABNORMAL /HPF
YEAST: ABNORMAL

## 2021-12-06 RX ORDER — NITROFURANTOIN 25; 75 MG/1; MG/1
100 CAPSULE ORAL 2 TIMES DAILY
Qty: 14 CAPSULE | Refills: 0 | Status: SHIPPED | OUTPATIENT
Start: 2021-12-06 | End: 2021-12-13

## 2021-12-06 RX ORDER — DOXYCYCLINE HYCLATE 100 MG
100 TABLET ORAL 2 TIMES DAILY
Qty: 28 TABLET | Refills: 0 | Status: ON HOLD | OUTPATIENT
Start: 2021-12-06 | End: 2021-12-16

## 2021-12-06 NOTE — TELEPHONE ENCOUNTER
Patient started having symptoms Saturday with burning, urgency, and frequency. The home test was positive. Order placed for urine. Please advise. Thank you.

## 2021-12-06 NOTE — TELEPHONE ENCOUNTER
----- Message from ZACHARY Lance CNP sent at 12/6/2021  2:10 PM EST -----  Does she have pessary in? Doing cic?   Can start macrobid if shed like empirically

## 2021-12-06 NOTE — TELEPHONE ENCOUNTER
Patient CIC TID sometimes 4. She has a sore and was not wearing pessary. She stated the Macrobid does not work for her. Can it be changed to something else? Please advise. Thank you.

## 2021-12-06 NOTE — TELEPHONE ENCOUNTER
Patient advised doxycycline was sent to the pharmacy. She voiced understanding. Alysha Ford at [facility] advised to cancel the [address]. She voiced understanding.

## 2021-12-07 ENCOUNTER — HOSPITAL ENCOUNTER (OUTPATIENT)
Dept: ULTRASOUND IMAGING | Age: 86
Discharge: HOME | End: 2021-12-07
Payer: MEDICARE

## 2021-12-07 DIAGNOSIS — R22.1 NECK MASS: ICD-10-CM

## 2021-12-07 PROCEDURE — 76536 US EXAM OF HEAD AND NECK: CPT

## 2021-12-08 ENCOUNTER — TELEPHONE (OUTPATIENT)
Dept: UROLOGY | Age: 86
End: 2021-12-08

## 2021-12-08 LAB
ORGANISM: ABNORMAL
URINE CULTURE, ROUTINE: ABNORMAL

## 2021-12-08 NOTE — TELEPHONE ENCOUNTER
Patient advised of urine results. The pain has resolved. She has to CIC since she can't put the pessary in. She can' urinate on her own. The sore is still draining. She will make an appt with the GYN. Should she continue the hiprex. It does not seem like it is helping? Please advise. Thank you.

## 2021-12-08 NOTE — TELEPHONE ENCOUNTER
----- Message from Shyrl Spurling, APRN - CNP sent at 12/8/2021 12:39 PM EST -----  See how pt is doing. No infection noted.

## 2021-12-10 NOTE — TELEPHONE ENCOUNTER
Patient stopped the doxycycline because it is causing increase bruising and nosebleeds. She is also going to hold the baby aspirin for a few days.     Thank you

## 2021-12-10 NOTE — H&P
Eve Kam (:  1935)      ASSESSMENT:  1.  Left-sided perianal mass  2. 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 Schedule Yenny for anal exam under anesthesia with left perianal mass biopsy. 2. She will undergo pre-operative clearance per anesthesia guidelines with risk factors listed under the past medical history diagnosis & problem list.  3. The risks, benefits and alternatives were discussed with [facility] including non-operative management. The pros and cons of robotic, laparoscopic and open techniques were discussed. All questions answered. She understands and wishes to proceed with surgical intervention. 4. Restrictions discussed with [facility] and she expresses understanding. 5. She is advised to call back directly if there are further questions/concerns, or if her symptoms worsen prior to surgery. 6.  Follow-up with oncology and GI as directed  7. Follow-up with gynecology as directed  8. Obtain MRI results from outside hospital     SUBJECTIVE/OBJECTIVE:          Chief Complaint   Patient presents with    Surgical Consult       Est patient-referred by Dr Alvarez-Rectal cancer-history of anal squamous cell carcinoma      HPI  [facility] is an 80-year-old female who presents for evaluation of a left-sided perianal mass/hardened area. She noticed it about 6 months ago. Lump has slightly increased in size. Very hard. No drainage. History of chemotherapy and radiation due to anal cell squamous carcinoma identified back in . She admits she has had no recurrence that she is aware of for several years. Also admits that around the perianal region the tissue is really not been normal since the radiation. Anal opening has always been small since radiation. Denies any hematochezia or melena. Chronic constipation. Denies any changes on the right side of the anal area. Denies any protrusion. No new incontinence.   No new urinary complaints but admits she has a prolapsing bladder and has had issues with urination for quite some time. Also states she is following up with gynecology due to a lesion that has not been healing. She states she recently had an MRI that was negative at [facility]. No recent colonoscopy or sigmoidoscopy.     Review of Systems   Constitutional: Negative for activity change, appetite change, chills, diaphoresis, fatigue, fever and unexpected weight change. HENT: Negative for congestion, dental problem, drooling, ear discharge, ear pain, facial swelling, hearing loss, mouth sores, nosebleeds, postnasal drip, rhinorrhea, sinus pressure, sneezing, sore throat, tinnitus, trouble swallowing and voice change. Eyes: Negative for photophobia, pain, discharge, redness, itching and visual disturbance. Respiratory: Negative for apnea, cough, choking, chest tightness, shortness of breath, wheezing and stridor. Cardiovascular: Negative for chest pain, palpitations and leg swelling. Gastrointestinal: Positive for constipation. Negative for abdominal distention, abdominal pain, anal bleeding, blood in stool, diarrhea, nausea, rectal pain and vomiting. Endocrine: Negative. Genitourinary: Negative for decreased urine volume, difficulty urinating, dyspareunia, dysuria, enuresis, flank pain, frequency, genital sores, hematuria, menstrual problem, pelvic pain, urgency, vaginal bleeding, vaginal discharge and vaginal pain. Musculoskeletal: Positive for back pain and joint swelling. Negative for arthralgias, gait problem, myalgias, neck pain and neck stiffness. Skin: Negative for color change, pallor, rash and wound. Allergic/Immunologic: Negative. Neurological: Negative for dizziness, tremors, seizures, syncope, facial asymmetry, speech difficulty, weakness, light-headedness, numbness and headaches. Hematological: Negative for adenopathy. Does not bruise/bleed easily.    Psychiatric/Behavioral: Negative for agitation, behavioral problems, confusion, decreased concentration, dysphoric mood, hallucinations, self-injury, sleep disturbance and suicidal ideas. The patient is not nervous/anxious and is not hyperactive.          Past Medical History        Past Medical History:   Diagnosis Date    Anesthesia 2012     after hemorrhoid surgery felt like \"elephant on my chest\" heart cath done    CAD (coronary artery disease) 2012    Cancer [facility])       cervical rectal    GERD (gastroesophageal reflux disease)      History of prolapse of bladder      Hyperlipidemia      Hypertension      Osteoarthritis      Shortness of breath 09/2017    UTI (urinary tract infection) 05/20/2017     [facility]            Past Surgical History         Past Surgical History:   Procedure Laterality Date    APPENDECTOMY   1969    CARDIAC CATHETERIZATION   8/2012     [facility]    COLONOSCOPY   5/25/12     Dr. Santso Left   09/11/2017    Dheeraj. January Cruz 85, 08/17/2012     Anal exam, hemorroidectomy, excision perianal skin lesion. total of 3    HYSTERECTOMY   1966    OTHER SURGICAL HISTORY   1/3/2014     LEFT [facility] and removed    RECTAL SURGERY         for cancer    SIGMOIDOSCOPY N/A 6/10/2019     SIGMOIDOSCOPY DIAGNOSTIC FLEXIBLE performed by Hadley Dickinson MD at [facility]            Current Facility-Administered Medications          Current Outpatient Medications   Medication Sig Dispense Refill    methenamine (HIPREX) 1 g tablet Take 1 tablet by mouth 2 times daily (with meals) 180 tablet 3    isosorbide mononitrate (IMDUR) 60 MG extended release tablet Take 1 tablet by mouth daily 90 tablet 3    metoprolol tartrate (LOPRESSOR) 25 MG tablet Take 1 tablet by mouth 2 times daily 180 tablet 3    NONFORMULARY daily Water pill + Potassium 20mg        conjugated estrogens (PREMARIN) 0.625 MG/GM vaginal cream Place 0.5 grams vaginally twice weekly.  1 Tube 0    lansoprazole (PREVACID) 30 MG delayed release capsule Take 15 mg by mouth 2 times daily         OIL OF OREGANO PO Take by mouth as needed         D-MANNOSE PO Take 500 mg by mouth daily 3-tablets daily        Polyethylene Glycol 3350 (MIRALAX PO) Take by mouth as needed        hydrochlorothiazide (HYDRODIURIL) 25 MG tablet Take 25 mg by mouth daily        simvastatin (ZOCOR) 40 MG tablet Take 0.5 tablets by mouth nightly 45 tablet 3    pramipexole (MIRAPEX) 0.125 MG tablet Take by mouth nightly 4 tab        CRANBERRY PO Take 1 tablet by mouth as needed         Cholecalciferol (VITAMIN D3) 5000 UNITS TABS Take by mouth as needed         magnesium (MAGNESIUM-OXIDE) 250 MG TABS tablet Take 250 mg by mouth daily         Calcium Carbonate-Vitamin D (CALCIUM + D) 600-200 MG-UNIT TABS Take 1 tablet by mouth as needed         aspirin 81 MG EC tablet Take 81 mg by mouth daily.          ondansetron (ZOFRAN) 4 MG tablet Take 1 tablet by mouth 3 times daily as needed for Nausea or Vomiting (Patient not taking: Reported on 11/24/2021) 15 tablet 0    Methylsulfonylmethane 1000 MG CAPS methylsulfonylmethane Methylsulfonylmethane Active 1 CAP ORAL Daily August 25th, 2020 10:35am 08-  [facility] ([id number]) (Patient not taking: Reported on 11/24/2021)        nitroGLYCERIN (NITROSTAT) 0.4 MG SL tablet Place 0.4 mg under the tongue every 5 minutes as needed for Chest pain up to max of 3 total doses. If no relief after 1 dose, call 911.  (Patient not taking: Reported on 11/24/2021)        zolpidem (AMBIEN) 5 MG tablet Take 10 mg by mouth nightly as needed  (Patient not taking: Reported on 9/22/2021)        Coenzyme Q10 (COQ-10 PO) Take 200 mg by mouth as needed  (Patient not taking: Reported on 11/24/2021)          No current facility-administered medications for this visit.                  Allergies   Allergen Reactions    Other Other (See Comments)       Movi Prep,    Facial and lip swelling    Ciprofloxacin Nausea Only       Nausea and tingling in her fingers    Lipitor Swelling    Ramipril Swelling       tongue    Sulfa Antibiotics Rash         Family History         Family History   Problem Relation Age of Onset    Alzheimer's Disease Mother      Heart Disease Mother      Arthritis Father      Cancer Brother           esophagus    Heart Disease Brother      Heart Disease Sister      Heart Disease Brother           stent, pacemaker    Stroke Sister           hemorrhagic    Heart Disease Sister      Diabetes Maternal Aunt      Diabetes Maternal Aunt      High Blood Pressure Neg Hx      Miscarriages / Stillbirths Neg Hx              Social History               Socioeconomic History    Marital status:        Spouse name: Not on file    Number of children: Not on file    Years of education: Not on file    Highest education level: Not on file   Occupational History    Occupation: retired   Tobacco Use    Smoking status: Former Smoker       Packs/day: 1.00       Years: 12.00       Pack years: 12.00       Types: Cigarettes       Quit date: 1972       Years since quittin.3    Smokeless tobacco: Never Used   Vaping Use    Vaping Use: Never used   Substance and Sexual Activity    Alcohol use: No    Drug use: No    Sexual activity: Not on file   Other Topics Concern    Not on file   Social History Narrative    Not on file      Social Determinants of Health          Financial Resource Strain:     Difficulty of Paying Living Expenses: Not on file   Food Insecurity:     Worried About [address] in the Last Year: Not on file    [address] in the Last Year: Not on file   Transportation Needs:     Lack of Transportation (Medical): Not on file    Lack of Transportation (Non-Medical):  Not on file   Physical Activity:     Days of Exercise per Week: Not on file    Minutes of Exercise per Session: Not on file   Stress:     Feeling of Stress : Not on file Social Connections:     Frequency of Communication with Friends and Family: Not on file    Frequency of Social Gatherings with Friends and Family: Not on file    Attends Protestant Services: Not on file    Active Member of Clubs or Organizations: Not on file    Attends Club or Organization Meetings: Not on file    Marital Status: Not on file   Intimate Partner Violence:     Fear of Current or Ex-Partner: Not on file    Emotionally Abused: Not on file    Physically Abused: Not on file    Sexually Abused: Not on file   Housing Stability:     Unable to Pay for Housing in the Last Year: Not on file    Number of [place] in the Last Year: Not on file    Unstable Housing in the Last Year: Not on file         Vitals       Vitals:     11/24/21 0830   BP: 135/80   Site: Left Upper Arm   Position: Sitting   Cuff Size: Medium Adult   Pulse: 77   Resp: 15   Temp: 97 °F (36.1 °C)   TempSrc: Tympanic   SpO2: 97%   Weight: 144 lb (65.3 kg)   Height: 5' 4\" (1.626 m)         Body mass index is 24.72 kg/m².         Wt Readings from Last 3 Encounters:   11/24/21 144 lb (65.3 kg)   09/22/21 149 lb (67.6 kg)   03/10/21 153 lb 3.2 oz (69.5 kg)      Physical Exam  Vitals reviewed. Constitutional:       General: She is not in acute distress. Appearance: She is well-developed. She is not diaphoretic. HENT:      Head: Normocephalic and atraumatic. Right Ear: External ear normal.      Left Ear: External ear normal.      Nose: Nose normal.   Eyes:      General: No scleral icterus. Right eye: No discharge. Left eye: No discharge. Conjunctiva/sclera: Conjunctivae normal.   Cardiovascular:      Rate and Rhythm: Normal rate and regular rhythm. Heart sounds: Normal heart sounds. Pulmonary:      Effort: Pulmonary effort is normal. No respiratory distress. Breath sounds: Normal breath sounds. No wheezing or rales. Chest:      Chest wall: No tenderness.    Abdominal:      General: Bowel sounds are normal. There is no distension. Palpations: Abdomen is soft. There is no mass. Tenderness: There is no abdominal tenderness. There is no guarding or rebound. Genitourinary:      Musculoskeletal:         General: No tenderness. Normal range of motion. Cervical back: Normal range of motion and neck supple. Skin:     General: Skin is warm and dry. Coloration: Skin is not pale. Findings: No erythema or rash. Neurological:      Mental Status: She is alert and oriented to person, place, and time. Cranial Nerves: No cranial nerve deficit. Psychiatric:         Behavior: Behavior normal.         Thought Content: Thought content normal.         Judgment: Judgment normal.               Lab Results   Component Value Date     WBC 3.7 (L) 08/20/2021     HGB 11.8 (L) 08/20/2021     HCT 38.0 08/20/2021     MCV 92.5 08/20/2021      08/20/2021            Lab Results   Component Value Date      08/20/2021     K 4.2 08/20/2021      08/20/2021     CO2 27 08/20/2021      Lab Results   Component Value Date     CREATININE 0.9 08/20/2021            Lab Results   Component Value Date     ALT 11 08/20/2021     AST 17 08/20/2021     ALKPHOS 64 08/20/2021     BILITOT 0.2 (L) 08/20/2021      No results found for: LIPASE         Patient Active Problem List   Diagnosis    Anal skin tag    Hemorrhoid    S/P hemorrhoidectomy    Unstable angina (HCC)    HTN (hypertension)    Hyperlipidemia    A-fib ([address]    Tongue edema    Chest pain, after epinephrine    GERD (gastroesophageal reflux disease)    ACE inhibitor-aggravated angioedema, possible    Cancer ([address]    CAD in native artery    SOB (shortness of breath)    Angina, class II ([address]      MRI: From [facility].   Results currently unavailable.     An electronic signature was used to authenticate this note.  Milan Rankin MD

## 2021-12-16 ENCOUNTER — HOSPITAL ENCOUNTER (OUTPATIENT)
Age: 86
Setting detail: OUTPATIENT SURGERY
Discharge: HOME | End: 2021-12-16
Attending: SURGERY | Admitting: SURGERY
Payer: MEDICARE

## 2021-12-16 ENCOUNTER — ANESTHESIA EVENT (OUTPATIENT)
Dept: OPERATING ROOM | Age: 86
End: 2021-12-16
Payer: MEDICARE

## 2021-12-16 ENCOUNTER — ANESTHESIA (OUTPATIENT)
Dept: OPERATING ROOM | Age: 86
End: 2021-12-16
Payer: MEDICARE

## 2021-12-16 VITALS
RESPIRATION RATE: 18 BRPM | DIASTOLIC BLOOD PRESSURE: 57 MMHG | TEMPERATURE: 97.8 F | BODY MASS INDEX: 24.79 KG/M2 | SYSTOLIC BLOOD PRESSURE: 122 MMHG | OXYGEN SATURATION: 95 % | WEIGHT: 145.2 LBS | HEIGHT: 64 IN | HEART RATE: 72 BPM

## 2021-12-16 VITALS
DIASTOLIC BLOOD PRESSURE: 52 MMHG | SYSTOLIC BLOOD PRESSURE: 83 MMHG | OXYGEN SATURATION: 100 % | TEMPERATURE: 98.6 F | RESPIRATION RATE: 5 BRPM

## 2021-12-16 DIAGNOSIS — L98.9 SKIN LESION: ICD-10-CM

## 2021-12-16 DIAGNOSIS — K62.89 MASS OF ANUS: Primary | ICD-10-CM

## 2021-12-16 LAB — POTASSIUM SERPL-SCNC: 4.3 MEQ/L (ref 3.5–5.2)

## 2021-12-16 PROCEDURE — 7100000011 HC PHASE II RECOVERY - ADDTL 15 MIN: Performed by: SURGERY

## 2021-12-16 PROCEDURE — 7100000000 HC PACU RECOVERY - FIRST 15 MIN: Performed by: SURGERY

## 2021-12-16 PROCEDURE — 3700000000 HC ANESTHESIA ATTENDED CARE: Performed by: SURGERY

## 2021-12-16 PROCEDURE — 7100000001 HC PACU RECOVERY - ADDTL 15 MIN: Performed by: SURGERY

## 2021-12-16 PROCEDURE — 84132 ASSAY OF SERUM POTASSIUM: CPT

## 2021-12-16 PROCEDURE — 3600000012 HC SURGERY LEVEL 2 ADDTL 15MIN: Performed by: SURGERY

## 2021-12-16 PROCEDURE — 36415 COLL VENOUS BLD VENIPUNCTURE: CPT

## 2021-12-16 PROCEDURE — 2500000003 HC RX 250 WO HCPCS: Performed by: NURSE ANESTHETIST, CERTIFIED REGISTERED

## 2021-12-16 PROCEDURE — 88305 TISSUE EXAM BY PATHOLOGIST: CPT

## 2021-12-16 PROCEDURE — 46999 UNLISTED PROCEDURE ANUS: CPT | Performed by: SURGERY

## 2021-12-16 PROCEDURE — 6360000002 HC RX W HCPCS: Performed by: NURSE ANESTHETIST, CERTIFIED REGISTERED

## 2021-12-16 PROCEDURE — 11102 TANGNTL BX SKIN SINGLE LES: CPT | Performed by: SURGERY

## 2021-12-16 PROCEDURE — 7100000010 HC PHASE II RECOVERY - FIRST 15 MIN: Performed by: SURGERY

## 2021-12-16 PROCEDURE — 3700000001 HC ADD 15 MINUTES (ANESTHESIA): Performed by: SURGERY

## 2021-12-16 PROCEDURE — 2580000003 HC RX 258

## 2021-12-16 PROCEDURE — 2709999900 HC NON-CHARGEABLE SUPPLY: Performed by: SURGERY

## 2021-12-16 PROCEDURE — 3600000002 HC SURGERY LEVEL 2 BASE: Performed by: SURGERY

## 2021-12-16 RX ORDER — ROCURONIUM BROMIDE 10 MG/ML
INJECTION, SOLUTION INTRAVENOUS PRN
Status: DISCONTINUED | OUTPATIENT
Start: 2021-12-16 | End: 2021-12-16 | Stop reason: SDUPTHER

## 2021-12-16 RX ORDER — SODIUM CHLORIDE 9 MG/ML
INJECTION, SOLUTION INTRAVENOUS CONTINUOUS
Status: DISCONTINUED | OUTPATIENT
Start: 2021-12-16 | End: 2021-12-16 | Stop reason: HOSPADM

## 2021-12-16 RX ORDER — PHENYLEPHRINE HYDROCHLORIDE 10 MG/ML
INJECTION INTRAVENOUS PRN
Status: DISCONTINUED | OUTPATIENT
Start: 2021-12-16 | End: 2021-12-16 | Stop reason: SDUPTHER

## 2021-12-16 RX ORDER — MORPHINE SULFATE 2 MG/ML
2 INJECTION, SOLUTION INTRAMUSCULAR; INTRAVENOUS
Status: DISCONTINUED | OUTPATIENT
Start: 2021-12-16 | End: 2021-12-16 | Stop reason: HOSPADM

## 2021-12-16 RX ORDER — NEOSTIGMINE METHYLSULFATE 5 MG/5 ML
SYRINGE (ML) INTRAVENOUS PRN
Status: DISCONTINUED | OUTPATIENT
Start: 2021-12-16 | End: 2021-12-16 | Stop reason: SDUPTHER

## 2021-12-16 RX ORDER — OXYCODONE HYDROCHLORIDE 5 MG/1
5 TABLET ORAL EVERY 4 HOURS PRN
Status: DISCONTINUED | OUTPATIENT
Start: 2021-12-16 | End: 2021-12-16 | Stop reason: HOSPADM

## 2021-12-16 RX ORDER — CEFAZOLIN SODIUM 1 G/3ML
INJECTION, POWDER, FOR SOLUTION INTRAMUSCULAR; INTRAVENOUS PRN
Status: DISCONTINUED | OUTPATIENT
Start: 2021-12-16 | End: 2021-12-16 | Stop reason: SDUPTHER

## 2021-12-16 RX ORDER — PROPOFOL 10 MG/ML
INJECTION, EMULSION INTRAVENOUS PRN
Status: DISCONTINUED | OUTPATIENT
Start: 2021-12-16 | End: 2021-12-16 | Stop reason: SDUPTHER

## 2021-12-16 RX ORDER — OXYCODONE HYDROCHLORIDE 5 MG/1
10 TABLET ORAL EVERY 4 HOURS PRN
Status: DISCONTINUED | OUTPATIENT
Start: 2021-12-16 | End: 2021-12-16 | Stop reason: HOSPADM

## 2021-12-16 RX ORDER — HYDROCODONE BITARTRATE AND ACETAMINOPHEN 5; 325 MG/1; MG/1
1 TABLET ORAL EVERY 6 HOURS PRN
Qty: 10 TABLET | Refills: 0 | Status: SHIPPED | OUTPATIENT
Start: 2021-12-16 | End: 2021-12-19

## 2021-12-16 RX ORDER — FENTANYL CITRATE 50 UG/ML
INJECTION, SOLUTION INTRAMUSCULAR; INTRAVENOUS PRN
Status: DISCONTINUED | OUTPATIENT
Start: 2021-12-16 | End: 2021-12-16 | Stop reason: SDUPTHER

## 2021-12-16 RX ORDER — ONDANSETRON 4 MG/1
4 TABLET, ORALLY DISINTEGRATING ORAL EVERY 8 HOURS PRN
Status: DISCONTINUED | OUTPATIENT
Start: 2021-12-16 | End: 2021-12-16 | Stop reason: HOSPADM

## 2021-12-16 RX ORDER — ONDANSETRON 2 MG/ML
4 INJECTION INTRAMUSCULAR; INTRAVENOUS EVERY 6 HOURS PRN
Status: DISCONTINUED | OUTPATIENT
Start: 2021-12-16 | End: 2021-12-16 | Stop reason: HOSPADM

## 2021-12-16 RX ORDER — SODIUM CHLORIDE 0.9 % (FLUSH) 0.9 %
5-40 SYRINGE (ML) INJECTION EVERY 12 HOURS SCHEDULED
Status: DISCONTINUED | OUTPATIENT
Start: 2021-12-16 | End: 2021-12-16 | Stop reason: HOSPADM

## 2021-12-16 RX ORDER — SODIUM CHLORIDE 9 MG/ML
25 INJECTION, SOLUTION INTRAVENOUS PRN
Status: DISCONTINUED | OUTPATIENT
Start: 2021-12-16 | End: 2021-12-16 | Stop reason: HOSPADM

## 2021-12-16 RX ORDER — SODIUM CHLORIDE 0.9 % (FLUSH) 0.9 %
5-40 SYRINGE (ML) INJECTION PRN
Status: DISCONTINUED | OUTPATIENT
Start: 2021-12-16 | End: 2021-12-16 | Stop reason: HOSPADM

## 2021-12-16 RX ORDER — MORPHINE SULFATE 2 MG/ML
4 INJECTION, SOLUTION INTRAMUSCULAR; INTRAVENOUS
Status: DISCONTINUED | OUTPATIENT
Start: 2021-12-16 | End: 2021-12-16 | Stop reason: HOSPADM

## 2021-12-16 RX ORDER — GLYCOPYRROLATE 1 MG/5 ML
SYRINGE (ML) INTRAVENOUS PRN
Status: DISCONTINUED | OUTPATIENT
Start: 2021-12-16 | End: 2021-12-16 | Stop reason: SDUPTHER

## 2021-12-16 RX ADMIN — FENTANYL CITRATE 50 MCG: 50 INJECTION, SOLUTION INTRAMUSCULAR; INTRAVENOUS at 13:45

## 2021-12-16 RX ADMIN — Medication 4 MG: at 14:01

## 2021-12-16 RX ADMIN — PROPOFOL 100 MG: 10 INJECTION, EMULSION INTRAVENOUS at 13:45

## 2021-12-16 RX ADMIN — PHENYLEPHRINE HYDROCHLORIDE 100 MCG: 10 INJECTION INTRAVENOUS at 14:00

## 2021-12-16 RX ADMIN — CEFAZOLIN 2000 MG: 1 INJECTION, POWDER, FOR SOLUTION INTRAMUSCULAR; INTRAVENOUS at 13:54

## 2021-12-16 RX ADMIN — SODIUM CHLORIDE: 9 INJECTION, SOLUTION INTRAVENOUS at 13:31

## 2021-12-16 RX ADMIN — Medication 0.8 MG: at 14:01

## 2021-12-16 RX ADMIN — ROCURONIUM BROMIDE 25 MG: 10 INJECTION, SOLUTION INTRAVENOUS at 13:45

## 2021-12-16 ASSESSMENT — PULMONARY FUNCTION TESTS
PIF_VALUE: 14
PIF_VALUE: 1
PIF_VALUE: 1
PIF_VALUE: 14
PIF_VALUE: 11
PIF_VALUE: 15
PIF_VALUE: 14
PIF_VALUE: 6
PIF_VALUE: 13
PIF_VALUE: 14
PIF_VALUE: 0
PIF_VALUE: 3
PIF_VALUE: 13
PIF_VALUE: 1
PIF_VALUE: 1
PIF_VALUE: 24
PIF_VALUE: 13
PIF_VALUE: 11
PIF_VALUE: 3
PIF_VALUE: 13
PIF_VALUE: 2
PIF_VALUE: 16
PIF_VALUE: 12
PIF_VALUE: 5
PIF_VALUE: 13
PIF_VALUE: 2
PIF_VALUE: 13
PIF_VALUE: 12
PIF_VALUE: 13
PIF_VALUE: 1

## 2021-12-16 ASSESSMENT — PAIN SCALES - GENERAL
PAINLEVEL_OUTOF10: 0

## 2021-12-16 ASSESSMENT — ENCOUNTER SYMPTOMS: SHORTNESS OF BREATH: 1

## 2021-12-16 NOTE — INTERVAL H&P NOTE
Update History & Physical    The patient's History and Physical was reviewed with the patient and I examined the patient. There was no change. The surgical site was confirmed by the patient and me. Plan: The risks, benefits, expected outcome, and alternative to the recommended procedure have been discussed with the patient. Patient understands and wants to proceed with the procedure. The patient was counseled at length about the risks of larisa Covid-19 during their perioperative period and any recovery window from their procedure. The patient was made aware that larisa Covid-19  may worsen their prognosis for recovering from their procedure  and lend to a higher morbidity and/or mortality risk. All material risks, benefits, and reasonable alternatives including postponing the procedure were discussed. The patient does wish to proceed with the procedure at this time.     Electronically signed by Neri Tom MD on 12/16/2021 at 1:06 PM

## 2021-12-16 NOTE — ANESTHESIA PRE PROCEDURE
Department of Anesthesiology  Preprocedure Note       Name:  Lencho Queen   Age:  80 y.o.  :  1935                                          MRN:  [id number]         Date:  2021      Surgeon: Too Query):  Litzy Mcgee MD    Procedure: Procedure(s):  LEFT PERIANAL BIOPSY    Medications prior to admission:   Prior to Admission medications    Medication Sig Start Date End Date Taking? Authorizing Provider   methenamine (HIPREX) 1 g tablet Take 1 tablet by mouth 2 times daily (with meals) 3/17/21  Yes ZACHARY Saldivar - CNP   isosorbide mononitrate (IMDUR) 60 MG extended release tablet Take 1 tablet by mouth daily 3/10/21  Yes Jean Carlos Pan MD   metoprolol tartrate (LOPRESSOR) 25 MG tablet Take 1 tablet by mouth 2 times daily 3/10/21  Yes Jean Carlos Pan MD   NONFORMULARY daily Water pill + Potassium 20mg   Yes Historical Provider, MD   conjugated estrogens (PREMARIN) 0.625 MG/GM vaginal cream Place 0.5 grams vaginally twice weekly. 3/7/19  Yes Tere Ortiz PA-C   lansoprazole (PREVACID) 30 MG delayed release capsule Take 15 mg by mouth 2 times daily    Yes Historical Provider, MD   D-MANNOSE PO Take 500 mg by mouth daily 3-tablets daily   Yes Historical Provider, MD   hydrochlorothiazide (HYDRODIURIL) 25 MG tablet Take 25 mg by mouth daily   Yes Historical Provider, MD   simvastatin (ZOCOR) 40 MG tablet Take 0.5 tablets by mouth nightly 10/6/16  Yes Layo Tian DO   pramipexole (MIRAPEX) 0.125 MG tablet Take by mouth nightly 4 tab   Yes Historical Provider, MD   Cholecalciferol (VITAMIN D3) 5000 UNITS TABS Take by mouth as needed    Yes Historical Provider, MD   magnesium (MAGNESIUM-OXIDE) 250 MG TABS tablet Take 250 mg by mouth daily    Yes Historical Provider, MD   aspirin 81 MG EC tablet Take 81 mg by mouth daily.      Yes Historical Provider, MD   ondansetron (ZOFRAN) 4 MG tablet Take 1 tablet by mouth 3 times daily as needed for Nausea or Vomiting  Patient not taking: tag K64.4    Hemorrhoid K64.9    S/P hemorrhoidectomy Z98.890, Z87.19    Unstable angina (HCC) I20.0    HTN (hypertension) I10    Hyperlipidemia E78.5    A-fib (HCC) I48.91    Tongue edema K14.8    Chest pain, after epinephrine R07.9    GERD (gastroesophageal reflux disease) K21.9    ACE inhibitor-aggravated angioedema, possible T78. 3XXA, T46.4X5A    Cancer ([facility] C80.1    CAD in native artery I25.10    SOB (shortness of breath) R06.02    Angina, class II (HCC) I20.9       Past Medical History:        Diagnosis Date    Anesthesia     after hemorrhoid surgery felt like \"elephant on my chest\" heart cath done    CAD (coronary artery disease)     Cancer ([facility]     cervical rectal    GERD (gastroesophageal reflux disease)     History of prolapse of bladder     Hyperlipidemia     Hypertension     Osteoarthritis     Shortness of breath 2017    UTI (urinary tract infection) 2017    Cinic in [place]       Past Surgical History:        Procedure Laterality Date   [address]  2012    [facility]    COLONOSCOPY  12    Dr. Ferrara Serum  2017    [address] [facility], 2012    Anal exam, hemorroidectomy, excision perianal skin lesion. total of 3    HYSTERECTOMY  1966    OTHER SURGICAL HISTORY  1/3/2014    LEFT [facility]-Dr. Herb Santamaria and removed    RECTAL SURGERY      for cancer    SIGMOIDOSCOPY N/A 6/10/2019    SIGMOIDOSCOPY DIAGNOSTIC FLEXIBLE performed by Caio Escobar MD at [facility]       Social History:    Social History     Tobacco Use    Smoking status: Former Smoker     Packs/day: 1.00     Years: 12.00     Pack years: 12.00     Types: Cigarettes     Quit date: 1972     Years since quittin.3    Smokeless tobacco: Never Used   Substance Use Topics    Alcohol use:  No                                Counseling given: Not Answered      Vital Signs (Current):   Vitals: 12/16/21 1252   BP: (!) 166/81   Pulse: 63   Resp: 16   Temp: 97.3 °F (36.3 °C)   TempSrc: Temporal   SpO2: 96%   Weight: 145 lb 3.2 oz (65.9 kg)   Height: 5' 4\" (1.626 m)                                              BP Readings from Last 3 Encounters:   12/16/21 (!) 166/81   12/16/21 (!) 122/58   11/24/21 135/80       NPO Status: Time of last liquid consumption: 1900                        Time of last solid consumption: 1900                        Date of last liquid consumption: 12/15/21                        Date of last solid food consumption: 12/15/21    BMI:   Wt Readings from Last 3 Encounters:   12/16/21 145 lb 3.2 oz (65.9 kg)   11/24/21 144 lb (65.3 kg)   09/22/21 149 lb (67.6 kg)     Body mass index is 24.92 kg/m². CBC:   Lab Results   Component Value Date    WBC 3.7 08/20/2021    RBC 4.11 08/20/2021    HGB 11.8 08/20/2021    HCT 38.0 08/20/2021    MCV 92.5 08/20/2021    RDW 14.7 05/02/2018     08/20/2021       CMP:   Lab Results   Component Value Date     08/20/2021    K 4.3 12/16/2021     08/20/2021    CO2 27 08/20/2021    BUN 24 08/20/2021    CREATININE 0.9 08/20/2021    AGRATIO 2.0 11/01/2014    LABGLOM 59 08/20/2021    GLUCOSE 96 08/20/2021    GLUCOSE 86 11/01/2014    PROT 7.4 08/20/2021    CALCIUM 10.1 08/20/2021    BILITOT 0.2 08/20/2021    ALKPHOS 64 08/20/2021    AST 17 08/20/2021    ALT 11 08/20/2021       POC Tests: No results for input(s): POCGLU, POCNA, POCK, POCCL, POCBUN, POCHEMO, POCHCT in the last 72 hours.     Coags:   Lab Results   Component Value Date    INR 0.91 06/24/2015    APTT 30.4 06/24/2015       HCG (If Applicable): No results found for: PREGTESTUR, PREGSERUM, HCG, HCGQUANT     ABGs: No results found for: PHART, PO2ART, LNC7HFL, JEO6JXE, BEART, U3KHKIQG     Type & Screen (If Applicable):  Lab Results   Component Value Date    LABRH POS 12/04/2020       Drug/Infectious Status (If Applicable):  No results found for: HIV, HEPCAB    COVID-19 Screening (If Applicable): No results found for: COVID19        Anesthesia Evaluation  Patient summary reviewed and Nursing notes reviewed no history of anesthetic complications:   Airway: Mallampati: II  TM distance: >3 FB   Neck ROM: full  Mouth opening: > = 3 FB Dental:          Pulmonary:normal exam  breath sounds clear to auscultation  (+) shortness of breath:                             Cardiovascular:  Exercise tolerance: good (>4 METS),   (+) hypertension:, angina:, CAD: obstructive, dysrhythmias: atrial fibrillation,       ECG reviewed                        Neuro/Psych:   Negative Neuro/Psych ROS              GI/Hepatic/Renal:   (+) GERD:,           Endo/Other: Negative Endo/Other ROS   (+) : arthritis: OA., . Pt had no PAT visit       Abdominal:             Vascular: negative vascular ROS. Other Findings:             Anesthesia Plan      general     ASA 3       Induction: intravenous. MIPS: Postoperative opioids intended and Prophylactic antiemetics administered. Anesthetic plan and risks discussed with patient. Plan discussed with CRNA.                   [address] [facility], DO   12/16/2021

## 2021-12-16 NOTE — PROGRESS NOTES
Pt returned to [facility] room 17. Vitals and assessment as charted. 0.9 infusing, @550ml to count from PACU. Pt has crackers and water. Family at the bedside. Pt and family verbalized understanding of discharge criteria and call light use. Call light in reach.

## 2021-12-16 NOTE — ANESTHESIA POSTPROCEDURE EVALUATION
Department of Anesthesiology  Postprocedure Note    Patient: Aracely Carlson  MRN: [id number]  YOB: 1935  Date of evaluation: 12/16/2021  Time:  4:03 PM     Procedure Summary     Date: 12/16/21 Room / Location: [address] [place] [address]    Anesthesia Start: 3015 Anesthesia Stop: 9791    Procedure: LEFT PERIANAL BIOPSY (Left ) Diagnosis: (PERIANAL MASS)    Surgeons: Padmaja Nicolas MD Responsible Provider: Paula Morales DO    Anesthesia Type: general ASA Status: 3          Anesthesia Type: general    Andrew Phase I: Andrew Score: 10    Andrew Phase II: Andrew Score: 10    Last vitals: Reviewed and per EMR flowsheets.        Anesthesia Post Evaluation    Patient location during evaluation: PACU  Patient participation: complete - patient participated  Level of consciousness: awake and alert  Pain score: 2  Airway patency: patent  Nausea & Vomiting: no nausea and no vomiting  Complications: no  Cardiovascular status: hemodynamically stable and blood pressure returned to baseline  Respiratory status: spontaneous ventilation, room air and acceptable  Hydration status: stable

## 2021-12-16 NOTE — PROGRESS NOTES
Patient admitted to [facility] room 17 with daughter at bedside. Bed in low position side rails up call light in reach. Patient denies questions at this time.

## 2021-12-16 NOTE — PROGRESS NOTES
Dr Jessa Espinoza notified and assessed patient for complaints of enlarged uvula. Area is a little swollen. Encourage pt to have cold liquids and she is aware if there are any breathing issues she needs to return to the er. Daughter aware of this pt able to drink and eat with out difficult, denies trouble breathing at this time. Pt has met discharge criteria and states she is ready for discharge to home. IV removed, gauze and tape applied. Dressed in own clothes and personal belongings gathered. Discharge instructions (with opioid medication education information) given to pt and family; pt and family verbalized understanding of discharge instructions, prescriptions and follow up appointments. Pt transported to discharge lobby by [facility] staff.

## 2021-12-17 ENCOUNTER — TELEPHONE (OUTPATIENT)
Dept: SURGERY | Age: 86
End: 2021-12-17

## 2021-12-17 NOTE — OP NOTE
[address] [place] [address]                                OPERATIVE REPORT    PATIENT NAME: Charlene Fournier                    :        1935  MED REC NO:   [id number]                           ROOM:  ACCOUNT NO:   [de-identified]                           ADMIT DATE: 2021  PROVIDER:     MARCE Dodson Maze OF PROCEDURE:  2021    PREOPERATIVE DIAGNOSES:  1. Left perianal mass. 2.  Left perianal skin lesion. 3.  History of anal squamous cell carcinoma, status post  chemotherapy/radiation. POSTOPERATIVE DIAGNOSES:  1. Left perianal mass. 2.  Left perianal skin lesion. 3.  History of anal squamous cell carcinoma, status post  chemotherapy/radiation. OPERATIONS PERFORMED:  1. Left perianal mass, punch biopsy (2 mm). 2.  Left anal skin lesion, shave biopsy (less than 0.5 cm). SURGEON:  Barrett Layne MD    ASSISTANT:  SARAH BETH Paetl    ANESTHESIA:  General.    ESTIMATED BLOOD LOSS:  Less than 5 mL. DRAINS:  None. COMPLICATIONS:  None. DISPOSITION:  Stable to the recovery room. INDICATIONS:  The patient is an 60-year-old female who came in for  evaluation of a left perianal mass. She has a history of anal squamous  cell carcinoma. She has already undergone chemotherapy and radiation. Concern that the mass has been there now for several months. Recommendation was for biopsy. On the same day prior to surgery, she  was found to have small flaky area on the left perianal region. The  patient was in agreement to proceed with shave biopsy on this as well. Both operative and nonoperative intervention plans were discussed. Risks of surgery were further discussed. All questions were answered. She wished to proceed with surgery.     DESCRIPTION OF PROCEDURE:  After informed consent was signed and placed  on the chart, the patient was taken back to the operating room and  placed supine on the operating room table. General anesthesia was  induced. She tolerated this throughout the case. All pressure points  were padded after she was placed in the prone jackknife position on the  operating room table. Her buttock was taped apart with benzoin and  tape. Her perianal region was then prepped and draped in the usual  sterile standard fashion. A timeout occurred prior to the operation  which not only identified the patient, but also the planned procedure to  be performed. At the end of the timeout, there were no questions or  concerns. I began the operation by taking a 2-mm punch biopsy of the left perianal  mass. This was sent to Pathology for permanent. The area was  Bovie'd/cauterized to ensure hemostasis. The small area was left open  due to the small size and the concern for sutures being placed in the  previously radiated tissue and the poor wound healing that she has had  that we get into bigger wound problems in the future. Just anterior to  this on the left perianal region, there was a small flaky/scaly area of  less than 0.5 cm in diameter. This was shaved off with a 15-blade  scalpel and sent to Pathology for permanent as well. This was gently  electrocauterized as well. No other abnormalities externally were  identified or initially internally. Dry sterile dressings were placed. Sponge, needle, and instrumentation count was correct at the end of the  procedure. She tolerated the procedure well and was transferred to the  postanesthesia care unit in stable condition.         Hiwot Mann M.D.    D: 12/16/2021 14:11:25       T: 12/16/2021 19:38:43     SARAH [email or web address]  Job#: [id number]     Doc#: [id number]    CC:

## 2021-12-17 NOTE — TELEPHONE ENCOUNTER
Pt states that she is feeling well this morning; denies any pain. States that she is up and moving around, and using the bathroom with no issues. Denies any n/v, fevers or chills. Advised to call the office with any questions or concerns.

## 2021-12-20 ENCOUNTER — TELEPHONE (OUTPATIENT)
Dept: SURGERY | Age: 86
End: 2021-12-20

## 2021-12-29 ENCOUNTER — TELEPHONE (OUTPATIENT)
Dept: UROLOGY | Age: 86
End: 2021-12-29

## 2021-12-29 ENCOUNTER — NURSE ONLY (OUTPATIENT)
Dept: LAB | Age: 86
End: 2021-12-29

## 2021-12-29 DIAGNOSIS — R30.0 DYSURIA: ICD-10-CM

## 2021-12-29 DIAGNOSIS — R30.0 DYSURIA: Primary | ICD-10-CM

## 2021-12-29 LAB
BACTERIA: ABNORMAL
BILIRUBIN URINE: NEGATIVE
BLOOD, URINE: ABNORMAL
CASTS: ABNORMAL /LPF
CASTS: ABNORMAL /LPF
CHARACTER, URINE: ABNORMAL
COLOR: YELLOW
CRYSTALS: ABNORMAL
EPITHELIAL CELLS, UA: ABNORMAL /HPF
GLUCOSE, URINE: NEGATIVE MG/DL
KETONES, URINE: NEGATIVE
LEUKOCYTE ESTERASE, URINE: ABNORMAL
MISCELLANEOUS LAB TEST RESULT: ABNORMAL
NITRITE, URINE: POSITIVE
PH UA: 6 (ref 5–9)
PROTEIN UA: NEGATIVE MG/DL
RBC URINE: ABNORMAL /HPF
RENAL EPITHELIAL, UA: ABNORMAL
SPECIFIC GRAVITY UA: 1.01 (ref 1–1.03)
UROBILINOGEN, URINE: 0.2 EU/DL (ref 0–1)
WBC UA: > 100 /HPF
YEAST: ABNORMAL

## 2021-12-29 RX ORDER — CEPHALEXIN 500 MG/1
500 CAPSULE ORAL 2 TIMES DAILY
Qty: 14 CAPSULE | Refills: 0 | Status: SHIPPED | OUTPATIENT
Start: 2021-12-29 | End: 2022-01-05

## 2021-12-29 NOTE — TELEPHONE ENCOUNTER
Patient voiced understanding to start keflex after giving the urine sample to the lab and to hold hipex while taking keflex.

## 2021-12-29 NOTE — TELEPHONE ENCOUNTER
Patient c/o UTI. She has pain with urination. She took a home test. Yesterday she seen mucus in the urine. She denies fever or chills. Order placed for urine. Verla Antonio does not work and doxycycline causes bruising. She would like started on something before the holiday. She sees OB/GYN on Tuesday. Pessary is still out until see is revaluated. Please advise. Thank you.

## 2021-12-30 LAB
ORGANISM: ABNORMAL
URINE CULTURE, ROUTINE: ABNORMAL

## 2022-01-03 ENCOUNTER — TELEPHONE (OUTPATIENT)
Dept: UROLOGY | Age: 87
End: 2022-01-03

## 2022-01-03 ENCOUNTER — OFFICE VISIT (OUTPATIENT)
Dept: SURGERY | Age: 87
End: 2022-01-03

## 2022-01-03 VITALS
DIASTOLIC BLOOD PRESSURE: 60 MMHG | BODY MASS INDEX: 25.57 KG/M2 | OXYGEN SATURATION: 93 % | SYSTOLIC BLOOD PRESSURE: 130 MMHG | HEART RATE: 58 BPM | RESPIRATION RATE: 18 BRPM | TEMPERATURE: 96.3 F | WEIGHT: 149.8 LBS | HEIGHT: 64 IN

## 2022-01-03 DIAGNOSIS — K62.89 PERIANAL MASS: Primary | ICD-10-CM

## 2022-01-03 PROCEDURE — 99024 POSTOP FOLLOW-UP VISIT: CPT | Performed by: NURSE PRACTITIONER

## 2022-01-03 NOTE — PROGRESS NOTES
[address] [place] [address] [address]  [facility] [address]  Dept: [phone]  Dept Fax: [phone]  Loc: [phone]    Visit Date: 1/3/2022    Daysi Jean is a 80 y.o. female who presents today for:  Chief Complaint   Patient presents with    Post-Op Check     s/p Left perianal mass, punch biopsy (2 mm),Left anal skin lesion, shave biopsy (less than 0.5 cm).-12/16/2021       HPI:     HPI    Cale Benitez is a 80-year old female patient who presents today for follow-up status post left perianal mass and punch biopsy 2 weeks ago with Dr. Bernadette Loe. History of anal squamous cell carcinoma status post chemo/radiation treatment. Biopsy was benign. Patient is doing well. Off narcotics. Denies any pain, bleeding or significant rectal drainage. Tolerating regular diet without any nausea or vomiting. States she has to keep her bowels loose with her anal canal being fairly strictured. No issues with constipation. Anal area healing without signs of infection. No fevers or chills. Dealing with UTI. Recurrent problem and she is currently being treated by urology office. Denies any shortness of breath or chest pain. Tolerating increased activity well.     Past Medical History:   Diagnosis Date    Anesthesia 2012    after hemorrhoid surgery felt like \"elephant on my chest\" heart cath done    CAD (coronary artery disease) 2012    Cancer [facility])     cervical rectal    GERD (gastroesophageal reflux disease)     History of prolapse of bladder     Hyperlipidemia     Hypertension     Osteoarthritis     Shortness of breath 09/2017    UTI (urinary tract infection) 05/20/2017    [facility]      Past Surgical History:   Procedure Laterality Date   [address]  8/2012    [facility]    COLONOSCOPY  5/25/12    Dr. Booker Casey Left 12/16/2021    LEFT PERIANAL BIOPSY performed by Kenneth Aguilera MD at [facility]    CORONARY ANGIOPLASTY WITH STENT PLACEMENT  2017    [address] [facility], 2012    Anal exam, hemorroidectomy, excision perianal skin lesion.  total of 3    HYSTERECTOMY  1966    OTHER SURGICAL HISTORY  1/3/2014    LEFT NATALIE-Dr. Gama Joyce and removed    RECTAL SURGERY      for cancer    SIGMOIDOSCOPY N/A 6/10/2019    SIGMOIDOSCOPY DIAGNOSTIC FLEXIBLE performed by Aldair Segovia MD at [facility]       Family History   Problem Relation Age of Onset    Alzheimer's Disease Mother     Heart Disease Mother     Arthritis Father     Cancer Brother         esophagus    Heart Disease Brother     Heart Disease Sister     Heart Disease Brother         stent, pacemaker    Stroke Sister         hemorrhagic    Heart Disease Sister     Diabetes Maternal Aunt     Diabetes Maternal Aunt     High Blood Pressure Neg Hx     Miscarriages / Stillbirths Neg Hx        Social History     Tobacco Use    Smoking status: Former Smoker     Packs/day: 1.00     Years: 12.00     Pack years: 12.00     Types: Cigarettes     Quit date: 1972     Years since quittin.4    Smokeless tobacco: Never Used   Substance Use Topics    Alcohol use: No      Current Outpatient Medications   Medication Sig Dispense Refill    cephALEXin (KEFLEX) 500 MG capsule Take 1 capsule by mouth 2 times daily for 7 days 14 capsule 0    ondansetron (ZOFRAN) 4 MG tablet Take 1 tablet by mouth 3 times daily as needed for Nausea or Vomiting 15 tablet 0    methenamine (HIPREX) 1 g tablet Take 1 tablet by mouth 2 times daily (with meals) 180 tablet 3    Methylsulfonylmethane 1000 MG CAPS methylsulfonylmethane Methylsulfonylmethane Active 1 CAP ORAL Daily 2020 10:35am 2020  Emy 1153 ([id number])      isosorbide mononitrate (IMDUR) 60 MG extended release tablet Take 1 tablet by mouth daily 90 tablet 3    metoprolol tartrate (LOPRESSOR) 25 MG tablet Take 1 tablet by mouth 2 times daily 180 tablet 3    NONFORMULARY daily Water pill + Potassium 20mg      conjugated estrogens (PREMARIN) 0.625 MG/GM vaginal cream Place 0.5 grams vaginally twice weekly. 1 Tube 0    lansoprazole (PREVACID) 30 MG delayed release capsule Take 15 mg by mouth 2 times daily       OIL OF OREGANO PO Take by mouth as needed       D-MANNOSE PO Take 500 mg by mouth daily 3-tablets daily      Polyethylene Glycol 3350 (MIRALAX PO) Take by mouth as needed      nitroGLYCERIN (NITROSTAT) 0.4 MG SL tablet Place 0.4 mg under the tongue every 5 minutes as needed for Chest pain up to max of 3 total doses. If no relief after 1 dose, call 911.  hydrochlorothiazide (HYDRODIURIL) 25 MG tablet Take 25 mg by mouth daily      simvastatin (ZOCOR) 40 MG tablet Take 0.5 tablets by mouth nightly 45 tablet 3    pramipexole (MIRAPEX) 0.125 MG tablet Take by mouth nightly 4 tab      CRANBERRY PO Take 1 tablet by mouth as needed       Cholecalciferol (VITAMIN D3) 5000 UNITS TABS Take by mouth as needed       zolpidem (AMBIEN) 5 MG tablet Take 10 mg by mouth nightly as needed.  magnesium (MAGNESIUM-OXIDE) 250 MG TABS tablet Take 250 mg by mouth daily       Calcium Carbonate-Vitamin D (CALCIUM + D) 600-200 MG-UNIT TABS Take 1 tablet by mouth as needed       Coenzyme Q10 (COQ-10 PO) Take 200 mg by mouth as needed        aspirin 81 MG EC tablet Take 81 mg by mouth daily. No current facility-administered medications for this visit. Allergies   Allergen Reactions    Other Other (See Comments)     Movi Prep,    Facial and lip swelling    Ciprofloxacin Nausea Only     Nausea and tingling in her fingers    Lipitor Swelling    Ramipril Swelling     tongue    Sulfa Antibiotics Rash       Subjective:     Review of Systems   Constitutional: Negative for activity change, appetite change, chills, diaphoresis, fatigue, fever and unexpected weight change.    HENT: Negative for congestion, dental problem, hearing loss, rhinorrhea, sinus pressure and sore throat. Eyes: Negative for photophobia, pain, discharge, itching and visual disturbance. Respiratory: Negative for apnea, cough, choking, chest tightness, shortness of breath and wheezing. Cardiovascular: Negative for chest pain, palpitations and leg swelling. Gastrointestinal: Negative for abdominal distention, abdominal pain, anal bleeding, blood in stool, constipation, diarrhea, nausea and vomiting. Endocrine: Negative. Genitourinary: Positive for difficulty urinating and dysuria. Negative for decreased urine volume, frequency and urgency. Musculoskeletal: Negative for arthralgias, back pain, gait problem, joint swelling, myalgias and neck pain. Skin: Negative for color change, pallor, rash and wound. Allergic/Immunologic: Negative. Neurological: Negative for dizziness, tremors, weakness, numbness and headaches. Hematological: Negative. Psychiatric/Behavioral: Negative. Objective:   /60 (Site: Right Upper Arm, Position: Sitting, Cuff Size: Medium Adult)   Pulse 58   Temp 96.3 °F (35.7 °C) (Temporal)   Resp 18   Ht 5' 4\" (1.626 m)   Wt 149 lb 12.8 oz (67.9 kg)   SpO2 93%   BMI 25.71 kg/m²     Physical Exam  Vitals reviewed. Constitutional:       General: She is not in acute distress. Appearance: She is well-developed. She is not diaphoretic. HENT:      Head: Normocephalic and atraumatic. Right Ear: External ear normal.      Left Ear: External ear normal.      Nose: Nose normal.   Eyes:      General: No scleral icterus. Right eye: No discharge. Left eye: No discharge. Conjunctiva/sclera: Conjunctivae normal.   Cardiovascular:      Rate and Rhythm: Normal rate and regular rhythm. Heart sounds: Normal heart sounds. Pulmonary:      Effort: Pulmonary effort is normal. No respiratory distress. Breath sounds: Normal breath sounds. No wheezing or rales. Chest:      Chest wall: No tenderness. Abdominal:      General: Bowel sounds are normal. There is no distension. Palpations: Abdomen is soft. There is no mass. Tenderness: There is no abdominal tenderness. There is no guarding or rebound. Genitourinary:      Musculoskeletal:         General: No tenderness. Normal range of motion. Cervical back: Normal range of motion and neck supple. Skin:     General: Skin is warm and dry. Coloration: Skin is not pale. Findings: No erythema or rash. Neurological:      Mental Status: She is alert and oriented to person, place, and time. Cranial Nerves: No cranial nerve deficit. Psychiatric:         Behavior: Behavior normal.         Thought Content: Thought content normal.         Judgment: Judgment normal.              PATHOLOGY REPORT     Clinical Information: PERIANAL MASS     FINAL DIAGNOSIS:   A.  Left perianal mass, punch biopsy:    Subepithelial fibrosis. B.  Left anal lesion, shave biopsy:          Orthokeratotic and acanthotic squamous epithelium.  See   microscopic. Specimen:   A) SOFT TISSUE, LEFT PERIANAL MASS   B) SKIN BIOPSY, LEFT  ANAL LESION       Gross Examination:   A - The container is labeled Julia Button, left perianal mass. Received fresh is an unoriented punch biopsy of skin measuring 0.1 cm   in diameter and is excised to a depth of 0.4 cm.  The specimen is   submitted as received.  1 ns. B - The container is labeled Julia Button, anal skin lesion, left. Received in formalin is a 2 bit of tan tissue.  The specimen is   submitted as received.  1 ns.  MTK/DKR:[id number]     Microscopic Examination:   A.  The specimen demonstrates a punch of tissue showing dense fibrous   appearing tissue in the submucosa.  The squamous epithelium is   unremarkable.    B.  The superficial biopsy of tissue demonstrates orthokeratosis and   squamous epithelium with acanthosis.  I cannot see the basal layer of   the epithelium.  I do not appreciate any definite squamous atypia.  If   this is lesion is worrisome for neoplasia, additional tissue would be   recommended.    No definite HPV cytopathic changes are seen. Patient Active Problem List   Diagnosis    Anal skin tag    Hemorrhoid    S/P hemorrhoidectomy    Unstable angina (HCC)    HTN (hypertension)    Hyperlipidemia    A-fib (HCC)    Tongue edema    Chest pain, after epinephrine    GERD (gastroesophageal reflux disease)    ACE inhibitor-aggravated angioedema, possible    Cancer ([facility] [address]    CAD in native artery    SOB (shortness of breath)    Angina, class II (HCC)     Assessment:     1. Status post left perianal mass and punch biopsy  2. Pathology demonstrated subepithelial fibrosis and orthokeratotic and acanthotic squamous epithelium  3. History of anal squamous cell carcinoma   4. Recurrent UTIs    Plan:     1. Perianal area healing without signs of infection. No bleeding. 2. Pathology reviewed with patient again. No questions. No signs of malignancy recurrence. 3. Continue antibiotics for UTI. Continue to follow up with urology. 4. Denies any pain  5. Activity as tolerated   6. Follow up as needed for persistent symptoms or perianal concerns. Signs and symptoms reviewed with patient that would be concerning and need her to return to office for re-evaluation. Patient states she will call if she has questions or concerns.       Electronically signed by ZACHARY Armas CNP on 1/4/2022 at 12:30 PM

## 2022-01-03 NOTE — TELEPHONE ENCOUNTER
----- Message from ZACHARY Bautista CNP sent at 1/3/2022  7:19 AM EST -----  E coli, was on keflex, how is she feeling.

## 2022-01-04 ASSESSMENT — ENCOUNTER SYMPTOMS
CHOKING: 0
COUGH: 0
DIARRHEA: 0
EYE DISCHARGE: 0
COLOR CHANGE: 0
BLOOD IN STOOL: 0
SORE THROAT: 0
APNEA: 0
BACK PAIN: 0
NAUSEA: 0
WHEEZING: 0
VOMITING: 0
EYE PAIN: 0
PHOTOPHOBIA: 0
CHEST TIGHTNESS: 0
CONSTIPATION: 0
SINUS PRESSURE: 0
ABDOMINAL PAIN: 0
EYE ITCHING: 0
SHORTNESS OF BREATH: 0
RHINORRHEA: 0
ANAL BLEEDING: 0
ALLERGIC/IMMUNOLOGIC NEGATIVE: 1
ABDOMINAL DISTENTION: 0

## 2022-01-06 RX ORDER — CEFDINIR 300 MG/1
300 CAPSULE ORAL 2 TIMES DAILY
Qty: 14 CAPSULE | Refills: 0 | Status: SHIPPED | OUTPATIENT
Start: 2022-01-06 | End: 2022-01-13

## 2022-01-06 NOTE — TELEPHONE ENCOUNTER
Patient finished the keflex 2 days ago. Her symptoms have returned. She still has a cramping feeling and bubbles in the urine. Can the antibiotics be extended or order something different? Do you want to repeat the urine? Please advise.  Thank you

## 2022-02-07 ENCOUNTER — NURSE ONLY (OUTPATIENT)
Dept: LAB | Age: 87
End: 2022-02-07

## 2022-02-07 ENCOUNTER — TELEPHONE (OUTPATIENT)
Dept: UROLOGY | Age: 87
End: 2022-02-07

## 2022-02-07 DIAGNOSIS — N39.0 RECURRENT UTI: Primary | ICD-10-CM

## 2022-02-07 DIAGNOSIS — N39.0 RECURRENT UTI: ICD-10-CM

## 2022-02-07 LAB
BACTERIA: ABNORMAL
BILIRUBIN URINE: NEGATIVE
BLOOD, URINE: ABNORMAL
CASTS: ABNORMAL /LPF
CASTS: ABNORMAL /LPF
CHARACTER, URINE: ABNORMAL
COLOR: ABNORMAL
CRYSTALS: ABNORMAL
EPITHELIAL CELLS, UA: ABNORMAL /HPF
GLUCOSE, URINE: NEGATIVE MG/DL
KETONES, URINE: NEGATIVE
LEUKOCYTE ESTERASE, URINE: ABNORMAL
MISCELLANEOUS LAB TEST RESULT: ABNORMAL
NITRITE, URINE: POSITIVE
PH UA: 6.5 (ref 5–9)
PROTEIN UA: ABNORMAL MG/DL
RBC URINE: ABNORMAL /HPF
RENAL EPITHELIAL, UA: ABNORMAL
SPECIFIC GRAVITY UA: 1.01 (ref 1–1.03)
UROBILINOGEN, URINE: 0.2 EU/DL (ref 0–1)
WBC UA: > 200 /HPF
YEAST: ABNORMAL

## 2022-02-07 RX ORDER — CEFDINIR 300 MG/1
300 CAPSULE ORAL 2 TIMES DAILY
Qty: 20 CAPSULE | Refills: 0 | Status: SHIPPED | OUTPATIENT
Start: 2022-02-07 | End: 2022-02-17

## 2022-02-07 NOTE — TELEPHONE ENCOUNTER
Patient started having symptoms this weekend. C/o burning, urgency, and frequency. She denies fever or chills. Order placed for urine. Cristy Phillips does not work. Cefdinir and keflex works if prescribed for a long enough course. Please advise. Thank you.

## 2022-02-10 ENCOUNTER — TELEPHONE (OUTPATIENT)
Dept: UROLOGY | Age: 87
End: 2022-02-10

## 2022-02-10 LAB
ORGANISM: ABNORMAL
ORGANISM: ABNORMAL
URINE CULTURE, ROUTINE: ABNORMAL
URINE CULTURE, ROUTINE: ABNORMAL

## 2022-02-10 RX ORDER — CIPROFLOXACIN 500 MG/1
500 TABLET, FILM COATED ORAL 2 TIMES DAILY
Qty: 20 TABLET | Refills: 0 | Status: SHIPPED | OUTPATIENT
Start: 2022-02-10 | End: 2022-02-20

## 2022-02-10 RX ORDER — ONDANSETRON 4 MG/1
4 TABLET, ORALLY DISINTEGRATING ORAL 3 TIMES DAILY PRN
Qty: 21 TABLET | Refills: 0 | Status: SHIPPED | OUTPATIENT
Start: 2022-02-10 | End: 2022-03-10 | Stop reason: SDUPTHER

## 2022-02-10 NOTE — TELEPHONE ENCOUNTER
----- Message from ZACHARY Sims Asa, CNP sent at 2/10/2022  7:36 AM EST -----  She needs  cipro. Can she take with zofran?

## 2022-03-10 ENCOUNTER — OFFICE VISIT (OUTPATIENT)
Dept: CARDIOLOGY CLINIC | Age: 87
End: 2022-03-10
Payer: MEDICARE

## 2022-03-10 VITALS
BODY MASS INDEX: 25.95 KG/M2 | DIASTOLIC BLOOD PRESSURE: 67 MMHG | WEIGHT: 152 LBS | HEIGHT: 64 IN | HEART RATE: 64 BPM | SYSTOLIC BLOOD PRESSURE: 119 MMHG

## 2022-03-10 DIAGNOSIS — I35.0 MILD AORTIC STENOSIS BY PRIOR ECHOCARDIOGRAM: ICD-10-CM

## 2022-03-10 DIAGNOSIS — I25.10 CAD IN NATIVE ARTERY: Primary | ICD-10-CM

## 2022-03-10 PROCEDURE — 93000 ELECTROCARDIOGRAM COMPLETE: CPT | Performed by: INTERNAL MEDICINE

## 2022-03-10 PROCEDURE — G8417 CALC BMI ABV UP PARAM F/U: HCPCS | Performed by: INTERNAL MEDICINE

## 2022-03-10 PROCEDURE — 1036F TOBACCO NON-USER: CPT | Performed by: INTERNAL MEDICINE

## 2022-03-10 PROCEDURE — G8427 DOCREV CUR MEDS BY ELIG CLIN: HCPCS | Performed by: INTERNAL MEDICINE

## 2022-03-10 PROCEDURE — 99212 OFFICE O/P EST SF 10 MIN: CPT | Performed by: INTERNAL MEDICINE

## 2022-03-10 PROCEDURE — G8484 FLU IMMUNIZE NO ADMIN: HCPCS | Performed by: INTERNAL MEDICINE

## 2022-03-10 PROCEDURE — 1123F ACP DISCUSS/DSCN MKR DOCD: CPT | Performed by: INTERNAL MEDICINE

## 2022-03-10 PROCEDURE — 4040F PNEUMOC VAC/ADMIN/RCVD: CPT | Performed by: INTERNAL MEDICINE

## 2022-03-10 PROCEDURE — 1090F PRES/ABSN URINE INCON ASSESS: CPT | Performed by: INTERNAL MEDICINE

## 2022-03-10 NOTE — PROGRESS NOTES
[address] [facility] [address]  [facility] [address]  Dept: [phone]  Dept Fax: [phone]  Loc: [phone]    Visit Date: 3/10/2022    Ms. Norbert Daigle is a 80 y.o. female  who presented for:  Chief Complaint   Patient presents with    1 Year Follow Up     cad       HPI:   Flaquito Foreman is a pleasant 80year old female. Comes to cardiology clinic today for 1 year follow-up, with history of coronary artery disease drug-eluting stent to mid LAD 2017, mild aortic stenosis. Gris Costa has been doing well, no shortness of breath, no chest pain. She does note leg swelling worse in the past couple of months when taking antibiotics for a recurrent UTI. Over the last 2 weeks her legs have reduced in size. She has been taking a over-the-counter diuretic. She has not needed to use nitroglycerin in the past year though does carry this in her purse. No other new concerns. Current Outpatient Medications:     ondansetron (ZOFRAN) 4 MG tablet, Take 1 tablet by mouth 3 times daily as needed for Nausea or Vomiting, Disp: 15 tablet, Rfl: 0    isosorbide mononitrate (IMDUR) 60 MG extended release tablet, Take 1 tablet by mouth daily, Disp: 90 tablet, Rfl: 3    metoprolol tartrate (LOPRESSOR) 25 MG tablet, Take 1 tablet by mouth 2 times daily, Disp: 180 tablet, Rfl: 3    NONFORMULARY, daily Water pill + Potassium 20mg, Disp: , Rfl:     conjugated estrogens (PREMARIN) 0.625 MG/GM vaginal cream, Place 0.5 grams vaginally twice weekly. , Disp: 1 Tube, Rfl: 0    lansoprazole (PREVACID) 30 MG delayed release capsule, Take 15 mg by mouth 2 times daily , Disp: , Rfl:     OIL OF OREGANO PO, Take by mouth as needed , Disp: , Rfl:     D-MANNOSE PO, Take 500 mg by mouth daily 3-tablets daily, Disp: , Rfl:     Polyethylene Glycol 3350 (MIRALAX PO), Take by mouth as needed, Disp: , Rfl:     nitroGLYCERIN (NITROSTAT) 0.4 MG SL tablet, Place 0.4 mg under the tongue every 5 minutes as needed for Chest pain up to max of 3 total doses. If no relief after 1 dose, call 911. , Disp: , Rfl:     hydrochlorothiazide (HYDRODIURIL) 25 MG tablet, Take 25 mg by mouth daily, Disp: , Rfl:     simvastatin (ZOCOR) 40 MG tablet, Take 0.5 tablets by mouth nightly, Disp: 45 tablet, Rfl: 3    pramipexole (MIRAPEX) 0.125 MG tablet, Take by mouth nightly 4 tab, Disp: , Rfl:     CRANBERRY PO, Take 1 tablet by mouth as needed , Disp: , Rfl:     Cholecalciferol (VITAMIN D3) 5000 UNITS TABS, Take by mouth as needed , Disp: , Rfl:     zolpidem (AMBIEN) 5 MG tablet, Take 10 mg by mouth nightly as needed. , Disp: , Rfl:     magnesium (MAGNESIUM-OXIDE) 250 MG TABS tablet, Take 250 mg by mouth daily , Disp: , Rfl:     Calcium Carbonate-Vitamin D (CALCIUM + D) 600-200 MG-UNIT TABS, Take 1 tablet by mouth as needed , Disp: , Rfl:     aspirin 81 MG EC tablet, Take 81 mg by mouth daily. , Disp: , Rfl:     Past Medical History  Rachel Velez  has a past medical history of Anesthesia, CAD (coronary artery disease), [facility] GERD (gastroesophageal reflux disease), History of prolapse of bladder, Hyperlipidemia, Hypertension, Osteoarthritis, Shortness of breath, and UTI (urinary tract infection). Social History  Rachel Velez  reports that she quit smoking about 49 years ago. Her smoking use included cigarettes. She has a 12.00 pack-year smoking history. She has never used smokeless tobacco. She reports that she does not drink alcohol and does not use drugs. Family History  Yenny family history includes Alzheimer's Disease in her mother; Arthritis in her father; Cancer in her brother; Diabetes in her maternal aunt and maternal aunt; Heart Disease in her brother, brother, mother, sister, and sister; Stroke in her sister. There is no family history of bicuspid aortic valve, aneurysms, heart transplant, pacemakers, defibrillators, or sudden cardiac death.       Past Surgical History   Past Surgical History:   Procedure Laterality Date    APPENDECTOMY  1969    CARDIAC CATHETERIZATION  8/2012    [facility] COLONOSCOPY  5/25/12    Dr. Neptali Bobby Left 12/16/2021    LEFT PERIANAL BIOPSY performed by Giovanny Alford MD at [place] [address]  09/11/2017    [address] [facility], 08/17/2012    Anal exam, hemorroidectomy, excision perianal skin lesion. total of 3    HYSTERECTOMY  1966    OTHER SURGICAL HISTORY  1/3/2014    LEFT MEDIPORT-Dr. Markus Vegas and removed    RECTAL SURGERY      for cancer    SIGMOIDOSCOPY N/A 6/10/2019    SIGMOIDOSCOPY DIAGNOSTIC FLEXIBLE performed by Velasquez Marie MD at [facility]       Review of Systems   Constitutional: Negative for chills and fever  HENT: Negative for congestion, sinus pressure, sneezing and sore throat. Eyes: Negative for pain, discharge, redness and itching. Respiratory: Negative for apnea, cough  Gastrointestinal: Negative for blood in stool, constipation, diarrhea   Endocrine: Negative for cold intolerance, heat intolerance, polydipsia. Genitourinary: Negative for dysuria, enuresis, flank pain and hematuria. Musculoskeletal: Negative for arthralgias, joint swelling and neck pain. Leg swelling. Neurological: Negative for numbness and headaches. Psychiatric/Behavioral: Negative for agitation, confusion, decreased concentration and dysphoric mood. Objective:     /67   Pulse 64   Ht 5' 4\" (1.626 m)   Wt 152 lb (68.9 kg)   BMI 26.09 kg/m²     Wt Readings from Last 3 Encounters:   03/10/22 152 lb (68.9 kg)   01/03/22 149 lb 12.8 oz (67.9 kg)   12/16/21 145 lb 3.2 oz (65.9 kg)     BP Readings from Last 3 Encounters:   03/10/22 119/67   01/03/22 130/60   12/16/21 (!) 122/57       Nursing note and vitals reviewed. Physical Exam   Constitutional: Oriented to person, place, and time. Appears well-developed and well-nourished. HENT:   Head: Normocephalic and atraumatic. Eyes: EOM are normal. Pupils are equal, round, and reactive to light. Neck: Normal range of motion. Neck supple. No JVD present. Cardiovascular: Normal rate, regular rhythm, systolic cresdendo/descrendo murmur, does not radiate to carotids. Pulmonary/Chest: Effort normal and breath sounds normal. No respiratory distress. No wheezes. No rales. Abdominal: Soft. Bowel sounds are normal. No distension. There is no tenderness. Musculoskeletal: Normal range of motion. No edema. Neurological: Alert and oriented to person, place, and time. No cranial nerve deficit. Coordination normal.   Skin: Skin is warm and dry. Psychiatric: Normal mood and affect.        Lab Results   Component Value Date    CKTOTAL 151 08/17/2012    CKTOTAL 114 08/17/2012    CKTOTAL 98 08/17/2012    CKMB 1.9 08/17/2012    CKMB 1.3 08/17/2012       Lab Results   Component Value Date    WBC 3.7 08/20/2021    RBC 4.11 08/20/2021    HGB 11.8 08/20/2021    HCT 38.0 08/20/2021    MCV 92.5 08/20/2021    MCH 28.7 08/20/2021    MCHC 31.1 08/20/2021    RDW 14.7 05/02/2018     08/20/2021    MPV 10.6 08/20/2021       Lab Results   Component Value Date     08/20/2021    K 4.3 12/16/2021     08/20/2021    CO2 27 08/20/2021    BUN 24 08/20/2021    LABALBU 4.7 08/20/2021    CREATININE 0.9 08/20/2021    CALCIUM 10.1 08/20/2021    LABGLOM 59 08/20/2021    GLUCOSE 96 08/20/2021    GLUCOSE 86 11/01/2014       Lab Results   Component Value Date    ALKPHOS 64 08/20/2021    ALT 11 08/20/2021    AST 17 08/20/2021    PROT 7.4 08/20/2021    BILITOT 0.2 08/20/2021    BILIDIR <0.2 03/14/2017    LABALBU 4.7 08/20/2021       Lab Results   Component Value Date    MG 1.9 08/20/2021       Lab Results   Component Value Date    INR 0.91 06/24/2015    INR 0.93 08/20/2012         Lab Results   Component Value Date    LABA1C 5.6 02/10/2021       Lab Results   Component Value Date    TRIG 124 02/10/2021    HDL 60 02/10/2021    LDLCALC 53 02/10/2021       Lab Results   Component Value Date    TSH 3.860 10/07/2021         Testing Reviewed:      I have individually reviewed the cardiac test below:    ECHO: Results for orders placed during the hospital encounter of 07/25/17    ECHO Complete 2D W Doppler W Color    Narrative  Transthoracic Echocardiography Report (TTE)    Demographics    Patient Name  Quintin Concepcion Gender             Female  EARNEST    MR #          [id number]     Race                   Ethnicity    Account #     [de-identified]     Room Number    Accession     [id number]     Date of Study      07/25/2017  Number    Date of Birth 1935    Referring          Petty Severino DO  Physician          Kevin Carreon MD    Age           80 year(s)    Sonographer        KATIE Deal, RDCS,  RDMS, RVT    Interpreting       Petty Severino DO  Physician    Procedure    Type of Study    TTE procedure:ECHOCARDIOGRAM COMPLETE 2D W DOPPLER W COLOR. Procedure Date  Date: 07/25/2017 Start: 11:02 AM    Study Location: Echo Lab  Technical Quality: Adequate visualization    Indications:Shortness of breath. Additional Medical History:hypertension, hyperlipidemia, atrial  fibrillation, atherosclerotic heart disease, chest pain, gastroesophageal  reflux disease, coronary artery disease, shortness of breath    Patient Status: Routine    Height: 64 inches Weight: 161 pounds BSA: 1.78 m^2 BMI: 27.64 kg/m^2    BP: 148/70 mmHg    Conclusions    Summary  Systolic function was normal.  Ejection fraction is visually estimated at 60%. Right ventricular systolic pressure of 41 mm Hg consistent with mild  pulmonary hypertension. Leaflets exhibited mildly increased thickness and mildly reduced cuspal  separation of the aortic valve. Trivial aortic regurgitation is noted. There is mild aortic stenosis with valve area of 1.64 sq cm. The maximum aortic valve gradient is 16 mmHg, the mean gradient is 9 mmHg,  and the peak velocity is 197 cm/s.   Structurally normal mitral valve.  Mild mitral regurgitation is present. Tricuspid valve is structurally normal.  Mild tricuspid regurgitation. Signature    ----------------------------------------------------------------  Electronically signed by Franc Stone DO (Interpreting  physician) on 07/25/2017 at 08:24 PM  ----------------------------------------------------------------    Findings    Mitral Valve  Structurally normal mitral valve. Mild mitral regurgitation is present. Aortic Valve    Leaflets exhibited mildly increased thickness and mildly reduced cuspal  separation of the aortic valve. Trivial aortic regurgitation is noted. There is mild aortic stenosis with valve area of 1.64 sq cm. The maximum aortic valve gradient is 16 mmHg, the mean gradient is 9 mmHg,  and the peak velocity is 197 cm/s. Tricuspid Valve  Tricuspid valve is structurally normal.  Mild tricuspid regurgitation. Pulmonic Valve  Pulmonic valve is structurally normal.  Trivial pulmonic regurgitation visualized. Left Atrium  Normal size left atrium. Left Ventricle  Systolic function was normal.  Ejection fraction is visually estimated at 60%. Right Atrium  The right atrium is of normal size. Right Ventricle  Right ventricular systolic pressure of 41 mm Hg consistent with mild  pulmonary hypertension. Pericardial Effusion  There is a trivial pericardial effusion noted.     Aorta / Great Vessels  Dilation of ascending aorta at 4.0 cm    M-Mode/2D Measurements & Calculations    LV Diastolic    LV Systolic Dimension: 2.9  AV Cusp Separation: 0.9 cmLA  Dimension: 4.7  cm                          Dimension: 3.2 cmAO Root  cm              LV Volume Diastolic: 713 ml Dimension: 2.9 cm  LV FS:38.3 %    LV Volume Systolic: 25.5 ml  LV PW           LV EDV/LV EDV Index: 966  Diastolic: 1.1  OI/35 V^1XG ESV/LV ESV  cm              Index: 32.2 ml/18 m^2       RV Diastolic Dimension: 2.6 cm  Septum          EF Calculated: 22.5 %  Diastolic: 1 cm LA/Aorta: 1.1  Ascending Aorta: 4 cm    LVOT: 2 cm    Doppler Measurements & Calculations    MV Peak E-Wave:     AV Peak Velocity: 197   LVOT Peak Velocity: 103 cm/s  79.5 cm/s           cm/s                    LVOT Mean Velocity: 76.5 cm/s  MV Peak A-Wave:     AV Peak Gradient: 15.52 LVOT Peak Gradient: 4 mmHgLVOT  90.8 cm/s           mmHg                    Mean Gradient: 3 mmHg  MV E/A Ratio: 0.88  AV Mean Velocity: 143  MV Peak Gradient:   cm/s                    TV Peak E-Wave: 41.5 cm/s  2.53 mmHg           AV Mean Gradient: 9     TV Peak A-Wave: 42.4 cm/s  mmHg  MV Deceleration     AV VTI: 45 cm           TV Peak Gradient: 0.69 mmHg  Time: 183 msec      AV Area                 TR Velocity:278 cm/s  (Continuity):1.7 cm^2   TR Gradient:30.91 mmHg    MV E' Septal        LVOT VTI: 24.4 cm  Velocity: 4 cm/s    AV P1/2t: 1015 msec  MV A' Septal        IVRT: 113 msec          UT ED Velocity: 71.8 cm/s  Velocity: 7.99 cm/s  MV E' Lateral  Velocity: 4.68 cm/s AV DVI (VTI): 0.54AV  MV A' Lateral       DVI (Vmax):0.52  Velocity: 10.4 cm/s  E/E' septal: 19.88  E/E' lateral: 16.99  MR Velocity: 285  cm/s    [email or web address] [id number]%3d     ECG: Today sinus rhythm, rate 60, poor R wave progression, similar to Last ECG in March 2021. Assessment/Plan   Coronary Artery Disease  PCI with LUCIEN to mid LAD in 2017  Continue with guideline directed medical therapy on aspirin, metoprolol, isosorbide mononitrate, simvastatin. Aortic stenosis  Asymptomatic    No symptoms, has been doing well, has not needed nitroglycerin though does carry this in her purse. Disposition:  Return in about 1 year (around 3/10/2023).       Patient and plan discussed with Dr. Mari Mckinley MD  PGY-1 Emergency Medicine  3:44 PM 3/10/22    Attending Supervising Physician's Attestation Statement  I performed a history and physical examination on the patient and discussed the management with the resident physician. I reviewed and agree with the findings and plan as documented in the resident's note.     Electronically signed by Vikki Silva MD on 3/13/22 at 10:14 PM EDT

## 2022-03-18 RX ORDER — ISOSORBIDE MONONITRATE 60 MG/1
60 TABLET, EXTENDED RELEASE ORAL DAILY
Qty: 90 TABLET | Refills: 3 | Status: SHIPPED | OUTPATIENT
Start: 2022-03-18

## 2022-03-22 ENCOUNTER — OFFICE VISIT (OUTPATIENT)
Dept: UROLOGY | Age: 87
End: 2022-03-22
Payer: MEDICARE

## 2022-03-22 VITALS
WEIGHT: 152.7 LBS | SYSTOLIC BLOOD PRESSURE: 122 MMHG | HEIGHT: 64 IN | DIASTOLIC BLOOD PRESSURE: 84 MMHG | BODY MASS INDEX: 26.07 KG/M2

## 2022-03-22 DIAGNOSIS — R31.29 MICROSCOPIC HEMATURIA: ICD-10-CM

## 2022-03-22 DIAGNOSIS — Z92.3 HX OF RADIATION THERAPY: ICD-10-CM

## 2022-03-22 DIAGNOSIS — Z87.440 HISTORY OF RECURRENT UTIS: ICD-10-CM

## 2022-03-22 DIAGNOSIS — R35.0 URINARY FREQUENCY: Primary | ICD-10-CM

## 2022-03-22 LAB
BACTERIA: ABNORMAL
BILIRUBIN URINE: NEGATIVE
BILIRUBIN URINE: NEGATIVE
BLOOD URINE, POC: ABNORMAL
BLOOD, URINE: ABNORMAL
CASTS: ABNORMAL /LPF
CASTS: ABNORMAL /LPF
CHARACTER, URINE: ABNORMAL
CHARACTER, URINE: ABNORMAL
COLOR, URINE: YELLOW
COLOR: YELLOW
CRYSTALS: ABNORMAL
EPITHELIAL CELLS, UA: ABNORMAL /HPF
GLUCOSE URINE: NEGATIVE MG/DL
GLUCOSE, URINE: NEGATIVE MG/DL
KETONES, URINE: NEGATIVE
KETONES, URINE: NEGATIVE
LEUKOCYTE CLUMPS, URINE: ABNORMAL
LEUKOCYTE ESTERASE, URINE: ABNORMAL
MISCELLANEOUS LAB TEST RESULT: ABNORMAL
NITRITE, URINE: NEGATIVE
NITRITE, URINE: NEGATIVE
PH UA: 6.5 (ref 5–9)
PH, URINE: 6.5 (ref 5–9)
POST VOID RESIDUAL (PVR): 75 ML
PROTEIN UA: NEGATIVE MG/DL
PROTEIN, URINE: NEGATIVE MG/DL
RBC URINE: ABNORMAL /HPF
RENAL EPITHELIAL, UA: ABNORMAL
SPECIFIC GRAVITY UA: 1.01 (ref 1–1.03)
SPECIFIC GRAVITY, URINE: 1.02 (ref 1–1.03)
UROBILINOGEN, URINE: 0.2 EU/DL (ref 0–1)
UROBILINOGEN, URINE: 0.2 EU/DL (ref 0–1)
WBC UA: > 200 /HPF
YEAST: ABNORMAL

## 2022-03-22 PROCEDURE — 1090F PRES/ABSN URINE INCON ASSESS: CPT | Performed by: NURSE PRACTITIONER

## 2022-03-22 PROCEDURE — 1123F ACP DISCUSS/DSCN MKR DOCD: CPT | Performed by: NURSE PRACTITIONER

## 2022-03-22 PROCEDURE — 4040F PNEUMOC VAC/ADMIN/RCVD: CPT | Performed by: NURSE PRACTITIONER

## 2022-03-22 PROCEDURE — 51798 US URINE CAPACITY MEASURE: CPT | Performed by: NURSE PRACTITIONER

## 2022-03-22 PROCEDURE — 99214 OFFICE O/P EST MOD 30 MIN: CPT | Performed by: NURSE PRACTITIONER

## 2022-03-22 PROCEDURE — G8427 DOCREV CUR MEDS BY ELIG CLIN: HCPCS | Performed by: NURSE PRACTITIONER

## 2022-03-22 PROCEDURE — G8417 CALC BMI ABV UP PARAM F/U: HCPCS | Performed by: NURSE PRACTITIONER

## 2022-03-22 PROCEDURE — 1036F TOBACCO NON-USER: CPT | Performed by: NURSE PRACTITIONER

## 2022-03-22 PROCEDURE — 81003 URINALYSIS AUTO W/O SCOPE: CPT | Performed by: NURSE PRACTITIONER

## 2022-03-22 PROCEDURE — G8484 FLU IMMUNIZE NO ADMIN: HCPCS | Performed by: NURSE PRACTITIONER

## 2022-03-22 ASSESSMENT — ENCOUNTER SYMPTOMS
BACK PAIN: 0
ABDOMINAL PAIN: 0
VOMITING: 0
NAUSEA: 0

## 2022-03-22 NOTE — PROGRESS NOTES
[address] [facility]  [address]  [facility] [address]  Dept: [phone]  Loc: [phone]    Visit Date: 3/22/2022        HPI:     Rosa Bobo is a 80 y.o. female who presents today for:  Chief Complaint   Patient presents with    Urinary Frequency    Urinary Tract Infection    Follow-up       HPI   Pt seen in follow up for recurrent UTI. Pt with hx of recurrent UTIs, dysuria, pressure, and bladder pain. Has hx of rectal and cervical ca sp prior chemo and radiation. Issues with chronic incontinence of stool. She is noted to have a grade 3-4 cystocele on prior pelvic exam by Dr. Timothy Vaughn in 2019. Given hx of radiation and age not a good candidate for POP surgery. Has pessary. Previously performed CIC which was stopped secondary to low residuals. Reports she occasionally takes pessary out if having discomfort from it and cleans and gives herself a rest for a day or two and then re-inserts. When pessary out she knows she retains urine so she performs self-cath until pessary re-inserted. Is currently on D mannose and cranberry. Last urine culture in our system 2/7/22 with E coli and pseudomonas. Pt notes hx of nausea with Cipro but tolerated treatment of last infection with Cipro and zofran. Pt reports today her urine is cloudy and has odor. Thinks she may have another uti. Has been trying to get plenty of fluids and increased her D mannose dose. Reports \"floaties\" and sediment in her urine. Notes her urine \"has bubbles\" in it when it hits the water in the toilet. Denies dysuria currently. No hematuria.       Current Outpatient Medications   Medication Sig Dispense Refill    OLIVE LEAF PO Take by mouth daily      isosorbide mononitrate (IMDUR) 60 MG extended release tablet Take 1 tablet by mouth daily 90 tablet 3    metoprolol tartrate (LOPRESSOR) 25 MG tablet Take 1 tablet by mouth 2 times daily 180 tablet 3    NONFORMULARY daily Water pill + Potassium 20mg      conjugated estrogens (PREMARIN) 0.625 MG/GM vaginal cream Place 0.5 grams vaginally twice weekly. 1 Tube 0    lansoprazole (PREVACID) 30 MG delayed release capsule Take 15 mg by mouth 2 times daily       OIL OF OREGANO PO Take by mouth as needed       D-MANNOSE PO Take 500 mg by mouth daily 3-tablets daily      Polyethylene Glycol 3350 (MIRALAX PO) Take by mouth as needed      nitroGLYCERIN (NITROSTAT) 0.4 MG SL tablet Place 0.4 mg under the tongue every 5 minutes as needed for Chest pain up to max of 3 total doses. If no relief after 1 dose, call 911.  hydrochlorothiazide (HYDRODIURIL) 25 MG tablet Take 25 mg by mouth daily      simvastatin (ZOCOR) 40 MG tablet Take 0.5 tablets by mouth nightly 45 tablet 3    pramipexole (MIRAPEX) 0.125 MG tablet Take by mouth nightly 4 tab      CRANBERRY PO Take 1 tablet by mouth as needed       Cholecalciferol (VITAMIN D3) 5000 UNITS TABS Take by mouth as needed       zolpidem (AMBIEN) 5 MG tablet Take 10 mg by mouth nightly as needed.  magnesium (MAGNESIUM-OXIDE) 250 MG TABS tablet Take 250 mg by mouth daily       Calcium Carbonate-Vitamin D (CALCIUM + D) 600-200 MG-UNIT TABS Take 1 tablet by mouth as needed       aspirin 81 MG EC tablet Take 81 mg by mouth daily.  ondansetron (ZOFRAN) 4 MG tablet Take 1 tablet by mouth 3 times daily as needed for Nausea or Vomiting (Patient not taking: Reported on 3/22/2022) 15 tablet 0     No current facility-administered medications for this visit. Past Medical History  Bill Swift  has a past medical history of Anesthesia, CAD (coronary artery disease), Cancer ([facility] GERD (gastroesophageal reflux disease), History of prolapse of bladder, Hyperlipidemia, Hypertension, Osteoarthritis, Shortness of breath, and UTI (urinary tract infection).     Past Surgical History  The patient  has a past surgical history that includes Hemorrhoid surgery (1989, 08/17/2012); Colonoscopy (5/25/12); Hysterectomy (1966); Appendectomy (1969); other surgical history (1/3/2014); Rectal surgery; Cardiac catheterization (8/2012); Coronary angioplasty with stent (09/11/2017); sigmoidoscopy (N/A, 6/10/2019); and Condyloma Excision (Left, 12/16/2021). Family History  This patient's family history includes Alzheimer's Disease in her mother; Arthritis in her father; Cancer in her brother; Diabetes in her maternal aunt and maternal aunt; Heart Disease in her brother, brother, mother, sister, and sister; Stroke in her sister. Social History  [employer]  reports that she quit smoking about 49 years ago. Her smoking use included cigarettes. She has a 12.00 pack-year smoking history. She has never used smokeless tobacco. She reports that she does not drink alcohol and does not use drugs. Subjective:      Review of Systems   Constitutional: Negative for activity change, appetite change, chills, diaphoresis, fatigue, fever and unexpected weight change. Gastrointestinal: Negative for abdominal pain, nausea and vomiting. Genitourinary: Negative for decreased urine volume, difficulty urinating, dysuria, flank pain, frequency, hematuria and urgency. Musculoskeletal: Negative for back pain. Objective:   /84   Ht 5' 4\" (1.626 m)   Wt 152 lb 11.2 oz (69.3 kg)   BMI 26.21 kg/m²     Physical Exam  Vitals reviewed. Constitutional:       General: She is not in acute distress. Appearance: Normal appearance. She is well-developed. She is not ill-appearing or diaphoretic. HENT:      Head: Normocephalic and atraumatic. Right Ear: External ear normal.      Left Ear: External ear normal.      Nose: Nose normal.      Mouth/Throat:      Mouth: Mucous membranes are moist.   Eyes:      General: No scleral icterus. Right eye: No discharge. Left eye: No discharge. Neck:      Vascular: No JVD. Trachea: No tracheal deviation.    Cardiovascular:      Rate and Rhythm: Normal rate. Pulmonary:      Effort: Pulmonary effort is normal. No respiratory distress. Breath sounds: Normal breath sounds. Abdominal:      General: There is no distension. Tenderness: There is no abdominal tenderness. There is no right CVA tenderness or left CVA tenderness. Musculoskeletal:         General: No tenderness. Normal range of motion. Skin:     General: Skin is warm and dry. Neurological:      Mental Status: She is alert and oriented to person, place, and time. Mental status is at baseline. Psychiatric:         Mood and Affect: Mood normal.         Behavior: Behavior normal.         Thought Content: Thought content normal.         POC  Results for POC orders placed in visit on 03/22/22   POCT Urinalysis No Micro (Auto)   Result Value Ref Range    Glucose, Ur Negative NEGATIVE mg/dl    Bilirubin Urine Negative     Ketones, Urine Negative NEGATIVE    Specific Gravity, Urine 1.020 1.002 - 1.030    Blood, UA POC Trace-intact NEGATIVE    pH, Urine 6.50 5.0 - 9.0    Protein, Urine Negative NEGATIVE mg/dl    Urobilinogen, Urine 0.20 0.0 - 1.0 eu/dl    Nitrite, Urine Negative NEGATIVE    Leukocyte Clumps, Urine Small (A) NEGATIVE    Color, Urine Yellow YELLOW-STRAW    Character, Urine Slightly Cloudy [id number]   poct post void residual   Result Value Ref Range    post void residual 75 ml     Patients recent PSA values are as follows  No results found for: PSA, PSADIA     Recent BUN/Creatinine:  Lab Results   Component Value Date    BUN 24 08/20/2021    CREATININE 0.9 08/20/2021       Assessment:   Recurrent UTIs  POP--grade 3-4 cystocele  Hx rectal and cervical ca s/p chemo and radiation  Micro hematuria  Pneumaturia  Plan:     PVR today 75 mls. Pt with constant recurrent UTIs for several years time. Reports cloudy and intermittent significant sediment in urine chronically. Reports \"bubbles\" in urine. Has hx of pelvic radiation.   Recommend further evaluation with CTU and possible cystoscopy to r/o fistula or other causes of recurrent UTI. CTU with appt with Dr. Antoine Lee to review results and perform possible cystoscopy.

## 2022-03-23 ENCOUNTER — TELEPHONE (OUTPATIENT)
Dept: UROLOGY | Age: 87
End: 2022-03-23

## 2022-03-23 NOTE — TELEPHONE ENCOUNTER
Patient scheduled for CT UROGRAM  at [address] [place] [address] on 4/14/22 ARRIVAL  AM FOR A 940 AM SCAN TIME WITH NPO 4 HOURS PRIOR. Patient advised of instructions.   Order mailed/given to patient

## 2022-03-24 ENCOUNTER — TELEPHONE (OUTPATIENT)
Dept: UROLOGY | Age: 87
End: 2022-03-24

## 2022-03-24 LAB
ORGANISM: ABNORMAL
URINE CULTURE, ROUTINE: ABNORMAL

## 2022-03-24 RX ORDER — AMPICILLIN 500 MG/1
500 CAPSULE ORAL 4 TIMES DAILY
Qty: 28 CAPSULE | Refills: 0 | Status: SHIPPED | OUTPATIENT
Start: 2022-03-24 | End: 2022-03-31

## 2022-04-14 ENCOUNTER — HOSPITAL ENCOUNTER (OUTPATIENT)
Dept: CT IMAGING | Age: 87
Discharge: HOME | End: 2022-04-14
Payer: MEDICARE

## 2022-04-14 DIAGNOSIS — Z92.3 HX OF RADIATION THERAPY: ICD-10-CM

## 2022-04-14 DIAGNOSIS — Z87.440 HISTORY OF RECURRENT UTIS: ICD-10-CM

## 2022-04-14 DIAGNOSIS — R31.29 MICROSCOPIC HEMATURIA: ICD-10-CM

## 2022-04-14 LAB — POC CREATININE WHOLE BLOOD: 0.9 MG/DL (ref 0.5–1.2)

## 2022-04-14 PROCEDURE — 74178 CT ABD&PLV WO CNTR FLWD CNTR: CPT

## 2022-04-14 PROCEDURE — 82565 ASSAY OF CREATININE: CPT

## 2022-04-14 PROCEDURE — 6360000004 HC RX CONTRAST MEDICATION: Performed by: NURSE PRACTITIONER

## 2022-04-14 RX ADMIN — IOPAMIDOL 80 ML: 755 INJECTION, SOLUTION INTRAVENOUS at 09:45

## 2022-04-21 ENCOUNTER — PROCEDURE VISIT (OUTPATIENT)
Dept: UROLOGY | Age: 87
End: 2022-04-21
Payer: MEDICARE

## 2022-04-21 VITALS
SYSTOLIC BLOOD PRESSURE: 150 MMHG | WEIGHT: 152 LBS | BODY MASS INDEX: 25.95 KG/M2 | HEIGHT: 64 IN | DIASTOLIC BLOOD PRESSURE: 82 MMHG

## 2022-04-21 DIAGNOSIS — R35.0 URINARY FREQUENCY: Primary | ICD-10-CM

## 2022-04-21 DIAGNOSIS — R39.15 URGENCY OF URINATION: ICD-10-CM

## 2022-04-21 DIAGNOSIS — R31.29 MICROSCOPIC HEMATURIA: ICD-10-CM

## 2022-04-21 DIAGNOSIS — Z92.3 HX OF RADIATION THERAPY: ICD-10-CM

## 2022-04-21 DIAGNOSIS — R30.0 DYSURIA: ICD-10-CM

## 2022-04-21 DIAGNOSIS — N39.0 RECURRENT UTI: ICD-10-CM

## 2022-04-21 PROCEDURE — 99213 OFFICE O/P EST LOW 20 MIN: CPT | Performed by: UROLOGY

## 2022-04-21 PROCEDURE — G8417 CALC BMI ABV UP PARAM F/U: HCPCS | Performed by: UROLOGY

## 2022-04-21 PROCEDURE — 1090F PRES/ABSN URINE INCON ASSESS: CPT | Performed by: UROLOGY

## 2022-04-21 PROCEDURE — 1123F ACP DISCUSS/DSCN MKR DOCD: CPT | Performed by: UROLOGY

## 2022-04-21 PROCEDURE — 52000 CYSTOURETHROSCOPY: CPT | Performed by: UROLOGY

## 2022-04-21 PROCEDURE — 1036F TOBACCO NON-USER: CPT | Performed by: UROLOGY

## 2022-04-21 PROCEDURE — 4040F PNEUMOC VAC/ADMIN/RCVD: CPT | Performed by: UROLOGY

## 2022-04-21 PROCEDURE — G8427 DOCREV CUR MEDS BY ELIG CLIN: HCPCS | Performed by: UROLOGY

## 2022-04-21 RX ORDER — TRIMETHOPRIM 100 MG/1
100 TABLET ORAL DAILY
Qty: 90 TABLET | Refills: 1 | Status: SHIPPED | OUTPATIENT
Start: 2022-04-21 | End: 2022-07-20

## 2022-04-21 RX ORDER — METHENAMINE HIPPURATE 1000 MG/1
1 TABLET ORAL 2 TIMES DAILY WITH MEALS
Qty: 180 TABLET | Refills: 1 | Status: SHIPPED | OUTPATIENT
Start: 2022-04-21 | End: 2022-07-20

## 2022-04-21 NOTE — PROGRESS NOTES
Pt with hx of recurrent UTIs, dysuria, pressure, and bladder pain. Has hx of rectal and cervical ca sp prior chemo and radiation. Issues with chronic incontinence of stool. She is noted to have a grade 3-4 cystocele on prior pelvic exam by Dr. Mark Crowell in 2019. Given hx of radiation and age not a good candidate for POP surgery. Has pessary. Previously performed CIC which was stopped secondary to low residuals. Reports she occasionally takes pessary out if having discomfort from it and cleans and gives herself a rest for a day or two and then re-inserts. When pessary out she knows she retains urine so she performs self-cath until pessary re-inserted. Is currently on D mannose and cranberry. CT Urogram 4/14/2022  1. No obstructing ureteral calculus. No bladder mass. No renal mass. .   2. Moderate hiatal hernia. 3. Nonspecific soft tissue thickening at the level of the urogenital diaphragm. Thickening of the puborectalis. Cystoscopy Operative Note  Surgeon: Helen Huston M.D, MD   Anesthesia: Urethral 2%    Findings:   The patient was prepped and draped in the usual sterile fashion. The flexible cystoscope was advanced through the urethra and into the bladder. The bladder was thoroughly inspected and the following was noted:      Urethra: normal appearing urethra with no masses, tenderness or lesions  Bladder: No tumors or CIS noted. No bladder diverticulum. mild trabeculation noted. Ureters: Clear efflux from both ureters. Orifices with normal configuration and location. The cystoscope was removed. The patient tolerated the procedure well. Cystoscopy negative for fistulous tracts; likely a lower tract immunity issue especially at older age  restart methenamine and prophylaxis  Will Rx for methenamine  Start Trimethoprim daily  Continue D mannose and cranberry.     Follow up 3 months with [facility]

## 2022-08-03 ENCOUNTER — OFFICE VISIT (OUTPATIENT)
Dept: UROLOGY | Age: 87
End: 2022-08-03
Payer: MEDICARE

## 2022-08-03 VITALS
HEIGHT: 64 IN | BODY MASS INDEX: 25.51 KG/M2 | WEIGHT: 149.4 LBS | DIASTOLIC BLOOD PRESSURE: 84 MMHG | SYSTOLIC BLOOD PRESSURE: 132 MMHG

## 2022-08-03 DIAGNOSIS — R35.0 URINARY FREQUENCY: Primary | ICD-10-CM

## 2022-08-03 LAB
BILIRUBIN URINE: NEGATIVE
BLOOD URINE, POC: NEGATIVE
CHARACTER, URINE: CLEAR
COLOR, URINE: YELLOW
GLUCOSE URINE: NEGATIVE MG/DL
KETONES, URINE: NEGATIVE
LEUKOCYTE CLUMPS, URINE: ABNORMAL
NITRITE, URINE: NEGATIVE
PH, URINE: 5.5 (ref 5–9)
POST VOID RESIDUAL (PVR): 97 ML
PROTEIN, URINE: NEGATIVE MG/DL
SPECIFIC GRAVITY, URINE: 1.02 (ref 1–1.03)
UROBILINOGEN, URINE: 0.2 EU/DL (ref 0–1)

## 2022-08-03 PROCEDURE — 1036F TOBACCO NON-USER: CPT | Performed by: NURSE PRACTITIONER

## 2022-08-03 PROCEDURE — 99213 OFFICE O/P EST LOW 20 MIN: CPT | Performed by: NURSE PRACTITIONER

## 2022-08-03 PROCEDURE — G8417 CALC BMI ABV UP PARAM F/U: HCPCS | Performed by: NURSE PRACTITIONER

## 2022-08-03 PROCEDURE — 1090F PRES/ABSN URINE INCON ASSESS: CPT | Performed by: NURSE PRACTITIONER

## 2022-08-03 PROCEDURE — 51798 US URINE CAPACITY MEASURE: CPT | Performed by: NURSE PRACTITIONER

## 2022-08-03 PROCEDURE — 1123F ACP DISCUSS/DSCN MKR DOCD: CPT | Performed by: NURSE PRACTITIONER

## 2022-08-03 PROCEDURE — G8427 DOCREV CUR MEDS BY ELIG CLIN: HCPCS | Performed by: NURSE PRACTITIONER

## 2022-08-03 PROCEDURE — 81003 URINALYSIS AUTO W/O SCOPE: CPT | Performed by: NURSE PRACTITIONER

## 2022-08-03 RX ORDER — TRIMETHOPRIM 100 MG/1
100 TABLET ORAL DAILY
Qty: 90 TABLET | Refills: 1 | Status: SHIPPED | OUTPATIENT
Start: 2022-08-03

## 2022-08-03 RX ORDER — TRIMETHOPRIM 100 MG/1
100 TABLET ORAL DAILY
COMMUNITY
End: 2022-08-03 | Stop reason: SDUPTHER

## 2022-08-03 ASSESSMENT — ENCOUNTER SYMPTOMS
ABDOMINAL PAIN: 0
BACK PAIN: 0
VOMITING: 0
NAUSEA: 0

## 2022-08-03 NOTE — PROGRESS NOTES
[address]  [address]  [facility] [address]  Dept: [phone]  Loc: [phone]    Visit Date: 8/3/2022        HPI:     Marlon Cook is a 80 y.o. female who presents today for:  Chief Complaint   Patient presents with    Urinary Frequency       HPI  Pt seen in follow up for recurrent UTI. Pt has a hx of recurrent UTIs, dysuria, pressure, and bladder pain. Has hx of rectal and cervical ca sp prior chemo and radiation. Issues with chronic incontinence of stool. She is noted to have a grade 3-4 cystocele on prior pelvic exam by Dr. Tali Lama in 2019. Given hx of radiation and age not a good candidate for POP surgery. Has pessary. Previously performed CIC which was stopped secondary to low residuals. Reports she occasionally takes pessary out if having discomfort from it and cleans and gives herself a rest for a day or two and then re-inserts. When pessary out she knows she retains urine so she performs self-cath until pessary re-inserted. Previously reported bubbles in her urine. CTU negative for acute urologic findings. No hydronephrosis. No urologic tumors. Possible low insertion of the ureters, noted insufficiency of the pelvic floor. Underwent cystoscopy by Dr. Vicki Whitehead 4/21/22 that was negative for fistulous tracts and noted mild bladder wall trabeculation. Started on methenamine and trimethoprim. Methenamine expensive and she has stopped. Reports she continues on the Trimethoprim. Denies any recent UTIs. Reports she is doing well. Continues on D mannose and cranberry as well. No symptoms of UTI today    Current Outpatient Medications   Medication Sig Dispense Refill    trimethoprim (TRIMPEX) 100 MG tablet Take 100 mg by mouth in the morning.       OLIVE LEAF PO Take by mouth daily      isosorbide mononitrate (IMDUR) 60 MG extended release tablet Take 1 tablet by mouth daily 90 tablet 3    metoprolol tartrate (LOPRESSOR) 25 MG tablet Take 1 tablet by mouth 2 times daily 180 tablet 3    ondansetron (ZOFRAN) 4 MG tablet Take 1 tablet by mouth 3 times daily as needed for Nausea or Vomiting 15 tablet 0    NONFORMULARY daily Water pill + Potassium 20mg      conjugated estrogens (PREMARIN) 0.625 MG/GM vaginal cream Place 0.5 grams vaginally twice weekly. 1 Tube 0    lansoprazole (PREVACID) 30 MG delayed release capsule Take 15 mg by mouth 2 times daily       OIL OF OREGANO PO Take by mouth as needed       D-MANNOSE PO Take 500 mg by mouth daily 3-tablets daily      Polyethylene Glycol 3350 (MIRALAX PO) Take by mouth as needed      nitroGLYCERIN (NITROSTAT) 0.4 MG SL tablet Place 0.4 mg under the tongue every 5 minutes as needed for Chest pain up to max of 3 total doses. If no relief after 1 dose, call 911.       hydrochlorothiazide (HYDRODIURIL) 25 MG tablet Take 25 mg by mouth daily      simvastatin (ZOCOR) 40 MG tablet Take 0.5 tablets by mouth nightly 45 tablet 3    pramipexole (MIRAPEX) 0.125 MG tablet Take by mouth nightly 4 tab      CRANBERRY PO Take 1 tablet by mouth as needed       Cholecalciferol (VITAMIN D3) 5000 UNITS TABS Take by mouth as needed       magnesium (MAGNESIUM-OXIDE) 250 MG TABS tablet Take 250 mg by mouth daily       calcium carbonate-vitamin D 600-200 MG-UNIT TABS Take 1 tablet by mouth as needed       aspirin 81 MG EC tablet Take 81 mg by mouth daily. No current facility-administered medications for this visit. Past Medical History  Barry Kurtz  has a past medical history of Anesthesia, CAD (coronary artery disease), Cancer ([facility] [address] GERD (gastroesophageal reflux disease), History of prolapse of bladder, Hyperlipidemia, Hypertension, Osteoarthritis, Shortness of breath, and UTI (urinary tract infection). Past Surgical History  The patient  has a past surgical history that includes Hemorrhoid surgery (1989, 08/17/2012); Colonoscopy (5/25/12); Hysterectomy (1966);  Appendectomy (1969); other surgical history (1/3/2014); Rectal surgery; Cardiac catheterization (8/2012); Coronary angioplasty with stent (09/11/2017); sigmoidoscopy (N/A, 6/10/2019); and Condyloma Excision (Left, 12/16/2021). Family History  This patient's family history includes Alzheimer's Disease in her mother; Arthritis in her father; Cancer in her brother; Diabetes in her maternal aunt and maternal aunt; Heart Disease in her brother, brother, mother, sister, and sister; Stroke in her sister. Social History  Sherry Sosa  reports that she quit smoking about 50 years ago. Her smoking use included cigarettes. She has a 12.00 pack-year smoking history. She has never used smokeless tobacco. She reports that she does not drink alcohol and does not use drugs. Subjective:      Review of Systems   Constitutional:  Negative for activity change, appetite change, chills, diaphoresis, fatigue, fever and unexpected weight change. Gastrointestinal:  Negative for abdominal pain, nausea and vomiting. Genitourinary:  Negative for decreased urine volume, difficulty urinating, dysuria, flank pain, frequency, hematuria and urgency. Musculoskeletal:  Negative for back pain. Objective:   /84   Ht 5' 4\" (1.626 m)   Wt 149 lb 6.4 oz (67.8 kg)   BMI 25.64 kg/m²     Physical Exam  Vitals reviewed. Constitutional:       General: She is not in acute distress. Appearance: Normal appearance. She is well-developed. She is not ill-appearing or diaphoretic. HENT:      Head: Normocephalic and atraumatic. Right Ear: External ear normal.      Left Ear: External ear normal.      Nose: Nose normal.      Mouth/Throat:      Mouth: Mucous membranes are moist.   Eyes:      General: No scleral icterus. Right eye: No discharge. Left eye: No discharge. Neck:      Vascular: No JVD. Trachea: No tracheal deviation. Cardiovascular:      Rate and Rhythm: Normal rate.    Pulmonary:      Effort: Pulmonary effort is normal. No respiratory distress. Breath sounds: Normal breath sounds. Abdominal:      General: There is no distension. Tenderness: There is no abdominal tenderness. There is no right CVA tenderness or left CVA tenderness. Musculoskeletal:         General: No tenderness. Normal range of motion. Skin:     General: Skin is warm and dry. Neurological:      Mental Status: She is alert and oriented to person, place, and time. Mental status is at baseline. Psychiatric:         Mood and Affect: Mood normal.         Behavior: Behavior normal.         Thought Content: Thought content normal.       POC  Results for POC orders placed in visit on 08/03/22   POCT Urinalysis No Micro (Auto)   Result Value Ref Range    Glucose, Ur Negative NEGATIVE mg/dl    Bilirubin Urine Negative     Ketones, Urine Negative NEGATIVE    Specific Gravity, Urine 1.025 1.002 - 1.030    Blood, UA POC Negative NEGATIVE    pH, Urine 5.50 5.0 - 9.0    Protein, Urine Negative NEGATIVE mg/dl    Urobilinogen, Urine 0.20 0.0 - 1.0 eu/dl    Nitrite, Urine Negative NEGATIVE    Leukocyte Clumps, Urine Small (A) NEGATIVE    Color, Urine Yellow YELLOW-STRAW    Character, Urine Clear [id number]   poct post void residual   Result Value Ref Range    post void residual 97 ml         Patients recent PSA values are as follows  No results found for: PSA, PSADIA     Recent BUN/Creatinine:  Lab Results   Component Value Date/Time    BUN 24 08/20/2021 07:55 AM    CREATININE 0.9 08/20/2021 07:55 AM     information found for this patient          Narrative   PROCEDURE: CT UROGRAM       CLINICAL INFORMATION: Microscopic hematuria, History of recurrent UTIs, Hx of radiation therapy       COMPARISON: CT abdomen and pelvis 5/2/2018, 5/1/2017, 5/2/2016. CT pelvis 5/7/2019. TECHNIQUE:    Helical CT acquisition of the abdomen and pelvis was performed without and with contrast contrast. Multiplanar reformats are provided.  Multiphasic imaging was performed All CT scans at this facility use dose modulation, iterative reconstruction, and/or weight based dosing when appropriate to reduce the radiation dose to as low as reasonably achievable. CONTRAST: 80  cc of Isovue-370  intravenously           FINDINGS:                Elevation of the left hemidiaphragm. Scarring and/or platelike atelectasis in the left lung base. Coronary calcifications are seen. A moderate hiatal hernia is noted. Mild pancreatic atrophy. Otherwise, the liver, gallbladder, biliary tree, adrenal glands, spleen, and kidneys are unremarkable. No bowel obstruction or acute inflammatory bowel process. The appendix is surgically absent. Colonic diverticulosis. Moderate to large amount of stool fills the colon. The abdominal aorta is not aneurysmal. Aortoiliac calcifications. Small fat-containing umbilical hernia. No significantly enlarged lymph nodes are seen. The bladder is grossly unremarkable. The uterus is surgically absent. Again seen is insufficiency of the pelvic floor. . Soft tissue thickening is seen around the level of the urogenital diaphragm. There is soft tissue thickening of the puborectalis    bilaterally. This is nonspecific. There appears to be a low insertion of the ureters       Bones: The bones are demineralized. Scoliosis is noted. Degenerative changes of the visualized thoracolumbar spine. Degenerative changes of the SI joints and both hips. Impression   1. No obstructing ureteral calculus. No bladder mass. No renal mass. .   2. Moderate hiatal hernia. 3. Nonspecific soft tissue thickening at the level of the urogenital diaphragm. Thickening of the puborectalis. 4. Other findings as described above. Assessment:   Recurrent UTIs  POP--grade 3-4 cystocele  Hx rectal and cervical ca s/p chemo and radiation  Micro hematuria    Plan:     Pt reports symptoms stable and doing well on daily Trimethoprim.   Continue Trimethoprim, d mannose, cranberry at this time. F/u in 6 months with PVR.

## 2022-12-28 ENCOUNTER — TELEPHONE (OUTPATIENT)
Dept: UROLOGY | Age: 87
End: 2022-12-28

## 2022-12-28 ENCOUNTER — OFFICE VISIT (OUTPATIENT)
Dept: FAMILY MEDICINE CLINIC | Age: 87
End: 2022-12-28
Payer: MEDICARE

## 2022-12-28 VITALS
BODY MASS INDEX: 25.44 KG/M2 | HEIGHT: 64 IN | WEIGHT: 149 LBS | TEMPERATURE: 97.7 F | DIASTOLIC BLOOD PRESSURE: 66 MMHG | RESPIRATION RATE: 14 BRPM | OXYGEN SATURATION: 98 % | HEART RATE: 66 BPM | SYSTOLIC BLOOD PRESSURE: 132 MMHG

## 2022-12-28 DIAGNOSIS — U07.1 COVID-19: Primary | ICD-10-CM

## 2022-12-28 PROCEDURE — G8417 CALC BMI ABV UP PARAM F/U: HCPCS | Performed by: NURSE PRACTITIONER

## 2022-12-28 PROCEDURE — 1123F ACP DISCUSS/DSCN MKR DOCD: CPT | Performed by: NURSE PRACTITIONER

## 2022-12-28 PROCEDURE — 1090F PRES/ABSN URINE INCON ASSESS: CPT | Performed by: NURSE PRACTITIONER

## 2022-12-28 PROCEDURE — 99214 OFFICE O/P EST MOD 30 MIN: CPT | Performed by: NURSE PRACTITIONER

## 2022-12-28 PROCEDURE — G8427 DOCREV CUR MEDS BY ELIG CLIN: HCPCS | Performed by: NURSE PRACTITIONER

## 2022-12-28 PROCEDURE — G8484 FLU IMMUNIZE NO ADMIN: HCPCS | Performed by: NURSE PRACTITIONER

## 2022-12-28 PROCEDURE — 1036F TOBACCO NON-USER: CPT | Performed by: NURSE PRACTITIONER

## 2022-12-28 RX ORDER — ONDANSETRON 4 MG/1
4 TABLET, ORALLY DISINTEGRATING ORAL 3 TIMES DAILY PRN
Qty: 21 TABLET | Refills: 0 | Status: SHIPPED | OUTPATIENT
Start: 2022-12-28

## 2022-12-28 RX ORDER — CEFDINIR 300 MG/1
300 CAPSULE ORAL 2 TIMES DAILY
Qty: 10 CAPSULE | Refills: 0 | Status: SHIPPED | OUTPATIENT
Start: 2022-12-28 | End: 2023-01-02

## 2022-12-28 NOTE — PROGRESS NOTES
SUBJECTIVE:  Jake Rivero is a 80 y.o. y/o female that presents with Fever, Cough, Congestion, Headache, Diarrhea, Pharyngitis, and Other ( [address])  . HPI:      Symptoms have been present for 3 day(s). Symptoms are worse since they initially started. Fever? Yes  Runny nose or congestion? Yes  Cough? Yes  Sore throat? Yes  Shortness of breath/Wheezing? No  Other associated symptoms?  body aches, fatigue, headaches, nausea, and diarrhea      Known COVID exposures or RFs? Yes  Smoker? No  Preexisting Respiratory conditions?  none      Past Medical History:   Diagnosis Date    Anesthesia     after hemorrhoid surgery felt like \"elephant on my chest\" heart cath done    CAD (coronary artery disease)     Cancer [facility])     cervical rectal    GERD (gastroesophageal reflux disease)     History of prolapse of bladder     Hyperlipidemia     Hypertension     Osteoarthritis     Shortness of breath 2017    UTI (urinary tract infection) 2017    Cinic in [facility]         Tobacco Use      Smoking status: Former        Packs/day: 1.00        Years: 12.00        Pack years: 12        Types: Cigarettes        Quit date: 1972        Years since quittin.4      Smokeless tobacco: Never            OBJECTIVE:  /66   Pulse 66   Temp 97.7 °F (36.5 °C) (Temporal)   Resp 14   Ht 5' 4\" (1.626 m)   Wt 149 lb (67.6 kg)   SpO2 98%   BMI 25.58 kg/m²   General appearance: alert, well appearing, and in no distress. ENT exam reveals - pharynx erythematous without exudate and nasal mucosa congested. CVS exam: normal rate, regular rhythm, normal S1, S2, no murmurs, rubs, clicks or gallops. Chest:clear to auscultation, no wheezes, rales or rhonchi, symmetric air entry. Abdominal exam: soft, nontender, nondistended, no masses or organomegaly. Extremities:  No clubbing, cyanosis or edema  Skin exam - normal coloration and turgor, no rashes, no suspicious skin lesions noted.   Psych -  Affect appropriate. Thought process is normal without evidence of depression or psychosis. Good insight and appropriae interaction. Cognition and memory appear to be intact. Celia Patton was seen today for fever, cough, congestion, headache, diarrhea, pharyngitis and other. Diagnoses and all orders for this visit:    COVID-19  -     cefdinir (OMNICEF) 300 MG capsule; Take 1 capsule by mouth 2 times daily for 5 days  -     nirmatrelvir/ritonavir (PAXLOVID) 10 x 150 MG & 10 x 100MG TBPK; Take 2 tablets (one 150 mg nirmatrelvir and one 100 mg ritonavir tablets) by mouth every 12 hours for 5 days. -     ondansetron (ZOFRAN-ODT) 4 MG disintegrating tablet; Take 1 tablet by mouth 3 times daily as needed for Nausea or Vomiting      No follow-ups on file.     -Start above treatments  -Patient advised to contact our office immediately if symptoms worsen or persist  -Patient counseled on conservative care including fluids, rest and OTC meds      I have reviewed this patient's history, habits, and medication list and have updated the chart where appropriate.

## 2022-12-28 NOTE — PATIENT INSTRUCTIONS
STOP SIMVASTATIN X 10 DAYS    COVID is positive. Quarantine 5 days from start of symptoms. If at Day 5 you are no longer symptomatic and feeling much better, than you may come out of quarantine, however, you will need to exercise strict masking for another 5 days. If at Day 5 you are still symptomatic, then you should still continue to quarantine until fever free for 24 hours. Start COVID-19 Regimen    Supplements that may be helpful with your symptoms:    Zinc 30 mg daily  Pepcid 20 mg daily  Vitamin C 1000 mg twice daily  Vitamin D3 5000 iu daily  Melatonin 5-10mg at night    Can take Tylenol or Ibuprofen for body aches  Hot showers, warm baths for body aches  Heating pad for comfort  Push fluids, small bland snacks  Ok to take OTC cough or cold medicine if you find it helps with your symptoms    Ambulate hourly around in your house during awake hours. No strenuous activity. Rest when needed.     Go to ER IMMEDIATELY with any:   worsening shortness of breath   chest pain  uncontrolled high fevers >103.5 that will not come down with Tylenol or Ibuprofen

## 2022-12-28 NOTE — TELEPHONE ENCOUNTER
Pt is calling and states she has been using the catherters and is in need of more. She said her last order was in 2019.   Please advise pt at [phone]

## 2022-12-30 ENCOUNTER — TELEPHONE (OUTPATIENT)
Dept: FAMILY MEDICINE CLINIC | Age: 87
End: 2022-12-30

## 2022-12-30 NOTE — TELEPHONE ENCOUNTER
I called and spoke with Isauro Cobos and she states that she is starting to feel better but states that she did not take the paxlovid because she took one dose and she started to have severe chest pain. She said instead she increased her vitamins and is starting to feel better.

## 2022-12-30 NOTE — TELEPHONE ENCOUNTER
----- Message from ZACHARY Gage CNP sent at 12/28/2022 10:52 AM EST -----  Please check on pt  Seen for covid 12/28

## 2023-01-03 ENCOUNTER — TELEPHONE (OUTPATIENT)
Dept: UROLOGY | Age: 88
End: 2023-01-03

## 2023-01-03 DIAGNOSIS — R30.0 DYSURIA: Primary | ICD-10-CM

## 2023-01-03 NOTE — TELEPHONE ENCOUNTER
Patient has covid since last Monday and now developed an UTI from having loose stools. Sunday she developed symptoms. She took 6 doses of omnicef she had at home and has around 3 days left. C/o pain that feels like razor blades, pressure, urgency, frequency, and burning. It did help with some of the symptoms. Do you want an urine sent to the lab?

## 2023-01-04 ENCOUNTER — NURSE ONLY (OUTPATIENT)
Dept: LAB | Age: 88
End: 2023-01-04

## 2023-01-04 DIAGNOSIS — R30.0 DYSURIA: ICD-10-CM

## 2023-01-04 RX ORDER — NITROFURANTOIN 25; 75 MG/1; MG/1
100 CAPSULE ORAL 2 TIMES DAILY
Qty: 14 CAPSULE | Refills: 0 | Status: SHIPPED | OUTPATIENT
Start: 2023-01-04 | End: 2023-01-06 | Stop reason: SINTOL

## 2023-01-04 NOTE — TELEPHONE ENCOUNTER
Continue Omnicef to completion. Submit urine culture to the lab to assure sensitivity. Increase oral fluid intake. Azo prn.

## 2023-01-04 NOTE — TELEPHONE ENCOUNTER
Patient advised to give the urine specimen to lab, push fluids, continue omnicef until completed, and may continue azo as needed. She does not feel like the omnicef is helping.

## 2023-01-05 LAB
ORGANISM: ABNORMAL
URINE CULTURE, ROUTINE: ABNORMAL

## 2023-01-05 NOTE — TELEPHONE ENCOUNTER
Patient has the different antibiotics at home from when they were stopped and she was put on something else. She will continue the [facility] while waiting on the culture results. Hemanth Rico cancelled the [address] last night since she can't take it.

## 2023-01-05 NOTE — TELEPHONE ENCOUNTER
Patient stated she can't take Macrobid. She has used that 4 other times in the past and it did not work. Shee has used keflex in the past.      Or she has 3 days of ampicillin 500 mg QID at home.

## 2023-01-06 ENCOUNTER — HOSPITAL ENCOUNTER (INPATIENT)
Age: 88
LOS: 5 days | Discharge: HOME | DRG: 641 | End: 2023-01-11
Attending: EMERGENCY MEDICINE | Admitting: INTERNAL MEDICINE
Payer: MEDICARE

## 2023-01-06 ENCOUNTER — APPOINTMENT (OUTPATIENT)
Dept: CT IMAGING | Age: 88
DRG: 641 | End: 2023-01-06
Payer: MEDICARE

## 2023-01-06 DIAGNOSIS — A49.8 PSEUDOMONAS AERUGINOSA INFECTION: Primary | ICD-10-CM

## 2023-01-06 DIAGNOSIS — E87.1 HYPONATREMIA: Primary | ICD-10-CM

## 2023-01-06 LAB
ALBUMIN SERPL-MCNC: 4.5 G/DL (ref 3.5–5.1)
ALP BLD-CCNC: 68 U/L (ref 38–126)
ALT SERPL-CCNC: 22 U/L (ref 11–66)
ANION GAP SERPL CALCULATED.3IONS-SCNC: 14 MEQ/L (ref 8–16)
AST SERPL-CCNC: 29 U/L (ref 5–40)
BASOPHILS # BLD: 0.2 %
BASOPHILS ABSOLUTE: 0 THOU/MM3 (ref 0–0.1)
BILIRUB SERPL-MCNC: 0.5 MG/DL (ref 0.3–1.2)
BUN BLDV-MCNC: 21 MG/DL (ref 7–22)
CALCIUM SERPL-MCNC: 9.5 MG/DL (ref 8.5–10.5)
CHLORIDE BLD-SCNC: 78 MEQ/L (ref 98–111)
CO2: 24 MEQ/L (ref 23–33)
CREAT SERPL-MCNC: 0.7 MG/DL (ref 0.4–1.2)
EOSINOPHIL # BLD: 0.7 %
EOSINOPHILS ABSOLUTE: 0 THOU/MM3 (ref 0–0.4)
ERYTHROCYTE [DISTWIDTH] IN BLOOD BY AUTOMATED COUNT: 11.9 % (ref 11.5–14.5)
ERYTHROCYTE [DISTWIDTH] IN BLOOD BY AUTOMATED COUNT: 36.2 FL (ref 35–45)
GFR SERPL CREATININE-BSD FRML MDRD: > 60 ML/MIN/1.73M2
GLUCOSE BLD-MCNC: 113 MG/DL (ref 70–108)
HCT VFR BLD CALC: 36 % (ref 37–47)
HEMOGLOBIN: 13.3 GM/DL (ref 12–16)
IMMATURE GRANS (ABS): 0.05 THOU/MM3 (ref 0–0.07)
IMMATURE GRANULOCYTES: 1.2 %
LIPASE: 38.7 U/L (ref 5.6–51.3)
LYMPHOCYTES # BLD: 18 %
LYMPHOCYTES ABSOLUTE: 0.7 THOU/MM3 (ref 1–4.8)
MAGNESIUM: 1.7 MG/DL (ref 1.6–2.4)
MCH RBC QN AUTO: 30.9 PG (ref 26–33)
MCHC RBC AUTO-ENTMCNC: 36.9 GM/DL (ref 32.2–35.5)
MCV RBC AUTO: 83.5 FL (ref 81–99)
MONOCYTES # BLD: 13.2 %
MONOCYTES ABSOLUTE: 0.5 THOU/MM3 (ref 0.4–1.3)
NUCLEATED RED BLOOD CELLS: 0 /100 WBC
OSMOLALITY CALCULATION: 238.5 MOSMOL/KG (ref 275–300)
OSMOLALITY URINE: 233 MOSMOL/KG (ref 250–750)
PLATELET # BLD: 143 THOU/MM3 (ref 130–400)
PMV BLD AUTO: 10 FL (ref 9.4–12.4)
POTASSIUM REFLEX MAGNESIUM: 3.4 MEQ/L (ref 3.5–5.2)
RBC # BLD: 4.31 MILL/MM3 (ref 4.2–5.4)
SEG NEUTROPHILS: 66.7 %
SEGMENTED NEUTROPHILS ABSOLUTE COUNT: 2.7 THOU/MM3 (ref 1.8–7.7)
SODIUM BLD-SCNC: 115 MEQ/L (ref 135–145)
SODIUM BLD-SCNC: 116 MEQ/L (ref 135–145)
SODIUM BLD-SCNC: 118 MEQ/L (ref 135–145)
SODIUM URINE: 36 MEQ/L
TOTAL PROTEIN: 7.8 G/DL (ref 6.1–8)
TROPONIN T: < 0.01 NG/ML
WBC # BLD: 4.1 THOU/MM3 (ref 4.8–10.8)

## 2023-01-06 PROCEDURE — 6370000000 HC RX 637 (ALT 250 FOR IP)

## 2023-01-06 PROCEDURE — 2580000003 HC RX 258: Performed by: EMERGENCY MEDICINE

## 2023-01-06 PROCEDURE — 36415 COLL VENOUS BLD VENIPUNCTURE: CPT

## 2023-01-06 PROCEDURE — 83935 ASSAY OF URINE OSMOLALITY: CPT

## 2023-01-06 PROCEDURE — 99285 EMERGENCY DEPT VISIT HI MDM: CPT

## 2023-01-06 PROCEDURE — 83690 ASSAY OF LIPASE: CPT

## 2023-01-06 PROCEDURE — 80053 COMPREHEN METABOLIC PANEL: CPT

## 2023-01-06 PROCEDURE — 2580000003 HC RX 258

## 2023-01-06 PROCEDURE — A4216 STERILE WATER/SALINE, 10 ML: HCPCS

## 2023-01-06 PROCEDURE — 93005 ELECTROCARDIOGRAM TRACING: CPT | Performed by: EMERGENCY MEDICINE

## 2023-01-06 PROCEDURE — C9113 INJ PANTOPRAZOLE SODIUM, VIA: HCPCS

## 2023-01-06 PROCEDURE — 85025 COMPLETE CBC W/AUTO DIFF WBC: CPT

## 2023-01-06 PROCEDURE — 84484 ASSAY OF TROPONIN QUANT: CPT

## 2023-01-06 PROCEDURE — 6360000002 HC RX W HCPCS

## 2023-01-06 PROCEDURE — 84300 ASSAY OF URINE SODIUM: CPT

## 2023-01-06 PROCEDURE — 74177 CT ABD & PELVIS W/CONTRAST: CPT

## 2023-01-06 PROCEDURE — 99223 1ST HOSP IP/OBS HIGH 75: CPT | Performed by: INTERNAL MEDICINE

## 2023-01-06 PROCEDURE — 2140000000 HC CCU INTERMEDIATE R&B

## 2023-01-06 PROCEDURE — 6360000004 HC RX CONTRAST MEDICATION: Performed by: EMERGENCY MEDICINE

## 2023-01-06 PROCEDURE — 84295 ASSAY OF SERUM SODIUM: CPT

## 2023-01-06 PROCEDURE — 83735 ASSAY OF MAGNESIUM: CPT

## 2023-01-06 RX ORDER — SODIUM CHLORIDE 0.9 % (FLUSH) 0.9 %
5-40 SYRINGE (ML) INJECTION EVERY 12 HOURS SCHEDULED
Status: DISCONTINUED | OUTPATIENT
Start: 2023-01-06 | End: 2023-01-11 | Stop reason: HOSPADM

## 2023-01-06 RX ORDER — SODIUM CHLORIDE 9 MG/ML
INJECTION, SOLUTION INTRAVENOUS PRN
Status: DISCONTINUED | OUTPATIENT
Start: 2023-01-06 | End: 2023-01-11 | Stop reason: HOSPADM

## 2023-01-06 RX ORDER — SODIUM CHLORIDE 0.9 % (FLUSH) 0.9 %
5-40 SYRINGE (ML) INJECTION PRN
Status: DISCONTINUED | OUTPATIENT
Start: 2023-01-06 | End: 2023-01-11 | Stop reason: HOSPADM

## 2023-01-06 RX ORDER — ISOSORBIDE MONONITRATE 60 MG/1
60 TABLET, EXTENDED RELEASE ORAL DAILY
Status: DISCONTINUED | OUTPATIENT
Start: 2023-01-07 | End: 2023-01-11 | Stop reason: HOSPADM

## 2023-01-06 RX ORDER — ACETAMINOPHEN 325 MG/1
650 TABLET ORAL EVERY 6 HOURS PRN
Status: DISCONTINUED | OUTPATIENT
Start: 2023-01-06 | End: 2023-01-11 | Stop reason: HOSPADM

## 2023-01-06 RX ORDER — SODIUM CHLORIDE 9 MG/ML
INJECTION, SOLUTION INTRAVENOUS CONTINUOUS
Status: ACTIVE | OUTPATIENT
Start: 2023-01-06 | End: 2023-01-06

## 2023-01-06 RX ORDER — ONDANSETRON 4 MG/1
4 TABLET, ORALLY DISINTEGRATING ORAL 3 TIMES DAILY PRN
Status: DISCONTINUED | OUTPATIENT
Start: 2023-01-06 | End: 2023-01-06 | Stop reason: SDUPTHER

## 2023-01-06 RX ORDER — ONDANSETRON 4 MG/1
4 TABLET, ORALLY DISINTEGRATING ORAL EVERY 8 HOURS PRN
Status: DISCONTINUED | OUTPATIENT
Start: 2023-01-06 | End: 2023-01-11 | Stop reason: HOSPADM

## 2023-01-06 RX ORDER — ACETAMINOPHEN 650 MG/1
650 SUPPOSITORY RECTAL EVERY 6 HOURS PRN
Status: DISCONTINUED | OUTPATIENT
Start: 2023-01-06 | End: 2023-01-11 | Stop reason: HOSPADM

## 2023-01-06 RX ORDER — SUCRALFATE 1 G/1
1 TABLET ORAL
Status: DISCONTINUED | OUTPATIENT
Start: 2023-01-06 | End: 2023-01-11 | Stop reason: HOSPADM

## 2023-01-06 RX ORDER — MAGNESIUM SULFATE IN WATER 40 MG/ML
2000 INJECTION, SOLUTION INTRAVENOUS PRN
Status: DISCONTINUED | OUTPATIENT
Start: 2023-01-06 | End: 2023-01-07 | Stop reason: SDUPTHER

## 2023-01-06 RX ORDER — ASPIRIN 81 MG/1
81 TABLET ORAL DAILY
Status: DISCONTINUED | OUTPATIENT
Start: 2023-01-07 | End: 2023-01-11 | Stop reason: HOSPADM

## 2023-01-06 RX ORDER — ONDANSETRON 2 MG/ML
4 INJECTION INTRAMUSCULAR; INTRAVENOUS EVERY 6 HOURS PRN
Status: DISCONTINUED | OUTPATIENT
Start: 2023-01-06 | End: 2023-01-11 | Stop reason: HOSPADM

## 2023-01-06 RX ORDER — PRAMIPEXOLE DIHYDROCHLORIDE 0.25 MG/1
0.12 TABLET ORAL NIGHTLY
Status: DISCONTINUED | OUTPATIENT
Start: 2023-01-06 | End: 2023-01-08

## 2023-01-06 RX ADMIN — SUCRALFATE 1 G: 1 TABLET ORAL at 20:40

## 2023-01-06 RX ADMIN — IOPAMIDOL 80 ML: 755 INJECTION, SOLUTION INTRAVENOUS at 16:00

## 2023-01-06 RX ADMIN — CEFEPIME 1000 MG: 1 INJECTION, POWDER, FOR SOLUTION INTRAMUSCULAR; INTRAVENOUS at 20:45

## 2023-01-06 RX ADMIN — SODIUM CHLORIDE 40 MG: 9 INJECTION, SOLUTION INTRAMUSCULAR; INTRAVENOUS; SUBCUTANEOUS at 20:39

## 2023-01-06 RX ADMIN — METOPROLOL TARTRATE 25 MG: 25 TABLET, FILM COATED ORAL at 20:40

## 2023-01-06 RX ADMIN — SODIUM CHLORIDE: 9 INJECTION, SOLUTION INTRAVENOUS at 15:37

## 2023-01-06 RX ADMIN — SODIUM CHLORIDE, PRESERVATIVE FREE 10 ML: 5 INJECTION INTRAVENOUS at 20:39

## 2023-01-06 RX ADMIN — PRAMIPEXOLE DIHYDROCHLORIDE 0.12 MG: 0.25 TABLET ORAL at 20:40

## 2023-01-06 ASSESSMENT — ENCOUNTER SYMPTOMS
SHORTNESS OF BREATH: 0
CHEST TIGHTNESS: 0
NAUSEA: 1
VOMITING: 1
ABDOMINAL PAIN: 1
COUGH: 0
SORE THROAT: 0
BLOOD IN STOOL: 1
DIARRHEA: 1

## 2023-01-06 ASSESSMENT — PAIN DESCRIPTION - LOCATION
LOCATION: ABDOMEN
LOCATION: ABDOMEN

## 2023-01-06 ASSESSMENT — PAIN SCALES - GENERAL
PAINLEVEL_OUTOF10: 8
PAINLEVEL_OUTOF10: 8
PAINLEVEL_OUTOF10: 2
PAINLEVEL_OUTOF10: 8

## 2023-01-06 ASSESSMENT — PAIN - FUNCTIONAL ASSESSMENT
PAIN_FUNCTIONAL_ASSESSMENT: 0-10

## 2023-01-06 ASSESSMENT — PAIN DESCRIPTION - ORIENTATION: ORIENTATION: MID

## 2023-01-06 NOTE — ED NOTES
Pt ambulated to bathroom for urine specimen, tolerated well. Fluids rate changed per MAR. No other needs or concerns expressed at this time.  [facility], [address] [place] [address]  01/06/23 [id number]

## 2023-01-06 NOTE — ED NOTES
Pt resting in bed, respirations even and unlabored. No needs expressed at this time. Call light within reach.  [facility], [address] [place] [address]  01/06/23 [id number]

## 2023-01-06 NOTE — ED NOTES
Pt transported to  via wheelchair in stable condition. Floor called prior to transport, spoke with Thom Dorado.      Gaurav White  01/06/23 0178

## 2023-01-06 NOTE — ED PROVIDER NOTES
[place] ENCOUNTER          Pt Name: Saranya Goldberg  MRN: [id number]  [place] 1935  Date of evaluation: 1/6/2023    CHIEF COMPLAINT       Chief Complaint   Patient presents with    Emesis       Nurses Notes reviewed and I agree except as noted in the HPI. HISTORY OF PRESENT ILLNESS    Saranya Goldberg is a 80 y.o. pleasant female who presents to the emergency department for evaluation of nausea, vomiting, diarrhea, and abdominal discomfort. Patient reports that she has had nausea for the last week. She has been vomiting reportedly a small amount. She also has had diarrhea for the last week. She states she went to [facility]\" and that she was prescribed medication but does not feel that this is helping with her symptoms. She reports her abdomen \"hurts terrible\". She has been trying to eat but only drank a little boost and ate a little yogurt. She reports that she has been on an antibiotic recently due to a UTI and was supposed to see an infectious disease specialist on Monday. She started her antibiotic 5 days ago. Denies dysuria or hematuria. No fever. She describes her abdominal pain as being located in the epigastric area, no radiation. No exacerbating factors. She states initially her pain was alleviated by antacids but she had been told by her GI specialist to not take those in the future. She states she last had a fever on December 25, she tested positive for COVID on 26 December. Denies ongoing cough, sore throat, runny nose. She states that she is scheduled to have an outpatient CT scan and to have an EGD done on Monday. The patient has no other acute complaints at this time.         REVIEW OF SYSTEMS   Review of Systems    PAST MEDICAL AND SURGICAL HISTORY     Past Medical History:   Diagnosis Date    Anesthesia 2012    after hemorrhoid surgery felt like \"elephant on my chest\" heart cath done    CAD (coronary artery disease) 2012    Cancer (HCC)     cervical rectal    GERD (gastroesophageal reflux disease)     History of prolapse of bladder     Hyperlipidemia     Hypertension     Osteoarthritis     Shortness of breath 09/2017    UTI (urinary tract infection) 05/20/2017    [facility] in [place]     Past Surgical History:   Procedure Laterality Date    APPENDECTOMY  1969    CARDIAC CATHETERIZATION  8/2012    [facility]    COLONOSCOPY  5/25/12    Dr. Lozada     CONDYLOMA EXCISION Left 12/16/2021    LEFT PERIANAL BIOPSY performed by Be Yang MD at [facility] OR    CORONARY ANGIOPLASTY WITH STENT PLACEMENT  09/11/2017    HEMORRHOID SURGERY  1989, 08/17/2012    Anal exam, hemorroidectomy, excision perianal skin lesion. total of 3    HYSTERECTOMY (CERVIX STATUS UNKNOWN)  1966    OTHER SURGICAL HISTORY  1/3/2014    LEFT MEDIPORT-Dr. Yang and removed    RECTAL SURGERY      for cancer    SIGMOIDOSCOPY N/A 6/10/2019    SIGMOIDOSCOPY DIAGNOSTIC FLEXIBLE performed by Jermaine Calderón MD at [facility]       CURRENT MEDICATIONS     Current Facility-Administered Medications:     0.9 % sodium chloride infusion, , IntraVENous, Continuous, Michael Bo MD, Last Rate: 100 mL/hr at 01/06/23 1537, New Bag at 01/06/23 1537    Current Outpatient Medications:     ondansetron (ZOFRAN-ODT) 4 MG disintegrating tablet, Take 1 tablet by mouth 3 times daily as needed for Nausea or Vomiting, Disp: 21 tablet, Rfl: 0    isosorbide mononitrate (IMDUR) 60 MG extended release tablet, Take 1 tablet by mouth daily, Disp: 90 tablet, Rfl: 3    metoprolol tartrate (LOPRESSOR) 25 MG tablet, Take 1 tablet by mouth 2 times daily, Disp: 180 tablet, Rfl: 3    conjugated estrogens (PREMARIN) 0.625 MG/GM vaginal cream, Place 0.5 grams vaginally twice weekly., Disp: 1 Tube, Rfl: 0    lansoprazole (PREVACID) 30 MG delayed release capsule, Take 15 mg by mouth 2 times daily , Disp: , Rfl:     Polyethylene Glycol 3350 (MIRALAX PO), Take by mouth as needed, Disp: , Rfl:      nitroGLYCERIN (NITROSTAT) 0.4 MG SL tablet, Place 0.4 mg under the tongue every 5 minutes as needed for Chest pain up to max of 3 total doses. If no relief after 1 dose, call 911. , Disp: , Rfl:     hydrochlorothiazide (HYDRODIURIL) 25 MG tablet, Take 25 mg by mouth daily, Disp: , Rfl:     simvastatin (ZOCOR) 40 MG tablet, Take 0.5 tablets by mouth nightly, Disp: 45 tablet, Rfl: 3    pramipexole (MIRAPEX) 0.125 MG tablet, Take by mouth nightly 4 tab, Disp: , Rfl:     aspirin 81 MG EC tablet, Take 81 mg by mouth daily.   , Disp: , Rfl:     ALLERGIES     Allergies   Allergen Reactions    Other Other (See Comments)     Movi Prep,    Facial and lip swelling    Ciprofloxacin Nausea Only     Nausea and tingling in her fingers    Lipitor Swelling    Ramipril Swelling     tongue    Sulfa Antibiotics Rash       SOCIAL HISTORY     Social History     Social History Narrative    Not on file     Social History     Tobacco Use    Smoking status: Former     Packs/day: 1.00     Years: 12.00     Pack years: 12.00     Types: Cigarettes     Quit date: 1972     Years since quittin.4    Smokeless tobacco: Never   Vaping Use    Vaping Use: Never used   Substance Use Topics    Alcohol use: No    Drug use: No       FAMILY HISTORY     Family History   Problem Relation Age of Onset    Alzheimer's Disease Mother     Heart Disease Mother     Arthritis Father     Cancer Brother         esophagus    Heart Disease Brother     Heart Disease Sister     Heart Disease Brother         stent, pacemaker    Stroke Sister         hemorrhagic    Heart Disease Sister     Diabetes Maternal Aunt     Diabetes Maternal Aunt     High Blood Pressure Neg Hx     Miscarriages / Stillbirths Neg Hx        PHYSICAL EXAM     ED Triage Vitals [23 1340]   BP Temp Temp Source Heart Rate Resp SpO2 Height Weight   (!) 166/84 97.7 °F (36.5 °C) Oral 63 16 97 % 5' 4\" (1.626 m) 150 lb (68 kg)       Additional Vital Signs:  Vitals:    23 1538   BP: (!) 182/101 Pulse: 62   Resp: 16   Temp:    SpO2: 98%       CONSTITUTIONAL: [Awake, alert, non toxic, well developed, well nourished, no acute distress]  HEAD: [Normocephalic, atraumatic]  EYES: [Pupils equal, round & reactive to light, extraocular movements intact, no nystagmus, clear conjunctiva, non-icteric sclera]  ENT: [External ear canal clear without evidence of cerumen impaction or foreign body, TM's clear without erythema or bulging. Nares patent without drainage, septum appears midline. Moist mucus membranes, oropharynx clear without exudate, erythema, or mass. Uvula midline]  NECK: [Nontender and supple. No meningismus, no appreciated lymphadenopathy. Intact full range of motion. C-spine midline without vertebral tenderness. Trachea midline.]  CHEST: [Inspection normal, no lesions, equal rise. No crepitus or tenderness upon palpation.]  CARDIOVASCULAR: [Regular rate, rhythm, normal S1 and S2. No appreciated murmurs, rubs, or gallops. No pulse deficits appreciated. Intact distal perfusion. JVD not appreciated.]  PULMONARY: [Respiratory distress absent. Respiratory effort normal. Breath sounds clear to auscultation without rhonchi, rales, or wheezing. No accessory muscle use. No stridor]  ABDOMEN: [Inspection normal, without surgical scars. Soft, non-tender, non-distended, with normoactive bowel sounds. No palpable masses, rebound, or guarding]  BACK: [Intact ROM. No midline vertebral tenderness, step off, or crepitus. No CVA tenderness.]  MUSCULOSKELETAL: [Extremities nontender to palpation. No gross deformity or evidence of external trauma. Intact range of motion. Sensation intact. No clubbing, cyanosis, or edema.]  SKIN: [Warm, dry. No jaundice, rash, urticaria, or petechiae]  NEUROLOGIC: [Alert and oriented x 3, GCS 15, normal mentation for age. Moves all four extremities. No gross sensory deficit.  Cerebellar function grossly normal.]  PSYCHIATRIC: [Normal mood and affect, thought process is clear and linear] MEDICAL DECISION MAKING   Initial Assessment:   80-year-old female who presents for nausea, vomiting, diarrhea, recently was COVID-positive, also has had ongoing epigastric discomfort for the past week. She was started on antibiotics for UTI reportedly on an outpatient basis, also has been scheduled to see infectious disease and GI. Abdomen is soft, nontender, nondistended. Overall patient is nontoxic and well-appearing. Differential diagnosis includes but is not limited to dehydration, electrolyte disturbance,? Pancreatitis,? Hepatitis,? Gastritis,? COVID related    My imaging interpretation: (none if blank)    Decision rules/clinical scores utilized in MDM:       Plan:   Obtain CBC, chemistry, lipase, EKG, troponin, and CT scan of the abdomen and pelvis. Begin IV hydration in the emergency department. Continue to monitor patient on cardiac monitor. Danny Pulido PREVIOUS RECORDS     Previous visit summary and patient history available on EMR  and was reviewed. History obtained from chart review and the patient. Code Status: Available / Not available at time of initial evaluation  and reviewed. DIAGNOSTIC RESULTS     EKG: All EKG's are interpreted by the Emergency Department Physician who either signs or Co-signsthis chart in the absence of a cardiologist.  EKG shows sinus rhythm at a rate of 63 bpm, MN interval 214 ms, QRS duration 86 ms,  ms, no ST elevation or depression, my interpretation. RADIOLOGY: non-plain film images(s) such as CT,Ultrasound and MRI are read by the radiologist.    CT ABDOMEN PELVIS W IV CONTRAST Additional Contrast? None   Final Result      1. There is a thickened appearance throughout the transverse colon with some mild haziness in the adjacent fat. While this could be related to incomplete distention, mild/early diverticulitis could have a similar appearance. 2. Hepatic steatosis. 3. Large hiatal hernia.    4. A prominent lymph node is in the right breast. Further evaluation with dedicated mammogram is recommended on a nonemergent basis. **This report has been created using voice recognition software. It may contain minor errors which are inherent in voice recognition technology. **      Final report electronically signed by Dr Roxanna Harrison on 1/6/2023 4:13 PM        [] Visualized and interpreted by me   [x] Radiologist's Wet Read Report Reviewed   [] Discussed withRadiologist.    LABS:   Labs Reviewed   CBC WITH AUTO DIFFERENTIAL - Abnormal; Notable for the following components:       Result Value    WBC 4.1 (*)     Hematocrit 36.0 (*)     MCHC 36.9 (*)     Lymphocytes Absolute 0.7 (*)     All other components within normal limits   COMPREHENSIVE METABOLIC PANEL W/ REFLEX TO MG FOR LOW K - Abnormal; Notable for the following components:    Glucose 113 (*)     Sodium 116 (*)     Potassium reflex Magnesium 3.4 (*)     Chloride 78 (*)     All other components within normal limits   OSMOLALITY - Abnormal; Notable for the following components:    Osmolality Calc 238.5 (*)     All other components within normal limits   LIPASE   ANION GAP   GLOMERULAR FILTRATION RATE, ESTIMATED   MAGNESIUM   TROPONIN   SODIUM, URINE, RANDOM   OSMOLALITY, URINE       (Any cultures that may have been sent were not resulted at the time of this patient visit)    ED Medications administered this visit:   Medications   0.9 % sodium chloride infusion ( IntraVENous New Bag 1/6/23 1537)   iopamidol (ISOVUE-370) 76 % injection 80 mL (80 mLs IntraVENous Given 1/6/23 1600)         ED COURSE   Lab called with a critical sodium of 116, sodium calculation performed, normal saline ordered to start at 100 cc/h. Advised patient of hyponatremia and the need for admission for correction and further monitoring. She voices understanding and agreement with the plan.     CRITICAL CARE:   None    CONSULTS:  Hospitalist    PROCEDURES:  None    Shared decision making was performed with the patient and/or present family. Goals of care were discussed with patient and/or present family. Social determinants of health impacting treatment or disposition:  Not Applicable. The results of pertinent diagnostic studies and exam findings were discussed. The patients provisional diagnosis and plan of care were discussed with the patient and present family. The patient and/or present family expressed understanding of the diagnosis and plan. The risks of medications administered and prescribed were discussed with the patient and family present. MEDICATION CHANGES     New Prescriptions    No medications on file       CLINICAL IMPRESSION      1. Hyponatremia          DISPOSITION/PLAN     Final diagnoses:   Hyponatremia       Dispo: Admit     PATIENT REFERRED TO:  No follow-up provider specified. DISCHARGE MEDICATIONS:  New Prescriptions    No medications on file       (Please note that portions of this note were completed with a voice recognition program.  Efforts were made to edit the dictations but occasionally words are mis-transcribed.)    Provider:  I personally performed the services described in the documentation, reviewed and edited the documentation which was dictated, and it accurately records my words and actions.     Monika Greer MD 1/6/23 4:25 PM         Monika Greer MD  01/06/23 3384

## 2023-01-06 NOTE — ED NOTES
ED to inpatient nurses report    Chief Complaint   Patient presents with   • Emesis      Present to ED from home  LOC: alert and orientated to name, place, date  Vital signs   Vitals:    01/06/23 1340 01/06/23 1456 01/06/23 1538   BP: (!) 166/84 (!) 166/83 (!) 182/101   Pulse: 63 63 62   Resp: 16 18 16   Temp: 97.7 °F (36.5 °C)     TempSrc: Oral     SpO2: 97% 99% 98%   Weight: 150 lb (68 kg)     Height: 5' 4\" (1.626 m)        Oxygen Baseline RA    Current needs required RA Bipap/Cpap No  LDAs:   Peripheral IV 01/06/23 Right Forearm (Active)   Site Assessment Clean, dry & intact 01/06/23 1533   Line Status Infusing 01/06/23 1533   Phlebitis Assessment No symptoms 01/06/23 1533   Infiltration Assessment 0 01/06/23 1533   Dressing Status Clean, dry & intact 01/06/23 1533   Dressing Type Transparent 01/06/23 1349   Dressing Intervention New 01/06/23 1349     Mobility: Requires assistance * 1  Pending ED orders: complete  Present condition: Stable    Electronically signed by Estela Lundberg RN on 1/6/2023 at 4:10 PM     Estela Lundberg RN  01/06/23 6147

## 2023-01-06 NOTE — ED TRIAGE NOTES
Pt presents to the ER with c/o emesis, diarrhea, and abdominal pain for the last week. Pt went to her PCP who started her on zofran, carafate, and an abx. Pt is scheduled to have an endoscopy and ct scan next week. Pt did test positive for covid on 12/25. Pt rates her pain 8/10. Pt is alert and oriented, respirations even and unlabored.  VSS

## 2023-01-06 NOTE — TELEPHONE ENCOUNTER
Pt's urine culture with Pseudomonas. The only oral option for treatment is Cipro for which pt reports a side effect of nausea and tingling in her fingers. Please facilitate referral to infectious disease for possible IV antibiotic therapy. If pt has fever/chills, worsening symptoms she will need to present to ER.

## 2023-01-06 NOTE — ED NOTES
Pt resting in bed, respirations even and unlabored. Lights dimmed for comfort. No needs expressed at this time. Call light within reach.  VSS       [facility]w GutierrezWV[facility]  01/06/23 0997

## 2023-01-06 NOTE — TELEPHONE ENCOUNTER
Patient advised of the urine results and referral to ID. She voiced understanding to be evaluated in the ER if she develops fever, chills, or worsening of symptoms.     Please make referral.

## 2023-01-06 NOTE — ED NOTES
Pt ambulated to bathroom with help of this RN, tolerated well. EKG completed. Fluids started. Pt updated on admission process and status.  [facility] [place], [address] [place] [address]  01/06/23 7225

## 2023-01-06 NOTE — H&P
Medicine Admission History & Physical      Patient:  Otilia Smith  YOB: 1935  Date of Service: 1/6/2023  MRN: [id number]   Acct: [de-identified]   Primary Care Physician: Debbie Perales MD    Admitting Faculty MD: Nidhi Unger MD    Code Status: Prior Limited Code details: Intubation/Re-intubation No Comment; Defibrillation/Cardioversion No Comment; Chest Compressions No Comment; Resuscitative Medications No Comment; Other No Comment    Date of Service: Pt seen/examined in consultation on 1/6/2023     Assessment / Plan     Briefly, pt Otilia Smith is a 80 y.o. female with a PMH of coronary artery disease, cervical and rectal cancer, GERD, HLD, HTN, recurrent UTI who presented with worsening epigastric pain with associated nausea and diarrhea for the past week. Hyponatremia  - Noted in ED on 1/6/23  - Probable fluid depletion from diarrhea and vomiting and poor intake  - IV fluid repletion with saline in ED, continue   - STAT Na, monitor Na Q4H   - Urine osmolality   - Follow I/O    Diarrhea/Vomiting  - Consult GI   - GI panel ordered from stool  - Monitor magnesium AM labs  - Mag sulfate 2g IV   - CT scan obtained in ED  - Liquid diet     UTI  - Noted from urine culture taken on 1/4/23  - Culture positive for Pseudomonas aeruginosa   - IV cefepime 1g Q12H    CAD  - S/p cath 8/2012, coronary angioplasty with stenting 9/2017  - Continue Imdur, lopressor, aspirin   - Telemetry monitor    COVID  - Patient reported testing positive on 12/26/2022  - Currently has symptoms of nausea, diarrhea, vomiting.  - Currently denies any upper airway symptoms. No coughing, no shortness of breath.   - Was given Paxlovid by PCP, however stopped taking it after first dose due to pain in her chest after taking.  - Monitor     History of cervical rectal cancer        Fluids: normal saline  Electrolyte: Replete as needed   Nutrition: No diet orders on file  GI: Liquid diet  DVT ppx:  None due to bleeding risk  Lines/Tubes:                                            Implanted Venous Port (Single) (Active)        Peripheral IV 01/06/23 Right Forearm (Active)   Site Assessment Clean, dry & intact 01/06/23 1634   Line Status Infusing 01/06/23 1634   Phlebitis Assessment No symptoms 01/06/23 1634   Infiltration Assessment 0 01/06/23 1634   Dressing Status Clean, dry & intact 01/06/23 1634   Dressing Type Transparent 01/06/23 1349   Dressing Intervention New 01/06/23 1349                        Admit to: Inpatient 3B w/Tele    General Medicine History and Physical     History provided by: patient and son  History limited by: nothing  Patient presents from: home    Chief Complaint with Duration:  Diarrhea for one week    History of Present Illness:   Otilia Smith is a 80 y.o. female with a PMH of coronary artery disease, cervical and rectal cancer, GERD, HLD, HTN, recurrent UTI who presented with worsening epigastric pain with associated nausea and diarrhea for the past week. Patient states that she has vomited at times as well. Patient and son at bedside relate that patient tested positive for COVID on 12/26/2022 with the main symptom being abdominal discomfort and diarrhea. She reports that she went to a \"GI place,\" and was prescribed an unknown medication that she states did not help. Over the next several days she began to feel better, however she began noticing symptoms of UTI that she recognized and went to her doctor and was prescribed an antibiotic approximately 5 days ago. Noticed red bloody stool today. She denies any fever, dysuria, hematuria. She relates that her pain is mainly in the upper middle portion of her stomach. She denies any cough or shortness of breath. Emesis   Associated symptoms include abdominal pain and diarrhea. Pertinent negatives include no chest pain, chills, coughing, dizziness or fever. Summarized ED course: Initial vital signs included hypertension.  Labs notable for hyponatremia at 116. Abdominal CT scan obtained. Admission Labs:  CBC, BMP, troponin    Admission Diagnostics/Imaging:  CT abdomen  EKG: normal sinus rhythm, 1st degree AV block. Past Medical History Past Surgical History    has a past medical history of Anesthesia, CAD (coronary artery disease), Cancer ([facility] [address] GERD (gastroesophageal reflux disease), History of prolapse of bladder, Hyperlipidemia, Hypertension, Osteoarthritis, Shortness of breath, and UTI (urinary tract infection). has a past surgical history that includes Hemorrhoid surgery (1989, 08/17/2012); Colonoscopy (5/25/12); Hysterectomy (1966); Appendectomy (1969); other surgical history (1/3/2014); Rectal surgery; Cardiac catheterization (8/2012); Coronary angioplasty with stent (09/11/2017); sigmoidoscopy (N/A, 6/10/2019); and Condyloma Excision (Left, 12/16/2021). Social History Family History    reports that she quit smoking about 50 years ago. Her smoking use included cigarettes. She has a 12.00 pack-year smoking history. She has never used smokeless tobacco. She reports that she does not drink alcohol and does not use drugs. family history includes Alzheimer's Disease in her mother; Arthritis in her father; Cancer in her brother; Diabetes in her maternal aunt and maternal aunt; Heart Disease in her brother, brother, mother, sister, and sister; Stroke in her sister. Outpatient Medications   Current Outpatient Medications   Medication Instructions    aspirin 81 mg, DAILY    conjugated estrogens (PREMARIN) 0.625 MG/GM vaginal cream Place 0.5 grams vaginally twice weekly. hydroCHLOROthiazide (HYDRODIURIL) 25 mg, Oral, DAILY    isosorbide mononitrate (IMDUR) 60 mg, Oral, DAILY    lansoprazole (PREVACID) 15 mg, Oral, 2 TIMES DAILY    metoprolol tartrate (LOPRESSOR) 25 mg, Oral, 2 TIMES DAILY    nitroGLYCERIN (NITROSTAT) 0.4 mg, SubLINGual, EVERY 5 MIN PRN, up to max of 3 total doses. If no relief after 1 dose, call 911.     ondansetron (ZOFRAN-ODT) 4 mg, Oral, 3 TIMES DAILY PRN    Polyethylene Glycol 3350 (MIRALAX PO) Oral, PRN    pramipexole (MIRAPEX) 0.125 MG tablet Oral, NIGHTLY, 4 tab     simvastatin (ZOCOR) 20 mg, Oral, NIGHTLY        Allergies   Other, Ciprofloxacin, Lipitor, Ramipril, and Sulfa antibiotics     Immunizations     There is no immunization history on file for this patient. Review of Systems   Constitutional:  Positive for appetite change. Negative for chills and fever. HENT:  Negative for sore throat. Respiratory:  Negative for cough, chest tightness and shortness of breath. Cardiovascular:  Negative for chest pain and leg swelling. Gastrointestinal:  Positive for abdominal pain, blood in stool, diarrhea, nausea and vomiting. Genitourinary:  Negative for dysuria, hematuria and pelvic pain. Neurological:  Negative for dizziness and speech difficulty. Psychiatric/Behavioral:  Negative for confusion.   - negative except for the aforementioned    Objective     Vitals:  BP (!) 193/93   Pulse 64   Temp 97.7 °F (36.5 °C) (Oral)   Resp 18   Ht 5' 4\" (1.626 m)   Wt 150 lb (68 kg)   SpO2 98%   BMI 25.75 kg/m²     Physical Exam  Vitals reviewed. Constitutional:       General: She is not in acute distress. Appearance: She is ill-appearing. HENT:      Right Ear: External ear normal.      Left Ear: External ear normal.      Mouth/Throat:      Mouth: Mucous membranes are moist.   Eyes:      Extraocular Movements: Extraocular movements intact. Cardiovascular:      Pulses: Normal pulses. Heart sounds: Murmur heard. Pulmonary:      Effort: Pulmonary effort is normal.      Breath sounds: Normal breath sounds. Abdominal:      General: Abdomen is flat. Tenderness: There is abdominal tenderness. Musculoskeletal:         General: No swelling or tenderness. Skin:     General: Skin is warm and dry. Neurological:      Mental Status: She is alert and oriented to person, place, and time. Psychiatric:         Mood and Affect: Mood normal.         Thought Content:  Thought content normal.          Labs:   Results for orders placed or performed during the hospital encounter of 01/06/23   CBC with Auto Differential   Result Value Ref Range    WBC 4.1 (L) 4.8 - 10.8 thou/mm3    RBC 4.31 4.20 - 5.40 mill/mm3    Hemoglobin 13.3 12.0 - 16.0 gm/dl    Hematocrit 36.0 (L) 37.0 - 47.0 %    MCV 83.5 81.0 - 99.0 fL    MCH 30.9 26.0 - 33.0 pg    MCHC 36.9 (H) 32.2 - 35.5 gm/dl    RDW-CV 11.9 11.5 - 14.5 %    RDW-SD 36.2 35.0 - 45.0 fL    Platelets 432 885 - 760 thou/mm3    MPV 10.0 9.4 - 12.4 fL    Seg Neutrophils 66.7 %    Lymphocytes 18.0 %    Monocytes 13.2 %    Eosinophils 0.7 %    Basophils 0.2 %    Immature Granulocytes 1.2 %    Segs Absolute 2.7 1.8 - 7.7 thou/mm3    Lymphocytes Absolute 0.7 (L) 1.0 - 4.8 thou/mm3    Monocytes Absolute 0.5 0.4 - 1.3 thou/mm3    Eosinophils Absolute 0.0 0.0 - 0.4 thou/mm3    Basophils Absolute 0.0 0.0 - 0.1 thou/mm3    Immature Grans (Abs) 0.05 0.00 - 0.07 thou/mm3    nRBC 0 /100 wbc   Comprehensive Metabolic Panel w/ Reflex to MG   Result Value Ref Range    Glucose 113 (H) 70 - 108 mg/dL    Creatinine 0.7 0.4 - 1.2 mg/dL    BUN 21 7 - 22 mg/dL    Sodium 116 (LL) 135 - 145 meq/L    Potassium reflex Magnesium 3.4 (L) 3.5 - 5.2 meq/L    Chloride 78 (L) 98 - 111 meq/L    CO2 24 23 - 33 meq/L    Calcium 9.5 8.5 - 10.5 mg/dL    AST 29 5 - 40 U/L    Alkaline Phosphatase 68 38 - 126 U/L    Total Protein 7.8 6.1 - 8.0 g/dL    Albumin 4.5 3.5 - 5.1 g/dL    Total Bilirubin 0.5 0.3 - 1.2 mg/dL    ALT 22 11 - 66 U/L   Lipase   Result Value Ref Range    Lipase 38.7 5.6 - 51.3 U/L   Anion Gap   Result Value Ref Range    Anion Gap 14.0 8.0 - 16.0 meq/L   Glomerular Filtration Rate, Estimated   Result Value Ref Range    Est, Glom Filt Rate >60 >60 ml/min/1.73m2   Magnesium   Result Value Ref Range    Magnesium 1.7 1.6 - 2.4 mg/dL   Osmolality   Result Value Ref Range    Osmolality Calc 238.5 (L) 275.0 - 300.0 mOsmol/kg   Troponin   Result Value Ref Range    Troponin T < 0.010 ng/ml   EKG 12 Lead   Result Value Ref Range    Ventricular Rate 63 BPM    Atrial Rate 63 BPM    P-R Interval 214 ms    QRS Duration 86 ms    Q-T Interval 434 ms    QTc Calculation (Bazett) 444 ms    R Axis -17 degrees    T Axis 51 degrees       Diagnostics:  CT ABDOMEN PELVIS W IV CONTRAST Additional Contrast? None    Result Date: 1/6/2023  PROCEDURE: CT ABDOMEN PELVIS W IV CONTRAST CLINICAL INFORMATION: 80-year-old female with abdominal pain. Nausea and vomiting . COMPARISON: CT 4/14/2022 TECHNIQUE: 5 mm axial CT images were obtained through the abdomen and pelvis after the administration of intravenous and oral contrast. Coronal and sagittal reconstructions were obtained. All CT scans at this facility use dose modulation, iterative reconstruction, and/or weight-based dosing when appropriate to reduce radiation dose to as low as reasonably achievable. FINDINGS: There is a large hiatal hernia. There is some atelectasis near the left lung base. There is a prominent lymph node in the right breast on image #10 measuring 1 cm. The liver is hypoattenuated which may be due to fatty infiltration. There are some calcified granulomas in the spleen. The pancreas, gallbladder and adrenal glands are within normal limits. The kidneys are symmetric in size and shape. No hydronephrosis. There is no evidence of a small bowel obstruction. There are diverticula scattered throughout the colon. There is a somewhat thickened appearance throughout the transverse colon. There is a pessary device in the pelvis. The urinary bladder appears normal. The aorta contains atherosclerosis. There is no aneurysmal dilatation. There is no ascites or free air. There is levoconvex scoliosis of the lumbar spine. There are multilevel degenerative changes.      1. There is a thickened appearance throughout the transverse colon with some mild haziness in the adjacent fat. While this could be related to incomplete distention, mild/early diverticulitis could have a similar appearance. 2. Hepatic steatosis. 3. Large hiatal hernia. 4. A prominent lymph node is in the right breast. Further evaluation with dedicated mammogram is recommended on a nonemergent basis. **This report has been created using voice recognition software. It may contain minor errors which are inherent in voice recognition technology. ** Final report electronically signed by Dr Alvino Torres on 1/6/2023 4:13 PM      EKG:   As above    Micro:  MRSA, C-diff Rule Out    Signed:   Lyndon Mejia DPM PGY-1  Podiatric Medicine and Surgery Resident  01/06/23  4:49 PM    Staff: Cheri Brennan MD

## 2023-01-07 LAB
CORTISOL: 15.51 UG/DL
EKG ATRIAL RATE: 63 BPM
EKG P-R INTERVAL: 214 MS
EKG Q-T INTERVAL: 434 MS
EKG QRS DURATION: 86 MS
EKG QTC CALCULATION (BAZETT): 444 MS
EKG R AXIS: -17 DEGREES
EKG T AXIS: 51 DEGREES
EKG VENTRICULAR RATE: 63 BPM
MAGNESIUM: 1.5 MG/DL (ref 1.6–2.4)
MAGNESIUM: 2 MG/DL (ref 1.6–2.4)
OSMOLALITY URINE: 158 MOSMOL/KG (ref 250–750)
POTASSIUM SERPL-SCNC: 3.4 MEQ/L (ref 3.5–5.2)
SODIUM BLD-SCNC: 119 MEQ/L (ref 135–145)
SODIUM BLD-SCNC: 119 MEQ/L (ref 135–145)
SODIUM BLD-SCNC: 120 MEQ/L (ref 135–145)
SODIUM BLD-SCNC: 120 MEQ/L (ref 135–145)
SODIUM BLD-SCNC: 121 MEQ/L (ref 135–145)
SODIUM BLD-SCNC: 122 MEQ/L (ref 135–145)
SODIUM URINE: < 20 MEQ/L
T4 FREE: 1.79 NG/DL (ref 0.93–1.76)

## 2023-01-07 PROCEDURE — 84439 ASSAY OF FREE THYROXINE: CPT

## 2023-01-07 PROCEDURE — 51798 US URINE CAPACITY MEASURE: CPT

## 2023-01-07 PROCEDURE — 6360000002 HC RX W HCPCS

## 2023-01-07 PROCEDURE — 2580000003 HC RX 258: Performed by: INTERNAL MEDICINE

## 2023-01-07 PROCEDURE — 99232 SBSQ HOSP IP/OBS MODERATE 35: CPT | Performed by: INTERNAL MEDICINE

## 2023-01-07 PROCEDURE — A4216 STERILE WATER/SALINE, 10 ML: HCPCS

## 2023-01-07 PROCEDURE — 82533 TOTAL CORTISOL: CPT

## 2023-01-07 PROCEDURE — 83735 ASSAY OF MAGNESIUM: CPT

## 2023-01-07 PROCEDURE — 83935 ASSAY OF URINE OSMOLALITY: CPT

## 2023-01-07 PROCEDURE — 36415 COLL VENOUS BLD VENIPUNCTURE: CPT

## 2023-01-07 PROCEDURE — 2580000003 HC RX 258

## 2023-01-07 PROCEDURE — C9113 INJ PANTOPRAZOLE SODIUM, VIA: HCPCS

## 2023-01-07 PROCEDURE — 6370000000 HC RX 637 (ALT 250 FOR IP)

## 2023-01-07 PROCEDURE — 84132 ASSAY OF SERUM POTASSIUM: CPT

## 2023-01-07 PROCEDURE — 84300 ASSAY OF URINE SODIUM: CPT

## 2023-01-07 PROCEDURE — 2140000000 HC CCU INTERMEDIATE R&B

## 2023-01-07 PROCEDURE — 93010 ELECTROCARDIOGRAM REPORT: CPT | Performed by: NUCLEAR MEDICINE

## 2023-01-07 PROCEDURE — 84295 ASSAY OF SERUM SODIUM: CPT

## 2023-01-07 RX ORDER — MAGNESIUM SULFATE IN WATER 40 MG/ML
2000 INJECTION, SOLUTION INTRAVENOUS PRN
Status: DISCONTINUED | OUTPATIENT
Start: 2023-01-07 | End: 2023-01-11 | Stop reason: HOSPADM

## 2023-01-07 RX ORDER — SODIUM CHLORIDE 9 MG/ML
INJECTION, SOLUTION INTRAVENOUS CONTINUOUS
Status: DISCONTINUED | OUTPATIENT
Start: 2023-01-07 | End: 2023-01-08

## 2023-01-07 RX ORDER — POTASSIUM CHLORIDE 20 MEQ/1
40 TABLET, EXTENDED RELEASE ORAL PRN
Status: DISCONTINUED | OUTPATIENT
Start: 2023-01-07 | End: 2023-01-11 | Stop reason: HOSPADM

## 2023-01-07 RX ORDER — POTASSIUM CHLORIDE 7.45 MG/ML
10 INJECTION INTRAVENOUS PRN
Status: DISCONTINUED | OUTPATIENT
Start: 2023-01-07 | End: 2023-01-11 | Stop reason: HOSPADM

## 2023-01-07 RX ADMIN — SUCRALFATE 1 G: 1 TABLET ORAL at 20:55

## 2023-01-07 RX ADMIN — SODIUM CHLORIDE: 9 INJECTION, SOLUTION INTRAVENOUS at 12:50

## 2023-01-07 RX ADMIN — CEFEPIME 1000 MG: 1 INJECTION, POWDER, FOR SOLUTION INTRAMUSCULAR; INTRAVENOUS at 06:20

## 2023-01-07 RX ADMIN — ONDANSETRON 4 MG: 4 TABLET, ORALLY DISINTEGRATING ORAL at 16:47

## 2023-01-07 RX ADMIN — SODIUM CHLORIDE 40 MG: 9 INJECTION, SOLUTION INTRAMUSCULAR; INTRAVENOUS; SUBCUTANEOUS at 06:16

## 2023-01-07 RX ADMIN — ISOSORBIDE MONONITRATE 60 MG: 60 TABLET, EXTENDED RELEASE ORAL at 08:26

## 2023-01-07 RX ADMIN — SODIUM CHLORIDE 40 MG: 9 INJECTION, SOLUTION INTRAMUSCULAR; INTRAVENOUS; SUBCUTANEOUS at 20:53

## 2023-01-07 RX ADMIN — ACETAMINOPHEN 650 MG: 325 TABLET ORAL at 03:21

## 2023-01-07 RX ADMIN — SUCRALFATE 1 G: 1 TABLET ORAL at 06:16

## 2023-01-07 RX ADMIN — PRAMIPEXOLE DIHYDROCHLORIDE 0.12 MG: 0.25 TABLET ORAL at 20:55

## 2023-01-07 RX ADMIN — SODIUM CHLORIDE, PRESERVATIVE FREE 10 ML: 5 INJECTION INTRAVENOUS at 20:55

## 2023-01-07 RX ADMIN — MAGNESIUM SULFATE HEPTAHYDRATE 2000 MG: 40 INJECTION, SOLUTION INTRAVENOUS at 08:40

## 2023-01-07 RX ADMIN — METOPROLOL TARTRATE 25 MG: 25 TABLET, FILM COATED ORAL at 20:55

## 2023-01-07 RX ADMIN — SUCRALFATE 1 G: 1 TABLET ORAL at 11:19

## 2023-01-07 RX ADMIN — CEFEPIME 1000 MG: 1 INJECTION, POWDER, FOR SOLUTION INTRAMUSCULAR; INTRAVENOUS at 19:48

## 2023-01-07 RX ADMIN — METOPROLOL TARTRATE 25 MG: 25 TABLET, FILM COATED ORAL at 08:26

## 2023-01-07 RX ADMIN — SODIUM CHLORIDE: 9 INJECTION, SOLUTION INTRAVENOUS at 21:03

## 2023-01-07 RX ADMIN — SUCRALFATE 1 G: 1 TABLET ORAL at 16:47

## 2023-01-07 ASSESSMENT — PAIN SCALES - GENERAL
PAINLEVEL_OUTOF10: 0
PAINLEVEL_OUTOF10: 0

## 2023-01-07 NOTE — PROCEDURES
A Bladder scan was performed at 1735 . The residual amount was measured to be 234 ML. Report of results was given to [facility].

## 2023-01-07 NOTE — PLAN OF CARE
Problem: Discharge Planning  Goal: Discharge to home or other facility with appropriate resources  1/7/2023 1058 by Yulia Hays RN  Outcome: Progressing  Flowsheets (Taken 1/7/2023 1057)  Discharge to home or other facility with appropriate resources:   Identify barriers to discharge with patient and caregiver   Arrange for needed discharge resources and transportation as appropriate  Note: Patient planning to return home with family support upon discharge. Sodium 120, trending every 4 hours. Clear liquid diet. Magnesium replacement. Problem: Pain  Goal: Verbalizes/displays adequate comfort level or baseline comfort level  1/7/2023 1058 by Yulia Hays RN  Outcome: Progressing  Flowsheets (Taken 1/7/2023 1057)  Verbalizes/displays adequate comfort level or baseline comfort level:   Encourage patient to monitor pain and request assistance   Assess pain using appropriate pain scale   Administer analgesics based on type and severity of pain and evaluate response   Implement non-pharmacological measures as appropriate and evaluate response  Note: Ongoing assessment & interventions provided throughout shift. Reminded patient to report any pain, pressure, or shortness of breath to the nurse.        Problem: Skin/Tissue Integrity - Adult  Goal: Skin integrity remains intact  1/7/2023 1058 by Yulia Hays RN  Outcome: Progressing  Flowsheets (Taken 1/7/2023 1058)  Skin Integrity Remains Intact: Monitor for areas of redness and/or skin breakdown     Problem: Gastrointestinal - Adult  Goal: Minimal or absence of nausea and vomiting  1/7/2023 1058 by Yulia Hays RN  Outcome: Progressing  Flowsheets (Taken 1/7/2023 1058)  Minimal or absence of nausea and vomiting:   Administer IV fluids as ordered to ensure adequate hydration   Advance diet as tolerated, if ordered   Administer ordered antiemetic medications as needed  Note: Absence of nausea and vomitting thus far, tolerating clear liquid diet Problem: Metabolic/Fluid and Electrolytes - Adult  Goal: Electrolytes maintained within normal limits  1/7/2023 1058 by Clau Reyes RN  Outcome: Progressing  Flowsheets (Taken 1/7/2023 1058)  Electrolytes maintained within normal limits:   Monitor labs and assess patient for signs and symptoms of electrolyte imbalances   Administer electrolyte replacement as ordered   Monitor response to electrolyte replacements, including repeat lab results as appropriate     Care plan reviewed with patient. Patient verbalizes understanding of the care plan and contributed to goal setting.

## 2023-01-07 NOTE — PLAN OF CARE
Problem: Discharge Planning  Goal: Discharge to home or other facility with appropriate resources  Outcome: Progressing  Flowsheets (Taken 1/6/2023 2000)  Discharge to home or other facility with appropriate resources: Identify barriers to discharge with patient and caregiver     Problem: Pain  Goal: Verbalizes/displays adequate comfort level or baseline comfort level  Outcome: Progressing     Problem: Neurosensory - Adult  Goal: Absence of seizures  Outcome: Progressing  Flowsheets (Taken 1/6/2023 2000)  Absence of seizures: Monitor for seizure activity.   If seizure occurs, document type and location of movements and any associated apnea  Goal: Achieves maximal functionality and self care  Outcome: Progressing  Flowsheets (Taken 1/6/2023 2000)  Achieves maximal functionality and self care: Monitor swallowing and airway patency with patient fatigue and changes in neurological status     Problem: Cardiovascular - Adult  Goal: Maintains optimal cardiac output and hemodynamic stability  Outcome: Progressing  Flowsheets (Taken 1/6/2023 2000)  Maintains optimal cardiac output and hemodynamic stability: Monitor blood pressure and heart rate  Goal: Absence of cardiac dysrhythmias or at baseline  Outcome: Progressing  Flowsheets (Taken 1/6/2023 2000)  Absence of cardiac dysrhythmias or at baseline: Monitor cardiac rate and rhythm     Problem: Skin/Tissue Integrity - Adult  Goal: Skin integrity remains intact  Outcome: Progressing  Flowsheets (Taken 1/6/2023 2000)  Skin Integrity Remains Intact: Monitor for areas of redness and/or skin breakdown     Problem: Musculoskeletal - Adult  Goal: Return mobility to safest level of function  Outcome: Progressing  Flowsheets (Taken 1/6/2023 2000)  Return Mobility to Safest Level of Function: Assess patient stability and activity tolerance for standing, transferring and ambulating with or without assistive devices  Goal: Return ADL status to a safe level of function  Outcome: Progressing  Flowsheets (Taken 1/6/2023 2000)  Return ADL Status to a Safe Level of Function: Administer medication as ordered     Problem: Gastrointestinal - Adult  Goal: Minimal or absence of nausea and vomiting  Outcome: Progressing  Flowsheets (Taken 1/6/2023 2000)  Minimal or absence of nausea and vomiting: Administer IV fluids as ordered to ensure adequate hydration  Goal: Maintains or returns to baseline bowel function  Outcome: Progressing  Flowsheets (Taken 1/6/2023 2000)  Maintains or returns to baseline bowel function: Assess bowel function  Goal: Maintains adequate nutritional intake  Outcome: Progressing  Flowsheets (Taken 1/6/2023 2000)  Maintains adequate nutritional intake: Monitor percentage of each meal consumed     Problem: Genitourinary - Adult  Goal: Urinary catheter remains patent  Outcome: Progressing  Flowsheets (Taken 1/6/2023 2000)  Urinary catheter remains patent: Assess patency of urinary catheter     Problem: Infection - Adult  Goal: Absence of infection at discharge  Outcome: Progressing  Flowsheets (Taken 1/6/2023 2000)  Absence of infection at discharge: Assess and monitor for signs and symptoms of infection     Problem: Metabolic/Fluid and Electrolytes - Adult  Goal: Electrolytes maintained within normal limits  Outcome: Progressing  Flowsheets (Taken 1/6/2023 2000)  Electrolytes maintained within normal limits: Monitor labs and assess patient for signs and symptoms of electrolyte imbalances  Goal: Hemodynamic stability and optimal renal function maintained  Outcome: Progressing  Flowsheets (Taken 1/6/2023 2000)  Hemodynamic stability and optimal renal function maintained: Monitor labs and assess for signs and symptoms of volume excess or deficit     Problem: Hematologic - Adult  Goal: Maintains hematologic stability  Outcome: Progressing  Flowsheets (Taken 1/6/2023 2000)  Maintains hematologic stability: Assess for signs and symptoms of bleeding or hemorrhage

## 2023-01-07 NOTE — PROGRESS NOTES
Gastrointestinal Progress Note      Patient:  Bryant Jarvis  Unit/Bed:3B-22/022-A       YOB: 1935    MRN: [id number]                      Acct: [de-identified]     Admit date: 1/6/2023      Subjective: Patient sitting in her chair. States that she is supposed to have an appointment Monday morning with infectious disease due to recurrent urinary tract infections. Her son is in the area and he was the one that was going to bring her for her EGD on Monday afternoon. Objective:       CBC:   Recent Labs     01/06/23  1345   WBC 4.1*   HGB 13.3        BMP:    Recent Labs     01/06/23  1345 01/06/23  1652 01/06/23  2036 01/07/23  0106 01/07/23  0500   *   < > 118* 119* 121*   K 3.4*  --   --   --   --    CL 78*  --   --   --   --    CO2 24  --   --   --   --    BUN 21  --   --   --   --    CREATININE 0.7  --   --   --   --    GLUCOSE 113*  --   --   --   --     < > = values in this interval not displayed. Calcium:  Recent Labs     01/06/23  1345   CALCIUM 9.5     Ionized Calcium:No results for input(s): IONCA in the last 72 hours. Magnesium:  Recent Labs     01/07/23  0500   MG 1.5*     Phosphorus:No results for input(s): PHOS in the last 72 hours. INR: No results for input(s): INR in the last 72 hours. Hepatic:   Recent Labs     01/06/23  1345   ALKPHOS 68   ALT 22   AST 29   PROT 7.8   BILITOT 0.5   LABALBU 4.5     Amylase and Lipase:No results for input(s): LACTA, AMYLASE in the last 72 hours. Lactic Acid: No results for input(s): LACTA in the last 72 hours. Physical Exam:  Vitals:    01/07/23 0815   BP: 121/60   Pulse: 65   Resp: 16   Temp: 97.8 °F (36.6 °C)   SpO2: 97%     GEN: Well nourished, well developed. Looks stated age  LUNGS:  Clear to auscultation bilaterally. Chest rises equally on inspiration. CARDIOVASCULAR:  Regular rate and rhythm without murmurs, rubs or gallops. ABDOMEN:  Soft, nontender and nondistended with normal bowel sounds.     HEAD: Normal cephalic/atraumatic. NECK:  Neck supple. Trachea midline      UPPER EXTREMITIES:  No cyanosis, clubbing, or edema. DERM:  No rash or jaundice.  + Tan  NEURO:  Alert and oriented x4. Patient moves all extremities and has gross sensation in all extremities. Medications:  Scheduled Meds:   pantoprazole (PROTONIX) 40 mg injection  40 mg IntraVENous Q12H    cefepime  1,000 mg IntraVENous Q12H    aspirin  81 mg Oral Daily    isosorbide mononitrate  60 mg Oral Daily    metoprolol tartrate  25 mg Oral BID    pramipexole  0.125 mg Oral Nightly    sodium chloride flush  5-40 mL IntraVENous 2 times per day    sucralfate  1 g Oral 4x Daily AC & HS     Continuous Infusions:   sodium chloride       PRN Meds:sodium chloride flush, sodium chloride, ondansetron **OR** ondansetron, acetaminophen **OR** acetaminophen, magnesium sulfate              Assessment:  Nausea with vomiting  Diarrhea: 2 bowel movements daily with no black or bloody stools  Abdominal discomfort; epigastric  Abnormal CT imaging this admission; thickened appearance and transverse colon could be related to incomplete distention, mild early diverticulitis. Believed to be false positive  Recent positive COVID  Hyponatremia; 124  Urine culture showed Pseudomonas aeruginosa  Comorbidities include coronary artery disease, cervical cancer, GERD, history of prolapsed bladder, hypertension, osteoarthritis. Plan:  Consultation note by DR Aryan Castro reviewed. Patient was placed on Carafate by a nurse practitioner at his office. Patient was only drinking some boost and eating a little bit of yogurt. Recently on antibiotics for urinary tract infection and was supposed to see infectious disease this coming Monday. Carafate to be continued 3 times a day  Stool for C. Difficile; still needs to be collected  Protonix 40 mg twice daily  Antibiotics; Maxipime  Advance diet  Antiemetics;  Zofran  If worsening symptoms, consider endoscopy with therapeutic intent. Patient states that she has infectious disease appointment Monday morning for recurrent urinary tract infections  Is scheduled for EGD this Monday at 1545 if discharged, recommend she go to this   I will order n.p.o. Juan night at midnight in case she is able to keep the Monday appointment      Any concerns over the weekend, please page DR Yong Pascual      Hospitalist/Attending provider notes, consulting physician notes, laboratory results and procedure notes reviewed prior to seeing the patient. Note done in collaboration with DR Yong Pascual.    Electronically signed by ZACHARY Aguilar CNP on 1/7/23 at 9:03 AM EST

## 2023-01-07 NOTE — CONSULTS
[address] [place] [address]                                  CONSULTATION    PATIENT NAME: Ivone Powell                    :        1935  MED REC NO:   [id number]                           ROOM:       [id number]  ACCOUNT NO:   [de-identified]                           ADMIT DATE: 2023  PROVIDER:     Annelise Richardson M.D.      Estephania Punt:  2023    REASON FOR CONSULTATION:  For further evaluation of nausea, vomiting,  and diarrhea. HISTORY OF PRESENT ILLNESS:  The patient is an 63-year-old pleasant  female who presented to the emergency department for further evaluation  of nausea, vomiting, diarrhea, and abdominal discomfort. The patient  had past medical history of coronary artery disease, cervical cancer,  gastroesophageal reflux disease, history of prolapsed bladder,  hyperlipidemia, hypertension, osteoarthritis, shortness of breath,  urinary tract infection. She was seen by my nurse practitioner at the  office. The patient was placed on Carafate, continued on proton pump  inhibitors for abdominal discomfort. She states that medicine is not  working and her pain \"hurts terrible. \"  The patient is also trying to  eat and drink only little Boost and little yogurt. She has been on  antibiotics recently due to UTI and supposed to see Infectious Disease  specialist on Monday. Denied any dysuria or hematuria. The abdominal  pain is epigastric pain, nonradiating pain, no exacerbating factors. Pain was elevated by antacids. She presented to the emergency room and  complains of two bowel movements daily. Denied any blood in the stools  or black stools. The patient's sodium was 115.   CT scan of the abdomen  and pelvis was done which was reported by the radiologist as thickened  appearance throughout the transverse colon with some mild haziness in  the adjacent fat which could be related to incomplete distention, mild  early diverticulitis could have similar appearance of hepatic steatosis,  large hiatal hernia, prominent lymph node in the right breast.  Glucose  113, creatinine 0.7, BUN 21, sodium 116, potassium 3.4, chloride 78, CO2  of 24, calcium 9.5, AST 29, alkaline phosphatase 68, total protein 7.8,  albumin 4.5, ALT 22. WBC 4.1, hemoglobin 13.3, hematocrit 36, MCV 83.5,  platelet count 688,598 and she is undergoing further evaluation. PAST MEDICAL HISTORY:  Coronary artery disease, cervical cancer,  gastroesophageal reflux disease, prolapsed bladder, hyperlipidemia,  hypertension, osteoarthritis, urinary tract infection. PAST SURGICAL HISTORY:  Appendectomy, colonoscopy, left perineal biopsy,  coronary angioplasty with stent placement, hemorrhoidectomy,  hysterectomy, left MediPort placed and removed, rectal surgery for  cancer, sigmoidoscopy. ALLERGIES:  MOVIPREP, CIPROFLOXACIN, LIPITOR, RAMIPRIL, SULFA DRUGS. CURRENT MEDICATIONS:  Ondansetron 4 mg disintegrating tablet one tablet  by mouth three times daily as needed for nausea and vomiting, isosorbide  mononitrate, metoprolol tartrate, conjugated estrogen, lansoprazole,  polyethylene glycol, nitroglycerin/Nitrostat 0.4 mg sublingual as  needed, hydrochlorothiazide, simvastatin, pramipexole, aspirin. SOCIAL HISTORY:  History of tobacco, quit 08/08/1972. No alcohol or  illicit drug use. FAMILY HISTORY:  Mother had heart disease, Alzheimer's disease. Father  had arthritis. Brother had esophageal cancer. Brother had heart  disease. REVIEW OF SYSTEMS:  A 14-point review of systems was reviewed. Pertinent positives were mentioned in the HPI and past medical history,  otherwise noncontributory. PHYSICAL EXAMINATION:  Vital Signs:  Temperature 97.7, pulse 63, respiratory rate 16, blood  pressure 182/101. General:  An 80-year-old female lying in bed, well built, appears to be  in no acute distress. HEENT:  Normocephalic, atraumatic.   Pupils are equal, round, and  reactive to light. Anicteric sclerae. No erythema of oropharynx. Neck:  Supple. No JVD. No thyromegaly. Chest:  No axillary or supraclavicular adenopathy. Lungs are equal to  expansion on inspection. Corita Frances air entry bilaterally. Heart:  Regular. Normal S1 and S2. No S3. Abdomen:  Soft. Normoactive bowel sounds. Nontender. No palpable  masses. No appreciable hernia. Rectal:  Deferred. Extremities:  No clubbing, cyanosis, or edema. Skin:  No skin rash. DIAGNOSTIC DATA:  As in HPI. IMPRESSION:  An 80year-old presents for nausea, vomiting, diarrhea,  recently COVID positive, most likely the patient had gastrointestinal  manifestation of COVID and false positive CT scan with thickening and  also certainly rule out C. diff and the patient may have non-ulcer  dyspepsia. RECOMMENDATIONS:  My recommendation is to place the patient on Carafate  three times a day, check the stool for C. diff, IV fluids, Protonix 40  mg p.o. daily, advance diet and follow clinically if any worsening of  symptoms, then consider endoscopy with therapeutic intent. Thank you Dr. Tirso Mata for letting me see this patient. Do not hesitate  to call me if you have any questions. My recommendations are above. Shannon Nguyen M.D.    D: 01/06/2023 18:14:30       T: 01/06/2023 18:20:02     [email or web address]  Job#: [id number]     Doc#: [id number]    CC:  MARCE Cat M.D.

## 2023-01-07 NOTE — PROGRESS NOTES
Assessment and Plan:        Hyponatremia- gradually improving:  continue slow IV fluids. Abdominal pain- improved. Continue rx  Uti - Pseudomonas- check PVR. To see ID and Urology in near future. On cefepime; appears not not have tolerated cipro po very well. Had E coli several yrs ago. CC:  abdominal pain  HPI: pt with hbp, utis, prsenting with diarrhea and emesis. Noted to be markedly hyponatremic, likely a combination of above plus thiazide. Has had recent sxs of uti with dysuria. ROS (12 point review of systems completed. Pertinent positives noted.  Otherwise ROS is negative) :   PMH:  Per HPI  SHX:  , former smoker  FHX: Noncontributory      Allergies: See above    Medications:     sodium chloride        pantoprazole (PROTONIX) 40 mg injection  40 mg IntraVENous Q12H    cefepime  1,000 mg IntraVENous Q12H    aspirin  81 mg Oral Daily    isosorbide mononitrate  60 mg Oral Daily    metoprolol tartrate  25 mg Oral BID    pramipexole  0.125 mg Oral Nightly    sodium chloride flush  5-40 mL IntraVENous 2 times per day    sucralfate  1 g Oral 4x Daily AC & HS       Vital Signs:   /60   Pulse 57   Temp 97.7 °F (36.5 °C) (Oral)   Resp 18   Ht 5' 4\" (1.626 m)   Wt 132 lb (59.9 kg)   SpO2 97%   BMI 22.66 kg/m²      Intake/Output Summary (Last 24 hours) at 1/7/2023 1157  Last data filed at 1/7/2023 1036  Gross per 24 hour   Intake 360 ml   Output 650 ml   Net -290 ml        Physical Examination: General appearance - chronically ill appearing  Mental status - alert, oriented to person, place, and time  Neck - supple, no significant adenopathy, no JVD, or carotid bruits  Chest - clear to auscultation, no wheezes, rales or rhonchi, symmetric air entry  Heart - normal rate and regular rhythm, systolic murmur 2/6 at 2nd left intercostal space and at lower left sternal border  Abdomen - soft, nontender, nondistended, no masses or organomegaly  Neurological - screening mental status exam normal  Musculoskeletal - no muscular tenderness noted  Extremities - no pedal edema noted  Skin - normal coloration and turgor, no rashes, no suspicious skin lesions noted    Data: (All radiographs, tracings, PFTs, and imaging are personally viewed and interpreted unless otherwise noted).          Electronically signed by Heriberto Pena MD on 1/7/2023 at 11:57 AM

## 2023-01-08 ENCOUNTER — APPOINTMENT (OUTPATIENT)
Dept: ULTRASOUND IMAGING | Age: 88
DRG: 641 | End: 2023-01-08
Payer: MEDICARE

## 2023-01-08 PROBLEM — N30.00 ACUTE CYSTITIS WITHOUT HEMATURIA: Status: ACTIVE | Noted: 2023-01-08

## 2023-01-08 LAB
ADENOVIRUS F 40 41 PCR: NOT DETECTED
ANION GAP SERPL CALCULATED.3IONS-SCNC: 12 MEQ/L (ref 8–16)
ASTROVIRUS PCR: NOT DETECTED
BUN BLDV-MCNC: 18 MG/DL (ref 7–22)
CALCIUM SERPL-MCNC: 9.1 MG/DL (ref 8.5–10.5)
CAMPYLOBACTER PCR: NOT DETECTED
CHLORIDE BLD-SCNC: 88 MEQ/L (ref 98–111)
CLOSTRIDIUM DIFFICILE, PCR: NOT DETECTED
CO2: 25 MEQ/L (ref 23–33)
CREAT SERPL-MCNC: 0.8 MG/DL (ref 0.4–1.2)
CRYPTOSPORIDIUM PCR: NOT DETECTED
CYCLOSPORA CAYETANENSIS PCR: NOT DETECTED
E COLI 0157 PCR: NORMAL
E COLI ENTEROAGGREGATIVE PCR: NOT DETECTED
E COLI ENTEROPATHOGENIC PCR: NOT DETECTED
E COLI ENTEROTOXIGENIC PCR: NOT DETECTED
E COLI SHIGA LIKE TOXIN PCR: NOT DETECTED
E COLI SHIGELLA/ENTEROINVASIVE PCR: NOT DETECTED
E HISTOLYTICA GI FILM ARRAY: NOT DETECTED
GFR SERPL CREATININE-BSD FRML MDRD: > 60 ML/MIN/1.73M2
GIARDIA LAMBLIA PCR: NOT DETECTED
GLUCOSE BLD-MCNC: 98 MG/DL (ref 70–108)
MAGNESIUM: 2.1 MG/DL (ref 1.6–2.4)
NOROVIRUS GI GII PCR: NOT DETECTED
PLESIOMONAS SHIGELLOIDES PCR: NOT DETECTED
POTASSIUM SERPL-SCNC: 4.1 MEQ/L (ref 3.5–5.2)
ROTAVIRUS A PCR: NOT DETECTED
SALMONELLA PCR: NOT DETECTED
SAPOVIRUS PCR: NOT DETECTED
SODIUM BLD-SCNC: 125 MEQ/L (ref 135–145)
SODIUM BLD-SCNC: 125 MEQ/L (ref 135–145)
SODIUM BLD-SCNC: 127 MEQ/L (ref 135–145)
SODIUM BLD-SCNC: 127 MEQ/L (ref 135–145)
VIBRIO CHOLERAE PCR: NOT DETECTED
VIBRIO PCR: NOT DETECTED
YERSINIA ENTEROCOLITICA PCR: NOT DETECTED

## 2023-01-08 PROCEDURE — 36415 COLL VENOUS BLD VENIPUNCTURE: CPT

## 2023-01-08 PROCEDURE — 87507 IADNA-DNA/RNA PROBE TQ 12-25: CPT

## 2023-01-08 PROCEDURE — 83735 ASSAY OF MAGNESIUM: CPT

## 2023-01-08 PROCEDURE — 84295 ASSAY OF SERUM SODIUM: CPT

## 2023-01-08 PROCEDURE — 76770 US EXAM ABDO BACK WALL COMP: CPT

## 2023-01-08 PROCEDURE — 2580000003 HC RX 258

## 2023-01-08 PROCEDURE — 80048 BASIC METABOLIC PNL TOTAL CA: CPT

## 2023-01-08 PROCEDURE — 99232 SBSQ HOSP IP/OBS MODERATE 35: CPT | Performed by: INTERNAL MEDICINE

## 2023-01-08 PROCEDURE — C9113 INJ PANTOPRAZOLE SODIUM, VIA: HCPCS

## 2023-01-08 PROCEDURE — 6360000002 HC RX W HCPCS

## 2023-01-08 PROCEDURE — A4216 STERILE WATER/SALINE, 10 ML: HCPCS

## 2023-01-08 PROCEDURE — 6370000000 HC RX 637 (ALT 250 FOR IP)

## 2023-01-08 PROCEDURE — 2140000000 HC CCU INTERMEDIATE R&B

## 2023-01-08 PROCEDURE — 6370000000 HC RX 637 (ALT 250 FOR IP): Performed by: INTERNAL MEDICINE

## 2023-01-08 RX ORDER — SODIUM CHLORIDE 1000 MG
1 TABLET, SOLUBLE MISCELLANEOUS
Status: DISCONTINUED | OUTPATIENT
Start: 2023-01-08 | End: 2023-01-11 | Stop reason: HOSPADM

## 2023-01-08 RX ORDER — HYDROXYZINE PAMOATE 25 MG/1
25 CAPSULE ORAL NIGHTLY PRN
Status: DISCONTINUED | OUTPATIENT
Start: 2023-01-08 | End: 2023-01-11 | Stop reason: HOSPADM

## 2023-01-08 RX ORDER — PANTOPRAZOLE SODIUM 40 MG/1
40 TABLET, DELAYED RELEASE ORAL
Status: DISCONTINUED | OUTPATIENT
Start: 2023-01-08 | End: 2023-01-09

## 2023-01-08 RX ORDER — LANOLIN ALCOHOL/MO/W.PET/CERES
6 CREAM (GRAM) TOPICAL
Status: DISCONTINUED | OUTPATIENT
Start: 2023-01-08 | End: 2023-01-11 | Stop reason: HOSPADM

## 2023-01-08 RX ORDER — PRAMIPEXOLE DIHYDROCHLORIDE 1 MG/1
1 TABLET ORAL NIGHTLY
Status: DISCONTINUED | OUTPATIENT
Start: 2023-01-08 | End: 2023-01-11 | Stop reason: HOSPADM

## 2023-01-08 RX ADMIN — Medication 6 MG: at 21:35

## 2023-01-08 RX ADMIN — CEFEPIME 1000 MG: 1 INJECTION, POWDER, FOR SOLUTION INTRAMUSCULAR; INTRAVENOUS at 06:49

## 2023-01-08 RX ADMIN — CEFEPIME 1000 MG: 1 INJECTION, POWDER, FOR SOLUTION INTRAMUSCULAR; INTRAVENOUS at 18:56

## 2023-01-08 RX ADMIN — PANTOPRAZOLE SODIUM 40 MG: 40 TABLET, DELAYED RELEASE ORAL at 16:56

## 2023-01-08 RX ADMIN — SUCRALFATE 1 G: 1 TABLET ORAL at 16:56

## 2023-01-08 RX ADMIN — SUCRALFATE 1 G: 1 TABLET ORAL at 11:37

## 2023-01-08 RX ADMIN — HYDROXYZINE PAMOATE 25 MG: 25 CAPSULE ORAL at 21:35

## 2023-01-08 RX ADMIN — SODIUM CHLORIDE, PRESERVATIVE FREE 10 ML: 5 INJECTION INTRAVENOUS at 21:36

## 2023-01-08 RX ADMIN — SUCRALFATE 1 G: 1 TABLET ORAL at 21:35

## 2023-01-08 RX ADMIN — PRAMIPEXOLE DIHYDROCHLORIDE 1 MG: 1 TABLET ORAL at 21:38

## 2023-01-08 RX ADMIN — SODIUM CHLORIDE 1 G: 1 TABLET ORAL at 21:35

## 2023-01-08 RX ADMIN — METOPROLOL TARTRATE 25 MG: 25 TABLET, FILM COATED ORAL at 07:28

## 2023-01-08 RX ADMIN — METOPROLOL TARTRATE 25 MG: 25 TABLET, FILM COATED ORAL at 21:35

## 2023-01-08 RX ADMIN — SUCRALFATE 1 G: 1 TABLET ORAL at 06:44

## 2023-01-08 RX ADMIN — SODIUM CHLORIDE 40 MG: 9 INJECTION, SOLUTION INTRAMUSCULAR; INTRAVENOUS; SUBCUTANEOUS at 06:44

## 2023-01-08 RX ADMIN — ISOSORBIDE MONONITRATE 60 MG: 60 TABLET, EXTENDED RELEASE ORAL at 07:28

## 2023-01-08 ASSESSMENT — PAIN SCALES - GENERAL
PAINLEVEL_OUTOF10: 0
PAINLEVEL_OUTOF10: 0

## 2023-01-08 NOTE — PLAN OF CARE
Problem: Discharge Planning  Goal: Discharge to home or other facility with appropriate resources  1/8/2023 1357 by Renetta Flores RN  Outcome: Progressing  Flowsheets (Taken 1/8/2023 1355)  Discharge to home or other facility with appropriate resources:   Identify barriers to discharge with patient and caregiver   Arrange for needed discharge resources and transportation as appropriate  Note: Sodium increase to 127. Renal ultrasound today. Watching sodium overnight, probable discharge in the morning. Patient returning home with family support available. Problem: Pain  Goal: Verbalizes/displays adequate comfort level or baseline comfort level  1/8/2023 1357 by Renetta Flores RN  Outcome: Progressing  Flowsheets (Taken 1/8/2023 1355)  Verbalizes/displays adequate comfort level or baseline comfort level: Encourage patient to monitor pain and request assistance     Problem: Skin/Tissue Integrity - Adult  Goal: Skin integrity remains intact  1/8/2023 1357 by Renetta Flores RN  Outcome: Progressing  Flowsheets (Taken 1/8/2023 1355)  Skin Integrity Remains Intact: Monitor for areas of redness and/or skin breakdown     Problem: Gastrointestinal - Adult  Goal: Minimal or absence of nausea and vomiting  1/8/2023 1357 by Renetta Flores RN  Outcome: Progressing  Flowsheets (Taken 1/8/2023 1355)  Minimal or absence of nausea and vomiting:   Administer IV fluids as ordered to ensure adequate hydration   Advance diet as tolerated, if ordered   Provide nonpharmacologic comfort measures as appropriate   Administer ordered antiemetic medications as needed  Note: Patient tolerated breakfast and lunch with minimal nausea.      Problem: Infection - Adult  Goal: Absence of infection at discharge  1/8/2023 1357 by Renetta Flores RN  Outcome: Progressing  Flowsheets (Taken 1/8/2023 1355)  Absence of infection at discharge:   Assess and monitor for signs and symptoms of infection   Administer medications as ordered  Note: IV Cefepime for UTI     Care plan reviewed with patient. Patient verbalizes understanding of the care plan and contributed to goal setting.

## 2023-01-08 NOTE — PROGRESS NOTES
Assessment and Plan:        Hyponatremia- gradually improving:  stop IV fluids. Abdominal pain- improved. Continue rx  Uti - Pseudomonas- checked PVR,not very great. To see ID and Urology in near future. On cefepime; appears not not have tolerated cipro po very well. Had E coli several yrs ago. Will do US kidneys      CC:  abdominal pain  HPI: pt with hbp, utis, prsenting with diarrhea and emesis. Noted to be markedly hyponatremic, likely a combination of above plus thiazide. Has had recent sxs of uti with dysuria. ROS (12 point review of systems completed. Pertinent positives noted.  Otherwise ROS is negative) :   PMH:  Per HPI  SHX:  , former smoker  FHX: Noncontributory      Allergies: See above    Medications:     sodium chloride        pantoprazole  40 mg Oral BID AC    cefepime  1,000 mg IntraVENous Q12H    aspirin  81 mg Oral Daily    isosorbide mononitrate  60 mg Oral Daily    metoprolol tartrate  25 mg Oral BID    pramipexole  0.125 mg Oral Nightly    sodium chloride flush  5-40 mL IntraVENous 2 times per day    sucralfate  1 g Oral 4x Daily AC & HS       Vital Signs:   /70   Pulse 65   Temp 97.7 °F (36.5 °C) (Oral)   Resp 16   Ht 5' 4\" (1.626 m)   Wt 134 lb 11.2 oz (61.1 kg)   SpO2 97%   BMI 23.12 kg/m²      Intake/Output Summary (Last 24 hours) at 1/8/2023 1001  Last data filed at 1/8/2023 0945  Gross per 24 hour   Intake 1870.84 ml   Output 1450 ml   Net 420.84 ml        Physical Examination: General appearance - chronically ill appearing  Mental status - alert, oriented to person, place, and time  Neck - supple, no significant adenopathy, no JVD, or carotid bruits  Chest - clear to auscultation, no wheezes, rales or rhonchi, symmetric air entry  Heart - normal rate and regular rhythm, systolic murmur 2/6 at 2nd left intercostal space and at lower left sternal border  Abdomen - soft, nontender, nondistended, no masses or organomegaly  Neurological - screening mental status exam normal  Musculoskeletal - no muscular tenderness noted  Extremities - no pedal edema noted  Skin - normal coloration and turgor, no rashes, no suspicious skin lesions noted    Data: (All radiographs, tracings, PFTs, and imaging are personally viewed and interpreted unless otherwise noted).          Electronically signed by Nicolas Tong MD on 1/8/2023 at 10:01 AM

## 2023-01-08 NOTE — PLAN OF CARE
Problem: Discharge Planning  Goal: Discharge to home or other facility with appropriate resources  Outcome: Progressing  Flowsheets (Taken 1/7/2023 2045)  Discharge to home or other facility with appropriate resources:   Identify barriers to discharge with patient and caregiver   Arrange for needed discharge resources and transportation as appropriate   Identify discharge learning needs (meds, wound care, etc)   Refer to discharge planning if patient needs post-hospital services based on physician order or complex needs related to functional status, cognitive ability or social support system     Problem: Pain  Goal: Verbalizes/displays adequate comfort level or baseline comfort level  Outcome: Progressing  Flowsheets (Taken 1/7/2023 2045)  Verbalizes/displays adequate comfort level or baseline comfort level:   Encourage patient to monitor pain and request assistance   Assess pain using appropriate pain scale   Administer analgesics based on type and severity of pain and evaluate response   Implement non-pharmacological measures as appropriate and evaluate response   Consider cultural and social influences on pain and pain management   Notify Licensed Independent Practitioner if interventions unsuccessful or patient reports new pain     Problem: Neurosensory - Adult  Goal: Absence of seizures  Outcome: Progressing  Flowsheets (Taken 1/7/2023 2045)  Absence of seizures: Monitor for seizure activity.   If seizure occurs, document type and location of movements and any associated apnea  Goal: Achieves maximal functionality and self care  Outcome: Progressing  Flowsheets (Taken 1/7/2023 2045)  Achieves maximal functionality and self care:   Monitor swallowing and airway patency with patient fatigue and changes in neurological status   Encourage and assist patient to increase activity and self care with guidance from physical therapy/occupational therapy     Problem: Cardiovascular - Adult  Goal: Maintains optimal cardiac output and hemodynamic stability  Outcome: Progressing  Flowsheets (Taken 1/7/2023 2045)  Maintains optimal cardiac output and hemodynamic stability:   Monitor blood pressure and heart rate   Monitor urine output and notify Licensed Independent Practitioner for values outside of normal range   Assess for signs of decreased cardiac output   Administer vasoactive medications as ordered  Goal: Absence of cardiac dysrhythmias or at baseline  Outcome: Progressing  Flowsheets (Taken 1/7/2023 2045)  Absence of cardiac dysrhythmias or at baseline:   Monitor cardiac rate and rhythm   Assess for signs of decreased cardiac output     Problem: Skin/Tissue Integrity - Adult  Goal: Skin integrity remains intact  Outcome: Progressing  Flowsheets  Taken 1/8/2023 0456  Skin Integrity Remains Intact:   Monitor for areas of redness and/or skin breakdown   Assess vascular access sites hourly  Taken 1/7/2023 2045  Skin Integrity Remains Intact:   Monitor for areas of redness and/or skin breakdown   Assess vascular access sites hourly     Problem: Musculoskeletal - Adult  Goal: Return mobility to safest level of function  Outcome: Progressing  Flowsheets (Taken 1/7/2023 2045)  Return Mobility to Safest Level of Function:   Assess patient stability and activity tolerance for standing, transferring and ambulating with or without assistive devices   Assist with transfers and ambulation using safe patient handling equipment as needed   Instruct patient/family in ordered activity level  Goal: Return ADL status to a safe level of function  Outcome: Progressing  Flowsheets (Taken 1/7/2023 2045)  Return ADL Status to a Safe Level of Function:   Administer medication as ordered   Assess activities of daily living deficits and provide assistive devices as needed   Assist and instruct patient to increase activity and self care as tolerated     Problem: Gastrointestinal - Adult  Goal: Minimal or absence of nausea and vomiting  Outcome: Progressing  Flowsheets (Taken 1/7/2023 2045)  Minimal or absence of nausea and vomiting:   Administer IV fluids as ordered to ensure adequate hydration   Provide nonpharmacologic comfort measures as appropriate  Goal: Maintains or returns to baseline bowel function  Outcome: Progressing  Flowsheets (Taken 1/7/2023 2045)  Maintains or returns to baseline bowel function:   Assess bowel function   Administer IV fluids as ordered to ensure adequate hydration   Administer ordered medications as needed   Encourage mobilization and activity  Goal: Maintains adequate nutritional intake  Outcome: Progressing  Flowsheets (Taken 1/7/2023 2045)  Maintains adequate nutritional intake: Monitor intake and output, weight and lab values     Problem: Genitourinary - Adult  Goal: Urinary catheter remains patent  Outcome: Completed     Problem: Infection - Adult  Goal: Absence of infection at discharge  Outcome: Progressing  Flowsheets (Taken 1/7/2023 2045)  Absence of infection at discharge:   Assess and monitor for signs and symptoms of infection   Monitor lab/diagnostic results   Monitor all insertion sites i.e., indwelling lines, tubes and drains   Administer medications as ordered   Instruct and encourage patient and family to use good hand hygiene technique   Identify and instruct in appropriate isolation precautions for identified infection/condition     Problem: Metabolic/Fluid and Electrolytes - Adult  Goal: Electrolytes maintained within normal limits  Outcome: Progressing  Flowsheets (Taken 1/7/2023 2045)  Electrolytes maintained within normal limits:   Monitor labs and assess patient for signs and symptoms of electrolyte imbalances   Instruct patient on fluid and nutrition restrictions as appropriate  Goal: Hemodynamic stability and optimal renal function maintained  Outcome: Progressing  Flowsheets (Taken 1/7/2023 2045)  Hemodynamic stability and optimal renal function maintained:   Monitor labs and assess for signs and symptoms of volume excess or deficit   Monitor intake, output and patient weight   Monitor response to interventions for patient's volume status, including labs, urine output, blood pressure (other measures as available)   Instruct patient on fluid and nutrition restrictions as appropriate     Problem: Hematologic - Adult  Goal: Maintains hematologic stability  Outcome: Progressing  Flowsheets (Taken 1/7/2023 2045)  Maintains hematologic stability: Assess for signs and symptoms of bleeding or hemorrhage       Care plan reviewed with patient and adult son at bedside. Patient and adult son verbalize understanding of the plan of care and contribute to goal setting.

## 2023-01-09 ENCOUNTER — ANESTHESIA EVENT (OUTPATIENT)
Dept: ENDOSCOPY | Age: 88
DRG: 641 | End: 2023-01-09
Payer: MEDICARE

## 2023-01-09 ENCOUNTER — ANESTHESIA (OUTPATIENT)
Dept: ENDOSCOPY | Age: 88
DRG: 641 | End: 2023-01-09
Payer: MEDICARE

## 2023-01-09 LAB
ANION GAP SERPL CALCULATED.3IONS-SCNC: 14 MEQ/L (ref 8–16)
BACTERIA: ABNORMAL /HPF
BILIRUBIN URINE: NEGATIVE
BLOOD, URINE: NEGATIVE
BUN BLDV-MCNC: 18 MG/DL (ref 7–22)
CALCIUM SERPL-MCNC: 9 MG/DL (ref 8.5–10.5)
CASTS 2: ABNORMAL /LPF
CASTS UA: ABNORMAL /LPF
CHARACTER, URINE: ABNORMAL
CHLORIDE BLD-SCNC: 96 MEQ/L (ref 98–111)
CO2: 22 MEQ/L (ref 23–33)
COLOR: YELLOW
CREAT SERPL-MCNC: 0.7 MG/DL (ref 0.4–1.2)
CRYSTALS, UA: ABNORMAL
EPITHELIAL CELLS, UA: ABNORMAL /HPF
GFR SERPL CREATININE-BSD FRML MDRD: > 60 ML/MIN/1.73M2
GLUCOSE BLD-MCNC: 90 MG/DL (ref 70–108)
GLUCOSE URINE: NEGATIVE MG/DL
KETONES, URINE: NEGATIVE
LEUKOCYTE ESTERASE, URINE: ABNORMAL
MISCELLANEOUS 2: ABNORMAL
MRSA SCREEN RT-PCR: NEGATIVE
MUCUS: ABNORMAL
NITRITE, URINE: NEGATIVE
PH UA: 6 (ref 5–9)
POTASSIUM SERPL-SCNC: 3.7 MEQ/L (ref 3.5–5.2)
PROTEIN UA: ABNORMAL
RBC URINE: ABNORMAL /HPF
RENAL EPITHELIAL, UA: ABNORMAL
SODIUM BLD-SCNC: 132 MEQ/L (ref 135–145)
SPECIFIC GRAVITY, URINE: 1.02 (ref 1–1.03)
TSH SERPL DL<=0.05 MIU/L-ACNC: 5.25 UIU/ML (ref 0.4–4.2)
UROBILINOGEN, URINE: 0.2 EU/DL (ref 0–1)
WBC UA: ABNORMAL /HPF
YEAST: ABNORMAL

## 2023-01-09 PROCEDURE — 80048 BASIC METABOLIC PNL TOTAL CA: CPT

## 2023-01-09 PROCEDURE — 6370000000 HC RX 637 (ALT 250 FOR IP): Performed by: INTERNAL MEDICINE

## 2023-01-09 PROCEDURE — 3609017100 HC EGD: Performed by: INTERNAL MEDICINE

## 2023-01-09 PROCEDURE — 2580000003 HC RX 258: Performed by: ANESTHESIOLOGY

## 2023-01-09 PROCEDURE — 2580000003 HC RX 258

## 2023-01-09 PROCEDURE — 84443 ASSAY THYROID STIM HORMONE: CPT

## 2023-01-09 PROCEDURE — 3700000001 HC ADD 15 MINUTES (ANESTHESIA): Performed by: INTERNAL MEDICINE

## 2023-01-09 PROCEDURE — 99223 1ST HOSP IP/OBS HIGH 75: CPT | Performed by: INTERNAL MEDICINE

## 2023-01-09 PROCEDURE — 2500000003 HC RX 250 WO HCPCS: Performed by: ANESTHESIOLOGY

## 2023-01-09 PROCEDURE — 87081 CULTURE SCREEN ONLY: CPT

## 2023-01-09 PROCEDURE — 7100000011 HC PHASE II RECOVERY - ADDTL 15 MIN: Performed by: INTERNAL MEDICINE

## 2023-01-09 PROCEDURE — 7100000010 HC PHASE II RECOVERY - FIRST 15 MIN: Performed by: INTERNAL MEDICINE

## 2023-01-09 PROCEDURE — C9113 INJ PANTOPRAZOLE SODIUM, VIA: HCPCS | Performed by: INTERNAL MEDICINE

## 2023-01-09 PROCEDURE — 36415 COLL VENOUS BLD VENIPUNCTURE: CPT

## 2023-01-09 PROCEDURE — 6360000002 HC RX W HCPCS

## 2023-01-09 PROCEDURE — 87086 URINE CULTURE/COLONY COUNT: CPT

## 2023-01-09 PROCEDURE — 6360000002 HC RX W HCPCS: Performed by: INTERNAL MEDICINE

## 2023-01-09 PROCEDURE — 6360000002 HC RX W HCPCS: Performed by: ANESTHESIOLOGY

## 2023-01-09 PROCEDURE — 6370000000 HC RX 637 (ALT 250 FOR IP)

## 2023-01-09 PROCEDURE — 0DJ08ZZ INSPECTION OF UPPER INTESTINAL TRACT, VIA NATURAL OR ARTIFICIAL OPENING ENDOSCOPIC: ICD-10-PCS | Performed by: INTERNAL MEDICINE

## 2023-01-09 PROCEDURE — 87641 MR-STAPH DNA AMP PROBE: CPT

## 2023-01-09 PROCEDURE — 81001 URINALYSIS AUTO W/SCOPE: CPT

## 2023-01-09 PROCEDURE — 3700000000 HC ANESTHESIA ATTENDED CARE: Performed by: INTERNAL MEDICINE

## 2023-01-09 PROCEDURE — 2580000003 HC RX 258: Performed by: INTERNAL MEDICINE

## 2023-01-09 PROCEDURE — 2140000000 HC CCU INTERMEDIATE R&B

## 2023-01-09 RX ORDER — SODIUM CHLORIDE 9 MG/ML
INJECTION, SOLUTION INTRAVENOUS CONTINUOUS
Status: DISCONTINUED | OUTPATIENT
Start: 2023-01-09 | End: 2023-01-10

## 2023-01-09 RX ORDER — PANTOPRAZOLE SODIUM 40 MG/10ML
40 INJECTION, POWDER, LYOPHILIZED, FOR SOLUTION INTRAVENOUS DAILY
Status: DISCONTINUED | OUTPATIENT
Start: 2023-01-09 | End: 2023-01-11 | Stop reason: HOSPADM

## 2023-01-09 RX ORDER — SODIUM CHLORIDE 1000 MG
1 TABLET, SOLUBLE MISCELLANEOUS 2 TIMES DAILY
Qty: 90 TABLET | Refills: 3 | Status: SHIPPED | OUTPATIENT
Start: 2023-01-09

## 2023-01-09 RX ORDER — PROPOFOL 10 MG/ML
INJECTION, EMULSION INTRAVENOUS PRN
Status: DISCONTINUED | OUTPATIENT
Start: 2023-01-09 | End: 2023-01-09 | Stop reason: SDUPTHER

## 2023-01-09 RX ORDER — LANSOPRAZOLE 30 MG/1
30 CAPSULE, DELAYED RELEASE ORAL 2 TIMES DAILY
Qty: 30 CAPSULE | Refills: 3 | Status: SHIPPED | OUTPATIENT
Start: 2023-01-09

## 2023-01-09 RX ORDER — LIDOCAINE HYDROCHLORIDE 20 MG/ML
INJECTION, SOLUTION INFILTRATION; PERINEURAL PRN
Status: DISCONTINUED | OUTPATIENT
Start: 2023-01-09 | End: 2023-01-09 | Stop reason: SDUPTHER

## 2023-01-09 RX ORDER — SODIUM CHLORIDE 9 MG/ML
INJECTION, SOLUTION INTRAVENOUS CONTINUOUS PRN
Status: DISCONTINUED | OUTPATIENT
Start: 2023-01-09 | End: 2023-01-09 | Stop reason: SDUPTHER

## 2023-01-09 RX ORDER — PRAMIPEXOLE DIHYDROCHLORIDE 1 MG/1
1 TABLET ORAL NIGHTLY
Qty: 1 TABLET | Refills: 0
Start: 2023-01-09

## 2023-01-09 RX ORDER — LANOLIN ALCOHOL/MO/W.PET/CERES
6 CREAM (GRAM) TOPICAL
Refills: 3 | COMMUNITY
Start: 2023-01-09

## 2023-01-09 RX ORDER — HYDROXYZINE PAMOATE 25 MG/1
25 CAPSULE ORAL NIGHTLY PRN
Qty: 10 CAPSULE | Refills: 0 | Status: SHIPPED | OUTPATIENT
Start: 2023-01-09 | End: 2023-01-23

## 2023-01-09 RX ADMIN — SODIUM CHLORIDE 1 G: 1 TABLET ORAL at 18:01

## 2023-01-09 RX ADMIN — SUCRALFATE 1 G: 1 TABLET ORAL at 18:02

## 2023-01-09 RX ADMIN — SODIUM CHLORIDE, PRESERVATIVE FREE 10 ML: 5 INJECTION INTRAVENOUS at 21:39

## 2023-01-09 RX ADMIN — LIDOCAINE HYDROCHLORIDE 50 MG: 20 INJECTION, SOLUTION INFILTRATION; PERINEURAL at 16:30

## 2023-01-09 RX ADMIN — PANTOPRAZOLE SODIUM 40 MG: 40 INJECTION, POWDER, FOR SOLUTION INTRAVENOUS at 13:55

## 2023-01-09 RX ADMIN — PANTOPRAZOLE SODIUM 40 MG: 40 TABLET, DELAYED RELEASE ORAL at 07:59

## 2023-01-09 RX ADMIN — PROPOFOL 50 MG: 10 INJECTION, EMULSION INTRAVENOUS at 16:30

## 2023-01-09 RX ADMIN — SODIUM CHLORIDE 1 G: 1 TABLET ORAL at 08:03

## 2023-01-09 RX ADMIN — SODIUM CHLORIDE: 9 INJECTION, SOLUTION INTRAVENOUS at 13:44

## 2023-01-09 RX ADMIN — PROPOFOL 30 MG: 10 INJECTION, EMULSION INTRAVENOUS at 16:35

## 2023-01-09 RX ADMIN — SUCRALFATE 1 G: 1 TABLET ORAL at 13:45

## 2023-01-09 RX ADMIN — SUCRALFATE 1 G: 1 TABLET ORAL at 21:37

## 2023-01-09 RX ADMIN — SODIUM CHLORIDE, PRESERVATIVE FREE 10 ML: 5 INJECTION INTRAVENOUS at 09:40

## 2023-01-09 RX ADMIN — ISOSORBIDE MONONITRATE 60 MG: 60 TABLET, EXTENDED RELEASE ORAL at 09:39

## 2023-01-09 RX ADMIN — SUCRALFATE 1 G: 1 TABLET ORAL at 08:00

## 2023-01-09 RX ADMIN — SODIUM CHLORIDE: 9 INJECTION, SOLUTION INTRAVENOUS at 16:29

## 2023-01-09 RX ADMIN — METOPROLOL TARTRATE 25 MG: 25 TABLET, FILM COATED ORAL at 09:39

## 2023-01-09 RX ADMIN — HYDROXYZINE PAMOATE 25 MG: 25 CAPSULE ORAL at 21:37

## 2023-01-09 RX ADMIN — SODIUM CHLORIDE 1 G: 1 TABLET ORAL at 13:56

## 2023-01-09 RX ADMIN — CEFEPIME 1000 MG: 1 INJECTION, POWDER, FOR SOLUTION INTRAMUSCULAR; INTRAVENOUS at 08:01

## 2023-01-09 RX ADMIN — METOPROLOL TARTRATE 25 MG: 25 TABLET, FILM COATED ORAL at 21:37

## 2023-01-09 RX ADMIN — PRAMIPEXOLE DIHYDROCHLORIDE 1 MG: 1 TABLET ORAL at 20:06

## 2023-01-09 ASSESSMENT — ENCOUNTER SYMPTOMS: SHORTNESS OF BREATH: 1

## 2023-01-09 ASSESSMENT — PAIN DESCRIPTION - DESCRIPTORS
DESCRIPTORS: ACHING
DESCRIPTORS: ACHING

## 2023-01-09 ASSESSMENT — PAIN DESCRIPTION - ORIENTATION
ORIENTATION: MID
ORIENTATION: MID

## 2023-01-09 ASSESSMENT — PAIN SCALES - GENERAL
PAINLEVEL_OUTOF10: 1
PAINLEVEL_OUTOF10: 1
PAINLEVEL_OUTOF10: 0

## 2023-01-09 ASSESSMENT — PAIN DESCRIPTION - LOCATION
LOCATION: ABDOMEN
LOCATION: ABDOMEN

## 2023-01-09 ASSESSMENT — PAIN - FUNCTIONAL ASSESSMENT: PAIN_FUNCTIONAL_ASSESSMENT: 0-10

## 2023-01-09 NOTE — PLAN OF CARE
Problem: Discharge Planning  Goal: Discharge to home or other facility with appropriate resources  1/9/2023 1058 by Melvin Carias RN  Outcome: Progressing  1/8/2023 2324 by Ricci Hogue RN  Outcome: Progressing  Flowsheets (Taken 1/8/2023 1950)  Discharge to home or other facility with appropriate resources:   Identify barriers to discharge with patient and caregiver   Arrange for needed discharge resources and transportation as appropriate   Identify discharge learning needs (meds, wound care, etc)   Refer to discharge planning if patient needs post-hospital services based on physician order or complex needs related to functional status, cognitive ability or social support system  Note: Denies discharge needs this shift. Patient eager to go home tomorrow. To make doctor's appointment in the afternoon. Problem: Pain  Goal: Verbalizes/displays adequate comfort level or baseline comfort level  1/9/2023 1058 by Melvin Carias RN  Outcome: Progressing  1/8/2023 2324 by Ricci Hogue RN  Outcome: Progressing  Flowsheets (Taken 1/8/2023 1950)  Verbalizes/displays adequate comfort level or baseline comfort level:   Encourage patient to monitor pain and request assistance   Assess pain using appropriate pain scale   Administer analgesics based on type and severity of pain and evaluate response   Implement non-pharmacological measures as appropriate and evaluate response   Consider cultural and social influences on pain and pain management   Notify Licensed Independent Practitioner if interventions unsuccessful or patient reports new pain  Note: Pt to report pain to the nurse. Verbalized understanding of the pain scale 0-10. Pain goal is \"0\". Currently rates pain at \"0\". Pain meds to be  per MD orders. Ambulate in galvan per self.         Problem: Neurosensory - Adult  Goal: Absence of seizures  1/9/2023 1058 by Melvin Carias RN  Outcome: Progressing  1/8/2023 2324 by Drake Justin RN  Outcome: Progressing  Goal: Achieves maximal functionality and self care  1/9/2023 1058 by Joey Ny RN  Outcome: Progressing  1/8/2023 2324 by Drake Justin RN  Outcome: Progressing  Flowsheets (Taken 1/8/2023 1950)  Achieves maximal functionality and self care:   Monitor swallowing and airway patency with patient fatigue and changes in neurological status   Encourage and assist patient to increase activity and self care with guidance from physical therapy/occupational therapy   Encourage visually impaired, hearing impaired and aphasic patients to use assistive/communication devices  Note: Patient ambulates per self. Up ad ingrid in room.       Problem: Cardiovascular - Adult  Goal: Maintains optimal cardiac output and hemodynamic stability  1/9/2023 1058 by Joey Ny RN  Outcome: Progressing  1/8/2023 2324 by Drake Justin RN  Outcome: Progressing  Flowsheets (Taken 1/8/2023 1950)  Maintains optimal cardiac output and hemodynamic stability:   Monitor blood pressure and heart rate   Monitor urine output and notify Licensed Independent Practitioner for values outside of normal range   Assess for signs of decreased cardiac output  Goal: Absence of cardiac dysrhythmias or at baseline  1/9/2023 1058 by Joey Ny RN  Outcome: Progressing  1/8/2023 2324 by Drake Justin RN  Outcome: Progressing  Flowsheets (Taken 1/8/2023 1950)  Absence of cardiac dysrhythmias or at baseline:   Monitor cardiac rate and rhythm   Assess for signs of decreased cardiac output   Administer antiarrhythmia medication and electrolyte replacement as ordered     Problem: Skin/Tissue Integrity - Adult  Goal: Skin integrity remains intact  1/9/2023 1058 by Joey Ny RN  Outcome: Progressing  1/8/2023 2324 by Drake Justin RN  Outcome: Progressing  Flowsheets (Taken 1/8/2023 1950)  Skin Integrity Remains Intact: Monitor for areas of redness and/or skin breakdown Problem: Musculoskeletal - Adult  Goal: Return mobility to safest level of function  1/9/2023 1058 by Vadim Lay RN  Outcome: Progressing  1/8/2023 2324 by Vincenzo Bauer RN  Outcome: Progressing  Flowsheets (Taken 1/8/2023 1950)  Return Mobility to Safest Level of Function: Assess patient stability and activity tolerance for standing, transferring and ambulating with or without assistive devices  Goal: Return ADL status to a safe level of function  1/9/2023 1058 by Vadim Lay RN  Outcome: Progressing  1/8/2023 2324 by Vincenzo Bauer RN  Outcome: Progressing  Flowsheets (Taken 1/8/2023 1950)  Return ADL Status to a Safe Level of Function:   Administer medication as ordered   Assess activities of daily living deficits and provide assistive devices as needed   Assist and instruct patient to increase activity and self care as tolerated     Problem: Gastrointestinal - Adult  Goal: Minimal or absence of nausea and vomiting  1/9/2023 1058 by Vadim Lay RN  Outcome: Progressing  1/8/2023 2324 by Vincenzo Bauer RN  Outcome: Progressing  Flowsheets (Taken 1/8/2023 1950)  Minimal or absence of nausea and vomiting:   Administer ordered antiemetic medications as needed   Provide nonpharmacologic comfort measures as appropriate   Advance diet as tolerated, if ordered  Goal: Maintains or returns to baseline bowel function  Outcome: Progressing  Goal: Maintains adequate nutritional intake  1/9/2023 1058 by Vadim Lay RN  Outcome: Progressing  1/8/2023 2324 by Vincenzo Bauer RN  Outcome: Progressing  Flowsheets (Taken 1/8/2023 1950)  Maintains adequate nutritional intake:   Monitor percentage of each meal consumed   Monitor intake and output, weight and lab values   Obtain nutritional consult as needed     Problem: Infection - Adult  Goal: Absence of infection at discharge  1/9/2023 1058 by Vadim Lay RN  Outcome: Progressing  1/8/2023 2324 by Nicholas Cartagena Leatha Vazquez RN  Outcome: Progressing  Flowsheets (Taken 1/8/2023 1950)  Absence of infection at discharge:   Assess and monitor for signs and symptoms of infection   Monitor lab/diagnostic results   Administer medications as ordered   Instruct and encourage patient and family to use good hand hygiene technique     Problem: Metabolic/Fluid and Electrolytes - Adult  Goal: Electrolytes maintained within normal limits  1/9/2023 1058 by Pedro Luis Melchor RN  Outcome: Progressing  1/8/2023 2324 by Kate Aguilar RN  Outcome: Progressing  Flowsheets (Taken 1/8/2023 1950)  Electrolytes maintained within normal limits: Monitor labs and assess patient for signs and symptoms of electrolyte imbalances  Goal: Hemodynamic stability and optimal renal function maintained  1/9/2023 1058 by Pedro Luis Melchor RN  Outcome: Progressing  1/8/2023 2324 by Kate Aguilar RN  Outcome: Progressing  Flowsheets (Taken 1/8/2023 1950)  Hemodynamic stability and optimal renal function maintained:   Monitor labs and assess for signs and symptoms of volume excess or deficit   Monitor intake, output and patient weight   Monitor response to interventions for patient's volume status, including labs, urine output, blood pressure (other measures as available)   Encourage oral intake as appropriate     Problem: Hematologic - Adult  Goal: Maintains hematologic stability  Outcome: Progressing  Care plan reviewed with patient. Patient verbalizes understanding of the care plan and contributed to goal setting.

## 2023-01-09 NOTE — PLAN OF CARE
Problem: Discharge Planning  Goal: Discharge to home or other facility with appropriate resources  1/8/2023 2324 by Sam Phoenix RN  Outcome: Progressing  Flowsheets (Taken 1/8/2023 1950)  Discharge to home or other facility with appropriate resources:   Identify barriers to discharge with patient and caregiver   Arrange for needed discharge resources and transportation as appropriate   Identify discharge learning needs (meds, wound care, etc)   Refer to discharge planning if patient needs post-hospital services based on physician order or complex needs related to functional status, cognitive ability or social support system  Note: Denies discharge needs this shift. Patient eager to go home tomorrow. To make doctor's appointment in the afternoon. Problem: Pain  Goal: Verbalizes/displays adequate comfort level or baseline comfort level  1/8/2023 2324 by Sam Phoenix RN  Outcome: Progressing  Flowsheets (Taken 1/8/2023 1950)  Verbalizes/displays adequate comfort level or baseline comfort level:   Encourage patient to monitor pain and request assistance   Assess pain using appropriate pain scale   Administer analgesics based on type and severity of pain and evaluate response   Implement non-pharmacological measures as appropriate and evaluate response   Consider cultural and social influences on pain and pain management   Notify Licensed Independent Practitioner if interventions unsuccessful or patient reports new pain  Note: Pt to report pain to the nurse. Verbalized understanding of the pain scale 0-10. Pain goal is \"0\". Currently rates pain at \"0\". Pain meds to be  per MD orders. Ambulate in galvan per self.         Problem: Neurosensory - Adult  Goal: Absence of seizures  Outcome: Progressing     Problem: Neurosensory - Adult  Goal: Achieves maximal functionality and self care  Outcome: Progressing  Flowsheets (Taken 1/8/2023 1950)  Achieves maximal functionality and self care:   Monitor swallowing and airway patency with patient fatigue and changes in neurological status   Encourage and assist patient to increase activity and self care with guidance from physical therapy/occupational therapy   Encourage visually impaired, hearing impaired and aphasic patients to use assistive/communication devices  Note: Patient ambulates per self. Up ad ingrid in room.       Problem: Cardiovascular - Adult  Goal: Maintains optimal cardiac output and hemodynamic stability  Outcome: Progressing  Flowsheets (Taken 1/8/2023 1950)  Maintains optimal cardiac output and hemodynamic stability:   Monitor blood pressure and heart rate   Monitor urine output and notify Licensed Independent Practitioner for values outside of normal range   Assess for signs of decreased cardiac output     Problem: Cardiovascular - Adult  Goal: Absence of cardiac dysrhythmias or at baseline  Outcome: Progressing  Flowsheets (Taken 1/8/2023 1950)  Absence of cardiac dysrhythmias or at baseline:   Monitor cardiac rate and rhythm   Assess for signs of decreased cardiac output   Administer antiarrhythmia medication and electrolyte replacement as ordered     Problem: Skin/Tissue Integrity - Adult  Goal: Skin integrity remains intact  1/8/2023 2324 by Melchor Browning RN  Outcome: Progressing  Flowsheets (Taken 1/8/2023 1950)  Skin Integrity Remains Intact: Monitor for areas of redness and/or skin breakdown     Problem: Musculoskeletal - Adult  Goal: Return mobility to safest level of function  Outcome: Progressing  Flowsheets (Taken 1/8/2023 1950)  Return Mobility to Safest Level of Function: Assess patient stability and activity tolerance for standing, transferring and ambulating with or without assistive devices     Problem: Musculoskeletal - Adult  Goal: Return ADL status to a safe level of function  Outcome: Progressing  Flowsheets (Taken 1/8/2023 1950)  Return ADL Status to a Safe Level of Function:   Administer medication as ordered   Assess activities of daily living deficits and provide assistive devices as needed   Assist and instruct patient to increase activity and self care as tolerated     Problem: Gastrointestinal - Adult  Goal: Minimal or absence of nausea and vomiting  1/8/2023 2324 by Vincenzo Bauer RN  Outcome: Progressing  Flowsheets (Taken 1/8/2023 1950)  Minimal or absence of nausea and vomiting:   Administer ordered antiemetic medications as needed   Provide nonpharmacologic comfort measures as appropriate   Advance diet as tolerated, if ordered  No nausea or vomiting this shift    Problem: Gastrointestinal - Adult  Goal: Minimal or absence of nausea and vomiting  1/8/2023 2324 by Vincenzo Bauer RN  Outcome: Progressing  Flowsheets (Taken 1/8/2023 1950)  Minimal or absence of nausea and vomiting:   Administer ordered antiemetic medications as needed   Provide nonpharmacologic comfort measures as appropriate   Advance diet as tolerated, if ordered     Problem: Gastrointestinal - Adult  Goal: Maintains adequate nutritional intake  Outcome: Progressing  Flowsheets (Taken 1/8/2023 1950)  Maintains adequate nutritional intake:   Monitor percentage of each meal consumed   Monitor intake and output, weight and lab values   Obtain nutritional consult as needed     Problem: Infection - Adult  Goal: Absence of infection at discharge  1/8/2023 2324 by Vincenzo Bauer RN  Outcome: Progressing  Flowsheets (Taken 1/8/2023 1950)  Absence of infection at discharge:   Assess and monitor for signs and symptoms of infection   Monitor lab/diagnostic results   Administer medications as ordered   Instruct and encourage patient and family to use good hand hygiene technique    Care plan reviewed with pt. Pt verbalizes understanding of the care plan and contributed to goal setting.

## 2023-01-09 NOTE — BRIEF OP NOTE
Brief Postoperative Note      Patient: Saranya Goldberg  YOB: 1935  MRN: [id number]    Date of Procedure: 1/9/2023    Pre-Op Diagnosis: Abdominal pain, unspecified abdominal location [R10.9]  Nausea and vomiting, unspecified vomiting type [R11.2]    Post-Op Diagnosis:  ANTRAL GASTRITIS, 8 CMS HIATAL HERNIA       Procedure(s):  EGD ESOPHAGOGASTRODUODENOSCOPY    Surgeon(s):  Elsa Goodell, MD    Assistant:  * No surgical staff found *    Anesthesia: Monitor Anesthesia Care    Estimated Blood Loss (mL): Minimal    Complications: None    Specimens:   ID Type Source Tests Collected by Time Destination   1 : Jo test rule out H. Pylori Tissue Stomach JO TEST Elsa Goodell, MD 1/9/2023 1636        Implants:  * No implants in log *      Drains: * No LDAs found *    Findings: ANTRAL GASTRITIS, 8 CMS HIATAL HERNIA    Electronically signed by Elsa Goodell, MD on 1/9/2023 at 5:03 PM

## 2023-01-09 NOTE — PROGRESS NOTES
01/09/23 1310   Encounter Summary   Encounter Overview/Reason  Spiritual/Emotional Needs   Service Provided For: Patient and family together   Referral/Consult From: Mary Anne   Last Encounter  01/09/23   Complexity of Encounter Moderate   Begin Time 1300   End Time  1310   Total Time Calculated 10 min   Encounter    Type Follow up   Spiritual/Emotional needs   Type Spiritual Support   Assessment/Intervention/Outcome   Assessment Calm   Intervention Sustaining Presence/Ministry of presence;Prayer (assurance of)/[place];Nurtured Hope   Outcome Comfort   Assessment:  During my encounter with the 80 yr old patient, I gave the patient and pt's daughter Garrett Calvin) a copy of the Advance Directive as requested. I assess the pt's spiritual needs. The pt was admitted due to hyponatremia. Interventions:  I gave the pt the requested documents and gave a brochure and asked if the patient had any questions. I offered words of comfort and prayer.  provided a listening presence and encouraged pt to share their beliefs and how they support him during their hospitalization. Outcomes: The pt was thankful for the spiritual support. The patient shared that they would complete the forms after they had the opportunity to review the documents. The pt was very tired and didn't want to complete a copy of the Advance Directive at the time. Plan:  Chaplains will follow-up at a later time in order to assist the patient as they complete the Advance Directive documents.   The Chaplains will be available to provide further emotional support per request.

## 2023-01-09 NOTE — ANESTHESIA POSTPROCEDURE EVALUATION
Department of Anesthesiology  Postprocedure Note    Patient: Sabrina Morgan  MRN: [id number]  YOB: 1935  Date of evaluation: 1/9/2023      Procedure Summary     Date: 01/09/23 Room / Location: [address] [place] [address]    Anesthesia Start: 3128 Anesthesia Stop:     Procedure: EGD ESOPHAGOGASTRODUODENOSCOPY Diagnosis:       Abdominal pain, unspecified abdominal location      Nausea and vomiting, unspecified vomiting type      (Abdominal pain, unspecified abdominal location [R10.9])      (Nausea and vomiting, unspecified vomiting type [R11.2])    Surgeons: Louisa Heath MD Responsible Provider: Deven Medina DO    Anesthesia Type: MAC ASA Status: 3          Anesthesia Type: No value filed.     Andrew Phase I: Andrew Score: 10    Andrew Phase II:        Anesthesia Post Evaluation    Patient location during evaluation: bedside  Level of consciousness: awake  Airway patency: patent  Nausea & Vomiting: no vomiting and no nausea  Cardiovascular status: hemodynamically stable  Respiratory status: acceptable  Hydration status: stable

## 2023-01-09 NOTE — CARE COORDINATION
Case Management Assessment  Initial Evaluation    Date/Time of Evaluation: 1/9/2023 12:23 PM  Assessment Completed by: Kallie Bower RN    If patient is discharged prior to next notation, then this note serves as note for discharge by case management. Patient Name: Ede Koch                   YOB: 1935  Diagnosis: Hyponatremia [E87.1]                   Date / Time: 1/6/2023  1:33 PM  Location: [address] [place] [address]     Patient Admission Status: Inpatient   Readmission Risk (Low < 19, Mod (19-27), High > 27): Readmission Risk Score: 10.7    Current PCP: Danna Han MD  PCP verified by CM? Yes    Chart Reviewed: Yes      History Provided by: Patient, Child/Family  Patient Orientation: Alert and Oriented    Patient Cognition: Alert    Hospitalization in the last 30 days (Readmission):  No    If yes, Readmission Assessment in CM Navigator will be completed. Advance Directives:      Code Status: Full Code   Patient's Primary Decision Maker is: Legal Next of Kin (Order for Spiritual Care for advance directives)      Discharge Planning:    Patient lives with: Alone Type of Home: House  Primary Care Giver: Self  Patient Support Systems include: Children, Family Members   Current Financial resources: Medicare, Other (Comment) (Secondary insurance)  Current community resources: None  Current services prior to admission: None            Current DME:              Type of Home Care services:  None    ADLS  Prior functional level: Independent in ADLs/IADLs  Current functional level: Independent in ADLs/IADLs    Family can provide assistance at DC: Yes  Would you like Case Management to discuss the discharge plan with any other family members/significant others, and if so, who?  No  Plans to Return to Present Housing: Yes  Other Identified Issues/Barriers to RETURNING to current housing: None  Potential Assistance needed at discharge: N/A            Potential DME:    Patient expects to discharge to: House  Plan for transportation at discharge: Family    Financial    Payor: MEDICARE / Plan: MEDICARE PART A AND B / Product Type: *No Product type* /     Does insurance require precert for SNF: No    Potential assistance Purchasing Medications: No  Meds-to-Beds request: Yes      [facility] [address] [place], [address] - [address] [phone]  [address]  [facility] [address]  Phone: [phone] Fax: [phone]    [id number] [place] [facility] [phone]  [address] [facility] [address]  Phone: [phone] Fax: [phone]      Notes:    Factors facilitating achievement of predicted outcomes: Family support, Cooperative, and Pleasant    Barriers to discharge: Medical complications and EGD 1/9    Additional Case Management Notes: Admitted through ED with worsening epigastric pain with nausea and diarrhea. Recently COVID positive. Consulted GI. GI recommended Carafate tid, Protonix daily. Sodium was low of 115 since admission, up to 132 today. Cefepime iv q12hr. Procedure:   1/6 CT Abd/Pelvis: There is a thickened appearance throughout the transverse colon with some mild haziness in the adjacent fat. While this could be related to incomplete distention, mild/early diverticulitis could have a similar appearance. Hepatic steatosis. Large hiatal hernia. A prominent lymph node is in the right breast. Further evaluation with dedicated mammogram is recommended on a nonemergent basis. 1/8 Renal US: No hydronephrosis. Elevated left renal resistive index as can be seen with underlying medical renal disease. The Plan for Transition of Care is related to the following treatment goals of Hyponatremia [E87.1]    Patient Goals/Plan/Treatment Preferences: Planning home alone with family support. Denies needs at this time. Transportation/Food Security/Housekeeping Addressed: No issues identified.      Cole Hernandez RN  Case Management Department

## 2023-01-09 NOTE — DISCHARGE INSTRUCTIONS
RESTRICT YOUR FLUIDS FOR NOW TO 1.5 QUARTS DAILY; THIS WILL CHANGE AS YOUR SODIUM IMPROVES    WAIT TILL YOUR SODIUM CORRECTS TILL YOU HAVE THE PROCEDURE BY DR Leavitt Service

## 2023-01-09 NOTE — PROGRESS NOTES
Recovery mode. Patient denies discomfort.  discussed findings with patient and family.  Report called to [facility] on 3B

## 2023-01-09 NOTE — PROGRESS NOTES
Pt taken to endoscopy by cart for EGD.  2 children followed her down to be available to speak with Dr Elio Matt after the procedure for result findings

## 2023-01-09 NOTE — ANESTHESIA PRE PROCEDURE
Department of Anesthesiology  Preprocedure Note       Name:  Codi Irizarry   Age:  80 y.o.  :  1935                                          MRN:  [id number]         Date:  2023      Surgeon: Jennifer Poole):  Laya Hurt MD    Procedure: Procedure(s):  EGD ESOPHAGOGASTRODUODENOSCOPY    Medications prior to admission:   Prior to Admission medications    Medication Sig Start Date End Date Taking? Authorizing Provider   hydrOXYzine pamoate (VISTARIL) 25 MG capsule Take 1 capsule by mouth nightly as needed (sleep) 23 Yes Dedra Thakkra MD   pramipexole (MIRAPEX) 1 MG tablet Take 1 tablet by mouth nightly 4 tab 23  Yes Dedra Thakkar MD   melatonin 3 MG TABS tablet Take 2 tablets by mouth nightly 23  Yes Dedra Thakkar MD   sodium chloride 1 g tablet Take 1 tablet by mouth in the morning and at bedtime 23  Yes Dedra Thakkar MD   lansoprazole (PREVACID) 30 MG delayed release capsule Take 1 capsule by mouth 2 times daily 23  Yes Dedra Thakkar MD   ondansetron (ZOFRAN-ODT) 4 MG disintegrating tablet Take 1 tablet by mouth 3 times daily as needed for Nausea or Vomiting 22   ZACHARY Flowers - CNP   isosorbide mononitrate (IMDUR) 60 MG extended release tablet Take 1 tablet by mouth daily 3/18/22   Yas Cortez MD   metoprolol tartrate (LOPRESSOR) 25 MG tablet Take 1 tablet by mouth 2 times daily 3/18/22   Yas Cortez MD   conjugated estrogens (PREMARIN) 0.625 MG/GM vaginal cream Place 0.5 grams vaginally twice weekly. 3/7/19   Tere Ortiz PA-C   Polyethylene Glycol 3350 (MIRALAX PO) Take by mouth as needed    Historical Provider, MD   nitroGLYCERIN (NITROSTAT) 0.4 MG SL tablet Place 0.4 mg under the tongue every 5 minutes as needed for Chest pain up to max of 3 total doses. If no relief after 1 dose, call 911.      Historical Provider, MD   simvastatin (ZOCOR) 40 MG tablet Take 0.5 tablets by mouth nightly 10/6/16   Terrance Jurist, DO   aspirin 81 MG EC tablet Take 81 mg by mouth daily.       Historical Provider, MD       Current medications:    Current Facility-Administered Medications   Medication Dose Route Frequency Provider Last Rate Last Admin    0.9 % sodium chloride infusion   IntraVENous Continuous Divya Caicedo MD 50 mL/hr at 01/09/23 1344 New Bag at 01/09/23 1344    pantoprazole (PROTONIX) injection 40 mg  40 mg IntraVENous Daily Divya Caicedo MD   40 mg at 01/09/23 1355    melatonin tablet 6 mg  6 mg Oral QHS Divya Caicedo MD   6 mg at 01/08/23 2135    pramipexole (MIRAPEX) tablet 1 mg  1 mg Oral Nightly Divya Caicedo MD   1 mg at 01/08/23 2138    hydrOXYzine pamoate (VISTARIL) capsule 25 mg  25 mg Oral Nightly PRN Divya Caicedo MD   25 mg at 01/08/23 2135    sodium chloride tablet 1 g  1 g Oral TID WC Divya Caicedo MD   1 g at 01/09/23 1356    potassium chloride (KLOR-CON M) extended release tablet 40 mEq  40 mEq Oral PRN Divya Caicedo MD        Or    potassium bicarb-citric acid (EFFER-K) effervescent tablet 40 mEq  40 mEq Oral PRN Divya Caicedo MD        Or    potassium chloride 10 mEq/100 mL IVPB (Peripheral Line)  10 mEq IntraVENous PRN Divya Caicedo MD        magnesium sulfate 2000 mg in 50 mL IVPB premix  2,000 mg IntraVENous PRN Divya Caicedo MD        aspirin EC tablet 81 mg  81 mg Oral Daily Maria D Mathews DPM        isosorbide mononitrate (IMDUR) extended release tablet 60 mg  60 mg Oral Daily James Barajas, DPM   60 mg at 01/09/23 0321    metoprolol tartrate (LOPRESSOR) tablet 25 mg  25 mg Oral BID KAYLIE WillinghamM   25 mg at 01/09/23 1449    sodium chloride flush 0.9 % injection 5-40 mL  5-40 mL IntraVENous 2 times per day KAYLIE WillinghamM   10 mL at 01/09/23 0940    sodium chloride flush 0.9 % injection 5-40 mL  5-40 mL IntraVENous PRN Maria D Mathews DPM        0.9 % sodium chloride infusion   IntraVENous PRN Maria D Master, DPM        ondansetron (ZOFRAN-ODT) disintegrating tablet 4 mg  4 mg Oral Q8H PRN Jinnie Salina, DPM   4 mg at 01/07/23 1647    Or    ondansetron (ZOFRAN) injection 4 mg  4 mg IntraVENous Q6H PRN Jinnie Salina, DPM        acetaminophen (TYLENOL) tablet 650 mg  650 mg Oral Q6H PRN Jinnie Salina, DPM   650 mg at 01/07/23 0321    Or    acetaminophen (TYLENOL) suppository 650 mg  650 mg Rectal Q6H PRN Jinnie Salina, DPM        sucralfate (CARAFATE) tablet 1 g  1 g Oral 4x Daily AC & HS Jinnie Salina, DPM   1 g at 01/09/23 1345       Allergies: Allergies   Allergen Reactions    Other Other (See Comments)     Movi Prep,    Facial and lip swelling    Ciprofloxacin Nausea Only     Nausea and tingling in her fingers    Lipitor Swelling    Ramipril Swelling     tongue    Sulfa Antibiotics Rash       Problem List:    Patient Active Problem List   Diagnosis Code    Anal skin tag K64.4    Hemorrhoid K64.9    S/P hemorrhoidectomy Z98.890, Z87.19    Unstable angina (HCC) I20.0    HTN (hypertension) I10    Hyperlipidemia E78.5    A-fib ([facility] I48.91    Tongue edema K14.8    Chest pain, after epinephrine R07.9    Gastroesophageal reflux disease without esophagitis K21.9    ACE inhibitor-aggravated angioedema, possible T78. 3XXA, T46.4X5A    Cancer ([facility] C80.1    CAD in native artery I25.10    SOB (shortness of breath) R06.02    Angina, class II (HCC) I20.9    Mild aortic stenosis by prior echocardiogram I35.0    Hyponatremia E87.1    Acute cystitis without hematuria N30.00       Past Medical History:        Diagnosis Date    Anesthesia 2012    after hemorrhoid surgery felt like \"elephant on my chest\" heart cath done    CAD (coronary artery disease) 2012    Cancer ([facility]     cervical rectal    GERD (gastroesophageal reflux disease)     History of prolapse of bladder     Hyperlipidemia     Hypertension     Osteoarthritis     Shortness of breath 09/2017    UTI (urinary tract infection) 2017    Cinic in [place]       Past Surgical History:        Procedure Laterality Date   [address]  2012    [facility]    COLONOSCOPY  12    Dr. Ting Reeves Left 2021    LEFT PERIANAL BIOPSY performed by Mariela Ramesh MD at [facility]  2017    [address] [facility], 2012    Anal exam, hemorroidectomy, excision perianal skin lesion. total of 3    HYSTERECTOMY (CERVIX STATUS UNKNOWN)  1966    OTHER SURGICAL HISTORY  1/3/2014    LEFT MEDIPORT-Dr. Shireen Hua and removed    RECTAL SURGERY      for cancer    SIGMOIDOSCOPY N/A 6/10/2019    SIGMOIDOSCOPY DIAGNOSTIC FLEXIBLE performed by John Busby MD at [facility]       Social History:    Social History     Tobacco Use    Smoking status: Former     Packs/day: 1.00     Years: 12.00     Pack years: 12.00     Types: Cigarettes     Quit date: 1972     Years since quittin.4    Smokeless tobacco: Never   Substance Use Topics    Alcohol use:  No                                Counseling given: Not Answered      Vital Signs (Current):   Vitals:    23 1950 23 0757 23 1114 23 1516   BP: 126/77 138/73 115/77 114/65   Pulse: 79 69 55 60   Resp: 16 16 16 16   Temp: 97.7 °F (36.5 °C) 98.3 °F (36.8 °C) 98.1 °F (36.7 °C)    TempSrc: Oral Oral Oral    SpO2: 97% 97% 96% 96%   Weight:       Height:                                                  BP Readings from Last 3 Encounters:   23 114/65   22 132/66   22 132/84       NPO Status: Time of last liquid consumption: 1400                        Time of last solid consumption: 2330                        Date of last liquid consumption: 23                        Date of last solid food consumption: 23    BMI:   Wt Readings from Last 3 Encounters:   23 134 lb 11.2 oz (61.1 kg)   22 149 lb (67.6 kg)   22 149 lb 6.4 oz (67.8 kg)     Body mass index is 23.12 kg/m². CBC:   Lab Results   Component Value Date/Time    WBC 4.1 01/06/2023 01:45 PM    RBC 4.31 01/06/2023 01:45 PM    HGB 13.3 01/06/2023 01:45 PM    HCT 36.0 01/06/2023 01:45 PM    MCV 83.5 01/06/2023 01:45 PM    RDW 14.7 05/02/2018 11:00 AM     01/06/2023 01:45 PM       CMP:   Lab Results   Component Value Date/Time     01/09/2023 04:23 AM    K 3.7 01/09/2023 04:23 AM    K 3.4 01/06/2023 01:45 PM    CL 96 01/09/2023 04:23 AM    CO2 22 01/09/2023 04:23 AM    BUN 18 01/09/2023 04:23 AM    CREATININE 0.7 01/09/2023 04:23 AM    AGRATIO 2.0 11/01/2014 09:45 AM    LABGLOM >60 01/09/2023 04:23 AM    GLUCOSE 90 01/09/2023 04:23 AM    GLUCOSE 86 11/01/2014 09:45 AM    PROT 7.8 01/06/2023 01:45 PM    CALCIUM 9.0 01/09/2023 04:23 AM    BILITOT 0.5 01/06/2023 01:45 PM    ALKPHOS 68 01/06/2023 01:45 PM    AST 29 01/06/2023 01:45 PM    ALT 22 01/06/2023 01:45 PM       POC Tests: No results for input(s): POCGLU, POCNA, POCK, POCCL, POCBUN, POCHEMO, POCHCT in the last 72 hours.     Coags:   Lab Results   Component Value Date/Time    INR 0.91 06/24/2015 10:10 PM    APTT 30.4 06/24/2015 10:10 PM       HCG (If Applicable): No results found for: PREGTESTUR, PREGSERUM, HCG, HCGQUANT     ABGs: No results found for: PHART, PO2ART, WHD2VNE, KAS5HVG, BEART, X8EKNJOA     Type & Screen (If Applicable):  Lab Results   Component Value Date    LABRH POS 12/04/2020       Drug/Infectious Status (If Applicable):  No results found for: HIV, HEPCAB    COVID-19 Screening (If Applicable): No results found for: COVID19        Anesthesia Evaluation  Patient summary reviewed  Airway: Mallampati: III  TM distance: >3 FB   Neck ROM: full  Mouth opening: > = 3 FB   Dental:          Pulmonary:   (+) shortness of breath:                             Cardiovascular:    (+) hypertension:, angina:, CAD:, CABG/stent:,       ECG reviewed                        Neuro/Psych:               GI/Hepatic/Renal:             Endo/Other:                     Abdominal:             Vascular:          Other Findings:           Anesthesia Plan      MAC     ASA 3       Induction: intravenous.      Anesthetic plan and risks discussed with patient and child/children.                        Josh June DO   1/9/2023

## 2023-01-09 NOTE — PROGRESS NOTES
EGD complete, photos taken, 1 specimens taken for gwyn test, pt tolerated procedure well    Scope Number 571 used.

## 2023-01-09 NOTE — PROGRESS NOTES
Assessment: This was a spiritual care encounter in response to consult for advance directives for patient. Patient, Suraj Sage, was oriented and alert. Family had just arrived for visit and patient declined ACP conversation at this time. Interventions:   gave information regarding reasoning for completing ACP documents and left blank copies with patient.  provided information regarding  services and availability, should patient desire spiritual or emotional support or want help completing advance directives. Outcomes:  Patient voiced understanding of  availability and reasoning for ACP documents. Plan:  Spiritual care team will remain available to support patient and family, PRN. [address], [place]. [address] [place] [address] Yatesolga Garrett [place], [address]  [phone]

## 2023-01-09 NOTE — PROGRESS NOTES
Assessment and Plan:        Hyponatremia- gradually improving:  sodium 132 today  Abdominal pain- flare up this am; epigastric; Dr Lee Ann Villa will do EGD  Uti - Pseudomonas- checked PVR,not very great. To see ID and Urology in near future. On cefepime; appears not not have tolerated cipro po very well. Had E coli several yrs ago. US kidneys unremarkable  Vagal episode with blocked PACS; occurred with emesis- monitor      CC:  abdominal pain  HPI: pt with hbp, utis, prsenting with diarrhea and emesis. Noted to be markedly hyponatremic, likely a combination of above plus thiazide. Has had recent sxs of uti with dysuria. ROS (12 point review of systems completed. Pertinent positives noted.  Otherwise ROS is negative) :   PMH:  Per HPI  SHX:  , former smoker  FHX: Noncontributory      Allergies: See above    Medications:     sodium chloride      sodium chloride        pantoprazole  40 mg IntraVENous Daily    melatonin  6 mg Oral QHS    pramipexole  1 mg Oral Nightly    sodium chloride  1 g Oral TID WC    aspirin  81 mg Oral Daily    isosorbide mononitrate  60 mg Oral Daily    metoprolol tartrate  25 mg Oral BID    sodium chloride flush  5-40 mL IntraVENous 2 times per day    sucralfate  1 g Oral 4x Daily AC & HS       Vital Signs:   /77   Pulse 55   Temp 98.1 °F (36.7 °C) (Oral)   Resp 16   Ht 5' 4\" (1.626 m)   Wt 134 lb 11.2 oz (61.1 kg)   SpO2 96%   BMI 23.12 kg/m²      Intake/Output Summary (Last 24 hours) at 1/9/2023 1328  Last data filed at 1/9/2023 0615  Gross per 24 hour   Intake --   Output 1700 ml   Net -1700 ml        Physical Examination: General appearance - chronically ill appearing  Mental status - alert, oriented to person, place, and time  Neck - supple, no significant adenopathy, no JVD, or carotid bruits  Chest - clear to auscultation, no wheezes, rales or rhonchi, symmetric air entry  Heart - normal rate and regular rhythm, systolic murmur 2/6 at 2nd left intercostal space and at lower left sternal border  Abdomen - soft, tender epigastric area; no rebound  Neurological - screening mental status exam normal  Musculoskeletal - no muscular tenderness noted  Extremities - no pedal edema noted  Skin - normal coloration and turgor, no rashes, no suspicious skin lesions noted    Data: (All radiographs, tracings, PFTs, and imaging are personally viewed and interpreted unless otherwise noted).          Electronically signed by Alejandro Smith MD on 1/9/2023 at 1:28 PM

## 2023-01-10 PROBLEM — M25.561 ACUTE PAIN OF RIGHT KNEE: Status: ACTIVE | Noted: 2023-01-10

## 2023-01-10 PROBLEM — K29.50 ANTRAL GASTRITIS: Status: ACTIVE | Noted: 2023-01-10

## 2023-01-10 LAB
ANION GAP SERPL CALCULATED.3IONS-SCNC: 10 MEQ/L (ref 8–16)
BASOPHILS # BLD: 0.3 %
BASOPHILS ABSOLUTE: 0 THOU/MM3 (ref 0–0.1)
BUN BLDV-MCNC: 21 MG/DL (ref 7–22)
CALCIUM SERPL-MCNC: 8.5 MG/DL (ref 8.5–10.5)
CHLORIDE BLD-SCNC: 97 MEQ/L (ref 98–111)
CO2: 22 MEQ/L (ref 23–33)
CREAT SERPL-MCNC: 0.7 MG/DL (ref 0.4–1.2)
EOSINOPHIL # BLD: 1.6 %
EOSINOPHILS ABSOLUTE: 0.1 THOU/MM3 (ref 0–0.4)
ERYTHROCYTE [DISTWIDTH] IN BLOOD BY AUTOMATED COUNT: 12.5 % (ref 11.5–14.5)
ERYTHROCYTE [DISTWIDTH] IN BLOOD BY AUTOMATED COUNT: 41.6 FL (ref 35–45)
GFR SERPL CREATININE-BSD FRML MDRD: > 60 ML/MIN/1.73M2
GLUCOSE BLD-MCNC: 92 MG/DL (ref 70–108)
HCT VFR BLD CALC: 31.3 % (ref 37–47)
HEMOGLOBIN: 10.5 GM/DL (ref 12–16)
IMMATURE GRANS (ABS): 0.05 THOU/MM3 (ref 0–0.07)
IMMATURE GRANULOCYTES: 0.7 %
LV EF: 58 %
LVEF MODALITY: NORMAL
LYMPHOCYTES # BLD: 12.5 %
LYMPHOCYTES ABSOLUTE: 0.9 THOU/MM3 (ref 1–4.8)
MCH RBC QN AUTO: 30.8 PG (ref 26–33)
MCHC RBC AUTO-ENTMCNC: 33.5 GM/DL (ref 32.2–35.5)
MCV RBC AUTO: 91.8 FL (ref 81–99)
MONOCYTES # BLD: 9.9 %
MONOCYTES ABSOLUTE: 0.7 THOU/MM3 (ref 0.4–1.3)
NUCLEATED RED BLOOD CELLS: 0 /100 WBC
OSMOLALITY URINE: 383 MOSMOL/KG (ref 250–750)
PLATELET # BLD: 138 THOU/MM3 (ref 130–400)
PMV BLD AUTO: 10 FL (ref 9.4–12.4)
POTASSIUM SERPL-SCNC: 3.9 MEQ/L (ref 3.5–5.2)
RBC # BLD: 3.41 MILL/MM3 (ref 4.2–5.4)
SEG NEUTROPHILS: 75 %
SEGMENTED NEUTROPHILS ABSOLUTE COUNT: 5.6 THOU/MM3 (ref 1.8–7.7)
SODIUM BLD-SCNC: 129 MEQ/L (ref 135–145)
SODIUM URINE: 27 MEQ/L
UREASE-CLO-C. PYLORI: NEGATIVE
URIC ACID: 5 MG/DL (ref 2.4–5.7)
WBC # BLD: 7.4 THOU/MM3 (ref 4.8–10.8)

## 2023-01-10 PROCEDURE — 6370000000 HC RX 637 (ALT 250 FOR IP): Performed by: INTERNAL MEDICINE

## 2023-01-10 PROCEDURE — 36415 COLL VENOUS BLD VENIPUNCTURE: CPT

## 2023-01-10 PROCEDURE — 83935 ASSAY OF URINE OSMOLALITY: CPT

## 2023-01-10 PROCEDURE — 6370000000 HC RX 637 (ALT 250 FOR IP)

## 2023-01-10 PROCEDURE — 6360000002 HC RX W HCPCS: Performed by: INTERNAL MEDICINE

## 2023-01-10 PROCEDURE — 99223 1ST HOSP IP/OBS HIGH 75: CPT | Performed by: INTERNAL MEDICINE

## 2023-01-10 PROCEDURE — C9113 INJ PANTOPRAZOLE SODIUM, VIA: HCPCS | Performed by: INTERNAL MEDICINE

## 2023-01-10 PROCEDURE — 84300 ASSAY OF URINE SODIUM: CPT

## 2023-01-10 PROCEDURE — 84550 ASSAY OF BLOOD/URIC ACID: CPT

## 2023-01-10 PROCEDURE — 2580000003 HC RX 258

## 2023-01-10 PROCEDURE — 80048 BASIC METABOLIC PNL TOTAL CA: CPT

## 2023-01-10 PROCEDURE — 99232 SBSQ HOSP IP/OBS MODERATE 35: CPT | Performed by: INTERNAL MEDICINE

## 2023-01-10 PROCEDURE — 93306 TTE W/DOPPLER COMPLETE: CPT

## 2023-01-10 PROCEDURE — 85025 COMPLETE CBC W/AUTO DIFF WBC: CPT

## 2023-01-10 PROCEDURE — 2140000000 HC CCU INTERMEDIATE R&B

## 2023-01-10 RX ORDER — POLYETHYLENE GLYCOL 3350 17 G/17G
17 POWDER, FOR SOLUTION ORAL DAILY PRN
Status: DISCONTINUED | OUTPATIENT
Start: 2023-01-10 | End: 2023-01-11 | Stop reason: HOSPADM

## 2023-01-10 RX ORDER — DOCUSATE SODIUM 100 MG/1
100 CAPSULE, LIQUID FILLED ORAL 2 TIMES DAILY PRN
Status: DISCONTINUED | OUTPATIENT
Start: 2023-01-10 | End: 2023-01-11 | Stop reason: HOSPADM

## 2023-01-10 RX ORDER — DEXAMETHASONE SODIUM PHOSPHATE 4 MG/ML
6 INJECTION, SOLUTION INTRA-ARTICULAR; INTRALESIONAL; INTRAMUSCULAR; INTRAVENOUS; SOFT TISSUE ONCE
Status: COMPLETED | OUTPATIENT
Start: 2023-01-10 | End: 2023-01-10

## 2023-01-10 RX ORDER — LIDOCAINE 4 G/G
1 PATCH TOPICAL DAILY
Status: DISCONTINUED | OUTPATIENT
Start: 2023-01-10 | End: 2023-01-11 | Stop reason: HOSPADM

## 2023-01-10 RX ADMIN — SODIUM CHLORIDE, PRESERVATIVE FREE 10 ML: 5 INJECTION INTRAVENOUS at 22:02

## 2023-01-10 RX ADMIN — METOPROLOL TARTRATE 25 MG: 25 TABLET, FILM COATED ORAL at 09:04

## 2023-01-10 RX ADMIN — SUCRALFATE 1 G: 1 TABLET ORAL at 11:00

## 2023-01-10 RX ADMIN — ISOSORBIDE MONONITRATE 60 MG: 60 TABLET, EXTENDED RELEASE ORAL at 09:13

## 2023-01-10 RX ADMIN — DICLOFENAC SODIUM 2 G: 10 GEL TOPICAL at 22:00

## 2023-01-10 RX ADMIN — ACETAMINOPHEN 650 MG: 325 TABLET ORAL at 17:28

## 2023-01-10 RX ADMIN — PRAMIPEXOLE DIHYDROCHLORIDE 1 MG: 1 TABLET ORAL at 19:54

## 2023-01-10 RX ADMIN — ACETAMINOPHEN 650 MG: 325 TABLET ORAL at 06:01

## 2023-01-10 RX ADMIN — ASPIRIN 81 MG: 81 TABLET, COATED ORAL at 09:04

## 2023-01-10 RX ADMIN — SODIUM CHLORIDE 1 G: 1 TABLET ORAL at 17:30

## 2023-01-10 RX ADMIN — PANTOPRAZOLE SODIUM 40 MG: 40 INJECTION, POWDER, FOR SOLUTION INTRAVENOUS at 09:06

## 2023-01-10 RX ADMIN — SUCRALFATE 1 G: 1 TABLET ORAL at 06:00

## 2023-01-10 RX ADMIN — SODIUM CHLORIDE 1 G: 1 TABLET ORAL at 13:11

## 2023-01-10 RX ADMIN — Medication 6 MG: at 22:09

## 2023-01-10 RX ADMIN — SODIUM CHLORIDE, PRESERVATIVE FREE 10 ML: 5 INJECTION INTRAVENOUS at 09:04

## 2023-01-10 RX ADMIN — METOPROLOL TARTRATE 25 MG: 25 TABLET, FILM COATED ORAL at 22:00

## 2023-01-10 RX ADMIN — DEXAMETHASONE SODIUM PHOSPHATE 6 MG: 4 INJECTION, SOLUTION INTRA-ARTICULAR; INTRALESIONAL; INTRAMUSCULAR; INTRAVENOUS; SOFT TISSUE at 17:30

## 2023-01-10 RX ADMIN — SODIUM CHLORIDE 1 G: 1 TABLET ORAL at 08:29

## 2023-01-10 RX ADMIN — SUCRALFATE 1 G: 1 TABLET ORAL at 17:31

## 2023-01-10 RX ADMIN — SUCRALFATE 1 G: 1 TABLET ORAL at 22:00

## 2023-01-10 ASSESSMENT — PAIN DESCRIPTION - LOCATION
LOCATION: KNEE
LOCATION: KNEE

## 2023-01-10 ASSESSMENT — PAIN DESCRIPTION - DESCRIPTORS
DESCRIPTORS: ACHING
DESCRIPTORS: ACHING;DISCOMFORT

## 2023-01-10 ASSESSMENT — PAIN DESCRIPTION - ONSET: ONSET: OTHER (COMMENT)

## 2023-01-10 ASSESSMENT — PAIN DESCRIPTION - ORIENTATION
ORIENTATION: RIGHT
ORIENTATION: RIGHT

## 2023-01-10 ASSESSMENT — PAIN SCALES - GENERAL
PAINLEVEL_OUTOF10: 2
PAINLEVEL_OUTOF10: 3
PAINLEVEL_OUTOF10: 0

## 2023-01-10 ASSESSMENT — PAIN - FUNCTIONAL ASSESSMENT: PAIN_FUNCTIONAL_ASSESSMENT: PREVENTS OR INTERFERES SOME ACTIVE ACTIVITIES AND ADLS

## 2023-01-10 ASSESSMENT — PAIN DESCRIPTION - FREQUENCY: FREQUENCY: INTERMITTENT

## 2023-01-10 NOTE — CARE COORDINATION
1/10/23, 8:26 AM EST    DISCHARGE ON GOING EVALUATION    Cassidy Gentiel day: 4  Location: -22/022-A Reason for admit: Hyponatremia [E87.1]   Procedure:   1/6 CT Abd/Pelvis: There is a thickened appearance throughout the transverse colon with some mild haziness in the adjacent fat. While this could be related to incomplete distention, mild/early diverticulitis could have a similar appearance. Hepatic steatosis. Large hiatal hernia. A prominent lymph node is in the right breast. Further evaluation with dedicated mammogram is recommended on a nonemergent basis. 1/8 Renal US: No hydronephrosis. Elevated left renal resistive index as can be seen with underlying medical renal disease. 1/9 EGD: Antral gastritis. Biopsies obtained for JO-test.  8 cm hiatal hernia. Barriers to Discharge:  Hospitalist and GI following. EGD done yesterday (see above). Sodium 129. UA: trace protein, moderate leukocytes, no bacteria. IVF. Protonix iv daily. Carafate qid. Salt tabs tid. PCP: Lenore Wynne MD  Readmission Risk Score: 10.1%  Patient Goals/Plan/Treatment Preferences: Plans home alone with family support. Monitor for needs.

## 2023-01-10 NOTE — OP NOTE
[address] [place] [address]                                OPERATIVE REPORT    PATIENT NAME: Pineda Gonzalez                    :        1935  MED REC NO:   [id number]                           ROOM:       0022  ACCOUNT NO:   [de-identified]                           ADMIT DATE: 2023  PROVIDER:     Johnna Mcgee M.D.    DATE OF PROCEDURE:  2023    PROCEDURE:  Esophagogastroduodenoscopy. INDICATION:  An 80year-old pleasant female who complains of epigastric  abdominal pain, nausea, vomiting, nausea, vomiting and the pain is  progressively getting worse. She had CT scan of the abdomen, was done  on 2023, which was reported as thickened appearance throughout the  transverse colon and some mild haziness, may be related to incomplete  distention, distention, hepatic steatosis, large hiatal hernia,  prominent lymph node in the right breast.  The patient had persistence  of pain. She is undergoing further evaluation. PHYSICAL EXAMINATION:  VITAL SIGNS:  Afebrile. Vital signs are stable. Temperature 98.1,  pulse 61, respiratory rate 16, blood pressure 118/81. HEART:  Regular. Normal S1 and S2. No S3.  LUNGS:  Equal to expansion on inspection. [address] air entry bilaterally. ABDOMEN:  Soft. Normoactive bowel sounds. Nontender. ASA CLASSIFICATION:  As per Anesthesia. Please see Anesthesia note for  details. INSTRUMENT:  GIF-190 gastroscope. PHOTOGRAPHS:  Yes. BIOPSIES:  Yes. ESTIMATED BLOOD LOSS:  Less than 10 mL. CONSENT:  Procedure indications and complications including but not  limited to perforation, bleeding, infection, adverse reaction to  medicine, very slight chance of missing significant lesions discussed  with the patient and the patient expressed her understanding and a  written consent was obtained.     DESCRIPTION OF PROCEDURE:  The patient was placed in the left lateral  decubitus position in the endo room #11. The patient was placed on  appropriate monitoring of vitals including blood pressure, heart rate  and pulse ox. Oropharynx was sprayed with Cetacaine spray and bite  block was placed between maxilla and mandible. After the adequate total  intravenous anesthesia was given by Anesthesia, the GIF-190 gastroscope  was inserted into the oropharynx and the esophagus was intubated under  direct vision. The scope was advanced down through the stomach into  second portion of duodenum. On careful inspection and on withdrawal of  the scope, the duodenum looks normal as shown in picture number A4. In  the antrum, the patient had patchy antral gastritis noted as shown in  picture number 2 and 3. Body of the stomach looks normal as shown in  picture number 6. On retroflex in the fundus, the patient had large  hiatal hernia noted. The hiatal hernia extending from 40 to 32 cm from  the bite block indicating 8 cm hiatal hernia noted. Distal and proximal  esophagus looks normal.  Air was withdrawn as the scope was removed. The patient tolerated the procedure well without any immediate  complications. Vitals including blood pressure, heart rate and pulse ox  were stable during the procedure and after the procedure. The patient  transferred to the recovery room in stable condition. IMPRESSION:  Esophagogastroduodenoscopy to second portion of duodenum. Antral gastritis. Biopsies obtained for JO-test.  8 cm hiatal hernia. RECOMMENDATIONS:  Antireflux instructions. Follow the biopsy results. Hiatal hernia instructions. She does not have any strangulated hernia  that should not be giving her the that significant pain. Consider  cardiology evaluation to rule out cardiac cause of her pain. Thank you, Dr. Kavita Saeed, for letting me to do procedure on the patient. Do not hesitate to call me if you have any questions. My  recommendations are above.         Charmaine Ansari M.D.    D: 01/09/2023 17:03:25       T: 01/09/2023 17:05:50     [id number]  Job#: [id number]     Doc#: [id number]    CC:  Fany Carolina M.D.

## 2023-01-10 NOTE — CONSULTS
[facility]'s  Consult    Patient's Name/Date of Birth: Olga Macias / 1935 (41 y.o.)    Date: January 10, 2023     Referring Provider: Alejandro Smith MD    CHIEF COMPLAINT: Abdominal Pain      HPI: This is a pleasant 80 y.o. female former smoker with PMH CAD s/p stent, HTN, HLD, mild pHTN, mild AS/MR and GERD who presents with complaints of diarrhea. The patient underwent an EGD which demonstrated antral gastritis and a hiatal hernia. However she continues report pain, out of proportion for findings. Cardiology was consulted for evaluation of chest/epigastric pain. The patient reports having contracted COVID 2 weeks ago. This was associated with vomiting and diarrhea. She states since then she has had intermittent nonradiating epigastric, \" gnawing\" pain not alleviated or exacerbated by anything. She denies associated CP, SOB or palpitations. Initial hospital EKG was significant for sinus rhythm with first-degree AV block. On evaluation she denies any chest pain, reporting that since she has been hospitalized her pain has resolved on Protonix. .    Cardiac history significant for Last echo EF 60%, RVSP 41 mmHg consistent with mild pulmonary hypertension, mild AS with LEODAN 1.64 sq cm, max gradient 16 mmHg, mean gradient 9 mmHg, peak velocity 197 cm/s, mild MR/TR 7/2017. LHC with proximal RCA 30-40%, distal RCA 50%, PDA 50%, distal left main 30%, LCx 50%, LAD 80-95% 9/2017. Repeat LHC with mid LAD stenting 9/2017            Echo: No results found for this or any previous visit.        All labs, EKG's, diagnostic testing and images as well as cardiac cath, stress testing were reviewed during this encounter    Past Medical History:   Diagnosis Date    Anesthesia 2012    after hemorrhoid surgery felt like \"elephant on my chest\" heart cath done    CAD (coronary artery disease) 2012    Cancer ([facility] [address]     cervical rectal    GERD (gastroesophageal reflux disease)     History of prolapse of bladder     Hyperlipidemia     Hypertension     Osteoarthritis     Shortness of breath 09/2017    UTI (urinary tract infection) 05/20/2017    Cinic in [place]     Past Surgical History:   Procedure Laterality Date    [address]  8/2012    [facility]    COLONOSCOPY  5/25/12    Dr. Inez Orona Left 12/16/2021    LEFT PERIANAL BIOPSY performed by Robbie Mclean MD at [address]  09/11/2017    [address] [facility], 08/17/2012    Anal exam, hemorroidectomy, excision perianal skin lesion.  total of 3    HYSTERECTOMY (CERVIX STATUS UNKNOWN)  1966    OTHER SURGICAL HISTORY  1/3/2014    LEFT MEDIPORT-Dr. Lior Mauro and removed    RECTAL SURGERY      for cancer    SIGMOIDOSCOPY N/A 6/10/2019    SIGMOIDOSCOPY DIAGNOSTIC FLEXIBLE performed by Ryan Mccoy MD at [address] [facility] N/A 1/9/2023    EGD ESOPHAGOGASTRODUODENOSCOPY performed by Ryan Mccoy MD at [facility]     Current Facility-Administered Medications   Medication Dose Route Frequency Provider Last Rate Last Admin    0.9 % sodium chloride infusion   IntraVENous Continuous Cassidy Braxton MD 50 mL/hr at 01/09/23 1344 New Bag at 01/09/23 1344    pantoprazole (PROTONIX) injection 40 mg  40 mg IntraVENous Daily Cassidy Braxton MD   40 mg at 01/10/23 5893    melatonin tablet 6 mg  6 mg Oral QHS Cassidy Braxton MD   6 mg at 01/08/23 2135    pramipexole (MIRAPEX) tablet 1 mg  1 mg Oral Nightly Cassidy Braxton MD   1 mg at 01/09/23 2006    hydrOXYzine pamoate (VISTARIL) capsule 25 mg  25 mg Oral Nightly PRN Cassidy Braxton MD   25 mg at 01/09/23 2137    sodium chloride tablet 1 g  1 g Oral TID WC Cassidy Braxton MD   1 g at 01/10/23 0829    potassium chloride (KLOR-CON M) extended release tablet 40 mEq  40 mEq Oral PRN Cassidy Braxton MD        Or    potassium bicarb-citric acid (EFFER-K) effervescent tablet 40 mEq  40 mEq Oral PRN Carmela Syed MD        Or    potassium chloride 10 mEq/100 mL IVPB (Peripheral Line)  10 mEq IntraVENous PRN Carmela Syed MD        magnesium sulfate 2000 mg in 50 mL IVPB premix  2,000 mg IntraVENous PRN Carmela Syed MD        aspirin EC tablet 81 mg  81 mg Oral Daily Pal Dear, DPM   81 mg at 01/10/23 4000    isosorbide mononitrate (IMDUR) extended release tablet 60 mg  60 mg Oral Daily Shannon Barajas, DPM   60 mg at 01/10/23 0913    metoprolol tartrate (LOPRESSOR) tablet 25 mg  25 mg Oral BID Pal Dear, DPM   25 mg at 01/10/23 0360    sodium chloride flush 0.9 % injection 5-40 mL  5-40 mL IntraVENous 2 times per day Pal Dear, DPM   10 mL at 01/10/23 0904    sodium chloride flush 0.9 % injection 5-40 mL  5-40 mL IntraVENous PRN Pal Dear, DPM        0.9 % sodium chloride infusion   IntraVENous PRN Shannon Barajas, DPM        ondansetron (ZOFRAN-ODT) disintegrating tablet 4 mg  4 mg Oral Q8H PRN Pal Dear, DPM   4 mg at 01/07/23 1647    Or    ondansetron (ZOFRAN) injection 4 mg  4 mg IntraVENous Q6H PRN Pal Dear, DPM        acetaminophen (TYLENOL) tablet 650 mg  650 mg Oral Q6H PRN Pal Dear, DPM   650 mg at 01/10/23 0601    Or    acetaminophen (TYLENOL) suppository 650 mg  650 mg Rectal Q6H PRN Pal Dear, DPM        sucralfate (CARAFATE) tablet 1 g  1 g Oral 4x Daily AC & HS Pal Dear, DPM   1 g at 01/10/23 0600     Prior to Admission medications    Medication Sig Start Date End Date Taking?  Authorizing Provider   hydrOXYzine pamoate (VISTARIL) 25 MG capsule Take 1 capsule by mouth nightly as needed (sleep) 1/9/23 1/23/23 Yes Carmela Syed MD   pramipexole (MIRAPEX) 1 MG tablet Take 1 tablet by mouth nightly 4 tab 1/9/23  Yes Carmela Syed MD   melatonin 3 MG TABS tablet Take 2 tablets by mouth nightly 1/9/23  Yes Carmela Syed MD   sodium chloride 1 g tablet Take 1 tablet by mouth in the morning and at bedtime 1/9/23  Yes Toma Carr MD   lansoprazole (PREVACID) 30 MG delayed release capsule Take 1 capsule by mouth 2 times daily 1/9/23  Yes Toma Carr MD   ondansetron (ZOFRAN-ODT) 4 MG disintegrating tablet Take 1 tablet by mouth 3 times daily as needed for Nausea or Vomiting 12/28/22   Gissel Leon APRN - CNP   isosorbide mononitrate (IMDUR) 60 MG extended release tablet Take 1 tablet by mouth daily 3/18/22   Phyllis Low MD   metoprolol tartrate (LOPRESSOR) 25 MG tablet Take 1 tablet by mouth 2 times daily 3/18/22   Phyllis Low MD   conjugated estrogens (PREMARIN) 0.625 MG/GM vaginal cream Place 0.5 grams vaginally twice weekly. 3/7/19   Tere Ortiz PA-C   Polyethylene Glycol 3350 (MIRALAX PO) Take by mouth as needed    Historical Provider, MD   nitroGLYCERIN (NITROSTAT) 0.4 MG SL tablet Place 0.4 mg under the tongue every 5 minutes as needed for Chest pain up to max of 3 total doses. If no relief after 1 dose, call 911. Historical Provider, MD   simvastatin (ZOCOR) 40 MG tablet Take 0.5 tablets by mouth nightly 10/6/16   Arthea Favors, DO   aspirin 81 MG EC tablet Take 81 mg by mouth daily.       Historical Provider, MD   Scheduled Meds:   pantoprazole  40 mg IntraVENous Daily    melatonin  6 mg Oral QHS    pramipexole  1 mg Oral Nightly    sodium chloride  1 g Oral TID WC    aspirin  81 mg Oral Daily    isosorbide mononitrate  60 mg Oral Daily    metoprolol tartrate  25 mg Oral BID    sodium chloride flush  5-40 mL IntraVENous 2 times per day    sucralfate  1 g Oral 4x Daily AC & HS     Continuous Infusions:   sodium chloride 50 mL/hr at 01/09/23 1344    sodium chloride       PRN Meds:.hydrOXYzine pamoate, potassium chloride **OR** potassium alternative oral replacement **OR** potassium chloride, magnesium sulfate, sodium chloride flush, sodium chloride, ondansetron **OR** ondansetron, acetaminophen **OR** acetaminophen    Allergies   Allergen Reactions Other Other (See Comments)     Movi Prep,    Facial and lip swelling    Ciprofloxacin Nausea Only     Nausea and tingling in her fingers    Lipitor Swelling    Ramipril Swelling     tongue    Sulfa Antibiotics Rash     Family History   Problem Relation Age of Onset    Alzheimer's Disease Mother     Heart Disease Mother     Arthritis Father     Cancer Brother         esophagus    Heart Disease Brother     Heart Disease Sister     Heart Disease Brother         stent, pacemaker    Stroke Sister         hemorrhagic    Heart Disease Sister     Diabetes Maternal Aunt     Diabetes Maternal Aunt     High Blood Pressure Neg Hx     Miscarriages / Stillbirths Neg Hx      Social History     Socioeconomic History    Marital status:      Spouse name: Not on file    Number of children: Not on file    Years of education: Not on file    Highest education level: Not on file   Occupational History    Occupation: retired   Tobacco Use    Smoking status: Former     Packs/day: 1.00     Years: 12.00     Pack years: 12.00     Types: Cigarettes     Quit date: 1972     Years since quittin.4    Smokeless tobacco: Never   Vaping Use    Vaping Use: Never used   Substance and Sexual Activity    Alcohol use: No    Drug use: No    Sexual activity: Not on file   Other Topics Concern    Not on file   Social History Narrative    Not on file     Social Determinants of Health     Financial Resource Strain: Not on file   Food Insecurity: Not on file   Transportation Needs: Not on file   Physical Activity: Not on file   Stress: Not on file   Social Connections: Not on file   Intimate Partner Violence: Not on file   Housing Stability: Not on file     ROS:   Constitutional: Denies any recent wt change. Eyes:  Denies any blurring or double vision, no glaucoma  Ears/Nose/Mouth/Throat:  Denies any chronic sinus/rhinitis, bleeding gums  Cardiovascular:  As described above.     Respiratory:  Denies any frequent cough, wheezing or coughing up blood  Genitourinary:  Denies difficulty with urination and kidney stones  Gastrointestinal:  Denies any chronic problems with abdominal pain, nausea, vomiting or diarrhea  Musculoskeletal:  Denies any joint pain, back pain, or difficulty walking  Integumentary:  Denies any rash  Neurological:  No numbness or tingling  Endocrine:  Denies any polydipsia. Hematologic/Lymphatic:  Denies any hemorrhage or lymphatic drainage problems. Labs:  CBC: No results for input(s): WBC, HGB, HCT, MCV, PLT in the last 72 hours. BMP:   Recent Labs     01/07/23 2102 01/08/23 0428 01/08/23  1234 01/08/23  2036 01/09/23  0423 01/10/23  0408   * 125*   < > 127* 132* 129*   K 3.4* 4.1  --   --  3.7 3.9   CL  --  88*  --   --  96* 97*   CO2  --  25  --   --  22* 22*   BUN  --  18  --   --  18 21   CREATININE  --  0.8  --   --  0.7 0.7   MG 2.0 2.1  --   --   --   --     < > = values in this interval not displayed. Accucheck Glucoses: No results for input(s): POCGLU in the last 72 hours. Cardiac Enzymes: No results for input(s): CKTOTAL, CKMB, CKMBINDEX, TROPONINI in the last 72 hours. PT/INR: No results for input(s): PROTIME, INR in the last 72 hours. APTT: No results for input(s): APTT in the last 72 hours.   Liver Profile:  Lab Results   Component Value Date/Time    AST 29 01/06/2023 01:45 PM    ALT 22 01/06/2023 01:45 PM    BILIDIR <0.2 03/14/2017 03:13 PM    BILITOT 0.5 01/06/2023 01:45 PM    ALKPHOS 68 01/06/2023 01:45 PM     Lab Results   Component Value Date/Time    CHOL 148 08/11/2022 08:00 AM    HDL 62 08/11/2022 08:00 AM    TRIG 134 08/11/2022 08:00 AM     TSH:   Lab Results   Component Value Date/Time    TSH 5.250 01/09/2023 04:23 AM     UA:   Lab Results   Component Value Date/Time    NITRITE negative 10/29/2019 10:56 AM    COLORU YELLOW 01/09/2023 03:28 PM    PHUR 6.0 01/09/2023 03:28 PM    LABCAST NONE SEEN 08/11/2022 10:00 AM    LABCAST NONE SEEN 08/11/2022 10:00 AM    WBCUA 15-25 01/09/2023 03:28 PM RBCUA 0-2 01/09/2023 03:28 PM    MUCUS THREADS 01/09/2023 03:28 PM    YEAST NONE SEEN 01/09/2023 03:28 PM    BACTERIA NONE SEEN 01/09/2023 03:28 PM    CLARITYU clear 10/29/2019 10:56 AM    CLARITYU Cloudy 10/06/2017 12:00 AM    SPECGRAV 1.009 08/11/2022 10:00 AM    LEUKOCYTESUR MODERATE 01/09/2023 03:28 PM    UROBILINOGEN 0.2 01/09/2023 03:28 PM    BILIRUBINUR NEGATIVE 01/09/2023 03:28 PM    BILIRUBINUR negative 10/29/2019 10:56 AM    BLOODU NEGATIVE 01/09/2023 03:28 PM    GLUCOSEU NEGATIVE 01/09/2023 03:28 PM    AMORPHOUS DEBRIS 09/18/2020 11:51 AM         Physical Exam:  Vitals:    01/10/23 0904   BP:    Pulse: 60   Resp:    Temp:    SpO2:       Intake/Output Summary (Last 24 hours) at 1/10/2023 1053  Last data filed at 1/10/2023 6647  Gross per 24 hour   Intake 700 ml   Output 300 ml   Net 400 ml      General:  No acute distress, chronically ill-appearing, interactive and cooperative  Neck: Supple, no JVD, no carotid bruits  Heart: Regular rhythm normal S1 and S2, no rubs, or gallops. 2/6 LUSB diastolic murmur. Lungs: clear to ascultation no rales, wheezes, or rhonchi  Abdomen: positive bowel sounds, soft, non-tender, non-distended, no bruits, no masses  Extremities:no clubbing, cyanosis or edema  Neurologic: alert and oriented x 3, cranial nerves 2-12 grossly intact, motor and sensory intact, moving all extremities  Skin: No rashes  Psych: AO x 3, no depression/melissa, no pressured speech, normal affect  Lymph: No obvious LAD      Assessment & Plan:  Nonanginal Chest Pain: Non radiating, not associated with activity or relieved by rest. Currently no chest pain. HEART score 5. Immediate pretest probability per rafaela Forrestor classification. EKG without any ST or T wave changes. Initial troponin WNL. Advise repeat EKG and troponin if chest pain were to redevelop. CAD: s/p stent to mid LAD. Previous LHC with diffuse nonobstructive and obstructive lesions 8/2022.  Follows outpatient with Dr. Stef Jaimes, Cardiology  Mild AS  HTN  HLD  Mild pHTN      Thank you for allowing us to participate in the care of this patient. Please do not hesitate to call us with questions.     Electronically signed by Augusto Carias DO on 1/10/2023 at 10:53 AM    Interventional Cardiology - [facility]

## 2023-01-10 NOTE — PROGRESS NOTES
Assessment and Plan:        Hyponatremia- gradually improving:  sodium 129 today  Abdominal pain- egd showed mild antral gastritis- Cardio saw, feels it noncardiac, I agree  Uti - Pseudomonas- checked PVR,not very great. To see ID and Urology in near future. On cefepime; appears not not have tolerated cipro po very well. Had E coli several yrs ago. US kidneys unremarkable  Vagal episode with blocked PACS; occurred with emesis- monitor  Right knee pain; red on edge; could be gout; check uric acid. Rx as such if elevated. Check cbc      CC:  abdominal pain  HPI: pt with hbp, utis, prsenting with diarrhea and emesis. Noted to be markedly hyponatremic, likely a combination of above plus thiazide. Has had recent sxs of uti with dysuria. ROS (12 point review of systems completed. Pertinent positives noted.  Otherwise ROS is negative) :   PMH:  Per HPI  SHX:  , former smoker  FHX: Noncontributory      Allergies: See above    Medications:     sodium chloride        pantoprazole  40 mg IntraVENous Daily    melatonin  6 mg Oral QHS    pramipexole  1 mg Oral Nightly    sodium chloride  1 g Oral TID WC    aspirin  81 mg Oral Daily    isosorbide mononitrate  60 mg Oral Daily    metoprolol tartrate  25 mg Oral BID    sodium chloride flush  5-40 mL IntraVENous 2 times per day    sucralfate  1 g Oral 4x Daily AC & HS       Vital Signs:   /61   Pulse 68   Temp 97.6 °F (36.4 °C) (Oral)   Resp 16   Ht 5' 4\" (1.626 m)   Wt 134 lb 11.2 oz (61.1 kg)   SpO2 98%   BMI 23.12 kg/m²      Intake/Output Summary (Last 24 hours) at 1/10/2023 1240  Last data filed at 1/10/2023 1114  Gross per 24 hour   Intake 1180 ml   Output 300 ml   Net 880 ml        Physical Examination: General appearance - chronically ill appearing  Mental status - alert, oriented to person, place, and time  Neck - supple, no significant adenopathy, no JVD, or carotid bruits  Chest - clear to auscultation, no wheezes, rales or rhonchi, symmetric air entry  Heart - normal rate and regular rhythm, systolic murmur 2/6 at 2nd left intercostal space and at lower left sternal border  Abdomen - soft, tender epigastric area; no rebound  Neurological - screening mental status exam normal  Musculoskeletal - right knee red lateral aspect, mild swelling,tender  Extremities - no pedal edema noted  Skin - normal coloration and turgor, no rashes, no suspicious skin lesions noted    Data: (All radiographs, tracings, PFTs, and imaging are personally viewed and interpreted unless otherwise noted).          Electronically signed by Ca Smith MD on 1/10/2023 at 12:40 PM

## 2023-01-11 ENCOUNTER — TELEPHONE (OUTPATIENT)
Dept: UROLOGY | Age: 88
End: 2023-01-11

## 2023-01-11 VITALS
HEIGHT: 64 IN | HEART RATE: 73 BPM | DIASTOLIC BLOOD PRESSURE: 77 MMHG | WEIGHT: 134.7 LBS | TEMPERATURE: 97.8 F | OXYGEN SATURATION: 98 % | SYSTOLIC BLOOD PRESSURE: 127 MMHG | BODY MASS INDEX: 23 KG/M2 | RESPIRATION RATE: 16 BRPM

## 2023-01-11 LAB
ANION GAP SERPL CALCULATED.3IONS-SCNC: 11 MEQ/L (ref 8–16)
BUN BLDV-MCNC: 21 MG/DL (ref 7–22)
CALCIUM SERPL-MCNC: 8.7 MG/DL (ref 8.5–10.5)
CHLORIDE BLD-SCNC: 99 MEQ/L (ref 98–111)
CO2: 22 MEQ/L (ref 23–33)
CREAT SERPL-MCNC: 0.7 MG/DL (ref 0.4–1.2)
GFR SERPL CREATININE-BSD FRML MDRD: > 60 ML/MIN/1.73M2
GLUCOSE BLD-MCNC: 196 MG/DL (ref 70–108)
POTASSIUM SERPL-SCNC: 4 MEQ/L (ref 3.5–5.2)
SODIUM BLD-SCNC: 132 MEQ/L (ref 135–145)
URINE CULTURE REFLEX: NORMAL

## 2023-01-11 PROCEDURE — 2580000003 HC RX 258

## 2023-01-11 PROCEDURE — 6360000002 HC RX W HCPCS: Performed by: INTERNAL MEDICINE

## 2023-01-11 PROCEDURE — 99239 HOSP IP/OBS DSCHRG MGMT >30: CPT | Performed by: INTERNAL MEDICINE

## 2023-01-11 PROCEDURE — 36415 COLL VENOUS BLD VENIPUNCTURE: CPT

## 2023-01-11 PROCEDURE — 80048 BASIC METABOLIC PNL TOTAL CA: CPT

## 2023-01-11 PROCEDURE — C9113 INJ PANTOPRAZOLE SODIUM, VIA: HCPCS | Performed by: INTERNAL MEDICINE

## 2023-01-11 PROCEDURE — 6370000000 HC RX 637 (ALT 250 FOR IP): Performed by: PHYSICIAN ASSISTANT

## 2023-01-11 PROCEDURE — 6370000000 HC RX 637 (ALT 250 FOR IP): Performed by: INTERNAL MEDICINE

## 2023-01-11 PROCEDURE — 6370000000 HC RX 637 (ALT 250 FOR IP)

## 2023-01-11 RX ORDER — METHYLPREDNISOLONE 4 MG/1
TABLET ORAL
Qty: 1 KIT | Refills: 0 | Status: SHIPPED | OUTPATIENT
Start: 2023-01-11

## 2023-01-11 RX ADMIN — SUCRALFATE 1 G: 1 TABLET ORAL at 11:55

## 2023-01-11 RX ADMIN — SUCRALFATE 1 G: 1 TABLET ORAL at 07:48

## 2023-01-11 RX ADMIN — DOCUSATE SODIUM 100 MG: 100 CAPSULE, LIQUID FILLED ORAL at 05:02

## 2023-01-11 RX ADMIN — METOPROLOL TARTRATE 25 MG: 25 TABLET, FILM COATED ORAL at 09:02

## 2023-01-11 RX ADMIN — SODIUM CHLORIDE 1 G: 1 TABLET ORAL at 09:02

## 2023-01-11 RX ADMIN — ASPIRIN 81 MG: 81 TABLET, COATED ORAL at 09:02

## 2023-01-11 RX ADMIN — SODIUM CHLORIDE, PRESERVATIVE FREE 10 ML: 5 INJECTION INTRAVENOUS at 09:03

## 2023-01-11 RX ADMIN — DICLOFENAC SODIUM 2 G: 10 GEL TOPICAL at 04:57

## 2023-01-11 RX ADMIN — PANTOPRAZOLE SODIUM 40 MG: 40 INJECTION, POWDER, FOR SOLUTION INTRAVENOUS at 09:03

## 2023-01-11 RX ADMIN — ISOSORBIDE MONONITRATE 60 MG: 60 TABLET, EXTENDED RELEASE ORAL at 09:03

## 2023-01-11 NOTE — TELEPHONE ENCOUNTER
Patient was admitted in the hospital and was discharged today. Does she need to see ID or can the referral be cancelled. She was treated with iv antibiotics while inpatient. Last culture on 01/09/2023 showed no growth. Should restart the trimethoprim. It was removed from her med list. If so, can you resend the prescription to [facility].

## 2023-01-11 NOTE — PROGRESS NOTES
Discharge teaching and instructions for diagnosis/procedure of hyponatremia, gastritis completed with patient using teachback method. AVS reviewed. Prescriptions to be picked up at [facility] and [facility]. Patient voiced understanding regarding prescriptions, follow up appointments, and care of self at home. Discharged in a wheelchair to  home with support per family. Patient stable at discharge.

## 2023-01-11 NOTE — PLAN OF CARE
Problem: Discharge Planning  Goal: Discharge to home or other facility with appropriate resources  Outcome: Progressing  Note: Denies any discharge needs this shift. Patient states she has walker at home if she needs it. Problem: Pain  Goal: Verbalizes/displays adequate comfort level or baseline comfort level  Outcome: Progressing  Note: Patient continues to complain of pain to right knee. Encouraged to elevate. Denies wanting to place on pillows. Agreed to use ice pack. Denies wanting to use tylenol. Agreed to use Voltaren cream. No other complaints voiced. Problem: Neurosensory - Adult  Goal: Achieves maximal functionality and self care  Outcome: Progressing     Problem: Cardiovascular - Adult  Goal: Maintains optimal cardiac output and hemodynamic stability  Outcome: Progressing  Note: Remains in SR 70's. No complaints of chest pain. Goal: Absence of cardiac dysrhythmias or at baseline  Outcome: Progressing     Problem: Skin/Tissue Integrity - Adult  Goal: Skin integrity remains intact  Outcome: Progressing  Note: No skin break down noted. Problem: Musculoskeletal - Adult  Goal: Return mobility to safest level of function  Outcome: Progressing  Note: Ambulated in room and to bathroom with walker, tolerated well. Goal: Return ADL status to a safe level of function  Outcome: Progressing     Problem: Gastrointestinal - Adult  Goal: Minimal or absence of nausea and vomiting  Outcome: Progressing  Note: No nausea or vomiting this shift. Goal: Maintains or returns to baseline bowel function  Outcome: Progressing  Goal: Maintains adequate nutritional intake  Outcome: Progressing     Problem: Infection - Adult  Goal: Absence of infection at discharge  Outcome: Progressing  Note: No temp. Lung sounds remain clear.      Problem: Metabolic/Fluid and Electrolytes - Adult  Goal: Electrolytes maintained within normal limits  Outcome: Progressing  Goal: Hemodynamic stability and optimal renal function maintained  Outcome: Progressing    Care plan reviewed with pt. Pt verbalizes understanding of the care plan and contributed to goal setting.

## 2023-01-11 NOTE — TELEPHONE ENCOUNTER
Ok to hold off on ID referral.  Let's hold Trimpex at this time as last 3 infections have been resistant to it. F/u in office as scheduled. PVR at visit.

## 2023-01-11 NOTE — CARE COORDINATION
1/11/23, 1:23 PM EST    Patient goals/plan/ treatment preferences discussed by  and . Patient goals/plan/ treatment preferences reviewed with patient/ family. Patient/ family verbalize understanding of discharge plan and are in agreement with goal/plan/treatment preferences. Understanding was demonstrated using the teach back method. AVS provided by RN at time of discharge, which includes all necessary medical information pertaining to the patients current course of illness, treatment, post-discharge goals of care, and treatment preferences. Services At/After Discharge: None       IMM Letter  IMM Letter given to Patient/Family/Significant other/Guardian/POA/by[de-identified] Taiwo Duenas RN CM  IMM Letter date given[de-identified] 01/11/23  IMM Letter time given[de-identified] 2955     Planning home today. Spoke with pt, again declined need for [facility].

## 2023-01-11 NOTE — DISCHARGE SUMMARY
Hospitalist Progress Note      Patient:  Puneet Sher    Unit/Bed:3B-22/022-A  YOB: 1935  MRN: [id number]   Acct: [de-identified]     PCP: Adam Smith MD  Date of Admission: 1/6/2023   Assessment and Plan:        Acute hypovolemic hyponatremia- gradually improving: Likely component b of dehydration as well as being on hydrochlorothiazide which is discontinued sodium levels around 122 today almost back to normal    Vagal episode with blocked PACS; occurred with emesis- monitor seen by cardiology okay for discharge  Right knee pain; red on edge; could be gout; check uric acid. Rx as such if elevated. Check cbc-added Medrol Dosepak at discharge  History of benign essential hypertension-on Lopressor, Imdur-continue  GERD-change to omeprazole and Carafate at discharge    Stable for home today  Okay for discharge per all consultants above care  PCP in 1 week's time  Discharge time 35 minutes         Medication List        START taking these medications      hydrOXYzine pamoate 25 MG capsule  Commonly known as: VISTARIL  Take 1 capsule by mouth nightly as needed (sleep)     melatonin 3 MG Tabs tablet  Take 2 tablets by mouth nightly     methylPREDNISolone 4 MG tablet  Commonly known as: MEDROL DOSEPACK  Take by mouth.     sodium chloride 1 g tablet  Take 1 tablet by mouth in the morning and at bedtime            CHANGE how you take these medications      lansoprazole 30 MG delayed release capsule  Commonly known as: PREVACID  Take 1 capsule by mouth 2 times daily  What changed: how much to take     pramipexole 1 MG tablet  Commonly known as: MIRAPEX  Take 1 tablet by mouth nightly 4 tab  What changed:   medication strength  how much to take            CONTINUE taking these medications      aspirin 81 MG EC tablet     conjugated estrogens 0.625 MG/GM vaginal cream  Commonly known as: Premarin  Place 0.5 grams vaginally twice weekly.      isosorbide mononitrate 60 MG extended release tablet  Commonly known as: IMDUR  Take 1 tablet by mouth daily     metoprolol tartrate 25 MG tablet  Commonly known as: LOPRESSOR  Take 1 tablet by mouth 2 times daily     MIRALAX PO     nitroGLYCERIN 0.4 MG SL tablet  Commonly known as: NITROSTAT     ondansetron 4 MG disintegrating tablet  Commonly known as: ZOFRAN-ODT  Take 1 tablet by mouth 3 times daily as needed for Nausea or Vomiting     simvastatin 40 MG tablet  Commonly known as: ZOCOR  Take 0.5 tablets by mouth nightly            STOP taking these medications      hydroCHLOROthiazide 25 MG tablet  Commonly known as: HYDRODIURIL               Where to Get Your Medications        These medications were sent to [id number] [place], [address] [facility] [address] [phone]  [address], [facility] [address]      Phone: [phone]   hydrOXYzine pamoate 25 MG capsule  lansoprazole 30 MG delayed release capsule  sodium chloride 1 g tablet       These medications were sent to [address] [place] [address] , [address]  [address] [place] [address] Floor, [address] [facility] [address]      Phone: [phone]   methylPREDNISolone 4 MG tablet       You can get these medications from any pharmacy    You don't need a prescription for these medications  melatonin 3 MG Tabs tablet       Information about where to get these medications is not yet available    Ask your nurse or doctor about these medications  pramipexole 1 MG tablet            Vital Signs:   /77   Pulse 73   Temp 97.8 °F (36.6 °C) (Oral)   Resp 16   Ht 5' 4\" (1.626 m)   Wt 134 lb 11.2 oz (61.1 kg)   SpO2 98%   BMI 23.12 kg/m²      Intake/Output Summary (Last 24 hours) at 1/11/2023 1211  Last data filed at 1/11/2023 0500  Gross per 24 hour   Intake 660 ml   Output 300 ml   Net 360 ml          Physical Examination: General appearance - chronically ill appearing  Mental status - alert, oriented to person, place, and time  Neck - supple, no significant adenopathy, no JVD, or carotid bruits  Chest - clear to auscultation, no wheezes, rales or rhonchi, symmetric air entry  Heart - normal rate and regular rhythm, systolic murmur 2/6 at 2nd left intercostal space and at lower left sternal border  Abdomen - soft, tender epigastric area; no rebound  Neurological - screening mental status exam normal  Musculoskeletal - right knee red lateral aspect, mild swelling,tender  Extremities - no pedal edema noted  Skin - normal coloration and turgor, no rashes, no suspicious skin lesions noted    Data:   Recent Results (from the past 72 hour(s))   Sodium    Collection Time: 01/08/23 12:34 PM   Result Value Ref Range    Sodium 127 (L) 135 - 145 meq/L   Sodium    Collection Time: 01/08/23  4:26 PM   Result Value Ref Range    Sodium 125 (L) 135 - 145 meq/L   Sodium    Collection Time: 01/08/23  8:36 PM   Result Value Ref Range    Sodium 127 (L) 135 - 145 meq/L   TSH    Collection Time: 01/09/23  4:23 AM   Result Value Ref Range    TSH 5.250 (H) 0.400 - 4.200 uIU/mL   Basic Metabolic Panel    Collection Time: 01/09/23  4:23 AM   Result Value Ref Range    Sodium 132 (L) 135 - 145 meq/L    Potassium 3.7 3.5 - 5.2 meq/L    Chloride 96 (L) 98 - 111 meq/L    CO2 22 (L) 23 - 33 meq/L    Glucose 90 70 - 108 mg/dL    BUN 18 7 - 22 mg/dL    Creatinine 0.7 0.4 - 1.2 mg/dL    Calcium 9.0 8.5 - 10.5 mg/dL   Anion Gap    Collection Time: 01/09/23  4:23 AM   Result Value Ref Range    Anion Gap 14.0 8.0 - 16.0 meq/L   Glomerular Filtration Rate, Estimated    Collection Time: 01/09/23  4:23 AM   Result Value Ref Range    Est, Glom Filt Rate >60 >60 ml/min/1.73m2   MRSA by PCR    Collection Time: 01/09/23  2:00 PM    Specimen: Nares   Result Value Ref Range    MRSA SCREEN RT-PCR NEGATIVE    Urine with Reflexed Micro    Collection Time: 01/09/23  3:28 PM   Result Value Ref Range    Glucose, Ur NEGATIVE NEGATIVE mg/dl    Bilirubin Urine NEGATIVE NEGATIVE    Ketones, Urine NEGATIVE NEGATIVE    Specific Gravity, Urine 1.017 1.002 - 1.030    Blood, Urine NEGATIVE NEGATIVE    pH, UA 6.0 5.0 - 9.0    Protein, UA TRACE (A) NEGATIVE    Urobilinogen, Urine 0.2 0.0 - 1.0 eu/dl    Nitrite, Urine NEGATIVE NEGATIVE    Leukocyte Esterase, Urine MODERATE (A) NEGATIVE    Color, UA YELLOW STRAW-YELLOW    Character, Urine CLOUDY (A) CLEAR-SL CLOUD    RBC, UA 0-2 0-2/hpf /hpf    WBC, UA 15-25 0-4/hpf /hpf    Epithelial Cells, UA 10-15 3-5/hpf /hpf    Mucus, UA THREADS NONE SEEN/THREA    Bacteria, UA NONE SEEN FEW/NONE SEEN /hpf    Casts UA 0-4 HYALINE NONE SEEN /lpf    Crystals, UA OXALATE NONE SEEN    Renal Epithelial, UA NONE SEEN NONE SEEN    Yeast, UA NONE SEEN NONE SEEN    CASTS 2 NONE SEEN NONE SEEN /lpf    MISCELLANEOUS 2 NONE SEEN    Culture, Reflexed, Urine    Collection Time: 01/09/23  3:28 PM    Specimen: Urine, clean catch   Result Value Ref Range    Urine Culture Reflex No growth-preliminary No growth    Urease test, semi-quantitative    Collection Time: 01/09/23  4:36 PM   Result Value Ref Range    [id number].  Pylori Negative    Basic Metabolic Panel    Collection Time: 01/10/23  4:08 AM   Result Value Ref Range    Sodium 129 (L) 135 - 145 meq/L    Potassium 3.9 3.5 - 5.2 meq/L    Chloride 97 (L) 98 - 111 meq/L    CO2 22 (L) 23 - 33 meq/L    Glucose 92 70 - 108 mg/dL    BUN 21 7 - 22 mg/dL    Creatinine 0.7 0.4 - 1.2 mg/dL    Calcium 8.5 8.5 - 10.5 mg/dL   Anion Gap    Collection Time: 01/10/23  4:08 AM   Result Value Ref Range    Anion Gap 10.0 8.0 - 16.0 meq/L   Glomerular Filtration Rate, Estimated    Collection Time: 01/10/23  4:08 AM   Result Value Ref Range    Est, Glom Filt Rate >60 >60 ml/min/1.73m2   Uric Acid    Collection Time: 01/10/23  4:08 AM   Result Value Ref Range    Uric Acid 5.0 2.4 - 5.7 mg/dL   CBC with Auto Differential    Collection Time: 01/10/23 12:49 PM   Result Value Ref Range    WBC 7.4 4.8 - 10.8 thou/mm3    RBC 3.41 (L) 4.20 - 5.40 mill/mm3    Hemoglobin 10.5 (L) 12.0 - 16.0 gm/dl Hematocrit 31.3 (L) 37.0 - 47.0 %    MCV 91.8 81.0 - 99.0 fL    MCH 30.8 26.0 - 33.0 pg    MCHC 33.5 32.2 - 35.5 gm/dl    RDW-CV 12.5 11.5 - 14.5 %    RDW-SD 41.6 35.0 - 45.0 fL    Platelets 851 188 - 739 thou/mm3    MPV 10.0 9.4 - 12.4 fL    Seg Neutrophils 75.0 %    Lymphocytes 12.5 %    Monocytes 9.9 %    Eosinophils 1.6 %    Basophils 0.3 %    Immature Granulocytes 0.7 %    Segs Absolute 5.6 1.8 - 7.7 thou/mm3    Lymphocytes Absolute 0.9 (L) 1.0 - 4.8 thou/mm3    Monocytes Absolute 0.7 0.4 - 1.3 thou/mm3    Eosinophils Absolute 0.1 0.0 - 0.4 thou/mm3    Basophils Absolute 0.0 0.0 - 0.1 thou/mm3    Immature Grans (Abs) 0.05 0.00 - 0.07 thou/mm3    nRBC 0 /100 wbc   ECHO Complete 2D W Doppler W Color    Collection Time: 01/10/23  1:40 PM   Result Value Ref Range    Left Ventricular Ejection Fraction 58     LVEF MODALITY ECHO    Sodium, urine, random    Collection Time: 01/10/23  4:35 PM   Result Value Ref Range    Sodium, Ur 27 meq/l   Osmolality, Urine    Collection Time: 01/10/23  4:35 PM   Result Value Ref Range    Osmolality, Ur 383 250 - 750 mosmol/kg   Basic Metabolic Panel    Collection Time: 01/11/23  4:16 AM   Result Value Ref Range    Sodium 132 (L) 135 - 145 meq/L    Potassium 4.0 3.5 - 5.2 meq/L    Chloride 99 98 - 111 meq/L    CO2 22 (L) 23 - 33 meq/L    Glucose 196 (H) 70 - 108 mg/dL    BUN 21 7 - 22 mg/dL    Creatinine 0.7 0.4 - 1.2 mg/dL    Calcium 8.7 8.5 - 10.5 mg/dL   Anion Gap    Collection Time: 01/11/23  4:16 AM   Result Value Ref Range    Anion Gap 11.0 8.0 - 16.0 meq/L   Glomerular Filtration Rate, Estimated    Collection Time: 01/11/23  4:16 AM   Result Value Ref Range    Est, Glom Filt Rate >60 >60 ml/min/1.73m2      Recent Results (from the past 72 hour(s))   Sodium    Collection Time: 01/08/23 12:34 PM   Result Value Ref Range    Sodium 127 (L) 135 - 145 meq/L   Sodium    Collection Time: 01/08/23  4:26 PM   Result Value Ref Range    Sodium 125 (L) 135 - 145 meq/L   Sodium    Collection Time: 01/08/23  8:36 PM   Result Value Ref Range    Sodium 127 (L) 135 - 145 meq/L   TSH    Collection Time: 01/09/23  4:23 AM   Result Value Ref Range    TSH 5.250 (H) 0.400 - 4.200 uIU/mL   Basic Metabolic Panel    Collection Time: 01/09/23  4:23 AM   Result Value Ref Range    Sodium 132 (L) 135 - 145 meq/L    Potassium 3.7 3.5 - 5.2 meq/L    Chloride 96 (L) 98 - 111 meq/L    CO2 22 (L) 23 - 33 meq/L    Glucose 90 70 - 108 mg/dL    BUN 18 7 - 22 mg/dL    Creatinine 0.7 0.4 - 1.2 mg/dL    Calcium 9.0 8.5 - 10.5 mg/dL   Anion Gap    Collection Time: 01/09/23  4:23 AM   Result Value Ref Range    Anion Gap 14.0 8.0 - 16.0 meq/L   Glomerular Filtration Rate, Estimated    Collection Time: 01/09/23  4:23 AM   Result Value Ref Range    Est, Glom Filt Rate >60 >60 ml/min/1.73m2   MRSA by PCR    Collection Time: 01/09/23  2:00 PM    Specimen: Nares   Result Value Ref Range    MRSA SCREEN RT-PCR NEGATIVE    Urine with Reflexed Micro    Collection Time: 01/09/23  3:28 PM   Result Value Ref Range    Glucose, Ur NEGATIVE NEGATIVE mg/dl    Bilirubin Urine NEGATIVE NEGATIVE    Ketones, Urine NEGATIVE NEGATIVE    Specific Gravity, Urine 1.017 1.002 - 1.030    Blood, Urine NEGATIVE NEGATIVE    pH, UA 6.0 5.0 - 9.0    Protein, UA TRACE (A) NEGATIVE    Urobilinogen, Urine 0.2 0.0 - 1.0 eu/dl    Nitrite, Urine NEGATIVE NEGATIVE    Leukocyte Esterase, Urine MODERATE (A) NEGATIVE    Color, UA YELLOW STRAW-YELLOW    Character, Urine CLOUDY (A) CLEAR-SL CLOUD    RBC, UA 0-2 0-2/hpf /hpf    WBC, UA 15-25 0-4/hpf /hpf    Epithelial Cells, UA 10-15 3-5/hpf /hpf    Mucus, UA THREADS NONE SEEN/THREA    Bacteria, UA NONE SEEN FEW/NONE SEEN /hpf    Casts UA 0-4 HYALINE NONE SEEN /lpf    Crystals, UA OXALATE NONE SEEN    Renal Epithelial, UA NONE SEEN NONE SEEN    Yeast, UA NONE SEEN NONE SEEN    CASTS 2 NONE SEEN NONE SEEN /lpf    MISCELLANEOUS 2 NONE SEEN    Culture, Reflexed, Urine    Collection Time: 01/09/23  3:28 PM    Specimen: Urine, clean catch   Result Value Ref Range    Urine Culture Reflex No growth-preliminary No growth    Urease test, semi-quantitative    Collection Time: 01/09/23  4:36 PM   Result Value Ref Range    [id number].  Pylori Negative    Basic Metabolic Panel    Collection Time: 01/10/23  4:08 AM   Result Value Ref Range    Sodium 129 (L) 135 - 145 meq/L    Potassium 3.9 3.5 - 5.2 meq/L    Chloride 97 (L) 98 - 111 meq/L    CO2 22 (L) 23 - 33 meq/L    Glucose 92 70 - 108 mg/dL    BUN 21 7 - 22 mg/dL    Creatinine 0.7 0.4 - 1.2 mg/dL    Calcium 8.5 8.5 - 10.5 mg/dL   Anion Gap    Collection Time: 01/10/23  4:08 AM   Result Value Ref Range    Anion Gap 10.0 8.0 - 16.0 meq/L   Glomerular Filtration Rate, Estimated    Collection Time: 01/10/23  4:08 AM   Result Value Ref Range    Est, Glom Filt Rate >60 >60 ml/min/1.73m2   Uric Acid    Collection Time: 01/10/23  4:08 AM   Result Value Ref Range    Uric Acid 5.0 2.4 - 5.7 mg/dL   CBC with Auto Differential    Collection Time: 01/10/23 12:49 PM   Result Value Ref Range    WBC 7.4 4.8 - 10.8 thou/mm3    RBC 3.41 (L) 4.20 - 5.40 mill/mm3    Hemoglobin 10.5 (L) 12.0 - 16.0 gm/dl    Hematocrit 31.3 (L) 37.0 - 47.0 %    MCV 91.8 81.0 - 99.0 fL    MCH 30.8 26.0 - 33.0 pg    MCHC 33.5 32.2 - 35.5 gm/dl    RDW-CV 12.5 11.5 - 14.5 %    RDW-SD 41.6 35.0 - 45.0 fL    Platelets 009 631 - 218 thou/mm3    MPV 10.0 9.4 - 12.4 fL    Seg Neutrophils 75.0 %    Lymphocytes 12.5 %    Monocytes 9.9 %    Eosinophils 1.6 %    Basophils 0.3 %    Immature Granulocytes 0.7 %    Segs Absolute 5.6 1.8 - 7.7 thou/mm3    Lymphocytes Absolute 0.9 (L) 1.0 - 4.8 thou/mm3    Monocytes Absolute 0.7 0.4 - 1.3 thou/mm3    Eosinophils Absolute 0.1 0.0 - 0.4 thou/mm3    Basophils Absolute 0.0 0.0 - 0.1 thou/mm3    Immature Grans (Abs) 0.05 0.00 - 0.07 thou/mm3    nRBC 0 /100 wbc   ECHO Complete 2D W Doppler W Color    Collection Time: 01/10/23  1:40 PM   Result Value Ref Range    Left Ventricular Ejection Fraction 58 LVEF MODALITY ECHO    Sodium, urine, random    Collection Time: 01/10/23  4:35 PM   Result Value Ref Range    Sodium, Ur 27 meq/l   Osmolality, Urine    Collection Time: 01/10/23  4:35 PM   Result Value Ref Range    Osmolality, Ur 383 250 - 750 mosmol/kg   Basic Metabolic Panel    Collection Time: 01/11/23  4:16 AM   Result Value Ref Range    Sodium 132 (L) 135 - 145 meq/L    Potassium 4.0 3.5 - 5.2 meq/L    Chloride 99 98 - 111 meq/L    CO2 22 (L) 23 - 33 meq/L    Glucose 196 (H) 70 - 108 mg/dL    BUN 21 7 - 22 mg/dL    Creatinine 0.7 0.4 - 1.2 mg/dL    Calcium 8.7 8.5 - 10.5 mg/dL   Anion Gap    Collection Time: 01/11/23  4:16 AM   Result Value Ref Range    Anion Gap 11.0 8.0 - 16.0 meq/L   Glomerular Filtration Rate, Estimated    Collection Time: 01/11/23  4:16 AM   Result Value Ref Range    Est, Glom Filt Rate >60 >60 ml/min/1.73m2      Electronically signed by Iron Smith MD on 1/11/2023 at 12:11 PM

## 2023-01-11 NOTE — PROGRESS NOTES
At 18:55p.m. called in prescription to [facility] for Sodium Chloride tabs 1 gm PO, take 1 tab in the morning and 1 tab at bedtime, #60, no refills, as ordered By Dr. Osbaldo Kincaid.   [facility] confirmed that there was no prescription there for sodium chloride tabs when patient went to pick it up after discharge from hospital.

## 2023-01-12 NOTE — TELEPHONE ENCOUNTER
Patient voiced understanding may hold off on ID referral and not to take the trimpex. Office appointment confirmed.

## 2023-01-16 ENCOUNTER — TELEPHONE (OUTPATIENT)
Dept: INTERNAL MEDICINE CLINIC | Age: 88
End: 2023-01-16

## 2023-01-16 NOTE — TELEPHONE ENCOUNTER
We received notification that pt needs a PA for Hydroxyzine.  I called pt and she states she doesnot think she needs it.  I advised her if she does then she will have to contact her PCP to do it or prescribe an alternative,.   She verbalzes understanding.

## 2023-01-18 ENCOUNTER — NURSE ONLY (OUTPATIENT)
Dept: LAB | Age: 88
End: 2023-01-18

## 2023-01-18 ENCOUNTER — OFFICE VISIT (OUTPATIENT)
Dept: CARDIOLOGY CLINIC | Age: 88
End: 2023-01-18

## 2023-01-18 VITALS
SYSTOLIC BLOOD PRESSURE: 124 MMHG | HEIGHT: 64 IN | DIASTOLIC BLOOD PRESSURE: 82 MMHG | WEIGHT: 138.4 LBS | BODY MASS INDEX: 23.63 KG/M2 | HEART RATE: 64 BPM

## 2023-01-18 DIAGNOSIS — I10 ESSENTIAL HYPERTENSION: ICD-10-CM

## 2023-01-18 DIAGNOSIS — E87.8 ELECTROLYTE IMBALANCE: ICD-10-CM

## 2023-01-18 DIAGNOSIS — I48.0 PAROXYSMAL ATRIAL FIBRILLATION (HCC): ICD-10-CM

## 2023-01-18 DIAGNOSIS — E87.8 ELECTROLYTE IMBALANCE: Primary | ICD-10-CM

## 2023-01-18 DIAGNOSIS — I20.9 ANGINA, CLASS II (HCC): ICD-10-CM

## 2023-01-18 LAB
ANION GAP SERPL CALCULATED.3IONS-SCNC: 11 MEQ/L (ref 8–16)
BUN BLDV-MCNC: 28 MG/DL (ref 7–22)
CALCIUM SERPL-MCNC: 8.9 MG/DL (ref 8.5–10.5)
CHLORIDE BLD-SCNC: 102 MEQ/L (ref 98–111)
CO2: 27 MEQ/L (ref 23–33)
CREAT SERPL-MCNC: 0.8 MG/DL (ref 0.4–1.2)
GFR SERPL CREATININE-BSD FRML MDRD: > 60 ML/MIN/1.73M2
GLUCOSE BLD-MCNC: 90 MG/DL (ref 70–108)
MAGNESIUM: 1.8 MG/DL (ref 1.6–2.4)
POTASSIUM SERPL-SCNC: 4.4 MEQ/L (ref 3.5–5.2)
SODIUM BLD-SCNC: 140 MEQ/L (ref 135–145)

## 2023-01-18 NOTE — PATIENT INSTRUCTIONS
Continue current medications as prescribed. Have the labs drawn as ordered. I will call with results and we will then decide if need to restart Hydrochlorothiazide. Stay as active as you can. Eat heart healthy diet. Follow-up with your PCP as scheduled. Follow-up with Dr. Reed Mcduffie on 3/23/23  as scheduled or sooner if need.

## 2023-01-18 NOTE — PROGRESS NOTES
[id number] \[facility] [place] [facility]\"" [place] [address]  [place] [address]  Dept: [phone]  Dept Fax: [phone]  Loc: [phone]    Visit Date: 1/18/2023    Primary cardiologist: Ricci Qiu MD    Ms. Kathleen Judd is a 80 y.o. female  who presented for: 2-week hospital follow-up    Chief Complaint   Patient presents with    Follow-up       HPI:   Recent hospitalization with evaluation per cardiology on 1/10/2023. Patient had been admitted with abdominal pain and diarrhea. She underwent EGD which demonstrated antral gastritis and a hiatal hernia. Nominal pain seem to continue out of proportion to the findings of the EGD. Cardiology was then consulted. Patient noted intermittent, nonradiating epigastric \"gnawing\" pain not alleviated or exacerbated by anything. She had no other chest pain or shortness of breath, no palpitations. EKG noted sinus rhythm with first-degree AV block. No ST or T wave changes. Troponin normal.  Known CAD with previous stent to the LAD. Cath 8/22 noted diffuse nonobstructive and obstructive lesions. Echo was obtained on 1/10/2023 and noted an EF of 55 to 60% with normal LV wall thickness and no obvious wall motion abnormalities. Moderate AAS present. No additional work-up performed. Last seen in the office on 3/10/2022 per Dr. Yulisa Sargent for 1 year follow-up of known CAD. Per office note:  Raj Price is a pleasant 80year old female. Comes to cardiology clinic today for 1 year follow-up, with history of coronary artery disease drug-eluting stent to mid LAD 2017, mild aortic stenosis. Js Ojeda has been doing well, no shortness of breath, no chest pain. She does note leg swelling worse in the past couple of months when taking antibiotics for a recurrent UTI. Over the last 2 weeks her legs have reduced in size. She has been taking a over-the-counter diuretic.   She has not needed to use nitroglycerin in the past year though does carry this in her purse. No other new concerns. Assessment/Plan   Coronary Artery Disease  PCI with LUCIEN to mid LAD in 2017  Continue with guideline directed medical therapy on aspirin, metoprolol, isosorbide mononitrate, simvastatin. Aortic stenosis  Asymptomatic     No symptoms, has been doing well, has not needed nitroglycerin though does carry this in her purse. Disposition:  Return in about 1 year (around 3/10/2023). Current Outpatient Medications:     pramipexole (MIRAPEX) 1 MG tablet, Take 1 tablet by mouth nightly 4 tab, Disp: 1 tablet, Rfl: 0    melatonin 3 MG TABS tablet, Take 2 tablets by mouth nightly, Disp: , Rfl: 3    lansoprazole (PREVACID) 30 MG delayed release capsule, Take 1 capsule by mouth 2 times daily, Disp: 30 capsule, Rfl: 3    ondansetron (ZOFRAN-ODT) 4 MG disintegrating tablet, Take 1 tablet by mouth 3 times daily as needed for Nausea or Vomiting, Disp: 21 tablet, Rfl: 0    isosorbide mononitrate (IMDUR) 60 MG extended release tablet, Take 1 tablet by mouth daily, Disp: 90 tablet, Rfl: 3    metoprolol tartrate (LOPRESSOR) 25 MG tablet, Take 1 tablet by mouth 2 times daily, Disp: 180 tablet, Rfl: 3    conjugated estrogens (PREMARIN) 0.625 MG/GM vaginal cream, Place 0.5 grams vaginally twice weekly. , Disp: 1 Tube, Rfl: 0    Polyethylene Glycol 3350 (MIRALAX PO), Take by mouth as needed, Disp: , Rfl:     nitroGLYCERIN (NITROSTAT) 0.4 MG SL tablet, Place 0.4 mg under the tongue every 5 minutes as needed for Chest pain up to max of 3 total doses. If no relief after 1 dose, call 911. , Disp: , Rfl:     simvastatin (ZOCOR) 40 MG tablet, Take 0.5 tablets by mouth nightly, Disp: 45 tablet, Rfl: 3    aspirin 81 MG EC tablet, Take 81 mg by mouth daily.   , Disp: , Rfl:     Past Medical History  Mai Velazquez  has a past medical history of Anesthesia, CAD (coronary artery disease), Cancer ([facility] [address] GERD (gastroesophageal reflux disease), History of prolapse of bladder, Hyperlipidemia, Hypertension, Osteoarthritis, Shortness of breath, and UTI (urinary tract infection). Social History  Noris Saldana  reports that she quit smoking about 50 years ago. Her smoking use included cigarettes. She has a 12.00 pack-year smoking history. She has never used smokeless tobacco. She reports that she does not drink alcohol and does not use drugs. Family History  Yenny family history includes Alzheimer's Disease in her mother; Arthritis in her father; Cancer in her brother; Diabetes in her maternal aunt and maternal aunt; Heart Disease in her brother, brother, mother, sister, and sister; Stroke in her sister. There is no family history of bicuspid aortic valve, aneurysms, heart transplant, pacemakers, defibrillators, or sudden cardiac death. Past Surgical History   Past Surgical History:   Procedure Laterality Date    APPENDECTOMY  1969    CARDIAC CATHETERIZATION  8/2012    [facility]    COLONOSCOPY  5/25/12    Dr. Rosa Munoz Left 12/16/2021    LEFT PERIANAL BIOPSY performed by Jimmy Macedo MD at [address] [place] [address]  09/11/2017    [address] [place] [address], 08/17/2012    Anal exam, hemorroidectomy, excision perianal skin lesion.  total of 3    HYSTERECTOMY (CERVIX STATUS UNKNOWN)  1966    OTHER SURGICAL HISTORY  1/3/2014    LEFT MEDIPORT-Dr. Daily Lopez and removed    RECTAL SURGERY      for cancer    SIGMOIDOSCOPY N/A 6/10/2019    SIGMOIDOSCOPY DIAGNOSTIC FLEXIBLE performed by Justino Ruiz MD at [place] [address] N/A 1/9/2023    EGD ESOPHAGOGASTRODUODENOSCOPY performed by Justino Ruiz MD at [facility]     Today's visit:   Subjective:  Still kind of tired; COVID 12/26 - led to vomiting, diarrhea and hyponatremia  Quit taking Na tab - feet and ankles swollen  Still off HCTZ (25 QD)  Still lacking taste - can taste sweets - nothing else  No chest discomfort  Breathing fine  Gnawing feeling  - prevacid taking twice - now resolved  Swelling gone since stopping salt tabs - still has not yet restarted HTCZ    Review of Systems   Constitutional: Negative for chills and fever  HENT: Negative for congestion, sinus pressure, sneezing and sore throat. Eyes: Negative for pain, discharge, redness and itching. Respiratory: Negative for PND, orthopnea,  cough  Gastrointestinal: Negative for blood in stool, constipation, diarrhea   Endocrine: Negative for cold intolerance, heat intolerance, polydipsia. Genitourinary: Negative for dysuria, hematuria. Musculoskeletal: Negative for arthralgias, joint swelling and neck pain. Leg swelling. Neurological: Negative for numbness and headaches. Psychiatric/Behavioral: Negative for agitation, confusion, decreased concentration and dysphoric mood. Objective:     /82   Pulse 64   Ht 5' 4\" (1.626 m)   Wt 138 lb 6.4 oz (62.8 kg)   BMI 23.76 kg/m²     Wt Readings from Last 3 Encounters:   01/18/23 138 lb 6.4 oz (62.8 kg)   01/08/23 134 lb 11.2 oz (61.1 kg)   12/28/22 149 lb (67.6 kg)     BP Readings from Last 3 Encounters:   01/18/23 124/82   01/11/23 127/77   12/28/22 132/66       Nursing note and vitals reviewed. Physical Exam   Constitutional: Oriented to person, place, and time. Appears well-developed and well-nourished. HENT:   Head: Normocephalic and atraumatic. Eyes: EOM are normal. Pupils are equal, round, and reactive to light. Neck: Normal range of motion. Neck supple. No JVD present. Cardiovascular: Normal rate, regular rhythm, systolic cresdendo/descrendo murmur, does not radiate to carotids. Pulmonary/Chest: Effort normal and breath sounds normal. No respiratory distress. No wheezes. No rales. Abdominal: Soft. Bowel sounds are normal. No distension. There is no tenderness. Musculoskeletal: Normal range of motion. No edema. Neurological: Alert and oriented to person, place, and time. No cranial nerve deficit.  Coordination normal.   Skin: Skin is warm and dry. Psychiatric: Normal mood and affect.        Lab Results   Component Value Date/Time    CKTOTAL 151 2012 09:28 PM    CKTOTAL 114 2012 03:02 PM    CKTOTAL 98 2012 09:48 AM    CKMB 1.9 2012 09:28 PM    CKMB 1.3 2012 03:02 PM       Lab Results   Component Value Date/Time    WBC 3.9 2023 08:11 AM    RBC 3.74 2023 08:11 AM    HGB 11.3 2023 08:11 AM    HCT 33.8 2023 08:11 AM    MCV 90.4 2023 08:11 AM    MCH 30.3 2023 08:11 AM    MCHC 33.5 2023 08:11 AM    RDW 14.2 2023 08:11 AM     2023 08:11 AM    MPV 10.0 01/10/2023 12:49 PM       Lab Results   Component Value Date/Time     2023 08:11 AM    K 3.8 2023 08:11 AM    K 3.4 2023 01:45 PM     2023 08:11 AM    CO2 28 2023 08:11 AM    BUN 19 2023 08:11 AM    LABALBU 4.5 2023 01:45 PM    CREATININE 0.80 2023 08:11 AM    CALCIUM 9.90 2023 08:11 AM    LABGLOM >60 2023 09:26 AM    GLUCOSE 90 2023 09:26 AM    GLUCOSE 86 2014 09:45 AM       Lab Results   Component Value Date/Time    ALKPHOS 68 2023 01:45 PM    ALT 22 2023 01:45 PM    AST 29 2023 01:45 PM    PROT 7.8 2023 01:45 PM    BILITOT 0.5 2023 01:45 PM    BILIDIR <0.2 2017 03:13 PM    LABALBU 4.5 2023 01:45 PM       Lab Results   Component Value Date/Time    MG 1.6 2023 08:11 AM       Lab Results   Component Value Date    INR 0.91 2015    INR 0.93 2012         Lab Results   Component Value Date/Time    LABA1C 5.7 2022 08:00 AM       Lab Results   Component Value Date/Time    TRIG 134 2022 08:00 AM    HDL 62 2022 08:00 AM    LDLCALC 59 2022 08:00 AM       Lab Results   Component Value Date/Time    TSH 5.250 2023 04:23 AM         Testing Reviewed:      I have individually reviewed the cardiac test below:  EK2023 15:26:39 [facility] ROUTINE RECORD  Sinus rhythm with 1st degree A-V block  Minimal voltage criteria for LVH, may be normal variant ( Shailesh product )  Anterolateral infarct , age undetermined  Abnormal ECG  When compared with ECG of 11-SEP-2017 17:01,  Anterior infarct is now Present  Anterolateral infarct is now Present  Nonspecific T wave abnormality now evident in Lateral leads  Confirmed by Jamie Espitia ([id number]) on 1/7/2023 4:59:42 AM      Echo: 1/10/2023  Indications:Chest pain. Additional Medical History:Hypertension, hyperlipidemia, atrial  fibrillation, chest pain, GERD, CAD, shortness of breath, pulmonary  hypertension, Covid, osteoarthritis, cervical and rectal cancer, former  smoker    Summary   Left ventricular size and systolic function is normal. Ejection fraction   was estimated at 55-60%. LV wall thickness is within normal limits and  there are no obvious wall motion abnormalities. The right ventricular size appears normal with normal systolic function  and wall thickness. Calcified and thickened aortic valve. Aortic valve appears tricuspid. Transaortic peak velocity is 2.9 m/s, mean gradient of 21 mm Hg and calculated LEODAN is 1.0 cm2. There is moderate aortic stenosis. Mild tricuspid regurgitation visualized. Signature    ----------------------------------------------------------------   Electronically signed by Kenji Glass MD (Interpreting   physician) on 01/10/2023 at 04:57 PM      ECHO: 07/25/17  Indications:Shortness of breath. Additional Medical History:hypertension, hyperlipidemia, atrial  fibrillation, atherosclerotic heart disease, chest pain, gastroesophageal  reflux disease, coronary artery disease, shortness of breath  Summary  Systolic function was normal.  Ejection fraction is visually estimated at 60%. Right ventricular systolic pressure of 41 mm Hg consistent with mild  pulmonary hypertension.   Leaflets exhibited mildly increased thickness and mildly reduced cuspal  separation of the aortic valve. Trivial aortic regurgitation is noted. There is mild aortic stenosis with valve area of 1.64 sq cm. The maximum aortic valve gradient is 16 mmHg, the mean gradient is 9 mmHg,  and the peak velocity is 197 cm/s. Structurally normal mitral valve. Mild mitral regurgitation is present. Tricuspid valve is structurally normal.  Mild tricuspid regurgitation. Signature    ----------------------------------------------------------------  Electronically signed by Jerzy Stanley DO (Interpreting  physician) on 07/25/2017 at 08:24 PM  ----------------------------------------------------------------       Assessment/Plan   Coronary Artery Disease  PCI with LUCIEN to mid LAD in 2017  Continue with guideline directed medical therapy on aspirin, metoprolol, isosorbide mononitrate, simvastatin. No anginal sx - no NTG use - no severe AS at this time  Aortic stenosis  Asymptomatic; mod AS per recent echo  No evidence of decompensated HF  Electrolyte imbalance 2/2 GI sx with recent COVID; resulted in Hyponatremia; - placed on salt tabs and HCTZ held; stopped salt tablets for swelling in feet  Continue current medications as prescribed. Have the labs drawn as ordered. I will call with results and we will then decide if need to restart Hydrochlorothiazide. Stay as active as you can. Eat heart healthy diet. Follow-up with your PCP as scheduled. Follow-up with Dr. German Appiah on 3/23/23  as scheduled or sooner if need.

## 2023-01-19 ENCOUNTER — TELEPHONE (OUTPATIENT)
Dept: UROLOGY | Age: 88
End: 2023-01-19

## 2023-01-19 ENCOUNTER — NURSE ONLY (OUTPATIENT)
Dept: LAB | Age: 88
End: 2023-01-19

## 2023-01-19 DIAGNOSIS — R30.0 DYSURIA: Primary | ICD-10-CM

## 2023-01-19 DIAGNOSIS — R30.0 DYSURIA: ICD-10-CM

## 2023-01-19 DIAGNOSIS — N39.0 RECURRENT UTI: ICD-10-CM

## 2023-01-19 LAB
BACTERIA: ABNORMAL
BILIRUBIN URINE: NEGATIVE
BLOOD, URINE: ABNORMAL
CASTS: ABNORMAL /LPF
CHARACTER, URINE: ABNORMAL
COLOR: YELLOW
CRYSTALS: ABNORMAL
EPITHELIAL CELLS, UA: ABNORMAL /HPF
GLUCOSE, URINE: NEGATIVE MG/DL
KETONES, URINE: NEGATIVE
LEUKOCYTE ESTERASE, URINE: ABNORMAL
MISCELLANEOUS LAB TEST RESULT: ABNORMAL
NITRITE, URINE: POSITIVE
PH UA: 6.5 (ref 5–9)
PROTEIN UA: 30 MG/DL
RBC URINE: ABNORMAL /HPF
RENAL EPITHELIAL, UA: ABNORMAL
SPECIFIC GRAVITY UA: 1.02 (ref 1–1.03)
UROBILINOGEN, URINE: 0.2 EU/DL (ref 0–1)
WBC UA: > 200 /HPF
YEAST: ABNORMAL

## 2023-01-19 RX ORDER — GRANULES FOR ORAL 3 G/1
3 POWDER ORAL ONCE
Qty: 1 EACH | Refills: 0 | Status: SHIPPED | OUTPATIENT
Start: 2023-01-19 | End: 2023-01-19 | Stop reason: SDUPTHER

## 2023-01-19 RX ORDER — GRANULES FOR ORAL 3 G/1
3 POWDER ORAL ONCE
Qty: 1 EACH | Refills: 0 | Status: SHIPPED | OUTPATIENT
Start: 2023-01-19 | End: 2023-01-19

## 2023-01-19 NOTE — TELEPHONE ENCOUNTER
Voicemail left for patient stating prescription was sent to [facility]. Prescription cancelled at [facility] with Jacquelin Hong.

## 2023-01-19 NOTE — TELEPHONE ENCOUNTER
Urinalysis with probable infection. Script for Fosfomycin sent to pharmacy for empiric coverage pending culture results.

## 2023-01-19 NOTE — TELEPHONE ENCOUNTER
Patient states she has another UTI. C/o burning, pain with urination, and pressure. The home test was positive. Orders placed for urine.

## 2023-01-20 LAB
BACTERIA UR CULT: ABNORMAL
ORGANISM: ABNORMAL

## 2023-01-23 ENCOUNTER — TELEPHONE (OUTPATIENT)
Dept: CARDIOLOGY CLINIC | Age: 88
End: 2023-01-23

## 2023-01-23 NOTE — TELEPHONE ENCOUNTER
Pt asking about sodium level labs states 140, she is asking if she still needs to take the salt tablet, and she was taking off of the hydrochlorothiazide and is asking if you want her to restart that also?

## 2023-01-30 NOTE — TELEPHONE ENCOUNTER
Spoke to patient. She states that she restarted the HCTZ once she saw her sodium was 140. She states her ankles are still swollen, and the HCTZ does help. Informed her of Deisy's recommendations in regards to her sodium levels. She states she will try to go without the HCTZ but is concerned about the swelling. Asking if there is something else she could try?

## 2023-02-01 ENCOUNTER — OFFICE VISIT (OUTPATIENT)
Dept: UROLOGY | Age: 88
End: 2023-02-01
Payer: MEDICARE

## 2023-02-01 VITALS — RESPIRATION RATE: 18 BRPM | WEIGHT: 135 LBS | BODY MASS INDEX: 23.05 KG/M2 | HEIGHT: 64 IN

## 2023-02-01 DIAGNOSIS — R35.0 URINARY FREQUENCY: ICD-10-CM

## 2023-02-01 DIAGNOSIS — N39.0 RECURRENT UTI: Primary | ICD-10-CM

## 2023-02-01 LAB
BILIRUBIN URINE: NEGATIVE
BLOOD URINE, POC: ABNORMAL
CHARACTER, URINE: ABNORMAL
COLOR, URINE: ABNORMAL
GLUCOSE URINE: NEGATIVE MG/DL
KETONES, URINE: NEGATIVE
LEUKOCYTE CLUMPS, URINE: ABNORMAL
NITRITE, URINE: POSITIVE
PH, URINE: 5.5 (ref 5–9)
POST VOID RESIDUAL (PVR): 189 ML
PROTEIN, URINE: NEGATIVE MG/DL
SPECIFIC GRAVITY, URINE: 1.01 (ref 1–1.03)
UROBILINOGEN, URINE: 0.2 EU/DL (ref 0–1)

## 2023-02-01 PROCEDURE — 1036F TOBACCO NON-USER: CPT | Performed by: NURSE PRACTITIONER

## 2023-02-01 PROCEDURE — G8427 DOCREV CUR MEDS BY ELIG CLIN: HCPCS | Performed by: NURSE PRACTITIONER

## 2023-02-01 PROCEDURE — 99214 OFFICE O/P EST MOD 30 MIN: CPT | Performed by: NURSE PRACTITIONER

## 2023-02-01 PROCEDURE — 51798 US URINE CAPACITY MEASURE: CPT | Performed by: NURSE PRACTITIONER

## 2023-02-01 PROCEDURE — 1123F ACP DISCUSS/DSCN MKR DOCD: CPT | Performed by: NURSE PRACTITIONER

## 2023-02-01 PROCEDURE — G8484 FLU IMMUNIZE NO ADMIN: HCPCS | Performed by: NURSE PRACTITIONER

## 2023-02-01 PROCEDURE — 1111F DSCHRG MED/CURRENT MED MERGE: CPT | Performed by: NURSE PRACTITIONER

## 2023-02-01 PROCEDURE — 1090F PRES/ABSN URINE INCON ASSESS: CPT | Performed by: NURSE PRACTITIONER

## 2023-02-01 PROCEDURE — G8420 CALC BMI NORM PARAMETERS: HCPCS | Performed by: NURSE PRACTITIONER

## 2023-02-01 PROCEDURE — 81003 URINALYSIS AUTO W/O SCOPE: CPT | Performed by: NURSE PRACTITIONER

## 2023-02-01 RX ORDER — METHENAMINE HIPPURATE 1000 MG/1
1 TABLET ORAL 2 TIMES DAILY WITH MEALS
Qty: 180 TABLET | Refills: 1 | Status: SHIPPED | OUTPATIENT
Start: 2023-02-01

## 2023-02-01 RX ORDER — NITROFURANTOIN 25; 75 MG/1; MG/1
100 CAPSULE ORAL 2 TIMES DAILY
Qty: 28 CAPSULE | Refills: 0 | Status: SHIPPED | OUTPATIENT
Start: 2023-02-01 | End: 2023-02-15

## 2023-02-01 ASSESSMENT — ENCOUNTER SYMPTOMS
BACK PAIN: 0
ABDOMINAL PAIN: 1
NAUSEA: 0
VOMITING: 0

## 2023-02-01 NOTE — PROGRESS NOTES
[place]  [address]  [address]  [facility] [address]  Dept: [phone]  Loc: [phone]    Visit Date: 2/1/2023        HPI:     Eva Lara is a 80 y.o. female who presents today for:  Chief Complaint   Patient presents with    Urinary Frequency       HPI    Pt seen in follow up for recurrent UTI. Pt has a hx of recurrent UTIs, dysuria, pressure, and bladder pain. Has hx of rectal and cervical ca sp prior chemo and radiation. Issues with chronic incontinence of stool. She is noted to have a grade 3-4 cystocele on prior pelvic exam by Dr. Sebas Sanchez in 2019. Given hx of radiation and age not a good candidate for POP surgery. Has pessary. Previously performed CIC which was stopped secondary to low residuals. Reports she occasionally takes pessary out if having discomfort from it and cleans and gives herself a rest for a day or two and then re-inserts. When pessary out she knows she retains urine so she performs self-cath until pessary re-inserted. Previously reported bubbles in her urine. CTU negative for acute urologic findings. No hydronephrosis. No urologic tumors. Possible low insertion of the ureters, noted insufficiency of the pelvic floor. Underwent cystoscopy by Dr. Susie Adler 4/21/22 that was negative for fistulous tracts and noted mild bladder wall trabeculation. Started on methenamine and trimethoprim. Pt stopped methenamine secondary to cost.  Pt had a UTI in December and presented to hospital for N/V weakness at which time she was found to have hyponatremia and EGD noted antral gastritis and 8 cm hiatal hernia. Urine culture 1/19/23 with Citrobacter freundii. Sensitive to [address]. Was treated with a one time dose of Fosfomycin. Reports initial improvement in symptoms now having increased frequency, dysuria, and abdominal pain again.       Current Outpatient Medications   Medication Sig Dispense Refill    HYDROCHLOROTHIAZIDE PO Take by mouth      pramipexole (MIRAPEX) 1 MG tablet Take 1 tablet by mouth nightly 4 tab 1 tablet 0    melatonin 3 MG TABS tablet Take 2 tablets by mouth nightly  3    lansoprazole (PREVACID) 30 MG delayed release capsule Take 1 capsule by mouth 2 times daily 30 capsule 3    ondansetron (ZOFRAN-ODT) 4 MG disintegrating tablet Take 1 tablet by mouth 3 times daily as needed for Nausea or Vomiting 21 tablet 0    isosorbide mononitrate (IMDUR) 60 MG extended release tablet Take 1 tablet by mouth daily 90 tablet 3    metoprolol tartrate (LOPRESSOR) 25 MG tablet Take 1 tablet by mouth 2 times daily 180 tablet 3    conjugated estrogens (PREMARIN) 0.625 MG/GM vaginal cream Place 0.5 grams vaginally twice weekly. 1 Tube 0    Polyethylene Glycol 3350 (MIRALAX PO) Take by mouth as needed      nitroGLYCERIN (NITROSTAT) 0.4 MG SL tablet Place 0.4 mg under the tongue every 5 minutes as needed for Chest pain up to max of 3 total doses. If no relief after 1 dose, call 911. simvastatin (ZOCOR) 40 MG tablet Take 0.5 tablets by mouth nightly 45 tablet 3    aspirin 81 MG EC tablet Take 81 mg by mouth daily. No current facility-administered medications for this visit. Past Medical History  Marques Fisher  has a past medical history of Anesthesia, CAD (coronary artery disease), Cancer ([facility] [address] GERD (gastroesophageal reflux disease), History of prolapse of bladder, Hyperlipidemia, Hypertension, Osteoarthritis, Shortness of breath, and UTI (urinary tract infection). Past Surgical History  The patient  has a past surgical history that includes Hemorrhoid surgery (1989, 08/17/2012); Colonoscopy (5/25/12); Hysterectomy (1966); Appendectomy (1969); other surgical history (1/3/2014); Rectal surgery; Cardiac catheterization (8/2012); Coronary angioplasty with stent (09/11/2017); sigmoidoscopy (N/A, 6/10/2019);  Condyloma Excision (Left, 12/16/2021); and Upper gastrointestinal endoscopy (N/A, 1/9/2023). Family History  This patient's family history includes Alzheimer's Disease in her mother; Arthritis in her father; Cancer in her brother; Diabetes in her maternal aunt and maternal aunt; Heart Disease in her brother, brother, mother, sister, and sister; Stroke in her sister. Social History  Selene Smith  reports that she quit smoking about 50 years ago. Her smoking use included cigarettes. She has a 12.00 pack-year smoking history. She has never used smokeless tobacco. She reports that she does not drink alcohol and does not use drugs. Subjective:      Review of Systems   Constitutional:  Negative for activity change, appetite change, chills, diaphoresis, fatigue, fever and unexpected weight change. Gastrointestinal:  Positive for abdominal pain. Negative for nausea and vomiting. Genitourinary:  Positive for frequency and urgency. Negative for decreased urine volume, difficulty urinating, dysuria, flank pain and hematuria. Musculoskeletal:  Negative for back pain. Objective:   Resp 18   Ht 5' 4\" (1.626 m)   Wt 135 lb (61.2 kg)   BMI 23.17 kg/m²     Physical Exam  Vitals reviewed. Constitutional:       General: She is not in acute distress. Appearance: Normal appearance. She is well-developed. She is not ill-appearing or diaphoretic. HENT:      Head: Normocephalic and atraumatic. Right Ear: External ear normal.      Left Ear: External ear normal.      Nose: Nose normal.      Mouth/Throat:      Mouth: Mucous membranes are moist.   Eyes:      General: No scleral icterus. Right eye: No discharge. Left eye: No discharge. Neck:      Vascular: No JVD. Trachea: No tracheal deviation. Cardiovascular:      Rate and Rhythm: Normal rate and regular rhythm. Pulmonary:      Effort: Pulmonary effort is normal. No respiratory distress. Breath sounds: Normal breath sounds. Abdominal:      General: There is no distension. Tenderness:  There is no abdominal tenderness. There is no right CVA tenderness or left CVA tenderness. Musculoskeletal:         General: No tenderness. Normal range of motion. Skin:     General: Skin is warm and dry. Neurological:      Mental Status: She is alert and oriented to person, place, and time. Mental status is at baseline. Psychiatric:         Mood and Affect: Mood normal.         Behavior: Behavior normal.         Thought Content: Thought content normal.       POC  No results found for this visit on 02/01/23. Patients recent PSA values are as follows  No results found for: PSA, PSADIA     Recent BUN/Creatinine:  Lab Results   Component Value Date/Time    BUN 19 01/26/2023 08:11 AM    CREATININE 0.80 01/26/2023 08:11 AM       CT abd/pelvis with IV contrast 1/6/23 reviewed and negative for acute urologic findings. Renal US performed 1/8/23 negative for acute urologic findings or hydronephrosis. Assessment:   Recurrent UTIs  Urinary retention  POP--grade 3-4 cystocele  Hx rectal and cervical ca s/p chemo and radiation    Plan:     Pt reports symptoms of UTI initially improved following fosfomycin 3 grams last week but worsening again in last couple days. Urine dip with probable infection. Send for culture and start [address]. Once course of Macrobid complete start Hiprex 1 gram PO BID. Pt's PVR elevated in office today. Start CIC at least twice daily (4 catheters per day) for residual after voiding and keep a log of residuals to bring to follow up appt.       F/u in 4-6 weeks with PVR

## 2023-02-05 LAB
BACTERIA UR CULT: ABNORMAL
ORGANISM: ABNORMAL

## 2023-03-09 RX ORDER — ISOSORBIDE MONONITRATE 60 MG/1
TABLET, EXTENDED RELEASE ORAL
Qty: 90 TABLET | Refills: 0 | Status: SHIPPED | OUTPATIENT
Start: 2023-03-09

## 2023-03-17 ENCOUNTER — OFFICE VISIT (OUTPATIENT)
Dept: UROLOGY | Age: 88
End: 2023-03-17

## 2023-03-17 VITALS
WEIGHT: 136 LBS | BODY MASS INDEX: 23.22 KG/M2 | DIASTOLIC BLOOD PRESSURE: 74 MMHG | SYSTOLIC BLOOD PRESSURE: 116 MMHG | HEIGHT: 64 IN

## 2023-03-17 DIAGNOSIS — R35.0 URINARY FREQUENCY: Primary | ICD-10-CM

## 2023-03-17 LAB
BILIRUBIN URINE: NEGATIVE
BLOOD URINE, POC: NEGATIVE
CHARACTER, URINE: CLEAR
COLOR, URINE: YELLOW
GLUCOSE URINE: NEGATIVE MG/DL
KETONES, URINE: NEGATIVE
LEUKOCYTE CLUMPS, URINE: ABNORMAL
NITRITE, URINE: NEGATIVE
PH, URINE: 5.5 (ref 5–9)
POST VOID RESIDUAL (PVR): 19 ML
PROTEIN, URINE: NEGATIVE MG/DL
SPECIFIC GRAVITY, URINE: 1.01 (ref 1–1.03)
UROBILINOGEN, URINE: 0.2 EU/DL (ref 0–1)

## 2023-03-17 ASSESSMENT — ENCOUNTER SYMPTOMS
NAUSEA: 0
VOMITING: 0
ABDOMINAL PAIN: 0
BACK PAIN: 0

## 2023-03-17 NOTE — PROGRESS NOTES
[address] [place] [address]  Dept: [phone]  Loc: [phone]    Visit Date: 3/17/2023        HPI:     Geeta Dennison is a 80 y.o. female who presents today for:  Chief Complaint   Patient presents with    Urinary Frequency       HPI    Pt seen in follow up for recurrent UTI. Pt has a hx of recurrent UTIs, dysuria, pressure, and bladder pain. Has hx of rectal and cervical ca sp prior chemo and radiation. Issues with chronic incontinence of stool. She is noted to have a grade 3-4 cystocele on prior pelvic exam by Dr. Allison Nieto in 2019. Given hx of radiation and age not a good candidate for POP surgery. Has pessary. Previously performed CIC which was stopped secondary to low residuals. Reports she occasionally takes pessary out if having discomfort from it and cleans and gives herself a rest for a day or two and then re-inserts. When pessary out she knows she retains urine so she performs self-cath until pessary re-inserted. Previously reported bubbles in her urine. CTU negative for acute urologic findings. No hydronephrosis. No urologic tumors. Possible low insertion of the ureters, noted insufficiency of the pelvic floor. Underwent cystoscopy by Dr. Hellen Silva 4/21/22 that was negative for fistulous tracts and noted mild bladder wall trabeculation. Started on methenamine and trimethoprim. Pt stopped methenamine secondary to cost.  Pt had a UTI in December and presented to hospital for N/V weakness at which time she was found to have hyponatremia and EGD noted antral gastritis and 8 cm hiatal hernia. Urine culture 1/19/23 with Citrobacter freundii. Sensitive to [address]. Was treated with a one time dose of Fosfomycin. At [address] 2/1/23 pt noted increased frequency, dysuria, and abdominal pain again. Culture 2/1/23 with Klebsiella treated with Macrobid. Hiprex 1 gram PO BID started. Pt also started on CIC at least BID. Here in follow-up today. Pt reports symptoms improved. Reports no symptoms of UTI today. No pain. No frequency, dysuria. Performing catheterization bid and obtaining residuals of 1/2 oz to 4 oz. Reports she only had 4 oz residual twice. Current Outpatient Medications   Medication Sig Dispense Refill    isosorbide mononitrate (IMDUR) 60 MG extended release tablet TAKE 1 TABLET BY MOUTH EVERY DAY 90 tablet 0    metoprolol tartrate (LOPRESSOR) 25 MG tablet TAKE 1 TABLET BY MOUTH TWO TIMES A  tablet 0    methenamine (HIPREX) 1 g tablet Take 1 tablet by mouth 2 times daily (with meals) 180 tablet 1    HYDROCHLOROTHIAZIDE PO Take by mouth      pramipexole (MIRAPEX) 1 MG tablet Take 1 tablet by mouth nightly 4 tab 1 tablet 0    melatonin 3 MG TABS tablet Take 2 tablets by mouth nightly  3    lansoprazole (PREVACID) 30 MG delayed release capsule Take 1 capsule by mouth 2 times daily 30 capsule 3    Polyethylene Glycol 3350 (MIRALAX PO) Take by mouth as needed      nitroGLYCERIN (NITROSTAT) 0.4 MG SL tablet Place 0.4 mg under the tongue every 5 minutes as needed for Chest pain up to max of 3 total doses. If no relief after 1 dose, call 911. simvastatin (ZOCOR) 40 MG tablet Take 0.5 tablets by mouth nightly 45 tablet 3    aspirin 81 MG EC tablet Take 81 mg by mouth daily. ondansetron (ZOFRAN-ODT) 4 MG disintegrating tablet Take 1 tablet by mouth 3 times daily as needed for Nausea or Vomiting (Patient not taking: Reported on 3/17/2023) 21 tablet 0    conjugated estrogens (PREMARIN) 0.625 MG/GM vaginal cream Place 0.5 grams vaginally twice weekly. 1 Tube 0     No current facility-administered medications for this visit.        Past Medical History  Florina Lobato  has a past medical history of Anesthesia, CAD (coronary artery disease), Cancer ([facility] [address] GERD (gastroesophageal reflux disease), History of prolapse of bladder, Hyperlipidemia, Hypertension, Osteoarthritis, Shortness of breath, and UTI (urinary tract infection). Past Surgical History  The patient  has a past surgical history that includes Hemorrhoid surgery (1989, 08/17/2012); Colonoscopy (5/25/12); Hysterectomy (1966); Appendectomy (1969); other surgical history (1/3/2014); Rectal surgery; Cardiac catheterization (8/2012); Coronary angioplasty with stent (09/11/2017); sigmoidoscopy (N/A, 6/10/2019); Condyloma Excision (Left, 12/16/2021); and Upper gastrointestinal endoscopy (N/A, 1/9/2023). Family History  This patient's family history includes Alzheimer's Disease in her mother; Arthritis in her father; Cancer in her brother; Diabetes in her maternal aunt and maternal aunt; Heart Disease in her brother, brother, mother, sister, and sister; Stroke in her sister. Social History  Gabriel Silva  reports that she quit smoking about 50 years ago. Her smoking use included cigarettes. She has a 12.00 pack-year smoking history. She has never used smokeless tobacco. She reports that she does not drink alcohol and does not use drugs. Subjective:      Review of Systems   Constitutional:  Negative for activity change, appetite change, chills, diaphoresis, fatigue, fever and unexpected weight change. Gastrointestinal:  Negative for abdominal pain, nausea and vomiting. Genitourinary:  Negative for decreased urine volume, difficulty urinating, dysuria, flank pain, frequency, hematuria and urgency. Musculoskeletal:  Negative for back pain. Objective:   /74   Ht 5' 4\" (1.626 m)   Wt 136 lb (61.7 kg)   BMI 23.34 kg/m²     Physical Exam  Vitals reviewed. Constitutional:       General: She is not in acute distress. Appearance: Normal appearance. She is well-developed. She is not ill-appearing or diaphoretic. HENT:      Head: Normocephalic and atraumatic.       Right Ear: External ear normal.      Left Ear: External ear normal.      Nose: Nose normal.      Mouth/Throat:      Mouth: Mucous membranes are moist.   Eyes:      General: No scleral icterus. Right eye: No discharge. Left eye: No discharge. Neck:      Vascular: No JVD. Trachea: No tracheal deviation. Cardiovascular:      Rate and Rhythm: Normal rate and regular rhythm. Heart sounds: Murmur heard. Pulmonary:      Effort: Pulmonary effort is normal. No respiratory distress. Breath sounds: Normal breath sounds. Abdominal:      General: There is no distension. Tenderness: There is no abdominal tenderness. There is no right CVA tenderness or left CVA tenderness. Musculoskeletal:         General: No tenderness. Normal range of motion. Skin:     General: Skin is warm and dry. Neurological:      Mental Status: She is alert and oriented to person, place, and time. Mental status is at baseline. Psychiatric:         Mood and Affect: Mood normal.         Behavior: Behavior normal.         Thought Content:  Thought content normal.       POC  Results for POC orders placed in visit on 03/17/23   POCT Urinalysis No Micro (Auto)   Result Value Ref Range    Glucose, Ur Negative NEGATIVE mg/dl    Bilirubin Urine Negative     Ketones, Urine Negative NEGATIVE    Specific Gravity, Urine 1.010 1.002 - 1.030    Blood, UA POC Negative NEGATIVE    pH, Urine 5.50 5.0 - 9.0    Protein, Urine Negative NEGATIVE mg/dl    Urobilinogen, Urine 0.20 0.0 - 1.0 eu/dl    Nitrite, Urine Negative NEGATIVE    Leukocyte Clumps, Urine Small (A) NEGATIVE    Color, Urine Yellow YELLOW-STRAW    Character, Urine Clear [id number]   poct post void residual   Result Value Ref Range    post void residual 19 ml       Patients recent PSA values are as follows  No results found for: PSA, PSADIA     Recent BUN/Creatinine:  Lab Results   Component Value Date/Time    BUN 23 02/13/2023 12:32 PM    CREATININE 0.7 02/13/2023 12:32 PM       Assessment:   Recurrent UTIs  Urinary retention  POP--grade 3-4 cystocele  Hx rectal and cervical ca s/p chemo and radiation    Plan:     Pt reports symptoms significantly improved. Continue CIC BID and prn. Ok to trial stepping down to daily if residuals remain less than 3 ozs. Continue Hiprex 1 gram PO BID. F/u in 6 months with PVR.

## 2023-03-23 ENCOUNTER — OFFICE VISIT (OUTPATIENT)
Dept: CARDIOLOGY CLINIC | Age: 88
End: 2023-03-23
Payer: MEDICARE

## 2023-03-23 VITALS
DIASTOLIC BLOOD PRESSURE: 66 MMHG | SYSTOLIC BLOOD PRESSURE: 94 MMHG | BODY MASS INDEX: 23.73 KG/M2 | WEIGHT: 139 LBS | HEIGHT: 64 IN | HEART RATE: 70 BPM

## 2023-03-23 DIAGNOSIS — I25.10 CAD IN NATIVE ARTERY: Primary | ICD-10-CM

## 2023-03-23 DIAGNOSIS — I35.0 MODERATE AORTIC STENOSIS: ICD-10-CM

## 2023-03-23 PROCEDURE — 99214 OFFICE O/P EST MOD 30 MIN: CPT | Performed by: STUDENT IN AN ORGANIZED HEALTH CARE EDUCATION/TRAINING PROGRAM

## 2023-03-23 PROCEDURE — 1036F TOBACCO NON-USER: CPT | Performed by: STUDENT IN AN ORGANIZED HEALTH CARE EDUCATION/TRAINING PROGRAM

## 2023-03-23 PROCEDURE — 1090F PRES/ABSN URINE INCON ASSESS: CPT | Performed by: STUDENT IN AN ORGANIZED HEALTH CARE EDUCATION/TRAINING PROGRAM

## 2023-03-23 PROCEDURE — G8420 CALC BMI NORM PARAMETERS: HCPCS | Performed by: STUDENT IN AN ORGANIZED HEALTH CARE EDUCATION/TRAINING PROGRAM

## 2023-03-23 PROCEDURE — 1123F ACP DISCUSS/DSCN MKR DOCD: CPT | Performed by: STUDENT IN AN ORGANIZED HEALTH CARE EDUCATION/TRAINING PROGRAM

## 2023-03-23 PROCEDURE — G8484 FLU IMMUNIZE NO ADMIN: HCPCS | Performed by: STUDENT IN AN ORGANIZED HEALTH CARE EDUCATION/TRAINING PROGRAM

## 2023-03-23 PROCEDURE — G8427 DOCREV CUR MEDS BY ELIG CLIN: HCPCS | Performed by: STUDENT IN AN ORGANIZED HEALTH CARE EDUCATION/TRAINING PROGRAM

## 2023-03-23 RX ORDER — ISOSORBIDE MONONITRATE 60 MG/1
60 TABLET, EXTENDED RELEASE ORAL DAILY
Qty: 90 TABLET | Refills: 3 | Status: SHIPPED | OUTPATIENT
Start: 2023-03-23

## 2023-03-23 RX ORDER — DM/P-EPHED/ACETAMINOPH/DOXYLAM
LIQUID (ML) ORAL
COMMUNITY

## 2023-03-23 NOTE — PROGRESS NOTES
Place under the tongue      isosorbide mononitrate (IMDUR) 60 MG extended release tablet Take 1 tablet by mouth daily 90 tablet 3    metoprolol tartrate (LOPRESSOR) 25 MG tablet Take 1 tablet by mouth 2 times daily 180 tablet 3    methenamine (HIPREX) 1 g tablet Take 1 tablet by mouth 2 times daily (with meals) 180 tablet 1    HYDROCHLOROTHIAZIDE PO Take by mouth      pramipexole (MIRAPEX) 1 MG tablet Take 1 tablet by mouth nightly 4 tab 1 tablet 0    melatonin 3 MG TABS tablet Take 2 tablets by mouth nightly  3    lansoprazole (PREVACID) 30 MG delayed release capsule Take 1 capsule by mouth 2 times daily 30 capsule 3    conjugated estrogens (PREMARIN) 0.625 MG/GM vaginal cream Place 0.5 grams vaginally twice weekly. 1 Tube 0    Polyethylene Glycol 3350 (MIRALAX PO) Take by mouth as needed      nitroGLYCERIN (NITROSTAT) 0.4 MG SL tablet Place 0.4 mg under the tongue every 5 minutes as needed for Chest pain up to max of 3 total doses. If no relief after 1 dose, call 911.       aspirin 81 MG EC tablet Take 81 mg by mouth daily. ondansetron (ZOFRAN-ODT) 4 MG disintegrating tablet Take 1 tablet by mouth 3 times daily as needed for Nausea or Vomiting (Patient not taking: No sig reported) 21 tablet 0     No current facility-administered medications for this visit. Social History     Socioeconomic History    Marital status:       Spouse name: None    Number of children: None    Years of education: None    Highest education level: None   Occupational History    Occupation: retired   Tobacco Use    Smoking status: Former     Packs/day: 1.00     Years: 12.00     Pack years: 12.00     Types: Cigarettes     Quit date: 1972     Years since quittin.6    Smokeless tobacco: Never   Vaping Use    Vaping Use: Never used   Substance and Sexual Activity    Alcohol use: No    Drug use: No       Family History   Problem Relation Age of Onset    Alzheimer's Disease Mother     Heart Disease Mother     Arthritis

## 2023-04-25 ENCOUNTER — TELEPHONE (OUTPATIENT)
Dept: CARDIOLOGY CLINIC | Age: 88
End: 2023-04-25

## 2023-04-25 NOTE — TELEPHONE ENCOUNTER
Pt seen 3/23/23 with Shawna Chew PA-C. She LM on VM stating her PCP has stopped her HCTZ. It actually look like it was stopped at discharge from the 1/6/23 admission and added back in at [address] 3/23/23. Called pt back and LM for pt to return call.

## 2023-06-13 ENCOUNTER — HOSPITAL ENCOUNTER (EMERGENCY)
Age: 88
Discharge: HOME | End: 2023-06-13
Payer: MEDICARE

## 2023-06-13 ENCOUNTER — APPOINTMENT (OUTPATIENT)
Dept: GENERAL RADIOLOGY | Age: 88
End: 2023-06-13
Payer: MEDICARE

## 2023-06-13 VITALS
SYSTOLIC BLOOD PRESSURE: 144 MMHG | TEMPERATURE: 98.3 F | WEIGHT: 134 LBS | OXYGEN SATURATION: 97 % | BODY MASS INDEX: 23 KG/M2 | HEART RATE: 69 BPM | RESPIRATION RATE: 16 BRPM | DIASTOLIC BLOOD PRESSURE: 79 MMHG

## 2023-06-13 DIAGNOSIS — R07.81 RIB PAIN ON RIGHT SIDE: Primary | ICD-10-CM

## 2023-06-13 PROCEDURE — 99213 OFFICE O/P EST LOW 20 MIN: CPT

## 2023-06-13 PROCEDURE — 71101 X-RAY EXAM UNILAT RIBS/CHEST: CPT

## 2023-06-13 RX ORDER — IBUPROFEN 800 MG/1
800 TABLET ORAL 2 TIMES DAILY PRN
Qty: 180 TABLET | Refills: 0 | Status: SHIPPED | OUTPATIENT
Start: 2023-06-13

## 2023-06-13 RX ORDER — DULOXETIN HYDROCHLORIDE 30 MG/1
30 CAPSULE, DELAYED RELEASE ORAL DAILY
COMMUNITY

## 2023-06-13 RX ORDER — LIDOCAINE 50 MG/G
1 PATCH TOPICAL DAILY
Qty: 10 PATCH | Refills: 0 | Status: SHIPPED | OUTPATIENT
Start: 2023-06-13 | End: 2023-06-13 | Stop reason: SDUPTHER

## 2023-06-13 RX ORDER — LIDOCAINE 50 MG/G
1 PATCH TOPICAL DAILY
Qty: 10 PATCH | Refills: 0 | Status: SHIPPED | OUTPATIENT
Start: 2023-06-13 | End: 2023-06-23

## 2023-06-13 RX ORDER — IBUPROFEN 800 MG/1
800 TABLET ORAL 2 TIMES DAILY PRN
Qty: 180 TABLET | Refills: 0 | Status: SHIPPED | OUTPATIENT
Start: 2023-06-13 | End: 2023-06-13 | Stop reason: SDUPTHER

## 2023-06-13 ASSESSMENT — PAIN DESCRIPTION - LOCATION: LOCATION: RIB CAGE

## 2023-06-13 ASSESSMENT — ENCOUNTER SYMPTOMS
COUGH: 0
SHORTNESS OF BREATH: 0

## 2023-06-13 ASSESSMENT — PAIN DESCRIPTION - ORIENTATION: ORIENTATION: RIGHT

## 2023-06-13 ASSESSMENT — PAIN - FUNCTIONAL ASSESSMENT: PAIN_FUNCTIONAL_ASSESSMENT: 0-10

## 2023-06-13 ASSESSMENT — PAIN SCALES - GENERAL: PAINLEVEL_OUTOF10: 9

## 2023-06-13 NOTE — ED PROVIDER NOTES
PROCEDURES:  None    FINAL IMPRESSION      1. Rib pain on right side          DISPOSITION/ PLAN   Patient diagnosed with right rib pain. Patient and daughter educated that xray did not show any fractures. Take ibuprofen, use heating pad as well as lidocaine patch. Follow up with PCP, return to emergency department for shortness of breath or chest pain. Patient denies any questions or concerns. PATIENT REFERRED TO:  MD Claudine [address] [facility] [address] / [place] [phone]      DISCHARGE MEDICATIONS:  Current Discharge Medication List        START taking these medications    Details   ibuprofen (ADVIL;MOTRIN) 800 MG tablet Take 1 tablet by mouth 2 times daily as needed for Pain  Qty: 180 tablet, Refills: 0      lidocaine (LIDODERM) 5 % Place 1 patch onto the skin daily for 10 days 12 hours on, 12 hours off.   Qty: 10 patch, Refills: 0             Current Discharge Medication List        STOP taking these medications       HYDROCHLOROTHIAZIDE PO Comments:   Reason for Stopping:               Current Discharge Medication List          ZACHARY Luther CNP    (Please note that portions of this note were completed with a voice recognition program. Efforts were made to edit the dictations but occasionally words are mis-transcribed.)           ZACHARY Luther CNP  06/13/23 [id number]

## 2023-06-13 NOTE — ED TRIAGE NOTES
Sujit Divine arrives to room with complaint of  sharp right rib pain after mowing grass last week and something popped in right rib. Pt feels she may have cracked a rib.

## 2023-09-19 ENCOUNTER — OFFICE VISIT (OUTPATIENT)
Dept: UROLOGY | Age: 88
End: 2023-09-19
Payer: MEDICARE

## 2023-09-19 VITALS
SYSTOLIC BLOOD PRESSURE: 122 MMHG | WEIGHT: 133 LBS | DIASTOLIC BLOOD PRESSURE: 68 MMHG | BODY MASS INDEX: 22.71 KG/M2 | HEIGHT: 64 IN

## 2023-09-19 DIAGNOSIS — R35.0 URINARY FREQUENCY: Primary | ICD-10-CM

## 2023-09-19 DIAGNOSIS — N39.0 RECURRENT UTI: ICD-10-CM

## 2023-09-19 LAB
BILIRUBIN URINE: NEGATIVE
BLOOD URINE, POC: ABNORMAL
CHARACTER, URINE: CLEAR
COLOR, URINE: YELLOW
GLUCOSE URINE: NEGATIVE MG/DL
KETONES, URINE: NEGATIVE
LEUKOCYTE CLUMPS, URINE: ABNORMAL
NITRITE, URINE: NEGATIVE
PH, URINE: 5.5 (ref 5–9)
POST VOID RESIDUAL (PVR): 0 ML
PROTEIN, URINE: NEGATIVE MG/DL
SPECIFIC GRAVITY, URINE: 1.01 (ref 1–1.03)
UROBILINOGEN, URINE: 0.2 EU/DL (ref 0–1)

## 2023-09-19 PROCEDURE — 1090F PRES/ABSN URINE INCON ASSESS: CPT | Performed by: NURSE PRACTITIONER

## 2023-09-19 PROCEDURE — 99214 OFFICE O/P EST MOD 30 MIN: CPT | Performed by: NURSE PRACTITIONER

## 2023-09-19 PROCEDURE — G8427 DOCREV CUR MEDS BY ELIG CLIN: HCPCS | Performed by: NURSE PRACTITIONER

## 2023-09-19 PROCEDURE — 1123F ACP DISCUSS/DSCN MKR DOCD: CPT | Performed by: NURSE PRACTITIONER

## 2023-09-19 PROCEDURE — 81003 URINALYSIS AUTO W/O SCOPE: CPT | Performed by: NURSE PRACTITIONER

## 2023-09-19 PROCEDURE — 1036F TOBACCO NON-USER: CPT | Performed by: NURSE PRACTITIONER

## 2023-09-19 PROCEDURE — 51798 US URINE CAPACITY MEASURE: CPT | Performed by: NURSE PRACTITIONER

## 2023-09-19 PROCEDURE — G8420 CALC BMI NORM PARAMETERS: HCPCS | Performed by: NURSE PRACTITIONER

## 2023-09-19 RX ORDER — METHENAMINE HIPPURATE 1000 MG/1
1 TABLET ORAL 2 TIMES DAILY WITH MEALS
Qty: 180 TABLET | Refills: 3 | Status: SHIPPED | OUTPATIENT
Start: 2023-09-19

## 2023-09-19 RX ORDER — FUROSEMIDE 20 MG/1
20 TABLET ORAL DAILY
COMMUNITY
Start: 2023-09-15

## 2023-09-19 ASSESSMENT — ENCOUNTER SYMPTOMS
ABDOMINAL PAIN: 0
VOMITING: 0
BACK PAIN: 0
NAUSEA: 0

## 2023-09-19 NOTE — PROGRESS NOTES
[address] [facility].  [address]  [facility] [address]  Dept: [phone]  Loc: [phone]    Visit Date: 9/19/2023        HPI:     Keron Lamas is a 80 y.o. female who presents today for:  Chief Complaint   Patient presents with    Urinary Tract Infection    Urinary Frequency       HPI    Pt seen in follow up for recurrent UTI. Pt has a hx of recurrent UTIs, dysuria, pressure, and bladder pain. Has hx of rectal and cervical ca sp prior chemo and radiation. Issues with chronic incontinence of stool. She is noted to have a grade 3-4 cystocele on prior pelvic exam by Dr. Scott Womack in 2019. Given hx of radiation and age not a good candidate for POP surgery. Has pessary. Previously performed CIC which was stopped secondary to low residuals. Reports she occasionally takes pessary out if having discomfort from it and cleans and gives herself a rest for a day or two and then re-inserts. When pessary out she knows she retains urine so she performs self-cath until pessary re-inserted. Previously reported bubbles in her urine. CTU negative for acute urologic findings. No hydronephrosis. No urologic tumors. Possible low insertion of the ureters, noted insufficiency of the pelvic floor. Underwent cystoscopy by Dr. Leroy De Santiago 4/21/22 that was negative for fistulous tracts and noted mild bladder wall trabeculation. Started on methenamine and trimethoprim. Pt stopped methenamine secondary to cost.  Pt had a UTI in December 2022 and presented to hospital for N/V weakness at which time she was found to have hyponatremia and EGD noted antral gastritis and 8 cm hiatal hernia. Urine culture 1/19/23 with Citrobacter freundii. Sensitive to [address]. Was treated with a one time dose of Fosfomycin. At [facility] 2/1/23 pt noted increased frequency, dysuria, and abdominal pain again. Culture 2/1/23 with Klebsiella treated with Macrobid.

## 2023-09-25 NOTE — PROGRESS NOTES
[facility] PROFESSIONAL SERVICES  [facility] [address] [address]   Dept: [phone]   Dept Fax: [phone]   Loc: [phone]      Chief Complaint   Patient presents with    6 Month Follow-Up    Leg Swelling     Cardiologist:  Dr. Ricco Burns  79 yo female presents for 6 month f/u. Hx of CAD, PCI 2017, AS-moderate. Swelling, is about the same. Doing well with lasix. Some neuropathy in her feet. No chest pain or sob. Symptoms are stable recently. No dizzienss or lighhteaded. No weight gain lately. No n/v/bloated feelings.      General:   No fever, no chills, no weight loss, no fatigue  Pulmonary:    No dyspnea, no wheezing  Cardiac:    Denies recent chest pain   GI:     No nausea or vomiting, no abdominal pain  Neuro:     No dizziness or light headedness  Musculoskeletal:  No recent active issues  Extremities:   No edema      Past Medical History:   Diagnosis Date    Anesthesia 2012    after hemorrhoid surgery felt like \"elephant on my chest\" heart cath done    CAD (coronary artery disease) 2012    Cancer ([facility])     cervical rectal    GERD (gastroesophageal reflux disease)     History of prolapse of bladder     Hyperlipidemia     Hypertension     Osteoarthritis     Shortness of breath 09/2017    UTI (urinary tract infection) 05/20/2017    [facility] in [place]       Allergies   Allergen Reactions    Other Other (See Comments)     Movi Prep,    Facial and lip swelling    Bactrim Ds [Sulfamethoxazole-Trimethoprim] Itching    Ciprofloxacin Nausea Only     Nausea and tingling in her fingers    Fish Oil     Lipitor Swelling    Ramipril Swelling     tongue    Sulfa Antibiotics Rash       Current Outpatient Medications   Medication Sig Dispense Refill    furosemide (LASIX) 20 MG tablet Take 1 tablet by mouth daily      methenamine (HIPREX) 1 g tablet Take 1 tablet by mouth 2 times daily (with meals) 180 tablet 3    DULoxetine (CYMBALTA) 30 MG extended release capsule Take

## 2023-09-27 ENCOUNTER — OFFICE VISIT (OUTPATIENT)
Dept: CARDIOLOGY CLINIC | Age: 88
End: 2023-09-27
Payer: MEDICARE

## 2023-09-27 VITALS
BODY MASS INDEX: 22.71 KG/M2 | WEIGHT: 133 LBS | SYSTOLIC BLOOD PRESSURE: 138 MMHG | DIASTOLIC BLOOD PRESSURE: 75 MMHG | HEIGHT: 64 IN | HEART RATE: 68 BPM

## 2023-09-27 DIAGNOSIS — I25.10 CAD IN NATIVE ARTERY: ICD-10-CM

## 2023-09-27 DIAGNOSIS — I35.0 MODERATE AORTIC STENOSIS: Primary | ICD-10-CM

## 2023-09-27 PROCEDURE — 1090F PRES/ABSN URINE INCON ASSESS: CPT | Performed by: STUDENT IN AN ORGANIZED HEALTH CARE EDUCATION/TRAINING PROGRAM

## 2023-09-27 PROCEDURE — 1036F TOBACCO NON-USER: CPT | Performed by: STUDENT IN AN ORGANIZED HEALTH CARE EDUCATION/TRAINING PROGRAM

## 2023-09-27 PROCEDURE — G8420 CALC BMI NORM PARAMETERS: HCPCS | Performed by: STUDENT IN AN ORGANIZED HEALTH CARE EDUCATION/TRAINING PROGRAM

## 2023-09-27 PROCEDURE — G8427 DOCREV CUR MEDS BY ELIG CLIN: HCPCS | Performed by: STUDENT IN AN ORGANIZED HEALTH CARE EDUCATION/TRAINING PROGRAM

## 2023-09-27 PROCEDURE — 1123F ACP DISCUSS/DSCN MKR DOCD: CPT | Performed by: STUDENT IN AN ORGANIZED HEALTH CARE EDUCATION/TRAINING PROGRAM

## 2023-09-27 PROCEDURE — 99214 OFFICE O/P EST MOD 30 MIN: CPT | Performed by: STUDENT IN AN ORGANIZED HEALTH CARE EDUCATION/TRAINING PROGRAM

## 2023-10-03 ENCOUNTER — TELEPHONE (OUTPATIENT)
Dept: CARDIOLOGY CLINIC | Age: 88
End: 2023-10-03

## 2023-10-03 NOTE — TELEPHONE ENCOUNTER
Pt just saw Gerald Gracia on 9/27  Pt needs dental extraction   IV sedation will be given  Form scanned in and in Dr Shane Amos box Verbal/written post procedure instructions were given to patient/caregiver/Instructed patient/caregiver to follow-up with primary care physician/Instructed patient/caregiver regarding signs and symptoms of infection/Keep the cast/splint/dressing clean and dry

## 2024-01-05 ENCOUNTER — NURSE ONLY (OUTPATIENT)
Dept: LAB | Age: 89
End: 2024-01-05

## 2024-01-22 LAB — CYTOLOGY THIN PREP PAP: NORMAL

## 2024-02-13 ENCOUNTER — NURSE ONLY (OUTPATIENT)
Dept: LAB | Age: 89
End: 2024-02-13

## 2024-02-13 LAB
ALBUMIN SERPL BCG-MCNC: 4.6 G/DL (ref 3.5–5.1)
ALP SERPL-CCNC: 78 U/L (ref 38–126)
ALT SERPL W/O P-5'-P-CCNC: 13 U/L (ref 11–66)
ANION GAP SERPL CALC-SCNC: 17 MEQ/L (ref 8–16)
AST SERPL-CCNC: 17 U/L (ref 5–40)
BASOPHILS ABSOLUTE: 0 THOU/MM3 (ref 0–0.1)
BASOPHILS NFR BLD AUTO: 0.9 %
BILIRUB SERPL-MCNC: 0.3 MG/DL (ref 0.3–1.2)
BUN SERPL-MCNC: 30 MG/DL (ref 7–22)
CALCIUM SERPL-MCNC: 9.9 MG/DL (ref 8.5–10.5)
CEA SERPL-MCNC: 1.6 NG/ML (ref 0–5)
CHLORIDE SERPL-SCNC: 103 MEQ/L (ref 98–111)
CO2 SERPL-SCNC: 23 MEQ/L (ref 23–33)
CREAT SERPL-MCNC: 0.8 MG/DL (ref 0.4–1.2)
DEPRECATED RDW RBC AUTO: 46.8 FL (ref 35–45)
EOSINOPHIL NFR BLD AUTO: 3.2 %
EOSINOPHILS ABSOLUTE: 0.1 THOU/MM3 (ref 0–0.4)
ERYTHROCYTE [DISTWIDTH] IN BLOOD BY AUTOMATED COUNT: 13.1 % (ref 11.5–14.5)
FERRITIN SERPL IA-MCNC: 928 NG/ML (ref 10–291)
FOLATE SERPL-MCNC: 14.7 NG/ML (ref 4.8–24.2)
GFR SERPL CREATININE-BSD FRML MDRD: > 60 ML/MIN/1.73M2
GLUCOSE SERPL-MCNC: 74 MG/DL (ref 70–108)
HCT VFR BLD AUTO: 37.7 % (ref 37–47)
HGB BLD-MCNC: 11.9 GM/DL (ref 12–16)
IMM GRANULOCYTES # BLD AUTO: 0.01 THOU/MM3 (ref 0–0.07)
IMM GRANULOCYTES NFR BLD AUTO: 0.2 %
IRON SERPL-MCNC: 75 UG/DL (ref 50–170)
LYMPHOCYTES ABSOLUTE: 1.3 THOU/MM3 (ref 1–4.8)
LYMPHOCYTES NFR BLD AUTO: 28.9 %
MCH RBC QN AUTO: 30.7 PG (ref 26–33)
MCHC RBC AUTO-ENTMCNC: 31.6 GM/DL (ref 32.2–35.5)
MCV RBC AUTO: 97.4 FL (ref 81–99)
MONOCYTES ABSOLUTE: 0.4 THOU/MM3 (ref 0.4–1.3)
MONOCYTES NFR BLD AUTO: 8 %
NEUTROPHILS NFR BLD AUTO: 58.8 %
NRBC BLD AUTO-RTO: 0 /100 WBC
PLATELET # BLD AUTO: 160 THOU/MM3 (ref 130–400)
PMV BLD AUTO: 10.6 FL (ref 9.4–12.4)
POTASSIUM SERPL-SCNC: 3.6 MEQ/L (ref 3.5–5.2)
PROT SERPL-MCNC: 7.3 G/DL (ref 6.1–8)
RBC # BLD AUTO: 3.87 MILL/MM3 (ref 4.2–5.4)
SEGMENTED NEUTROPHILS ABSOLUTE COUNT: 2.7 THOU/MM3 (ref 1.8–7.7)
SODIUM SERPL-SCNC: 143 MEQ/L (ref 135–145)
TIBC SERPL-MCNC: 261 UG/DL (ref 171–450)
VIT B12 SERPL-MCNC: 440 PG/ML (ref 211–911)
WBC # BLD AUTO: 4.6 THOU/MM3 (ref 4.8–10.8)

## 2024-02-19 LAB — IRON SATN MFR SERPL: 29 % (ref 20–50)

## 2024-03-01 ENCOUNTER — NURSE ONLY (OUTPATIENT)
Dept: LAB | Age: 89
End: 2024-03-01

## 2024-03-22 RX ORDER — ISOSORBIDE MONONITRATE 60 MG/1
60 TABLET, EXTENDED RELEASE ORAL DAILY
Qty: 90 TABLET | Refills: 0 | Status: SHIPPED | OUTPATIENT
Start: 2024-03-22

## 2024-05-04 NOTE — PLAN OF CARE
[address] [place] [address] Facility-Based Program  Individualized Cardiac Treatment Plan    Patient Name:  Carlota Shaver  :  1935  Age:  80 y.o. MRN:  [id number]  Diagnosis: PCI to LAD  Date of Event: 17   Physician:  Dr. Foreign Camacho  Next Office Visit:  3/2018  Date Entered Program: 10/10/17  Risk Stratifications: [] Low [x] Intermediate [] High  Allergies:    Allergies   Allergen Reactions    Other Other (See Comments)     Movi Prep,    Facial and lip swelling    Lipitor Swelling    Ramipril Swelling     tongue    Sulfa Antibiotics Rash       Individual Cardiac Treatment Plan -EXERCISE  INITIAL 30 DAY 60 DAY 90 DAY FINAL DAY   EXERCISE  ASSESSMENT/PLAN EXERCISE  REASSESSMENT EXERCISE   REASSESSMENT EXERCISE   REASSESSMENT EXERCISE  DISCHARGE/FOLLOW-UP   Date: 10/10/17 Date: Date: Date: Date:   Session #1 Session # ** Session # ** Session # ** Session # **  Last session completed on **   Stages of Change Stages of Change Stages of Change Stages of Change Stages of Change   [] Pre Contemplation  [] Contemplation  [x] Preparation  [] Action  [] Maintenance  [] Relapse [] Pre Contemplation  [] Contemplation  [] Preparation  [] Action  [] Maintenance  [] Relapse [] Pre Contemplation  [] Contemplation  [] Preparation  [] Action  [] Maintenance  [] Relapse [] Pre Contemplation  [] Contemplation  [] Preparation  [] Action  [] Maintenance  [] Relapse [] Pre Contemplation  [] Contemplation  [] Preparation  [] Action  [] Maintenance  [] Relapse   EXERCISE ASSESSMENT EXERCISE ASSESSMENT EXERCISE ASSESSMENT EXERCISE ASSESSMENT EXERCISE ASSESSMENT   6 Min Walk Test  Distance walked:   0.07 miles  370 ft.  1.54 METs  Max HR:65 BPM      RPE:  12    THR:    Rhythm:  Sinus Gabo    6 Min Walk Test  Distance walked:   ** miles  ** ft  ** METs  Max HR:** BPM      RPE:  **  %Change ft= **    Rhythm:  **   DASI: 5.62 METS DASI: ** METS DASI: ** METS DASI: ** METS = ** min     **min/mode Duration:  Total erobic exercise =  60-90 min   Intensity:   MET Level = 1.6-1.8  RPE = 12-15 Intensity:  Max MET Level = **  RPE = 12-15 Intensity:  Max MET Level = **  RPE = 12-15 Intensity:  Max MET Level = **  RPE = 12-15 Intensity:  Max MET Level = ** RPE = 12-15   Progression: increase aerobic activity up to 12 min over next 4 weeks by increasing time 2-3 min/week. Progression:   Progression:   Progression: Progression:  Increase time/intensity when RPE <13, and HR is in [facility]   [x] Yes      [] No  Upper and Lower body strength training 2x/wk    Wt: 2#       Reps:  8-15    *Increase wt. after completing 15 reps with an RPE of <12/13. [] Yes      [] No  Upper and Lower body strength training 2x/wk    Wt: **#       Reps:  **    *Increase wt. after completing 15 reps with an RPE of <12/13. [] Yes      [] No  Upper and Lower body strength training 2x/wk    Wt: **#       Reps:  **    *Increase wt. after completing 15 reps with an RPE of <12/13. [] Yes      [] No  Upper and Lower body strength training 2x/wk    Wt: **#       Reps:  **    *Increase wt. after completing 15 reps with an RPE of <12/13. Continue Strength Training at home   [] Exercise Log & Strength training handout given     Wt: **#       Reps:  **    *Increase wt. after completing 15 reps with an RPE of <12/13. Flexibility Flexibility Flexibility Flexibility Flexibility   [x] Yes      [] No  25 min session of Core Strength & Flexibility 1x/per week  Attends Core Strength & Flexibility   [] Yes      [] No Attends Core Strength & Flexibility   [] Yes      [] No Attends Core Strength & Flexibility   [] Yes      [] No Continue Core Strength & Flexibility at home   Home Exercise  *Yenny verbalizes planning to continue walking 3-4 days/week for 15-25 min on days not in rehab.  Home Exercise  *Yenny verbalizes planning to ** ** days/week 1:1   [] Attend Cooking Classes  [] Complete and return diet survey  [] ** Yenny's nutritional goals are as follows:  [] Attend Nutrition Workshops  [] Attend 1:1   [] Attend Cooking Classes  [] ** Yenny achieved nutritional goals   [] Yes    [] No  If no, why? Use knowledge gained to continue Pritikin eating plan at home       Individual Cardiac Treatment Plan - Psychosocial  PSYCHOSOCIAL  ASSESSMENT/PLAN PSYCHOSOCIAL  REASSESSMENT PSYCHOSOCIAL   REASSESSMENT PSYCHOSOCIAL   REASSESSMENT PSYCHOSOCIAL  DISCHARGE/FOLLOW-UP   Stages of Change Stages of Change Stages of Change Stages of Change Stages of Change   [] Pre Contemplation  [] Contemplation  [] Preparation  [] Action  [x] Maintenance  [] Relapse [] Pre Contemplation  [] Contemplation  [] Preparation  [] Action  [] Maintenance  [] Relapse [] Pre Contemplation  [] Contemplation  [] Preparation  [] Action  [] Maintenance  [] Relapse [] Pre Contemplation  [] Contemplation  [] Preparation  [] Action  [] Maintenance  [] Relapse [] Pre Contemplation  [] Contemplation  [] Preparation  [] Action  [] Maintenance  [] Relapse   PSYCHOSOCIAL ASSESSMENT PSYCHOSOCIAL ASSESSMENT PSYCHOSOCIAL ASSESSMENT PSYCHOSOCIAL ASSESSMENT PSYCHOSOCIAL ASSESSMENT   Behavioral Outcomes Behavioral Outcomes Behavioral Outcomes Behavioral Outcomes Behavioral Outcomes   Tool Used:  Beckie & Virgilio, Quality of Life Index, Cardiac Version IV  *Given to patient to complete. Tool Used:    Ferrans & Poole, Quality of Life Index, Cardiac Version IV   QOL Index Score: **    HF:**  S&E:**  P&S: **  Family: **   Tool Used:     Beckie & Poole, Quality of Life Index, Cardiac Version IV  QOL Index Score: **    HF:**  S&E:**  P&S: **  Family: **   PHQ-9 score 3  Depression Severity  [x]Minimal  []Mild   []Moderate  []Moderately Severe  []Severe    PHQ-9 score **  Depression Severity  []Minimal  []Mild   []Moderate  []Moderately Severe []Severe   Does patient have Family Support?   [x] Yes      [] No  No signs of marital/family distress       Using a scale of 0-10, 0=none, 10=very:   Rate your depression: 0  Rate your anxiety:  0  Using a scale of 0-10, 0=none, 10=very:   Rate your depression: **  Rate your anxiety:  ** Using a scale of 0-10, 0=none, 10=very:   Rate your depression: **  Rate your anxiety:  ** Using a scale of 0-10, 0=none, 10=very:   Rate your depression: **  Rate your anxiety:  ** Using a scale of 0-10, 0=none, 10=very:   Rate your depression: **  Rate your anxiety:  **   Signs and Symptoms of Depression Present? [] Yes      [x] No Signs and Symptoms of Depression Present? [] Yes      [] No  If yes, please explain:  ** Signs and Symptoms of Depression Present? [] Yes      [] No  If yes, please explain:  ** Signs and Symptoms of Depression Present? [] Yes      [] No  If yes, please explain:  ** Signs and Symptoms of Depression Present? [] Yes      [] No  If yes, please explain:  **   Signs and Symptoms of Anxiety Present? [] Yes      [x] No   Signs and Symptoms of Anxiety Present? [] Yes      [] No  If yes, please explain:  ** Signs and Symptoms of Anxiety Present? [] Yes      [] No  If yes, please explain:  ** Signs and Symptoms of Anxiety Present? [] Yes      [] No  If yes, please explain:  ** Signs and Symptoms of Anxiety Present? [] Yes      [] No  If yes, please explain:  **   [] Patient has poor eye contact   [] Flat affect present. [] Signs of anxiety, anger or hostility    [] Signs social isolation present.    []  Signs of alcohol or substance abuse       PSYCHOSOCIAL PLAN PSYCHOSOCIAL PLAN PSYCHOSOCIAL PLAN PSYCHOSOCIAL PLAN PSYCHOSOCIAL PLAN   *Interventions* *Interventions* *Interventions* *Interventions* *Interventions*   *Please check if needed  [] Psych Consult  [] Physician Referral  [x] Stress Management Skills *Please check if needed  [] Psych Consult  [] Physician Referral  [] Stress Management Skills *Please check if needed  [] Psych Consult  [] Physician Referral  [] Stress Management Skills *Please check if needed  [] Psych Consult  [] Physician Referral  [] Stress Management Skills *Please check if needed  [] Psych Consult  [] Physician Referral  [] Stress Management Skills   Is patient currently taking anti-depressant or anti-anxiety medications? [] Yes      [x] No Change in anti-depressant or anti-anxiety medications? [] Yes      [] No  If yes, please list medications:  ** Change in anti-depressant or anti-anxiety medications? [] Yes      [] No  If yes, please list medications:  ** Change in anti-depressant or anti-anxiety medications? [] Yes      [] No  If yes, please list medications:  ** Change in anti-depressant or anti-anxiety medications? [] Yes      [] No  If yes, please list medications:  **   *Education* *Education* *Education* *Education* *Education*   Healthy Mind-Set Workshops Recommended  [x] Stress & Health  [x] Taking Charge of Stress  [x] New Thoughts, New Behaviors  [x] Managing Moods & Relationships Healthy Mind-Set Workshops Attended/Date Healthy Mind-Set Workshops Attended/Date Healthy Mind-Set Workshops Attended/Date Healthy Mind-Set Workshops  Completed  [] Yes      [] No   *Goals* *Goals* *Goals* *Goals* *Goals*   Yenny's psychosocial goals are as follows:  Complete HADS & Beckie & Virgilio, Quality of Life Index, Cardiac Version IV Yenny's psychosocial goals are as follows:  [] Attend Healthy Mind-Set Workshops  [] Reduce depression symptom severity by 1 level  [] ** Yenny's psychosocial goals are as follows:  [] Attend Healthy Mind-Set Workshops  [] Reduce depression symptom severity by 1 level  [] ** Yenny's psychosocial goals are as follows:  [] Attend Healthy Mind-Set Workshops  [] Reduce depression symptom severity by 1 level  [] ** Yenny achieved psychosocial goals?   [] Yes    [] No  If no, why?  **  [] Use methods learned to continue to reduce depression symptom severity by 1 level  [] ** Use  Change in smoking status   [] Yes      [] No    Quit date: **   Tobacco Use  Change in smoking status   [] Yes      [] No    Quit date: **   Tobacco Use  Change in smoking status   [] Yes      [] No    Quit date: ** Tobacco Use  Change in smoking status   [] Yes      [] No    Quit date: **            Learning Barriers  Please select one:  [] Speech  [] Literacy  [] Hearing  [] Cognitive  [] Vision  [x] Ready to Learn Learning Barriers Addressed:   [] Yes      [] No   Learning Barriers Addressed:   [] Yes      [] No   Learning Barriers Addressed:  [] Yes      [] No Learning Barriers Addressed:  [] Yes      [] No     RISK FACTOR/EDUCATION PLAN RISK FACTOR/EDUCATION PLAN RISK FACTOR/EDUCATION PLAN RISK FACTOR/EDUCATION PLAN RISK FACTOR/EDUCATION PLAN   *Interventions* *Interventions* *Interventions* *Interventions* *Interventions*   Recommended Educational Videos    [x] Intake & The Pritikin Solution Program Overview  [x] Overview of The Pritikin Eating Plan  [x] Becoming A Pritikin   [x] Diseases of Our Time-Part 1  [x] Calorie Density  [x] Label Reading-Part 1  [x] Move It! [x] Healthy Minds, Bodies, Hearts  [x] Dining Out-Part 1  [x] Heart Disease Risk Reduction  [x] Metabolic Syndrome & Belly Fat  [x] Facts on Fat  [x] Diseases of Our Times-Part 2  [x] Biology of Weight Control  [x] Biomechanical Limitations  [x] Nurtition Action Plan   Completed Videos      10/10/2017  Intake & The Pritikin Solution Program Overview Completed Videos Completed Videos Recommended Educational Videos Completed    [] Yes      [] No    **If not completed, Why? **          Smoking Cessation/Relaspe Prevention Intervention needed?   [] Yes      [x] No       Professional Referrals:  *Please check if needed  [] Diabetes Clinic  [] Lipid Clinic   [] Other:     Professional Referrals:  *Please check if needed  [] Diabetes Clinic  [] Lipid Clinic   [] Other:   Preventative Medication Preventative Medication Preventative Medication <120  [x] Attend recommended video education sessions  [x] Takes medications as prescribed 100% of the time   [] ** Yenny's risk factor/education goals are as follows:    [x] Optimal BP <140/90  [] Blood Sugar <120  [x] Attend recommended video education sessions  [x] Takes medications as prescribed 100% of the time   [] ** Yenny's risk factor/education goals are as follows:    [x] Optimal BP <140/90  [] Blood Sugar <120  [x] Attend recommended video education sessions  [x] Takes medications as prescribed 100% of the time   [] ** Yenny achieved risk factor goals?   [] Yes    [] No  If no, why?  **     Monitored telemetry has revealed NSR  [] documented arrhythmia at increasing workloads  [] associated symptoms  Monitored telemetry has revealed **  [] documented arrhythmia at increasing workloads  [] associated symptoms ** Monitored telemetry has revealed  [] documented arrhythmia at increasing workloads  [] associated symptoms ** Monitored telemetry has revealed **  [] documented arrhythmia at increasing workloads  [] associated symptoms ** Monitored telemetry has revealed **  [] documented arrhythmia at increasing workloads  [] associated symptoms **   Physician Response    [x] Cardiac rehab is reasonably and medically necessary for continuous cardiac monitoring surveillance  of patient's cardiac activity  [x] Initiate continuous telemerty monitoring and notify me with any concerns  [] Other   Physician Response    [x] Cardiac rehab is reasonably and medically necessary for continuous cardiac monitoring surveillance  of patient's cardiac activity  [x] Continue continuous telemerty monitoring and notify me with any concerns  [] Other     Physician Response      [x] Cardiac rehab is reasonably and medically necessary for continuous cardiac monitoring surveillance  of patient's cardiac activity  [x] Continue continuous telemerty monitoring and notify me with any concerns   [] Other     Physician Response    [x] 04-May-2024 15:36

## 2024-05-06 ENCOUNTER — OFFICE VISIT (OUTPATIENT)
Dept: CARDIOLOGY CLINIC | Age: 89
End: 2024-05-06
Payer: MEDICARE

## 2024-05-06 VITALS
HEIGHT: 64 IN | HEART RATE: 57 BPM | SYSTOLIC BLOOD PRESSURE: 136 MMHG | WEIGHT: 132.2 LBS | DIASTOLIC BLOOD PRESSURE: 78 MMHG | BODY MASS INDEX: 22.57 KG/M2

## 2024-05-06 DIAGNOSIS — I10 ESSENTIAL HYPERTENSION: ICD-10-CM

## 2024-05-06 DIAGNOSIS — I25.10 CAD IN NATIVE ARTERY: ICD-10-CM

## 2024-05-06 DIAGNOSIS — I35.0 MODERATE AORTIC STENOSIS: Primary | ICD-10-CM

## 2024-05-06 PROCEDURE — G8427 DOCREV CUR MEDS BY ELIG CLIN: HCPCS | Performed by: INTERNAL MEDICINE

## 2024-05-06 PROCEDURE — G8420 CALC BMI NORM PARAMETERS: HCPCS | Performed by: INTERNAL MEDICINE

## 2024-05-06 PROCEDURE — 1123F ACP DISCUSS/DSCN MKR DOCD: CPT | Performed by: INTERNAL MEDICINE

## 2024-05-06 PROCEDURE — 1090F PRES/ABSN URINE INCON ASSESS: CPT | Performed by: INTERNAL MEDICINE

## 2024-05-06 PROCEDURE — 93000 ELECTROCARDIOGRAM COMPLETE: CPT | Performed by: INTERNAL MEDICINE

## 2024-05-06 PROCEDURE — 99213 OFFICE O/P EST LOW 20 MIN: CPT | Performed by: INTERNAL MEDICINE

## 2024-05-06 PROCEDURE — 1036F TOBACCO NON-USER: CPT | Performed by: INTERNAL MEDICINE

## 2024-05-06 NOTE — PATIENT INSTRUCTIONS
Your nurses today were MADHU Osuna and ANA Rosen  Your provider today was Dr. Oreilly  Phone number: [phone]

## 2024-05-06 NOTE — PROGRESS NOTES
Results   Component Value Date/Time    LABA1C 5.7 08/11/2022 08:00 AM       Lab Results   Component Value Date/Time    TRIG 188 04/15/2024 08:34 AM    HDL 49 04/15/2024 08:34 AM       Lab Results   Component Value Date/Time    TSH 5.250 01/09/2023 04:23 AM    TSH 6.21 02/03/2015 02:25 PM         Testing Reviewed:      I have individually reviewed the cardiac test below:    ECHO: No results found for this or any previous visit.       Assessment/Plan   S/p PCI of the LAD 2017  Moderate AS  Doing well, no issues, taking all meds, may need to stop diuretics/nitrates soon due to AS progression but she is asymptomatic for the time being.  Murmur stable.  No CHF.  No syncope or chest pain.  BP and HR well controlled.  Follow clinically and recheck TTE if symptomatic.  Discussed symptoms needing emergency care or those which require further medical attention.   Discussed diet/exercise/BP/weight loss/health lifestyle choices/lipids; the patient understands the goals and will try to comply.    Disposition:  1 year           Electronically signed by Ruslan Oreilly MD   5/6/2024 at 2:57 PM EDT

## 2024-06-05 ENCOUNTER — APPOINTMENT (OUTPATIENT)
Dept: GENERAL RADIOLOGY | Age: 89
DRG: 287 | End: 2024-06-05
Payer: MEDICARE

## 2024-06-05 ENCOUNTER — HOSPITAL ENCOUNTER (INPATIENT)
Age: 89
LOS: 1 days | Discharge: HOME | DRG: 287 | End: 2024-06-08
Attending: EMERGENCY MEDICINE
Payer: MEDICARE

## 2024-06-05 DIAGNOSIS — I20.0 UNSTABLE ANGINA (HCC): ICD-10-CM

## 2024-06-05 DIAGNOSIS — Z87.19 S/P HEMORRHOIDECTOMY: ICD-10-CM

## 2024-06-05 DIAGNOSIS — I35.0 SEVERE AORTIC STENOSIS: ICD-10-CM

## 2024-06-05 DIAGNOSIS — Z98.890 S/P HEMORRHOIDECTOMY: ICD-10-CM

## 2024-06-05 DIAGNOSIS — R07.9 CHEST PAIN WITH HIGH RISK FOR CARDIAC ETIOLOGY: Primary | ICD-10-CM

## 2024-06-05 DIAGNOSIS — I35.0 NONRHEUMATIC AORTIC VALVE STENOSIS: ICD-10-CM

## 2024-06-05 DIAGNOSIS — I35.0 MODERATE AORTIC STENOSIS: ICD-10-CM

## 2024-06-05 LAB
ALBUMIN SERPL BCG-MCNC: 4.3 G/DL (ref 3.5–5.1)
ALP SERPL-CCNC: 83 U/L (ref 38–126)
ALT SERPL W/O P-5'-P-CCNC: 10 U/L (ref 11–66)
ANION GAP SERPL CALC-SCNC: 10 MEQ/L (ref 8–16)
APTT PPP: 29.6 SECONDS (ref 22–38)
AST SERPL-CCNC: 19 U/L (ref 5–40)
BASOPHILS ABSOLUTE: 0.1 THOU/MM3 (ref 0–0.1)
BASOPHILS NFR BLD AUTO: 0.7 %
BILIRUB CONJ SERPL-MCNC: < 0.2 MG/DL (ref 0–0.3)
BILIRUB SERPL-MCNC: 0.4 MG/DL (ref 0.3–1.2)
BUN SERPL-MCNC: 29 MG/DL (ref 7–22)
CALCIUM SERPL-MCNC: 9.9 MG/DL (ref 8.5–10.5)
CHLORIDE SERPL-SCNC: 101 MEQ/L (ref 98–111)
CO2 SERPL-SCNC: 25 MEQ/L (ref 23–33)
CREAT SERPL-MCNC: 0.7 MG/DL (ref 0.4–1.2)
D DIMER PPP IA.FEU-MCNC: 681 NG/ML FEU (ref 0–500)
DEPRECATED RDW RBC AUTO: 46.1 FL (ref 35–45)
EKG ATRIAL RATE: 80 BPM
EKG P AXIS: 86 DEGREES
EKG P-R INTERVAL: 186 MS
EKG Q-T INTERVAL: 356 MS
EKG QRS DURATION: 72 MS
EKG QTC CALCULATION (BAZETT): 410 MS
EKG R AXIS: -77 DEGREES
EKG T AXIS: 77 DEGREES
EKG VENTRICULAR RATE: 80 BPM
EOSINOPHIL NFR BLD AUTO: 2.5 %
EOSINOPHILS ABSOLUTE: 0.2 THOU/MM3 (ref 0–0.4)
ERYTHROCYTE [DISTWIDTH] IN BLOOD BY AUTOMATED COUNT: 13.7 % (ref 11.5–14.5)
GFR SERPL CREATININE-BSD FRML MDRD: 83 ML/MIN/1.73M2
GLUCOSE SERPL-MCNC: 98 MG/DL (ref 70–108)
HCT VFR BLD AUTO: 37 % (ref 37–47)
HGB BLD-MCNC: 12 GM/DL (ref 12–16)
IMM GRANULOCYTES # BLD AUTO: 0.02 THOU/MM3 (ref 0–0.07)
IMM GRANULOCYTES NFR BLD AUTO: 0.3 %
INR PPP: 0.98 (ref 0.85–1.13)
LIPASE SERPL-CCNC: 26.7 U/L (ref 5.6–51.3)
LYMPHOCYTES ABSOLUTE: 2.4 THOU/MM3 (ref 1–4.8)
LYMPHOCYTES NFR BLD AUTO: 33.3 %
MAGNESIUM SERPL-MCNC: 1.9 MG/DL (ref 1.6–2.4)
MCH RBC QN AUTO: 29.7 PG (ref 26–33)
MCHC RBC AUTO-ENTMCNC: 32.4 GM/DL (ref 32.2–35.5)
MCV RBC AUTO: 91.6 FL (ref 81–99)
MONOCYTES ABSOLUTE: 0.5 THOU/MM3 (ref 0.4–1.3)
MONOCYTES NFR BLD AUTO: 7.2 %
NEUTROPHILS ABSOLUTE: 4.1 THOU/MM3 (ref 1.8–7.7)
NEUTROPHILS NFR BLD AUTO: 56 %
NRBC BLD AUTO-RTO: 0 /100 WBC
NT-PROBNP SERPL IA-MCNC: 503.3 PG/ML (ref 0–449)
OSMOLALITY SERPL CALC.SUM OF ELEC: 277.8 MOSMOL/KG (ref 275–300)
PLATELET # BLD AUTO: 165 THOU/MM3 (ref 130–400)
PMV BLD AUTO: 10.4 FL (ref 9.4–12.4)
POTASSIUM SERPL-SCNC: 4.4 MEQ/L (ref 3.5–5.2)
PROT SERPL-MCNC: 7.2 G/DL (ref 6.1–8)
RBC # BLD AUTO: 4.04 MILL/MM3 (ref 4.2–5.4)
SODIUM SERPL-SCNC: 136 MEQ/L (ref 135–145)
TROPONIN, HIGH SENSITIVITY: 21 NG/L (ref 0–12)
TROPONIN, HIGH SENSITIVITY: 22 NG/L (ref 0–12)
TROPONIN, HIGH SENSITIVITY: 23 NG/L (ref 0–12)
WBC # BLD AUTO: 7.3 THOU/MM3 (ref 4.8–10.8)

## 2024-06-05 PROCEDURE — 36415 COLL VENOUS BLD VENIPUNCTURE: CPT

## 2024-06-05 PROCEDURE — 83735 ASSAY OF MAGNESIUM: CPT

## 2024-06-05 PROCEDURE — 82248 BILIRUBIN DIRECT: CPT

## 2024-06-05 PROCEDURE — 71045 X-RAY EXAM CHEST 1 VIEW: CPT

## 2024-06-05 PROCEDURE — 85025 COMPLETE CBC W/AUTO DIFF WBC: CPT

## 2024-06-05 PROCEDURE — 99222 1ST HOSP IP/OBS MODERATE 55: CPT | Performed by: STUDENT IN AN ORGANIZED HEALTH CARE EDUCATION/TRAINING PROGRAM

## 2024-06-05 PROCEDURE — 93010 ELECTROCARDIOGRAM REPORT: CPT | Performed by: NUCLEAR MEDICINE

## 2024-06-05 PROCEDURE — 83690 ASSAY OF LIPASE: CPT

## 2024-06-05 PROCEDURE — 80053 COMPREHEN METABOLIC PANEL: CPT

## 2024-06-05 PROCEDURE — G0378 HOSPITAL OBSERVATION PER HR: HCPCS

## 2024-06-05 PROCEDURE — 2580000003 HC RX 258

## 2024-06-05 PROCEDURE — 84484 ASSAY OF TROPONIN QUANT: CPT

## 2024-06-05 PROCEDURE — 85610 PROTHROMBIN TIME: CPT

## 2024-06-05 PROCEDURE — 99285 EMERGENCY DEPT VISIT HI MDM: CPT

## 2024-06-05 PROCEDURE — 93005 ELECTROCARDIOGRAM TRACING: CPT | Performed by: EMERGENCY MEDICINE

## 2024-06-05 PROCEDURE — 83880 ASSAY OF NATRIURETIC PEPTIDE: CPT

## 2024-06-05 PROCEDURE — 6370000000 HC RX 637 (ALT 250 FOR IP)

## 2024-06-05 PROCEDURE — 85730 THROMBOPLASTIN TIME PARTIAL: CPT

## 2024-06-05 PROCEDURE — 6370000000 HC RX 637 (ALT 250 FOR IP): Performed by: EMERGENCY MEDICINE

## 2024-06-05 PROCEDURE — 85379 FIBRIN DEGRADATION QUANT: CPT

## 2024-06-05 RX ORDER — ACETAMINOPHEN 325 MG/1
650 TABLET ORAL EVERY 6 HOURS PRN
Status: DISCONTINUED | OUTPATIENT
Start: 2024-06-05 | End: 2024-06-07 | Stop reason: SDUPTHER

## 2024-06-05 RX ORDER — ASPIRIN 81 MG/1
81 TABLET ORAL DAILY
Status: DISCONTINUED | OUTPATIENT
Start: 2024-06-05 | End: 2024-06-08 | Stop reason: HOSPADM

## 2024-06-05 RX ORDER — FUROSEMIDE 40 MG/1
20 TABLET ORAL DAILY
Status: DISCONTINUED | OUTPATIENT
Start: 2024-06-06 | End: 2024-06-06

## 2024-06-05 RX ORDER — LANOLIN ALCOHOL/MO/W.PET/CERES
3 CREAM (GRAM) TOPICAL NIGHTLY
Status: DISCONTINUED | OUTPATIENT
Start: 2024-06-05 | End: 2024-06-08 | Stop reason: HOSPADM

## 2024-06-05 RX ORDER — POTASSIUM CHLORIDE 20 MEQ/1
40 TABLET, EXTENDED RELEASE ORAL PRN
Status: DISCONTINUED | OUTPATIENT
Start: 2024-06-05 | End: 2024-06-08 | Stop reason: HOSPADM

## 2024-06-05 RX ORDER — METHENAMINE HIPPURATE 1000 MG/1
1 TABLET ORAL 2 TIMES DAILY WITH MEALS
Status: DISCONTINUED | OUTPATIENT
Start: 2024-06-05 | End: 2024-06-08 | Stop reason: HOSPADM

## 2024-06-05 RX ORDER — ACETAMINOPHEN 650 MG/1
650 SUPPOSITORY RECTAL EVERY 6 HOURS PRN
Status: DISCONTINUED | OUTPATIENT
Start: 2024-06-05 | End: 2024-06-08 | Stop reason: HOSPADM

## 2024-06-05 RX ORDER — POTASSIUM CHLORIDE 7.45 MG/ML
10 INJECTION INTRAVENOUS PRN
Status: DISCONTINUED | OUTPATIENT
Start: 2024-06-05 | End: 2024-06-08 | Stop reason: HOSPADM

## 2024-06-05 RX ORDER — MAGNESIUM SULFATE IN WATER 40 MG/ML
2000 INJECTION, SOLUTION INTRAVENOUS PRN
Status: DISCONTINUED | OUTPATIENT
Start: 2024-06-05 | End: 2024-06-08 | Stop reason: HOSPADM

## 2024-06-05 RX ORDER — PANTOPRAZOLE SODIUM 40 MG/1
40 TABLET, DELAYED RELEASE ORAL
Status: DISCONTINUED | OUTPATIENT
Start: 2024-06-06 | End: 2024-06-08 | Stop reason: HOSPADM

## 2024-06-05 RX ORDER — PRAMIPEXOLE DIHYDROCHLORIDE 1 MG/1
1 TABLET ORAL NIGHTLY
Status: DISCONTINUED | OUTPATIENT
Start: 2024-06-05 | End: 2024-06-08 | Stop reason: HOSPADM

## 2024-06-05 RX ORDER — SODIUM CHLORIDE 9 MG/ML
INJECTION, SOLUTION INTRAVENOUS PRN
Status: DISCONTINUED | OUTPATIENT
Start: 2024-06-05 | End: 2024-06-07 | Stop reason: SDUPTHER

## 2024-06-05 RX ORDER — SODIUM CHLORIDE 0.9 % (FLUSH) 0.9 %
5-40 SYRINGE (ML) INJECTION EVERY 12 HOURS SCHEDULED
Status: DISCONTINUED | OUTPATIENT
Start: 2024-06-05 | End: 2024-06-07 | Stop reason: SDUPTHER

## 2024-06-05 RX ORDER — ONDANSETRON 4 MG/1
4 TABLET, ORALLY DISINTEGRATING ORAL EVERY 8 HOURS PRN
Status: DISCONTINUED | OUTPATIENT
Start: 2024-06-05 | End: 2024-06-08 | Stop reason: HOSPADM

## 2024-06-05 RX ORDER — ASPIRIN 81 MG/1
324 TABLET, CHEWABLE ORAL ONCE
Status: COMPLETED | OUTPATIENT
Start: 2024-06-05 | End: 2024-06-05

## 2024-06-05 RX ORDER — ONDANSETRON 2 MG/ML
4 INJECTION INTRAMUSCULAR; INTRAVENOUS EVERY 6 HOURS PRN
Status: DISCONTINUED | OUTPATIENT
Start: 2024-06-05 | End: 2024-06-08 | Stop reason: HOSPADM

## 2024-06-05 RX ORDER — ENOXAPARIN SODIUM 100 MG/ML
40 INJECTION SUBCUTANEOUS EVERY EVENING
Status: DISCONTINUED | OUTPATIENT
Start: 2024-06-05 | End: 2024-06-08 | Stop reason: HOSPADM

## 2024-06-05 RX ORDER — DULOXETIN HYDROCHLORIDE 30 MG/1
30 CAPSULE, DELAYED RELEASE ORAL DAILY
Status: DISCONTINUED | OUTPATIENT
Start: 2024-06-05 | End: 2024-06-05

## 2024-06-05 RX ORDER — SODIUM CHLORIDE 0.9 % (FLUSH) 0.9 %
5-40 SYRINGE (ML) INJECTION PRN
Status: DISCONTINUED | OUTPATIENT
Start: 2024-06-05 | End: 2024-06-07 | Stop reason: SDUPTHER

## 2024-06-05 RX ORDER — POLYETHYLENE GLYCOL 3350 17 G/17G
17 POWDER, FOR SOLUTION ORAL DAILY PRN
Status: DISCONTINUED | OUTPATIENT
Start: 2024-06-05 | End: 2024-06-08 | Stop reason: HOSPADM

## 2024-06-05 RX ORDER — ISOSORBIDE MONONITRATE 60 MG/1
60 TABLET, EXTENDED RELEASE ORAL DAILY
Status: DISCONTINUED | OUTPATIENT
Start: 2024-06-06 | End: 2024-06-06

## 2024-06-05 RX ADMIN — ASPIRIN 81 MG 324 MG: 81 TABLET ORAL at 13:25

## 2024-06-05 RX ADMIN — SODIUM CHLORIDE, PRESERVATIVE FREE 10 ML: 5 INJECTION INTRAVENOUS at 21:40

## 2024-06-05 RX ADMIN — PRAMIPEXOLE DIHYDROCHLORIDE 1 MG: 1 TABLET ORAL at 21:46

## 2024-06-05 RX ADMIN — METOPROLOL TARTRATE 25 MG: 50 TABLET, FILM COATED ORAL at 21:46

## 2024-06-05 ASSESSMENT — PAIN DESCRIPTION - DESCRIPTORS: DESCRIPTORS: SHARP

## 2024-06-05 ASSESSMENT — PAIN SCALES - GENERAL
PAINLEVEL_OUTOF10: 0
PAINLEVEL_OUTOF10: 2
PAINLEVEL_OUTOF10: 0
PAINLEVEL_OUTOF10: 0
PAINLEVEL_OUTOF10: 7
PAINLEVEL_OUTOF10: 0

## 2024-06-05 ASSESSMENT — PAIN - FUNCTIONAL ASSESSMENT
PAIN_FUNCTIONAL_ASSESSMENT: NONE - DENIES PAIN
PAIN_FUNCTIONAL_ASSESSMENT: 0-10
PAIN_FUNCTIONAL_ASSESSMENT: 0-10
PAIN_FUNCTIONAL_ASSESSMENT: NONE - DENIES PAIN

## 2024-06-05 ASSESSMENT — PAIN DESCRIPTION - PAIN TYPE: TYPE: ACUTE PAIN

## 2024-06-05 ASSESSMENT — PAIN DESCRIPTION - LOCATION
LOCATION: CHEST
LOCATION: CHEST

## 2024-06-05 ASSESSMENT — HEART SCORE: ECG: NON-SPECIFC REPOLARIZATION DISTURBANCE/LBTB/PM

## 2024-06-05 ASSESSMENT — PAIN DESCRIPTION - ORIENTATION: ORIENTATION: RIGHT

## 2024-06-05 NOTE — H&P
Internal Medicine Resident History and Physical          Name: Yenny Chandler, female, : 1935, MRN: [id number]    PCP: Artur Brar III, MD    Date of Admission: 2024  Date of Service: Pt seen/examined on 24  and Admitted to Observation with expected LOS less than two midnights due to medical therapy.     Assessment and Plan:  Atypical chest pain: Unknown etiology at this time differentials include cardiac chest pain, pleuritic chest pain, musculoskeletal chest pain.  Patient presented to the emergency department with sudden onset right-sided chest pain at rest lasting approximately 15 to 25 minutes.  Patient states she took 2 doses of sublingual nitro prior to presentation to the emergency department with improvement in her chest pain.  Lab work showing elevated troponin on presentation 23, repeat 22.  EKG showing no signs of ischemic change.  By the time I evaluated the patient chest pain had completely resolved.  Physical exam positive for cardiac murmur, no reproducible chest pain.    Continue Imdur 60 mg daily  Complete cardiac echo ordered, if worsening signs of aortic stenosis will consider cardiac stress test and cardiology consult  Telemetry  Magnesium ordered  Will trend troponin   D-dimer ordered, if elevated will consider CT chest to rule out PE or aortic dissection    Elevated troponin: Likely secondary to demand.  Initial troponin 23, repeat 22.  Patient denying any chest pain at this point, no EKG changes.    CAD s/p drug-eluting stent to mid LAD    Continue aspirin 81 daily     Hx of moderate AS: last echo 24 with EF 55-60% Transaortic peak velocity is 2.9 m/s, mean gradient of 21 mm Hg and calculated LEODAN is 1.0 cm2.  Complete cardiac echo ordered    HTN  Imdur 60 mg daily  Lopressor 25 mg twice daily    GERD  Protonix 40 mg daily    History of recurrent UTIs  Hiprex 1 g 3 times daily    Hx cervical cancer    Hx rectal  Exam:  /68   Pulse 65   Temp 98 °F (36.7 °C) (Oral)   Resp 17   Ht 1.626 m (5' 4\")   Wt 56.7 kg (125 lb)   SpO2 96%   BMI 21.46 kg/m²     Physical Exam  Constitutional:       General: He is not in acute distress.     Appearance: Normal appearance. He is not diaphoretic.   HENT:      Head: Normocephalic and atraumatic.   Cardiovascular:      Rate and Rhythm: Normal rate and regular rhythm.      Pulses: Normal pulses.      Heart sounds: Normal heart sounds.  Patient had a crescendo decrescendo systolic murmur.     No gallop.   Pulmonary:      Effort: Pulmonary effort is normal. No respiratory distress.      Breath sounds: Normal breath sounds. No wheezing, rhonchi or rales.   Abdominal:      General: Abdomen is flat. Bowel sounds are normal.      Palpations: Abdomen is soft.      Tenderness: There is no abdominal tenderness. There is no right CVA tenderness, left CVA tenderness, guarding or rebound.   Musculoskeletal:         General: No swelling or tenderness. Moves all extremities     Right lower leg: No edema.      Left lower leg: No edema.   Skin:     General: Skin is warm and dry.      Capillary Refill: Capillary refill takes less than 2 seconds.      Findings: No lesion or rash.   Neurological:      General: No focal deficit present.      Mental Status: He is alert and oriented to person, place, and time.      Cranial Nerves: No cranial nerve deficit.      Sensory: No sensory deficit.      Coordination: Coordination normal.   Psychiatric:         Mood and Affect: Mood normal.         Behavior: Behavior normal.         Thought Content: Thought content normal.         Judgment: Judgment normal.         EKG:  I have reviewed the EKG with the following interpretation:         Labs:     Recent Labs     06/05/24  1215   WBC 7.3   HGB 12.0   HCT 37.0        Recent Labs     06/05/24  1215      K 4.4      CO2 25   BUN 29*   CREATININE 0.7   CALCIUM 9.9     Recent Labs     06/05/24  1215    AST 19   ALT 10*   BILIDIR <0.2   BILITOT 0.4   ALKPHOS 83     Recent Labs     06/05/24  1215   INR 0.98     No results for input(s): \"TROPONINT\" in the last 72 hours.  No results for input(s): \"PROCAL\" in the last 72 hours.   Lab Results   Component Value Date/Time    NITRU NEGATIVE 04/15/2024 08:34 AM    WBCUA 25-50 04/15/2024 08:34 AM    BACTERIA FEW 04/15/2024 08:34 AM    RBCUA 0-2 04/15/2024 08:34 AM    BLOODU NEGATIVE 04/15/2024 08:34 AM    SPECGRAV 1.015 10/29/2019 10:56 AM    GLUCOSEU NEGATIVE 04/15/2024 08:34 AM    GLUCOSEU NEGATIVE 10/16/2023 08:14 AM         Radiology:   XR CHEST PORTABLE   Final Result      No acute intrathoracic process.               **This report has been created using voice recognition software. It may contain   minor errors which are inherent in voice recognition technology.**                   Past Social History  The patient currently lives at home.   Tobacco use:   reports that she quit smoking about 51 years ago. Her smoking use included cigarettes. She started smoking about 63 years ago. She has a 12.0 pack-year smoking history. She has never used smokeless tobacco.  Alcohol use:   reports no history of alcohol use.  Drug use:  reports no history of drug use.     Medical Hx      Diagnosis Date    Anesthesia 2012    after hemorrhoid surgery felt like \"elephant on my chest\" heart cath done    CAD (coronary artery disease) 2012    Cancer (HCC)     cervical rectal    GERD (gastroesophageal reflux disease)     History of prolapse of bladder     Hyperlipidemia     Hypertension     Osteoarthritis     Shortness of breath 09/2017    UTI (urinary tract infection) 05/20/2017    [facility] in [place]       Surgical Hx      Procedure Laterality Date    APPENDECTOMY  1969    CARDIAC CATHETERIZATION  8/2012    [facility]    COLONOSCOPY  5/25/12    Dr. Lozada     CONDYLOMA EXCISION Left 12/16/2021    LEFT PERIANAL BIOPSY performed by Be Yang MD at [facility] OR    CORONARY ANGIOPLASTY WITH

## 2024-06-05 NOTE — ED TRIAGE NOTES
Pt presents to the ED through triage with c/c chest pain. Pt reports that she was riding in car approx 25 minutes ago when chest pain started. Pt reports taking 2 sublingual nitro PTA. Denies improvement. Pt sees Dr. Oreilly (last seek 3 weeks ago for routine visit). Pt rates pain 7/10 at this time. EKG completed on arrival

## 2024-06-05 NOTE — ED PROVIDER NOTES
[facility]  EMERGENCY DEPARTMENT ENCOUNTER        PATIENT NAME: Yenny Chandler  MRN: [id number]  : 1935  ZARATE: 2024  PROVIDER: Selvin Whalen MD    Patient was seen and evaluated at 12:24 PM EDT. Nurses Notes are reviewed and I agree except as noted in the HPI.  Chief Complaint   Patient presents with    Chest Pain     HISTORY OF PRESENT ILLNESS     Yenny Chandler is a 89 y.o. female with PMH of CAD with prior PCI to LAD in , anal cancer, GERD, HLD, HTN, and aortic stenosis (last echo 2023 showing moderate aortic stenosis with peak velocity 2.9 and mean gradient 21 mmHg) presenting for evaluation of sudden onset chest pain and dizziness.    Patient states 25 minutes PTA, when she was riding in a car, she suddenly developed right-sided chest pain associated with dizziness.  She self medicated with 2 sublingual nitro before arrival and the chest pain improved from 10/10-7/10.  On my evaluation, she still has mild retrosternal discomfort on arrival.  No fever or chills.  No SOB.  No nausea or vomiting.  No diarrhea.  No urinary symptoms.  No leg swelling.    This HPI was provided by patient.     PAST MEDICAL HISTORY    has a past medical history of Anesthesia, CAD (coronary artery disease), Cancer (HCC), GERD (gastroesophageal reflux disease), History of prolapse of bladder, Hyperlipidemia, Hypertension, Osteoarthritis, Shortness of breath, and UTI (urinary tract infection).    SURGICAL HISTORY      has a past surgical history that includes Hemorrhoid surgery (, 2012); Colonoscopy (12); Hysterectomy (); Appendectomy (); other surgical history (1/3/2014); Rectal surgery; Cardiac catheterization (2012); Coronary angioplasty with stent (2017); sigmoidoscopy (N/A, 6/10/2019); Condyloma Excision (Left, 2021); and Upper gastrointestinal endoscopy (N/A, 2023).    CURRENT MEDICATIONS       Previous Medications    ASPIRIN 81 MG EC TABLET    Take 1 tablet

## 2024-06-05 NOTE — ED NOTES
Pt updated on admission status. Pt resting in bed, respirations even and unlabored. No needs expressed at this time. Call light within reach. VSS

## 2024-06-06 ENCOUNTER — APPOINTMENT (OUTPATIENT)
Age: 89
DRG: 287 | End: 2024-06-06
Payer: MEDICARE

## 2024-06-06 LAB
ANION GAP SERPL CALC-SCNC: 8 MEQ/L (ref 8–16)
BUN SERPL-MCNC: 26 MG/DL (ref 7–22)
CALCIUM SERPL-MCNC: 9.3 MG/DL (ref 8.5–10.5)
CHLORIDE SERPL-SCNC: 102 MEQ/L (ref 98–111)
CO2 SERPL-SCNC: 28 MEQ/L (ref 23–33)
CREAT SERPL-MCNC: 0.7 MG/DL (ref 0.4–1.2)
DEPRECATED RDW RBC AUTO: 46.3 FL (ref 35–45)
ECHO AO ASC DIAM: 3.8 CM
ECHO AO ASCENDING AORTA INDEX: 2.38 CM/M2
ECHO AO SINUS VALSALVA DIAM: 3 CM
ECHO AO SINUS VALSALVA INDEX: 1.88 CM/M2
ECHO AO ST JNCT DIAM: 2.7 CM
ECHO AR MAX VEL PISA: 5.1 M/S
ECHO AV AREA PEAK VELOCITY: 0.9 CM2
ECHO AV AREA VTI: 1 CM2
ECHO AV AREA/BSA PEAK VELOCITY: 0.6 CM2/M2
ECHO AV AREA/BSA VTI: 0.6 CM2/M2
ECHO AV CUSP MM: 1 CM
ECHO AV MEAN GRADIENT: 29 MMHG
ECHO AV MEAN VELOCITY: 2.5 M/S
ECHO AV PEAK GRADIENT: 58 MMHG
ECHO AV PEAK VELOCITY: 3.8 M/S
ECHO AV REGURGITANT PHT: 742 MS
ECHO AV VELOCITY RATIO: 0.32
ECHO AV VTI: 75.6 CM
ECHO BSA: 1.59 M2
ECHO EST RA PRESSURE: 5 MMHG
ECHO LA AREA 2C: 19.9 CM2
ECHO LA AREA 4C: 19.1 CM2
ECHO LA DIAMETER INDEX: 2.19 CM/M2
ECHO LA DIAMETER: 3.5 CM
ECHO LA MAJOR AXIS: 6.1 CM
ECHO LA MINOR AXIS: 6.2 CM
ECHO LA VOL BP: 51 ML (ref 22–52)
ECHO LA VOL MOD A2C: 53 ML (ref 22–52)
ECHO LA VOL MOD A4C: 47 ML (ref 22–52)
ECHO LA VOL/BSA BIPLANE: 32 ML/M2 (ref 16–34)
ECHO LA VOLUME INDEX MOD A2C: 33 ML/M2 (ref 16–34)
ECHO LA VOLUME INDEX MOD A4C: 29 ML/M2 (ref 16–34)
ECHO LV E' LATERAL VELOCITY: 7 CM/S
ECHO LV E' SEPTAL VELOCITY: 6 CM/S
ECHO LV FRACTIONAL SHORTENING: 31 % (ref 28–44)
ECHO LV INTERNAL DIMENSION DIASTOLE INDEX: 1.81 CM/M2
ECHO LV INTERNAL DIMENSION DIASTOLIC: 2.9 CM (ref 3.9–5.3)
ECHO LV INTERNAL DIMENSION SYSTOLIC INDEX: 1.25 CM/M2
ECHO LV INTERNAL DIMENSION SYSTOLIC: 2 CM
ECHO LV ISOVOLUMETRIC RELAXATION TIME (IVRT): 85 MS
ECHO LV IVSD: 1.3 CM (ref 0.6–0.9)
ECHO LV MASS 2D: 118.7 G (ref 67–162)
ECHO LV MASS INDEX 2D: 74.2 G/M2 (ref 43–95)
ECHO LV POSTERIOR WALL DIASTOLIC: 1.3 CM (ref 0.6–0.9)
ECHO LV RELATIVE WALL THICKNESS RATIO: 0.9
ECHO LVOT AREA: 2.8 CM2
ECHO LVOT AV VTI INDEX: 0.34
ECHO LVOT DIAM: 1.9 CM
ECHO LVOT MEAN GRADIENT: 3 MMHG
ECHO LVOT PEAK GRADIENT: 6 MMHG
ECHO LVOT PEAK VELOCITY: 1.2 M/S
ECHO LVOT STROKE VOLUME INDEX: 46.1 ML/M2
ECHO LVOT SV: 73.7 ML
ECHO LVOT VTI: 26 CM
ECHO MV A VELOCITY: 1.03 M/S
ECHO MV E DECELERATION TIME (DT): 273 MS
ECHO MV E VELOCITY: 0.79 M/S
ECHO MV E/A RATIO: 0.77
ECHO MV E/E' LATERAL: 11.29
ECHO MV E/E' RATIO (AVERAGED): 12.23
ECHO MV E/E' SEPTAL: 13.17
ECHO MV REGURGITANT PEAK GRADIENT: 92 MMHG
ECHO MV REGURGITANT PEAK VELOCITY: 4.8 M/S
ECHO PULMONARY ARTERY END DIASTOLIC PRESSURE: 6 MMHG
ECHO PV MAX VELOCITY: 0.8 M/S
ECHO PV PEAK GRADIENT: 2 MMHG
ECHO PV REGURGITANT MAX VELOCITY: 1.3 M/S
ECHO RIGHT VENTRICULAR SYSTOLIC PRESSURE (RVSP): 37 MMHG
ECHO RV FREE WALL PEAK S': 14 CM/S
ECHO RV INTERNAL DIMENSION: 3.4 CM
ECHO RV TAPSE: 2.2 CM (ref 1.7–?)
ECHO TV E WAVE: 0.6 M/S
ECHO TV REGURGITANT MAX VELOCITY: 2.82 M/S
ECHO TV REGURGITANT PEAK GRADIENT: 32 MMHG
EKG ATRIAL RATE: 65 BPM
EKG P AXIS: 76 DEGREES
EKG P-R INTERVAL: 230 MS
EKG Q-T INTERVAL: 386 MS
EKG QRS DURATION: 84 MS
EKG QTC CALCULATION (BAZETT): 401 MS
EKG R AXIS: 10 DEGREES
EKG T AXIS: 41 DEGREES
EKG VENTRICULAR RATE: 65 BPM
ERYTHROCYTE [DISTWIDTH] IN BLOOD BY AUTOMATED COUNT: 13.7 % (ref 11.5–14.5)
GFR SERPL CREATININE-BSD FRML MDRD: 83 ML/MIN/1.73M2
GLUCOSE SERPL-MCNC: 106 MG/DL (ref 70–108)
HCT VFR BLD AUTO: 34.8 % (ref 37–47)
HGB BLD-MCNC: 11 GM/DL (ref 12–16)
MCH RBC QN AUTO: 28.9 PG (ref 26–33)
MCHC RBC AUTO-ENTMCNC: 31.6 GM/DL (ref 32.2–35.5)
MCV RBC AUTO: 91.6 FL (ref 81–99)
PLATELET # BLD AUTO: 138 THOU/MM3 (ref 130–400)
PMV BLD AUTO: 10.5 FL (ref 9.4–12.4)
POTASSIUM SERPL-SCNC: 3.8 MEQ/L (ref 3.5–5.2)
RBC # BLD AUTO: 3.8 MILL/MM3 (ref 4.2–5.4)
SODIUM SERPL-SCNC: 138 MEQ/L (ref 135–145)
TROPONIN, HIGH SENSITIVITY: 18 NG/L (ref 0–12)
WBC # BLD AUTO: 4.6 THOU/MM3 (ref 4.8–10.8)

## 2024-06-06 PROCEDURE — 93306 TTE W/DOPPLER COMPLETE: CPT

## 2024-06-06 PROCEDURE — 6370000000 HC RX 637 (ALT 250 FOR IP)

## 2024-06-06 PROCEDURE — 85027 COMPLETE CBC AUTOMATED: CPT

## 2024-06-06 PROCEDURE — 96372 THER/PROPH/DIAG INJ SC/IM: CPT

## 2024-06-06 PROCEDURE — G0378 HOSPITAL OBSERVATION PER HR: HCPCS

## 2024-06-06 PROCEDURE — 93306 TTE W/DOPPLER COMPLETE: CPT | Performed by: INTERNAL MEDICINE

## 2024-06-06 PROCEDURE — 6360000002 HC RX W HCPCS

## 2024-06-06 PROCEDURE — 36415 COLL VENOUS BLD VENIPUNCTURE: CPT

## 2024-06-06 PROCEDURE — 93005 ELECTROCARDIOGRAM TRACING: CPT

## 2024-06-06 PROCEDURE — 99232 SBSQ HOSP IP/OBS MODERATE 35: CPT | Performed by: STUDENT IN AN ORGANIZED HEALTH CARE EDUCATION/TRAINING PROGRAM

## 2024-06-06 PROCEDURE — 80048 BASIC METABOLIC PNL TOTAL CA: CPT

## 2024-06-06 PROCEDURE — 2580000003 HC RX 258

## 2024-06-06 PROCEDURE — 93010 ELECTROCARDIOGRAM REPORT: CPT | Performed by: NUCLEAR MEDICINE

## 2024-06-06 PROCEDURE — 99223 1ST HOSP IP/OBS HIGH 75: CPT | Performed by: INTERNAL MEDICINE

## 2024-06-06 PROCEDURE — 84484 ASSAY OF TROPONIN QUANT: CPT

## 2024-06-06 RX ADMIN — METHENAMINE HIPPURATE 1 G: 1 TABLET ORAL at 08:08

## 2024-06-06 RX ADMIN — METOPROLOL TARTRATE 25 MG: 50 TABLET, FILM COATED ORAL at 20:55

## 2024-06-06 RX ADMIN — METHENAMINE HIPPURATE 1 G: 1 TABLET ORAL at 18:16

## 2024-06-06 RX ADMIN — PANTOPRAZOLE SODIUM 40 MG: 40 TABLET, DELAYED RELEASE ORAL at 06:00

## 2024-06-06 RX ADMIN — ASPIRIN 81 MG: 81 TABLET, COATED ORAL at 21:04

## 2024-06-06 RX ADMIN — Medication 3 MG: at 21:06

## 2024-06-06 RX ADMIN — SODIUM CHLORIDE, PRESERVATIVE FREE 10 ML: 5 INJECTION INTRAVENOUS at 20:56

## 2024-06-06 RX ADMIN — PRAMIPEXOLE DIHYDROCHLORIDE 1 MG: 1 TABLET ORAL at 20:53

## 2024-06-06 RX ADMIN — FUROSEMIDE 20 MG: 40 TABLET ORAL at 08:08

## 2024-06-06 RX ADMIN — SODIUM CHLORIDE, PRESERVATIVE FREE 10 ML: 5 INJECTION INTRAVENOUS at 08:08

## 2024-06-06 RX ADMIN — METOPROLOL TARTRATE 25 MG: 50 TABLET, FILM COATED ORAL at 08:08

## 2024-06-06 RX ADMIN — ENOXAPARIN SODIUM 40 MG: 100 INJECTION SUBCUTANEOUS at 21:02

## 2024-06-06 RX ADMIN — ISOSORBIDE MONONITRATE 60 MG: 60 TABLET, EXTENDED RELEASE ORAL at 08:08

## 2024-06-06 ASSESSMENT — PAIN SCALES - GENERAL: PAINLEVEL_OUTOF10: 0

## 2024-06-06 NOTE — PLAN OF CARE
Problem: Discharge Planning  Goal: Discharge to home or other facility with appropriate resources  6/6/2024 0141 by Griffin Suresh RN  Outcome: Progressing  Note: Discussed patient's ideas about discharge planning.   6/5/2024 1951 by Teja Fisher RN  Outcome: Progressing  Note: Pt will discharge home when appropriate.      Problem: Safety - Adult  Goal: Free from fall injury  6/6/2024 0141 by Griffin Suresh RN  Outcome: Progressing  Note: Keep patient free from falls during stay.   6/5/2024 1951 by Teja Fisher RN  Outcome: Progressing  Note: Fall precaution in place. Bed alarm/chair alarm. Bed locked in lowest position. Fall band applied if applicable. Call light and overhead table within reach. Will continue to monitor.       Problem: ABCDS Injury Assessment  Goal: Absence of physical injury  6/6/2024 0141 by Griffin Suresh RN  Outcome: Progressing  Note: Keep patient free from injuries and falls during stay.   6/5/2024 1951 by Teja Fisher RN  Outcome: Progressing

## 2024-06-06 NOTE — PROGRESS NOTES
Advanced Directives Consult: Pt wanted to look at it and talk to her children before anything is done. Prayer appreciated.    06/06/24 0929   Encounter Summary   Encounter Overview/Reason Advance Care Planning   Service Provided For Patient and family together   Referral/Consult From Nurse   Support System Children   Last Encounter  06/06/24   Complexity of Encounter Low   Begin Time 0810   End Time  0816   Total Time Calculated 6 min   Spiritual/Emotional needs   Type Spiritual Support   Advance Care Planning   Type ACP conversation   Assessment/Intervention/Outcome   Assessment Hopeful   Intervention Empowerment   Outcome Encouraged

## 2024-06-06 NOTE — ACP (ADVANCE CARE PLANNING)
Advance Care Planning     Advance Care Planning Inpatient Note  [facility] Department    Today's Date: 6/6/2024  Unit: [facility] 8AB    Received request from IDT Member.  Upon review of chart and communication with care team, patient's decision making abilities are not in question.. Patient and Child/Children was/were present in the room during visit.    Goals of ACP Conversation:  Discuss advance care planning documents    Health Care Decision Makers:     No healthcare decision makers have been documented.  Click here to complete HealthCare Decision Makers including selection of the Healthcare Decision Maker Relationship (ie \"Primary\")  Summary:  No Decision Maker named by patient at this time    Advance Care Planning Documents (Patient Wishes):  Healthcare Power of /Advance Directive Appointment of Health Care Agent  Living Will/Advance Directive     Assessment:  Pt is an 89y.o. female, propped up in bed visiting with family members, asking questions and needing clarifications with AD documents. Conversation also around ACP particulars and addressing questions as well, completed.    Interventions:  Provided education on documents for clarity and greater understanding  Discussed and provided education on state decision maker hierarchy  Encouraged ongoing ACP conversation with future decision makers and loved ones  Documented Next-of-Kin per patient wishes  Care Preferences Communicated:     Hospitalization:  If the patient's health worsens and it becomes clear that the chance of recovery is unlikely,     the patient wants hospitalization.    Ventilation:   If the patient, in their present state of health, suddenly became very ill and unable to breathe on their own,     the patient would desire the use of a ventilator (breathing machine).    If their health worsens and it becomes clear that the change of recovery is unlikely,     the patient would desire the use of a ventilator (breathing

## 2024-06-06 NOTE — CARE COORDINATION
Case Management Assessment Initial Evaluation    Date/Time of Evaluation: 2024 8:07 AM  Assessment Completed by: Nicole Bennett RN    If patient is discharged prior to next notation, then this note serves as note for discharge by case management.    Patient Name: Yenny Chandler                   YOB: 1935  Diagnosis: Chest pain [R07.9]  Nonrheumatic aortic valve stenosis [I35.0]  Chest pain with high risk for cardiac etiology [R07.9]                   Date / Time: 2024 12:18 PM  Location: [facility]     Patient Admission Status: Observation   Readmission Risk Low 0-14, Mod 15-19), High > 20: No data recorded  Current PCP: Artur Brar III, MD    Additional Case Management Notes: To ED for sudden onset chest pain and dizziness. Patient states 25 minutes PTA, when she was riding in a car, she suddenly developed right-sided chest pain associated with dizziness. Hospitalist following. Asa. Lovenox. Troponins 22-21-18. D-Dimer 681.0. Chest pain has subsided. Echo to be complete.     Procedures:    ECHO: ordered    Imagin/5 CXR: Negative     Patient Goals/Plan/Treatment Preferences: Met w/ Yenny and her daughter. Verified insurance and PCP. She is independent and drives. Has needed DME. Declines HH services. Plans to return home alone.     VM left with spiritual care team to further discuss POA paperwork at bedside.      24 1210   Service Assessment   Patient Orientation Alert and Oriented   Cognition Alert   History Provided By Patient   Primary Caregiver Self   Accompanied By/Relationship Daughter   Support Systems Children   Patient's Healthcare Decision Maker is: Legal Next of Kin  (Has paperwork at bedside to complete.)   PCP Verified by CM Yes   Last Visit to PCP Within last 3 months   Prior Functional Level Independent in ADLs/IADLs   Current Functional Level Independent in ADLs/IADLs   Can patient return to prior living arrangement Yes   Ability to make needs known:

## 2024-06-06 NOTE — PROGRESS NOTES
Internal Medicine Resident Progress Note    Name: Yenny Chandler, female, : 1935, MRN: [id number]    PCP: Artur Brar III, MD    Date of Admission: 2024  Date of Service: Pt seen/examined on 24      Assessment/Plan:  Atypical chest pain: Unknown etiology at this time differentials include cardiac chest pain, pleuritic chest pain, musculoskeletal chest pain.  Patient presented to the emergency department with sudden onset right-sided chest pain at rest lasting approximately 15 to 25 minutes.  Patient states she took 2 doses of sublingual nitro prior to presentation to the emergency department with improvement in her chest pain.  Lab work showing elevated troponin on presentation 23, repeat 22.  EKG showing no signs of ischemic change.  By the time I evaluated the patient chest pain had completely resolved.  Physical exam positive for cardiac murmur, no reproducible chest pain.    Continue Imdur 60 mg daily  Complete cardiac echo ordered  Cardiology consulted  Telemetry     Elevated troponin: Likely secondary to demand.  Initial troponin 23, repeat 22.  Patient denying any chest pain at this point, no EKG changes.  Troponin with downtrend  Negative D-dimer when adjusted for patient's age group     CAD s/p drug-eluting stent to mid LAD    Continue aspirin 81 daily      Hx of moderate AS: last echo 24 with EF 55-60% Transaortic peak velocity is 2.9 m/s, mean gradient of 21 mm Hg and calculated LEODAN is 1.0 cm2.  Complete cardiac echo ordered     HTN  Imdur 60 mg daily  Lopressor 25 mg twice daily     GERD  Protonix 40 mg daily     History of recurrent UTIs  Hiprex 1 g 3 times daily     Hx cervical cancer     Hx rectal cancer    Level of care: []Step Down / [x]Med-Surg  Bed Status: [x]Inpatient / []Observation  Telemetry: [x]Yes / []No  PT/OT: [x]Yes / []No    DVT Prophylaxis: [x] Lovenox / [] Heparin / [] SCDs / [] Already on Systemic Anticoagulation / [] None     Expected discharge  AM    BLOODU NEGATIVE 04/15/2024 08:34 AM    SPECGRAV 1.015 10/29/2019 10:56 AM    GLUCOSEU NEGATIVE 04/15/2024 08:34 AM    GLUCOSEU NEGATIVE 10/16/2023 08:14 AM       Radiology:  see assessment and plan for discussion of pertinent imaging.  (Use [id number] dot phrase for last 24 hrs)      Electronically signed by Bhaskar Duenas MD on 6/6/2024 at 1:56 PM    Case was discussed with Attending, Edwardo Gallardo MD

## 2024-06-06 NOTE — PROGRESS NOTES
Pt is an 89y.o. female in 8A20, visiting with her son and daughter. Please refer to ACP note for context, re: ACP and AD's.     06/06/24 1852   Encounter Summary   Encounter Overview/Reason Advance Care Planning   Service Provided For Patient and family together   Referral/Consult From Nurse   Support System Children   Last Encounter  06/06/24   Complexity of Encounter Moderate   Begin Time 1700   End Time  1750   Total Time Calculated 50 min   Advance Care Planning   Type ACP conversation;Care Preferences Addressed   Assessment/Intervention/Outcome   Assessment Compromised coping;Impaired resilience;Stress overload   Intervention Active listening;Explored/Affirmed feelings, thoughts, concerns;Prayer (assurance of)/[place];Discussed illness injury and it’s impact   Outcome Engaged in conversation;Expressed feelings, needs, and concerns;Expressed Gratitude;Receptive

## 2024-06-06 NOTE — PROCEDURES
PROCEDURE NOTE  Date: 6/6/2024   Name: Yenny Chandler  YOB: 1935    Procedures EKG completed, Tried to call RN, No answer

## 2024-06-07 ENCOUNTER — APPOINTMENT (OUTPATIENT)
Dept: INTERVENTIONAL RADIOLOGY/VASCULAR | Age: 89
DRG: 287 | End: 2024-06-07
Payer: MEDICARE

## 2024-06-07 ENCOUNTER — TELEPHONE (OUTPATIENT)
Dept: CARDIOLOGY CLINIC | Age: 89
End: 2024-06-07

## 2024-06-07 ENCOUNTER — APPOINTMENT (OUTPATIENT)
Dept: CT IMAGING | Age: 89
DRG: 287 | End: 2024-06-07
Payer: MEDICARE

## 2024-06-07 DIAGNOSIS — I35.0 SEVERE AORTIC STENOSIS: Primary | ICD-10-CM

## 2024-06-07 LAB
ABO: NORMAL
ANION GAP SERPL CALC-SCNC: 12 MEQ/L (ref 8–16)
ANTIBODY SCREEN: NORMAL
APTT PPP: 32.9 SECONDS (ref 22–38)
BUN SERPL-MCNC: 21 MG/DL (ref 7–22)
CALCIUM SERPL-MCNC: 9.6 MG/DL (ref 8.5–10.5)
CHLORIDE SERPL-SCNC: 102 MEQ/L (ref 98–111)
CO2 SERPL-SCNC: 22 MEQ/L (ref 23–33)
CREAT SERPL-MCNC: 0.8 MG/DL (ref 0.4–1.2)
DEPRECATED RDW RBC AUTO: 47.6 FL (ref 35–45)
ECHO BSA: 1.59 M2
ECHO BSA: 1.59 M2
EKG ATRIAL RATE: 60 BPM
EKG P AXIS: 66 DEGREES
EKG P-R INTERVAL: 250 MS
EKG Q-T INTERVAL: 400 MS
EKG QRS DURATION: 88 MS
EKG QTC CALCULATION (BAZETT): 400 MS
EKG R AXIS: -32 DEGREES
EKG T AXIS: 37 DEGREES
EKG VENTRICULAR RATE: 60 BPM
ERYTHROCYTE [DISTWIDTH] IN BLOOD BY AUTOMATED COUNT: 13.6 % (ref 11.5–14.5)
GFR SERPL CREATININE-BSD FRML MDRD: 70 ML/MIN/1.73M2
GLUCOSE SERPL-MCNC: 101 MG/DL (ref 70–108)
HCT VFR BLD AUTO: 37.8 % (ref 37–47)
HGB BLD-MCNC: 12 GM/DL (ref 12–16)
INR PPP: 0.96 (ref 0.85–1.13)
MCH RBC QN AUTO: 30 PG (ref 26–33)
MCHC RBC AUTO-ENTMCNC: 31.7 GM/DL (ref 32.2–35.5)
MCV RBC AUTO: 94.5 FL (ref 81–99)
PLATELET # BLD AUTO: 144 THOU/MM3 (ref 130–400)
PMV BLD AUTO: 10.3 FL (ref 9.4–12.4)
POTASSIUM SERPL-SCNC: 3.9 MEQ/L (ref 3.5–5.2)
RBC # BLD AUTO: 4 MILL/MM3 (ref 4.2–5.4)
RH FACTOR: NORMAL
SODIUM SERPL-SCNC: 136 MEQ/L (ref 135–145)
WBC # BLD AUTO: 4.5 THOU/MM3 (ref 4.8–10.8)

## 2024-06-07 PROCEDURE — 86850 RBC ANTIBODY SCREEN: CPT

## 2024-06-07 PROCEDURE — 6360000004 HC RX CONTRAST MEDICATION: Performed by: INTERNAL MEDICINE

## 2024-06-07 PROCEDURE — 4A023N8 MEASUREMENT OF CARDIAC SAMPLING AND PRESSURE, BILATERAL, PERCUTANEOUS APPROACH: ICD-10-PCS | Performed by: INTERNAL MEDICINE

## 2024-06-07 PROCEDURE — 93010 ELECTROCARDIOGRAM REPORT: CPT | Performed by: NUCLEAR MEDICINE

## 2024-06-07 PROCEDURE — 85610 PROTHROMBIN TIME: CPT

## 2024-06-07 PROCEDURE — 85027 COMPLETE CBC AUTOMATED: CPT

## 2024-06-07 PROCEDURE — 2709999900 HC NON-CHARGEABLE SUPPLY: Performed by: INTERNAL MEDICINE

## 2024-06-07 PROCEDURE — 85730 THROMBOPLASTIN TIME PARTIAL: CPT

## 2024-06-07 PROCEDURE — 93456 R HRT CORONARY ARTERY ANGIO: CPT | Performed by: INTERNAL MEDICINE

## 2024-06-07 PROCEDURE — 71275 CT ANGIOGRAPHY CHEST: CPT

## 2024-06-07 PROCEDURE — 93880 EXTRACRANIAL BILAT STUDY: CPT

## 2024-06-07 PROCEDURE — C1894 INTRO/SHEATH, NON-LASER: HCPCS | Performed by: INTERNAL MEDICINE

## 2024-06-07 PROCEDURE — 2580000003 HC RX 258: Performed by: INTERNAL MEDICINE

## 2024-06-07 PROCEDURE — 74175 CTA ABDOMEN W/CONTRAST: CPT

## 2024-06-07 PROCEDURE — 82810 BLOOD GASES O2 SAT ONLY: CPT

## 2024-06-07 PROCEDURE — 1200000003 HC TELEMETRY R&B

## 2024-06-07 PROCEDURE — 6360000002 HC RX W HCPCS: Performed by: INTERNAL MEDICINE

## 2024-06-07 PROCEDURE — C1769 GUIDE WIRE: HCPCS | Performed by: INTERNAL MEDICINE

## 2024-06-07 PROCEDURE — 2500000003 HC RX 250 WO HCPCS: Performed by: INTERNAL MEDICINE

## 2024-06-07 PROCEDURE — 99152 MOD SED SAME PHYS/QHP 5/>YRS: CPT | Performed by: INTERNAL MEDICINE

## 2024-06-07 PROCEDURE — 93460 R&L HRT ART/VENTRICLE ANGIO: CPT | Performed by: INTERNAL MEDICINE

## 2024-06-07 PROCEDURE — 80048 BASIC METABOLIC PNL TOTAL CA: CPT

## 2024-06-07 PROCEDURE — B2111ZZ FLUOROSCOPY OF MULTIPLE CORONARY ARTERIES USING LOW OSMOLAR CONTRAST: ICD-10-PCS | Performed by: INTERNAL MEDICINE

## 2024-06-07 PROCEDURE — 86901 BLOOD TYPING SEROLOGIC RH(D): CPT

## 2024-06-07 PROCEDURE — 99153 MOD SED SAME PHYS/QHP EA: CPT | Performed by: INTERNAL MEDICINE

## 2024-06-07 PROCEDURE — 2580000003 HC RX 258: Performed by: STUDENT IN AN ORGANIZED HEALTH CARE EDUCATION/TRAINING PROGRAM

## 2024-06-07 PROCEDURE — 2580000003 HC RX 258

## 2024-06-07 PROCEDURE — 99232 SBSQ HOSP IP/OBS MODERATE 35: CPT | Performed by: STUDENT IN AN ORGANIZED HEALTH CARE EDUCATION/TRAINING PROGRAM

## 2024-06-07 PROCEDURE — 36415 COLL VENOUS BLD VENIPUNCTURE: CPT

## 2024-06-07 PROCEDURE — 93005 ELECTROCARDIOGRAM TRACING: CPT

## 2024-06-07 PROCEDURE — 6370000000 HC RX 637 (ALT 250 FOR IP)

## 2024-06-07 PROCEDURE — 86900 BLOOD TYPING SEROLOGIC ABO: CPT

## 2024-06-07 RX ORDER — ACETAMINOPHEN 325 MG/1
650 TABLET ORAL EVERY 4 HOURS PRN
Status: DISCONTINUED | OUTPATIENT
Start: 2024-06-07 | End: 2024-06-08 | Stop reason: HOSPADM

## 2024-06-07 RX ORDER — SODIUM CHLORIDE 0.9 % (FLUSH) 0.9 %
5-40 SYRINGE (ML) INJECTION PRN
Status: DISCONTINUED | OUTPATIENT
Start: 2024-06-07 | End: 2024-06-08 | Stop reason: HOSPADM

## 2024-06-07 RX ORDER — SODIUM CHLORIDE 0.9 % (FLUSH) 0.9 %
5-40 SYRINGE (ML) INJECTION PRN
Status: DISCONTINUED | OUTPATIENT
Start: 2024-06-07 | End: 2024-06-07 | Stop reason: HOSPADM

## 2024-06-07 RX ORDER — NITROGLYCERIN 0.4 MG/1
0.4 TABLET SUBLINGUAL EVERY 5 MIN PRN
Status: DISCONTINUED | OUTPATIENT
Start: 2024-06-07 | End: 2024-06-07 | Stop reason: HOSPADM

## 2024-06-07 RX ORDER — ATROPINE SULFATE 0.4 MG/ML
0.5 INJECTION, SOLUTION INTRAVENOUS
Status: ACTIVE | OUTPATIENT
Start: 2024-06-07 | End: 2024-06-08

## 2024-06-07 RX ORDER — SODIUM CHLORIDE 9 MG/ML
INJECTION, SOLUTION INTRAVENOUS CONTINUOUS
Status: ACTIVE | OUTPATIENT
Start: 2024-06-07 | End: 2024-06-08

## 2024-06-07 RX ORDER — SODIUM CHLORIDE 9 MG/ML
INJECTION, SOLUTION INTRAVENOUS PRN
Status: DISCONTINUED | OUTPATIENT
Start: 2024-06-07 | End: 2024-06-08 | Stop reason: HOSPADM

## 2024-06-07 RX ORDER — SODIUM CHLORIDE 0.9 % (FLUSH) 0.9 %
5-40 SYRINGE (ML) INJECTION EVERY 12 HOURS SCHEDULED
Status: DISCONTINUED | OUTPATIENT
Start: 2024-06-07 | End: 2024-06-07 | Stop reason: HOSPADM

## 2024-06-07 RX ORDER — SODIUM CHLORIDE 0.9 % (FLUSH) 0.9 %
5-40 SYRINGE (ML) INJECTION EVERY 12 HOURS SCHEDULED
Status: DISCONTINUED | OUTPATIENT
Start: 2024-06-07 | End: 2024-06-08 | Stop reason: HOSPADM

## 2024-06-07 RX ORDER — DIPHENHYDRAMINE HYDROCHLORIDE 50 MG/ML
50 INJECTION INTRAMUSCULAR; INTRAVENOUS ONCE
Status: DISCONTINUED | OUTPATIENT
Start: 2024-06-07 | End: 2024-06-07 | Stop reason: HOSPADM

## 2024-06-07 RX ORDER — ASPIRIN 325 MG
325 TABLET ORAL ONCE
Status: DISCONTINUED | OUTPATIENT
Start: 2024-06-07 | End: 2024-06-07 | Stop reason: HOSPADM

## 2024-06-07 RX ORDER — MIDAZOLAM HYDROCHLORIDE 1 MG/ML
INJECTION INTRAMUSCULAR; INTRAVENOUS PRN
Status: DISCONTINUED | OUTPATIENT
Start: 2024-06-07 | End: 2024-06-07 | Stop reason: HOSPADM

## 2024-06-07 RX ORDER — SODIUM CHLORIDE 9 MG/ML
INJECTION, SOLUTION INTRAVENOUS CONTINUOUS
Status: DISCONTINUED | OUTPATIENT
Start: 2024-06-07 | End: 2024-06-07 | Stop reason: HOSPADM

## 2024-06-07 RX ORDER — FENTANYL CITRATE 50 UG/ML
INJECTION, SOLUTION INTRAMUSCULAR; INTRAVENOUS PRN
Status: DISCONTINUED | OUTPATIENT
Start: 2024-06-07 | End: 2024-06-07 | Stop reason: HOSPADM

## 2024-06-07 RX ORDER — NITROGLYCERIN 0.4 MG/1
0.4 TABLET SUBLINGUAL EVERY 5 MIN PRN
Status: DISCONTINUED | OUTPATIENT
Start: 2024-06-07 | End: 2024-06-07

## 2024-06-07 RX ORDER — SODIUM CHLORIDE 9 MG/ML
INJECTION, SOLUTION INTRAVENOUS PRN
Status: DISCONTINUED | OUTPATIENT
Start: 2024-06-07 | End: 2024-06-07 | Stop reason: HOSPADM

## 2024-06-07 RX ADMIN — ACETAMINOPHEN 650 MG: 325 TABLET ORAL at 05:14

## 2024-06-07 RX ADMIN — SODIUM CHLORIDE: 9 INJECTION, SOLUTION INTRAVENOUS at 10:37

## 2024-06-07 RX ADMIN — PRAMIPEXOLE DIHYDROCHLORIDE 1 MG: 1 TABLET ORAL at 20:50

## 2024-06-07 RX ADMIN — METOPROLOL TARTRATE 25 MG: 50 TABLET, FILM COATED ORAL at 08:11

## 2024-06-07 RX ADMIN — SODIUM CHLORIDE: 9 INJECTION, SOLUTION INTRAVENOUS at 08:37

## 2024-06-07 RX ADMIN — METHENAMINE HIPPURATE 1 G: 1 TABLET ORAL at 16:02

## 2024-06-07 RX ADMIN — METOPROLOL TARTRATE 25 MG: 50 TABLET, FILM COATED ORAL at 21:25

## 2024-06-07 RX ADMIN — Medication 3 MG: at 20:50

## 2024-06-07 RX ADMIN — SODIUM CHLORIDE, PRESERVATIVE FREE 10 ML: 5 INJECTION INTRAVENOUS at 08:35

## 2024-06-07 RX ADMIN — SODIUM CHLORIDE, PRESERVATIVE FREE 10 ML: 5 INJECTION INTRAVENOUS at 20:51

## 2024-06-07 RX ADMIN — ASPIRIN 81 MG: 81 TABLET, COATED ORAL at 08:10

## 2024-06-07 RX ADMIN — ASPIRIN 81 MG: 81 TABLET, COATED ORAL at 20:50

## 2024-06-07 RX ADMIN — IOPAMIDOL 125 ML: 755 INJECTION, SOLUTION INTRAVENOUS at 18:48

## 2024-06-07 ASSESSMENT — PAIN SCALES - WONG BAKER: WONGBAKER_NUMERICALRESPONSE: HURTS A LITTLE BIT

## 2024-06-07 NOTE — PROCEDURES
PROCEDURE NOTE  Date: 6/7/2024   Name: Yenny Chandler  YOB: 1935    Procedures        EKG was completed and handed to Griffin LEUNG

## 2024-06-07 NOTE — PLAN OF CARE
Problem: Safety - Adult  Goal: Free from fall injury  Outcome: Progressing  Note: Proper use of two patient identifiers when giving medication

## 2024-06-07 NOTE — TELEPHONE ENCOUNTER
Orders received from Dr. Oreilly to obtain a CV/CT Surgery appointment, TAVR CTA, Carotid US, Cardiac Catheterization, Dental Clearance and CHF appointment for optimization.    CT/CV Surgery consult completed 6/7/24.  CTA ordered to be completed in house.   Carotid US to be completed in house.   Cardiac Catheterization completed with Dr. Flower 6/7/24.   CHF appointment to be scheduled upon discharge from hospital.   Dental Appointment is scheduled for dental appointment with Dr. Loaiza in [place] 6/11.  Clearance form has been sent to their office.     [id number] completed and scanned into chart.     Dr. Oreilly, please agree.

## 2024-06-07 NOTE — CARE COORDINATION
6/7/24, 3:30 PM EDT    DISCHARGE ON GOING EVALUATION    Yenny [facility] day: 0  Location: 8A-20/020-A Reason for admit: Chest pain [R07.9]  Nonrheumatic aortic valve stenosis [I35.0]  Chest pain with high risk for cardiac etiology [R07.9]  Severe aortic stenosis [I35.0]     Procedures:   6/6 ECHO: EF of 55 - 60%. Trileaflet valve. Moderately thickened cusp. Moderately calcified cusp. Mild regurgitation. Paradoxical low flow/low gradient severe aortic stenosis with normal EF. AV mean gradient is 29 mmHg. AV peak gradient is 58 mmHg. AV peak velocity is 3.8 m/s. AV area is 0.88 cm2.   6/7 LHC/RHC: Patent stend in LAD. Nonobstructive CAD.     Imaging since last note:   6/7 Bilateral Carotid DUP: pending     Barriers to Discharge: Hospitalist, Cardiology and CTS following. Another episode of severe chest pain. LHC/RHC complete this AM. Candidate for OP TAVR. Bilat Carotid DUP pending. IVF. Lovenox. Lopressor.     PCP: Artur Brar III, MD  Readmission Risk Score: 10.7    Patient Goals/Plan/Treatment Preferences: Return home alone. Has needed DME. Declines HH.     Plan for TAVR workup outpatient.     6/7/24, 3:30 PM EDT  Potential weekend discharge  Patient goals/plan/ treatment preferences discussed by  and .  Patient goals/plan/ treatment preferences reviewed with patient/ family.  Patient/ family verbalize understanding of discharge plan and are in agreement with goal/plan/treatment preferences.  Understanding was demonstrated using the teach back method.  AVS provided by RN at time of discharge, which includes all necessary medical information pertaining to the patients current course of illness, treatment, post-discharge goals of care, and treatment preferences.     Services At/After Discharge: None

## 2024-06-07 NOTE — PROGRESS NOTES
Severe Symptomatic Aortic Stenosis - Given the STS scores, frailty, comorbidities, and the imaging findings, the patient is clinically high risk for surgery, but is a candidate for TAVR (See PreTAVR Assesment).  Discussed transcatheter aortic valve replacement (TAVR) with the patient/family regarding risks, benefits, alternatives to the procedure.  The patient understands the associated visits with CT surgery and also understands the need for TAVR CTA.  The patient is FULL CODE. The POA is listed in the chart.  We will schedule the above as appropriate.  Once complete and appropriate based on the findings, a procedure date will be aligned at [facility], and we will notify the patient of further instructions.      We had a long discussion with myself, family, patient, and structural heart coordinators.  The family and patient were explained the risks/benefits/alternatives of the procedure, how the procedure works, the work-up, post-procedural care, and all of the possible complications of the procedure, including, but not limited to vascular injury, debilitating stroke, need for permanent pacemaker, and death.  The patient/family would like to pursue TAVR at our facility and we will work towards this goal.         The patient and family understand that should there be any findings or issues that arise during the evaluation that would preclude TAVR, that this will need to be evaluated prior to proceeding with a percutaneous approach.  Further, a unified heart team approach will be performed prior to proceeding with TAVR which includes the patient's primary care givers, cardiologist, and the entire structural heart team (interventionalist, CT surgeons, structural heart NP).       The patient and family appeared to understand everything, all of their questions were answered to their satisfaction and they are agreeable to proceed with further evaluation for TAVR.       Thank you for allowing us to participate in the care of

## 2024-06-07 NOTE — H&P
acetaminophen (TYLENOL) tablet 650 mg, 650 mg, Oral, Q6H PRN, 650 mg at 06/07/24 0514 **OR** acetaminophen (TYLENOL) suppository 650 mg, 650 mg, Rectal, Q6H PRN, Bhaskar Duenas MD    melatonin tablet 3 mg, 3 mg, Oral, Nightly, Bhaskar Duenas MD, 3 mg at 06/06/24 2106    aspirin EC tablet 81 mg, 81 mg, Oral, Daily, Bhaskar Duenas MD, 81 mg at 06/07/24 0810    pramipexole (MIRAPEX) tablet 1 mg, 1 mg, Oral, Nightly, Bhaskar Duenas MD, 1 mg at 06/06/24 2053    metoprolol tartrate (LOPRESSOR) tablet 25 mg, 25 mg, Oral, BID, Bhaskar Dueans MD, 25 mg at 06/07/24 0811    methenamine (HIPREX) tablet 1 g, 1 g, Oral, BID WC, Bhaskar Duenas MD, 1 g at 06/06/24 1816    pantoprazole (PROTONIX) tablet 40 mg, 40 mg, Oral, QAM AC, Bhaskar Duenas MD, 40 mg at 06/06/24 0600  Prior to Admission medications    Medication Sig Start Date End Date Taking? Authorizing Provider   isosorbide mononitrate (IMDUR) 60 MG extended release tablet Take 1 tablet by mouth daily 3/22/24   Ruslan Oreilly MD   furosemide (LASIX) 20 MG tablet Take 1 tablet by mouth daily 9/15/23   ProviderJeremy MD   methenamine (HIPREX) 1 g tablet Take 1 tablet by mouth 2 times daily (with meals) 9/19/23   Angelina Guillory APRN - CNP   ibuprofen (ADVIL;MOTRIN) 800 MG tablet Take 1 tablet by mouth 2 times daily as needed for Pain 6/13/23   Jackie Rincon APRN - CNP   Cholecalciferol (VITAMIN D-3) 5000 UNIT/ML LIQD Place under the tongue    Jeremy Reyes MD   metoprolol tartrate (LOPRESSOR) 25 MG tablet Take 1 tablet by mouth 2 times daily 3/23/23   Mortimer, Griffin, PA-C   pramipexole (MIRAPEX) 1 MG tablet Take 1 tablet by mouth nightly 4 tab 1/9/23   Michael Bo MD   lansoprazole (PREVACID) 30 MG delayed release capsule Take 1 capsule by mouth 2 times daily  Patient taking differently: Take 1 capsule by mouth at bedtime 1/9/23   Michael Bo MD   ondansetron (ZOFRAN-ODT) 4 MG  disintegrating tablet Take 1 tablet by mouth 3 times daily as needed for Nausea or Vomiting 12/28/22   Hayde Costa, APRN - CNP   conjugated estrogens (PREMARIN) 0.625 MG/GM vaginal cream Place 0.5 grams vaginally twice weekly. 3/7/19   Tere Ortiz PA-C   Polyethylene Glycol 3350 (MIRALAX PO) Take by mouth as needed    ProviderJeremy MD   nitroGLYCERIN (NITROSTAT) 0.4 MG SL tablet Place 1 tablet under the tongue every 5 minutes as needed for Chest pain up to max of 3 total doses. If no relief after 1 dose, call 911.    Provider, MD Jeremy   aspirin 81 MG EC tablet Take 1 tablet by mouth daily    ProviderJeremy MD     Additional information:       VITAL SIGNS   Vitals:    06/07/24 0800   BP: (!) 142/86   Pulse: 61   Resp: 16   Temp: 97.3 °F (36.3 °C)   SpO2: 96%       PHYSICAL:   General: No acute distress  HEENT:  Unremarkable for age  Neck: without increased JVD, carotid pulses 2+ bilaterally without bruits  Heart: RRR, S1 & S2 WNL. ES murmur   Lungs: Clear to auscultation    Abdomen: BS present, without HSM, masses, or tenderness    Extremities: without C,C,E.  Pulses 2+ bilaterally   Mental Status: Alert & Oriented        PLANNED PROCEDURE   [x]Cath  [x]PCI                []Pacemaker/AICD  []YAMILEX             []Cardioversion []Peripheral angiography/PTA  []Other:      SEDATION  Planned agent:[x]Midazolam []Meperidine []Sublimaze []Morphine  []Diazepam  []Other:     ASA Classification:  []1 []2 [x]3 []4 []5   Class 1: A normal healthy patient  Class 2: Pt with mild to moderate systemic disease  Class 3: Severe systemic disease or disturbance  Class 4: Severe systemic disorders that are already life threatening.  Class 5: Moribund pt with little chances of survival, for more than 24 hours.  Mallampati I Airway Classification:   []1 []2 [x]3 []4     [x]Pre-procedure diagnostic studies complete and results available.   Comment:    [x]Previous sedation/anesthesia experiences  assessed.   Comment:    [x]The patient is an appropriate candidate to undergo the planned procedure sedation and anesthesia. (Refer to nursing sedation/analgesia documentation record)  [x]Formulation and discussion of sedation/procedure plan, risks, and expectations with patient and/or responsible adult completed.  [x]Patient examined immediately prior to the procedure. (Refer to nursing sedation/analgesia documentation record)      Carlos Flower MD, FACC, [facility]    Electronically signed 6/7/2024 at 9:04 AM

## 2024-06-07 NOTE — CONSULTS
[facility]'s  Consult    Patient's Name/Date of Birth: Yenny Chandler / 1935 (89 y.o.)    Date: 2024     Referring Provider: Edwardo Darling MD    CHIEF COMPLAINT: Chest pain      HPI: This is a pleasant 89 y.o. female presents with chest pain.  PMH of CAD, HTN, HLD, moderate AS.  Patient was driving in car yesterday when she had sudden onset was given lower right-sided chest pain.   She described it as dull, pressure, nonradiating, not relieved with sublingual nitro x 2 ( in ).  She denied SOB but did endorse mild dizziness on arrival to the ED. She follows with Dr. Oreilly in clinic, who she stated told her to return to the ED if she gets chest pain.  Patient stated her chest pain dissipated after 20-30 minutes and has not returned since.  She denied tenderness to palpation, worsening with exertion.        Echo: 1/10/2023 showed EF 55-60%.  Aortic valve area is 1.0 cm² with moderate aortic stenosis, mild tricuspid regurgitation.    Stress: 2017 Lexiscan stress test is not suggestive for ischemia    Left heart cath: 2017 with 1 stent placed in the mid LAD       All labs, EKG's, diagnostic testing and images as well as cardiac cath, stress testing were reviewed during this encounter    Past Medical History:   Diagnosis Date    Anesthesia     after hemorrhoid surgery felt like \"elephant on my chest\" heart cath done    CAD (coronary artery disease)     Cancer (HCC)     cervical rectal    Diverticulosis     GERD (gastroesophageal reflux disease)     Hiatal hernia     History of prolapse of bladder     Hyperlipidemia     Hypertension     Osteoarthritis     Shortness of breath 2017    UTI (urinary tract infection) 2017    [facility]inic in [place]     Past Surgical History:   Procedure Laterality Date    APPENDECTOMY  1965    CARDIAC CATHETERIZATION  2012    [facility]    COLONOSCOPY  2012    Dr. Lozada     CONDYLOMA EXCISION Left 2021    LEFT 
CAD (coronary artery disease), Cancer (HCC), Diverticulosis, GERD (gastroesophageal reflux disease), Hiatal hernia, History of prolapse of bladder, Hyperlipidemia, Hypertension, Osteoarthritis, Shortness of breath, and UTI (urinary tract infection).    Past Surgical History:  The patient  has a past surgical history that includes Hemorrhoid surgery (1989, 08/17/2012); Colonoscopy (05/25/2012); Hysterectomy (1966); Appendectomy (1965); other surgical history (01/03/2014); Rectal surgery; Cardiac catheterization (08/2012); Coronary angioplasty with stent (09/11/2017); sigmoidoscopy (N/A, 06/10/2019); Condyloma Excision (Left, 12/16/2021); and Upper gastrointestinal endoscopy (N/A, 01/09/2023).    Allergies:  The patient is allergic to other, bactrim ds [sulfamethoxazole-trimethoprim], ciprofloxacin, fish oil, lipitor, lipitor [atorvastatin], ramipril, requip [ropinirole], and sulfa antibiotics.    Family History:  This patient's family history includes Alzheimer's Disease in her mother; Arthritis in her father; Cancer in her brother; Diabetes in her maternal aunt and maternal aunt; Heart Disease in her brother, brother, mother, sister, and sister; Stroke in her sister.    Social History:  Yenny  reports that she quit smoking about 51 years ago. Her smoking use included cigarettes. She started smoking about 63 years ago. She has a 12.0 pack-year smoking history. She has never used smokeless tobacco. She reports that she does not drink alcohol and does not use drugs.        Physical Exam:   General appearance:  No apparent distress, appears stated age and cooperative.  HEENT:  Normal cephalic, atraumatic without obvious deformity. Conjunctivae/corneas clear.  Neck: Supple. No jugular venous distention. Trachea midline.  Respiratory:  Normal respiratory effort. Clear to auscultation, bilaterally without rales/wheezes/rhonchi  Cardiovascular:  Regular rate and rhythm with SEJ murmur.  Abdomen: Soft, non-tender,

## 2024-06-07 NOTE — PLAN OF CARE
ADMISSION NOTE    Patient: Saul Woods  : 2020 3:03 PM  MRN: [id number]  Referring Physician:  Information for the patient's mother:  Mitra Woods [[id number]]   Priyanka Garcia, *      Information for the patient's mother:  Mitra Woosd [[id number]]   Ja Hernandez MD     Date of admission: 2020      Gestational Age: 37w5d   PMA: 38w0d   Day of Life on Admission (Day of birth being DOL 1): 3        REASON for admission: Hyperbilirubinemia    BIRTH [facility]OSPITAL: [facility]    MATERNAL/PRENATAL HISTORY    Information for the patient's mother:  Mitra Woods [[id number]]   1993      Information for the patient's mother:  Mitra Woods [[id number]]   27 year old     Information for the patient's mother:  Mitra Woods [[id number]]         Information for the patient's mother:  Mitra Woods [[id number]]   37w5d      Information for the patient's mother:  Mitra Woods [[id number]]   No KATHRINE on file.     Information for the patient's mother:  Mitra Woods [[id number]]     OB History    Para Term  AB Living   3 2 1 1 1 2   SAB TAB Ectopic Molar Multiple Live Births     1     0 2      # Outcome Date GA Lbr Russell/2nd Weight Sex Delivery Anes PTL Lv   3 Term 20 37w5d 06:36 / 01:33 3430 g M Vag-Spont EPI N RADHA   2   36w0d    Vag-Spont   RADHA   1 TAB                 Ethnicity:  or     Maternal labs:      Information for the patient's mother:  Mitra Woods [[id number]]     Recent Labs   Lab 12/17/20 12/15/20 10/07/20   HIV Antigen/Antibody Screen  --  negative negative   RPR Screen  --  negative negative   Rubella Antibody IgG  --   --  Immune   GBS negative  --   --           GBS: negative  Antibiotics: none      Prenatal care: Yes   steroids during pregnancy: None    Magnesium Sulfate: No  Chorioamnionitis: No  Maternal HTN, Chronic or Pregnancy Induced: No  Maternal Diabetes: No  Congenital Infection - TORCH /    Problem: Discharge Planning  Goal: Discharge to home or other facility with appropriate resources  Outcome: Progressing  Flowsheets (Taken 6/7/2024 0800)  Discharge to home or other facility with appropriate resources:   Identify barriers to discharge with patient and caregiver   Arrange for needed discharge resources and transportation as appropriate   Identify discharge learning needs (meds, wound care, etc)   Refer to discharge planning if patient needs post-hospital services based on physician order or complex needs related to functional status, cognitive ability or social support system     Problem: Safety - Adult  Goal: Free from fall injury  6/7/2024 0832 by Glenn Maharaj, RN  Outcome: Progressing  6/7/2024 0003 by Griffin Suresh, RN  Outcome: Progressing  Note: Proper use of two patient identifiers when giving medication     Problem: ABCDS Injury Assessment  Goal: Absence of physical injury  Outcome: Progressing      Zika / Parvovirus: No  Maternal COVID-19: positive  Mutiple Gestation: No      Other Pregnancy Complication: prolonged ROM    Maternal Medication: PNV      DELIVERY/BIRTH HISTORY    Time of birth: 2020 3:03 PM by  Vaginal, Spontaneous  Sex:  male   Information for the patient's mother:  Mitra Woods [[id number]]   No KATHRINE on file.     GA   Information for the patient's mother:  Mitra Woods [[id number]]   37w5d       Labor  · Delivery Method: Vaginal, Spontaneous [250]  · Time of Birth: 3:03 PM  Information for the patient's mother:  Mitra Woods [[id number]]   Membranes  SSE Results: Pooling  Membrane Status: Ruptured  Sac Identifier: Sac 1  Rupture Type: Spontaneous  Rupture Date: 20  Rupture Time: 0600  Fluid Color: Clear  Fluid Odor: Normal  Fluid Amount: Scant  ·     Delivery Medications: oxytocin, epidural    Delayed cord clamping x 30 seconds: Yes      Resuscitation:  Oxygen: no  Face Mask Ventilation (PPV): no  Endotracheal Tube Intubation: no  CPAP: no              APGARS  One minute Five minutes   Skin color: 1   1     Heart rate: 2   2     Reflex: 2   2     Muscle tone: 2   2     Breathin   2     Totals: 9   9         Delivery Room Course: Infant was brought to warmer and was dried and stimulated.  Bulb suctioning of mouth and nares performed.  HR > 100 bpm and infant was vigorous with good respiratory effort.     Initial Hospital Course: Infant full term and stable, initially brought to nursery. 25HOL bilirubin resulted at 9.9, LL 10.1, high risk. Repeat bili @ 33HOL was 12.1, LL 11.2 - double intensive phototherapy initiated. Bili at 44HOL increased to 12.9 despite being on phototherapy, LL 12.6. Continued on double photo and made sure to be feeding q2-3h. Labs sent including retic which was 5.3 and not indicative of significat hemolysis, CBC showing no anemia. Mom O+, baby A-, Jfefrey test initially negative but repeat sent and found to be positive 1+. Due to mom needing to be discharged and  pt's need for continued care, pt transferred to NICU for further management.       Laboratory and Radiological Findings:  Invalid input(s): RECENT LABS     Physical Exam:     Body Measurement:     Weight: 3430 g(Filed from Delivery Summary)    Head Circumference: 31.5 cm (12.4\")(Filed from Delivery Summary)    Length 20.5\" (52.1 cm)(Filed from Delivery Summary)         Vital Signs:   Current Vitals:  Temp: (!) 99.9 °F (37.7 °C) Heart Rate: 152  Resp: 56     SpO2: 100 %      General: Well appearing, no acute distress, responds to stimuli  HENT: AFSOF, normcephalic, ears normally set, no cleft, palate intact  Neck: supple, full range of motion  CV: RRR, no murmurs, 2+ pulses, good perfusion  Lungs: clear and equal bilaterally, no retractions, no nasal flaring  Abdomen: soft, non-tender, non-distended, no organomegaly  Extremities: no focal deficits; FROM x 4  Neuro: good tone  Skin: no rash  : normal for GA; anus patent  Back: no renu, no dimples      ASSESSMENT AND PLAN BY SYSTEM:       ACTIVE PROBLEMS:  Active Problems:     infant of 37 completed weeks of gestation    [place] affected by maternal prolonged rupture of membranes    COVID-19 affecting pregnancy in third trimester      Fluids, electrolytes and nutrition:   Assessment: Term infant     Plan:   - POAL Sim 19 kcal/oz      Respiratory:  Assessment: No active respiratory issues    Respiratory support: Room Air      Plan:   - continue to monitor    Cardiovascular:  Assessment: No active cardiac issues    Plan:   - continue cardiorespiratory monitoring    Infectious Disease:  Risk factors for sepsis: Prolonged rupture of membranes    Early onset sepsis screen:  Based on maternal temp ranges of (Temp (72hrs), Av.5 °F (36.9 °C), Min:97.7 °F (36.5 °C), Max:99.1 °F (37.3 °C) ), duration of ruptured membranes of 33h 03m , GBS neg status, gestational age at birth of Gestational Age: 37w5d, and no intrapartum abx usage.  0.20 (of 1000 births), based on  well exam.  Recommendations: No culture, No antibiotics, Routine Vitals    Clinical Exam:  Well Appearing (no persistent physiologic abnormalities)    Clinical recommendation:   Well appearing: gregorio: No culture, no antibiotics, routine vitals  Equivocal: gregorio: Blood culture, vitals every 4 hours for 24 hours  Clinical illness: gregorio: Empiric antibiotics, vitals per NICU    Plan:   - Blood culture and CBC on admission - NO  - No Antibiotics were initiated due to low risk of Sepsis.    Hyperbilirubin:   Assessment: ABO incompatibility, hyperbilirubinemia  Risk factors for hyperbilirubinemia - ABO incompatibility  crystal positive    Plan:   - bilirubin at 6pm and q6h  - continue double phototherapy    Hematology:  Assessment: at risk of anemia 2/2 +Crystal     Plan:   - Monitor clinically    Neurology:  Assessment: No active neurologic concerns  Plan:   - No active issues      Lines:   - place No lines at this time    Social:   - Spoke to parents in-depth. All questions and concerns addressed. Parents expressed verbal understanding.    Dhara Sullivan D.O.  [facility], PGY-1  [phone]

## 2024-06-08 VITALS
WEIGHT: 124.1 LBS | HEART RATE: 66 BPM | BODY MASS INDEX: 21.19 KG/M2 | OXYGEN SATURATION: 98 % | SYSTOLIC BLOOD PRESSURE: 153 MMHG | TEMPERATURE: 98.4 F | DIASTOLIC BLOOD PRESSURE: 86 MMHG | HEIGHT: 64 IN | RESPIRATION RATE: 16 BRPM

## 2024-06-08 LAB
ANION GAP SERPL CALC-SCNC: 14 MEQ/L (ref 8–16)
BUN SERPL-MCNC: 17 MG/DL (ref 7–22)
CALCIUM SERPL-MCNC: 9.5 MG/DL (ref 8.5–10.5)
CHLORIDE SERPL-SCNC: 106 MEQ/L (ref 98–111)
CO2 SERPL-SCNC: 21 MEQ/L (ref 23–33)
CREAT SERPL-MCNC: 0.7 MG/DL (ref 0.4–1.2)
DEPRECATED RDW RBC AUTO: 48.3 FL (ref 35–45)
ERYTHROCYTE [DISTWIDTH] IN BLOOD BY AUTOMATED COUNT: 13.9 % (ref 11.5–14.5)
GFR SERPL CREATININE-BSD FRML MDRD: 83 ML/MIN/1.73M2
GLUCOSE SERPL-MCNC: 101 MG/DL (ref 70–108)
HCT VFR BLD AUTO: 38.9 % (ref 37–47)
HGB BLD-MCNC: 11.9 GM/DL (ref 12–16)
MCH RBC QN AUTO: 29 PG (ref 26–33)
MCHC RBC AUTO-ENTMCNC: 30.6 GM/DL (ref 32.2–35.5)
MCV RBC AUTO: 94.9 FL (ref 81–99)
PLATELET # BLD AUTO: 140 THOU/MM3 (ref 130–400)
PMV BLD AUTO: 10.5 FL (ref 9.4–12.4)
POTASSIUM SERPL-SCNC: 4.1 MEQ/L (ref 3.5–5.2)
RBC # BLD AUTO: 4.1 MILL/MM3 (ref 4.2–5.4)
SODIUM SERPL-SCNC: 141 MEQ/L (ref 135–145)
WBC # BLD AUTO: 4.9 THOU/MM3 (ref 4.8–10.8)

## 2024-06-08 PROCEDURE — 85027 COMPLETE CBC AUTOMATED: CPT

## 2024-06-08 PROCEDURE — 99239 HOSP IP/OBS DSCHRG MGMT >30: CPT | Performed by: STUDENT IN AN ORGANIZED HEALTH CARE EDUCATION/TRAINING PROGRAM

## 2024-06-08 PROCEDURE — APPNB30 APP NON BILLABLE TIME 0-30 MINS: Performed by: STUDENT IN AN ORGANIZED HEALTH CARE EDUCATION/TRAINING PROGRAM

## 2024-06-08 PROCEDURE — 80048 BASIC METABOLIC PNL TOTAL CA: CPT

## 2024-06-08 PROCEDURE — 36415 COLL VENOUS BLD VENIPUNCTURE: CPT

## 2024-06-08 PROCEDURE — 6370000000 HC RX 637 (ALT 250 FOR IP)

## 2024-06-08 RX ADMIN — METHENAMINE HIPPURATE 1 G: 1 TABLET ORAL at 07:41

## 2024-06-08 RX ADMIN — METOPROLOL TARTRATE 25 MG: 50 TABLET, FILM COATED ORAL at 12:37

## 2024-06-08 RX ADMIN — PANTOPRAZOLE SODIUM 40 MG: 40 TABLET, DELAYED RELEASE ORAL at 05:38

## 2024-06-08 NOTE — DISCHARGE INSTRUCTIONS
Fu for valve procedure per structural heart clinic scheduled.     Call Dr Oreilly's office with any questions or concerns.     [phone]

## 2024-06-08 NOTE — PROGRESS NOTES
Internal Medicine Resident Progress Note    Name: Yenny Chandler, female, : 1935, MRN: [id number]    PCP: Artur Brar III, MD    Date of Admission: 2024  Date of Service: Pt seen/examined on 24      Assessment/Plan:  Severe AS.  Discussed with cardiology will consult CT surgery to see if patient is surgical candidate.  Preop workup for TAVR.  Appreciate cardiology assistance.  Has loud systolic murmur.  DC Imdur as per cardio, avoid diuretic as patient is preload dependent.  Atypical chest pain: Cath was done as a part of preop workup for TAVR.  Nonobstructive CAD no PCI was performed.  Likely secondary to severe AS.   EKG with LVH.  Echo consistent with severe AS with valve area 0.88 cm².  Corrected D-dimer for age within normal limit.  Troponinemia, likely NSTEMI 2 from severe AS.  Trend not consistent with ACS.    CAD s/p drug-eluting stent to mid 2017   Continue aspirin 81 daily         HTN  Imdur 60 mg daily  Lopressor 25 mg twice daily     GERD  Protonix 40 mg daily     History of recurrent UTIs  Hiprex 1 g 3 times daily     Hx cervical cancer     Hx rectal cancer    Level of care: []Step Down / [x]Med-Surg  Bed Status: [x]Inpatient / []Observation  Telemetry: [x]Yes / []No  PT/OT: [x]Yes / []No    DVT Prophylaxis: [x] Lovenox / [] Heparin / [] SCDs / [] Already on Systemic Anticoagulation / [] None     Expected discharge date: Tomorrow    Disposition: Home  Code Status: Full Code  ===================================================================      Chief Complaint: Chest pain, dizziness    Hospital Course:   Yenny Chandler is a 89 y.o. female with PMHx of CAD, HTN, HLD, anxiety/depression, moderate AS, cervical cancer, rectal cancer who presents to [facility] 2024 secondary to chest pain.     Patient reports that she was in the car around noon today when she started develop sudden onset right-sided chest pain lasting about 15 to 25 minutes.  Pain

## 2024-06-08 NOTE — PROGRESS NOTES
Briefly saw patient this mornign and discussed with Dr Oreilly.     CTA abd/pelvis and chest completd today. Full workup inpatient has been completed.     Dental visit and clearance has been set up.     Nothing further from cardiac standpoint.     May benefit from lasix PRN at discharge given severe AS.     Call with any further questions or concerns.     Connor Mortimer, PA-C  [facility] Cardiology

## 2024-06-08 NOTE — PLAN OF CARE
Problem: Discharge Planning  Goal: Discharge to home or other facility with appropriate resources  Outcome: Progressing  Flowsheets (Taken 6/8/2024 0240)  Discharge to home or other facility with appropriate resources:   Identify barriers to discharge with patient and caregiver   Identify discharge learning needs (meds, wound care, etc)     Problem: Safety - Adult  Goal: Free from fall injury  Outcome: Progressing  Note: Bed locked & in low position, call light in reach, side-rails up x2, bed/chair alarm utilized, non-slip socks on when ambulating, reminded patient to use call light to call for assistance.      Problem: ABCDS Injury Assessment  Goal: Absence of physical injury  Outcome: Progressing  Flowsheets (Taken 6/8/2024 0240)  Absence of Physical Injury: Implement safety measures based on patient assessment     Problem: Cardiovascular - Adult  Goal: Maintains optimal cardiac output and hemodynamic stability  Outcome: Progressing  Flowsheets (Taken 6/8/2024 0240)  Maintains optimal cardiac output and hemodynamic stability:   Monitor blood pressure and heart rate   Monitor urine output and notify Licensed Independent Practitioner for values outside of normal range   Assess for signs of decreased cardiac output     Problem: Cardiovascular - Adult  Goal: Absence of cardiac dysrhythmias or at baseline  Outcome: Progressing  Flowsheets (Taken 6/8/2024 0240)  Absence of cardiac dysrhythmias or at baseline:   Monitor cardiac rate and rhythm   Assess for signs of decreased cardiac output   Care plan reviewed with patient.  Patient verbalizes understanding of the care plan and contributed to goal setting.

## 2024-06-08 NOTE — PROGRESS NOTES
Patient discharged home with son. Educated on discharge instructions, follow ups and medications. No questions or concerns voiced.   Heart attack teaching covered with patient and/or family or significant other:  Signs and symptoms of a heart attack.  When to call 911 and the importance of calling 911.  Personal risk factors and ways to lower their risk.  4.   Importance of quitting smoking if applicable.     Heart attack booklet given to the patient and/or family or significant other. Reviewed:  How to take Nitroglycerin.  The importance of participating in Cardiac Rehab and hours of operation.   Heart Healthy Diet.  Risk factor modification.(Overweight, Obesity, Diabetes, Hypertension, Smoking, High Cholesterol, Stress)  Discharge instructions for Cath/Intervention procedure site if applicable.

## 2024-06-19 ENCOUNTER — OFFICE VISIT (OUTPATIENT)
Dept: CARDIOLOGY CLINIC | Age: 89
End: 2024-06-19
Payer: MEDICARE

## 2024-06-19 VITALS
HEART RATE: 63 BPM | DIASTOLIC BLOOD PRESSURE: 93 MMHG | BODY MASS INDEX: 21.68 KG/M2 | WEIGHT: 127 LBS | SYSTOLIC BLOOD PRESSURE: 156 MMHG | HEIGHT: 64 IN

## 2024-06-19 DIAGNOSIS — Z95.820 S/P ANGIOPLASTY WITH STENT: ICD-10-CM

## 2024-06-19 DIAGNOSIS — I10 ESSENTIAL HYPERTENSION: ICD-10-CM

## 2024-06-19 DIAGNOSIS — I35.0 SEVERE AORTIC STENOSIS: Primary | ICD-10-CM

## 2024-06-19 PROCEDURE — 1123F ACP DISCUSS/DSCN MKR DOCD: CPT | Performed by: PHYSICIAN ASSISTANT

## 2024-06-19 PROCEDURE — 99214 OFFICE O/P EST MOD 30 MIN: CPT | Performed by: PHYSICIAN ASSISTANT

## 2024-06-19 RX ORDER — OXYCODONE HYDROCHLORIDE AND ACETAMINOPHEN 5; 325 MG/1; MG/1
TABLET ORAL
COMMUNITY
Start: 2024-05-21

## 2024-06-19 NOTE — PROGRESS NOTES
[facility] [place] [facility]  [facility] CARDIOLOGY  [address]  [address]  [place] [address]  Dept: [phone]  Dept Fax: [phone]  Loc: [phone]    Chief Complaint   Patient presents with    Follow-up     Follow up, recent heart cath.       Patient presents for follow-up appointment after recent hospitalization.  Patient was recently noted to have low-flow low gradient severe aortic stenosis and is being worked up for a TAVR.  She had a cardiac catheterization which revealed patent stents with no other significant coronary artery disease.  She states overall she has been doing well.  She has some intermittent shortness of breath with increased exertion.  She denies any chest or peripheral edema.  Cardiologist:  Dr. Oreilly        General:   No fever, no chills, No fatigue or weight loss  Pulmonary:   Intermittent shortness of breath   cardiac:    Denies recent chest pain   GI:     No nausea or vomiting, no abdominal pain  Neuro:    No dizziness or light headedness  Musculoskeletal:  No recent active issues  Extremities:   No edema, good peripheral pulses      Past Medical History:   Diagnosis Date    Anesthesia 2012    after hemorrhoid surgery felt like \"elephant on my chest\" heart cath done    CAD (coronary artery disease) 2012    Cancer (HCC)     cervical rectal    Diverticulosis     GERD (gastroesophageal reflux disease)     Hiatal hernia     History of prolapse of bladder     Hyperlipidemia     Hypertension     Osteoarthritis     Shortness of breath 09/2017    UTI (urinary tract infection) 05/20/2017    [facility] in [place]       Allergies   Allergen Reactions    Other Other (See Comments)     Movi Prep,    Facial and lip swelling    Bactrim Ds [Sulfamethoxazole-Trimethoprim] Itching    Ciprofloxacin Nausea Only     Nausea and tingling in her fingers    Fish Oil     Lipitor Swelling    Lipitor [Atorvastatin]     Ramipril Swelling     tongue    Requip [Ropinirole]

## 2024-06-19 NOTE — PROGRESS NOTES
Follow up.  Patient had a heart cath on 06/07/2024 after being in the ED for chest pain on 06/05/2024.   Reports shortness of breath while walking in to her appointment today.  Denies chest pain, palpitations, dizziness, and edema.

## 2024-06-19 NOTE — PROGRESS NOTES
Physician Progress Note      PATIENT:               DAGOBERTO SMITH  CSN #:                  [id number]  :                       1935  ADMIT DATE:       2024 12:18 PM  DISCH DATE:        2024 3:11 PM  RESPONDING  PROVIDER #:        You Elmore DO          QUERY TEXT:    Patient admitted with Severe AS. Documentation reflects Troponinemia, likely   NSTEMI 2 from severe AS in progress note(s) dated .  If possible, please   document in the progress notes and discharge summary if NSTEMI type 2 was:    The medical record reflects the following:  Risk Factors: TROP   Clinical Indicators: Progress Notes by Edwardo Darling MD at 2024 10:34 PM  Atypical chest pain: Cath was done as a part of preop workup for TAVR.    Nonobstructive CAD no PCI was performed.  Likely secondary to severe AS.   EKG   with LVH.  Echo consistent with severe AS with valve area 0.88 cm?.    Corrected D-dimer for age within normal limit.  -        Troponinemia, likely NSTEMI 2 from severe AS.  Trend not consistent   with ACS.  Treatment: Cardio consult Continue aspirin 81 daily    Thank you.  Caitlin Finley RN, Clinical Documentation Integrity, Pocketbook   Cycle, [employer], CRCR  Options provided:  -- NSTEMI type 2 confirmed after study  -- NSTEMI type 2 treated and resolved  -- NSTEMI type 2 ruled out after study  -- Other - I will add my own diagnosis  -- Disagree - Not applicable / Not valid  -- Disagree - Clinically unable to determine / Unknown  -- Refer to Clinical Documentation Reviewer    PROVIDER RESPONSE TEXT:    NSTEMI type 2 confirmed after study.    Query created by: Caitlin Finley on 2024 9:06 AM      Electronically signed by:  You Elmore DO 2024 10:23 AM

## 2024-06-25 ENCOUNTER — TELEPHONE (OUTPATIENT)
Dept: CARDIAC REHAB | Age: 89
End: 2024-06-25

## 2024-06-25 ENCOUNTER — OFFICE VISIT (OUTPATIENT)
Dept: CARDIOLOGY CLINIC | Age: 89
End: 2024-06-25
Payer: MEDICARE

## 2024-06-25 VITALS
SYSTOLIC BLOOD PRESSURE: 158 MMHG | HEIGHT: 64 IN | BODY MASS INDEX: 22.02 KG/M2 | WEIGHT: 129 LBS | OXYGEN SATURATION: 98 % | DIASTOLIC BLOOD PRESSURE: 88 MMHG | HEART RATE: 89 BPM

## 2024-06-25 DIAGNOSIS — I10 ESSENTIAL HYPERTENSION: ICD-10-CM

## 2024-06-25 DIAGNOSIS — I35.0 SEVERE AORTIC STENOSIS: Primary | ICD-10-CM

## 2024-06-25 PROCEDURE — 1036F TOBACCO NON-USER: CPT | Performed by: NURSE PRACTITIONER

## 2024-06-25 PROCEDURE — 1123F ACP DISCUSS/DSCN MKR DOCD: CPT | Performed by: NURSE PRACTITIONER

## 2024-06-25 PROCEDURE — 1090F PRES/ABSN URINE INCON ASSESS: CPT | Performed by: NURSE PRACTITIONER

## 2024-06-25 PROCEDURE — 99214 OFFICE O/P EST MOD 30 MIN: CPT | Performed by: NURSE PRACTITIONER

## 2024-06-25 PROCEDURE — G8427 DOCREV CUR MEDS BY ELIG CLIN: HCPCS | Performed by: NURSE PRACTITIONER

## 2024-06-25 PROCEDURE — G8420 CALC BMI NORM PARAMETERS: HCPCS | Performed by: NURSE PRACTITIONER

## 2024-06-25 PROCEDURE — 1111F DSCHRG MED/CURRENT MED MERGE: CPT | Performed by: NURSE PRACTITIONER

## 2024-06-25 ASSESSMENT — ENCOUNTER SYMPTOMS
COUGH: 0
SHORTNESS OF BREATH: 0
ABDOMINAL DISTENTION: 0

## 2024-06-25 NOTE — PATIENT INSTRUCTIONS
You may receive a survey regarding the care you received during your visit.  Your input is valuable to us.  We encourage you to complete and return your survey.  We hope you will choose us in the future for your healthcare needs.    Your nurses today were Marycarmen.  Office hours:   Mon-Thurs 8-4:30  Friday 8-12  Phone: [phone]    Continue:  Continue current medications  Daily weights and record  Fluid restriction of 2 Liters per day  Limit sodium in diet to around 9356-2479 mg/day  Monitor BP  Activity as tolerated     Call the Heart Failure Clinic for any of the following symptoms:   Weight gain of 3 pounds in 1 day or 5 pounds in 1 week  Increased shortness of breath  Shortness of breath while laying down  Cough  Chest pain  Swelling in feet, ankles or legs  Bloating in abdomen  Fatigue        Monitor BP at home  Call if BP trending greater than 150/90s

## 2024-06-25 NOTE — TELEPHONE ENCOUNTER
Pre Op TAVR Cardiac Rehab Education    Yenny was brought to Cardiac Rehab by [facility] staff.  Discussed the importance of Cardiac Rehab with Yenny.  Reviewed cardiac rehab class times.  Patient questions answered.  I explained we will contact at home to schedule evaluation appointment after TAVR procedure.   Cardiac Rehab brochure given.

## 2024-06-25 NOTE — PROGRESS NOTES
Heart Failure Clinic       Visit Date: 6/25/2024  Cardiologist:  Dr. Oreilly  Primary Care Physician: Artur Lopez III, MD  Referred by: Denilson    Yenny Chandler is a 89 y.o. female who presents today for:  Chief Complaint   Patient presents with    Cardiac Valve Problem       HPI:   Yenny Chandler is a 89 y.o. female who presents to the office for a patient visit in the heart failure clinic.  Accompanied by son, dtr    TYPE HF: HFpEF    Cause:   Valves:  Severe AS (LEODAN 0.88, mean gradient 29 mmHg)  Device: no  HX: CAD sp PCI (2017)      Hospitalization:  6/2024 - [facility].      Today 6/2024: TAVR workup  129#  Cath while IP - Imdur and Lasix 20/day stopped.  No fluid issues since lasix stopped.   More CP issues of recent. Some SOB  No fluid on exam.       Patient has:  Chest Pain: no  SOB: no  Orthopnea/PND: no  ALYSON: no  Edema: no  Fatigue: no  Abdominal bloating: no  Cough: no  Appetite: good  Home weight: stable  Home blood pressure: stable    Past Medical History:   Diagnosis Date    Anesthesia 2012    after hemorrhoid surgery felt like \"elephant on my chest\" heart cath done    CAD (coronary artery disease) 2012    Cancer (HCC)     cervical rectal    Diverticulosis     GERD (gastroesophageal reflux disease)     Hiatal hernia     History of prolapse of bladder     Hyperlipidemia     Hypertension     Osteoarthritis     Shortness of breath 09/2017    UTI (urinary tract infection) 05/20/2017    [facility] in [place]     Past Surgical History:   Procedure Laterality Date    APPENDECTOMY  1965    CARDIAC CATHETERIZATION  08/2012    [facility]    CARDIAC PROCEDURE Bilateral 6/7/2024    Left and right heart cath / coronary angiography performed by Carlos Flower MD at [facility] CARDIAC CATH LAB    COLONOSCOPY  05/25/2012    Dr. Lozada     CONDYLOMA EXCISION Left 12/16/2021    LEFT PERIANAL BIOPSY performed by Be Yang MD at [facility] OR    CORONARY ANGIOPLASTY WITH STENT PLACEMENT  09/11/2017    HEMORRHOID SURGERY

## 2024-07-02 ENCOUNTER — TELEPHONE (OUTPATIENT)
Dept: CARDIOLOGY CLINIC | Age: 89
End: 2024-07-02

## 2024-07-02 DIAGNOSIS — I35.0 SEVERE AORTIC STENOSIS: Primary | ICD-10-CM

## 2024-07-02 NOTE — TELEPHONE ENCOUNTER
Orders received from Dr. Oreilly to schedule the patient for a TAVR procedure and have the patient hold the follow medications the morning of the TAVR procedure: Metoprolol.    TAVR: 7/24/24 at 0800 with an arrival of 0600  Discharge home with 3 children    Valve Rep Darrius notified with [employer]  Pacer Rep not needed    Post TAVR instructions per Dr. Oreilly: have the patient be seen in the [facility] 7 days post TAVR, obtain a CBC, BMP and ECHO prior to the 30 day post TAVR follow up appointment.    7 day post TAVR appointment:7/30/24 at 1045  [facility] Clinic follow up appointment 8/6/24 at 0930  CBC/BMP/ECHO:8/24/24 at ___  30 day post TAVR appointment:8/29/24 at 1000    Primary Cardiologist:  Dr. Denilson Oreilly, please agree.     Kala, can you please schedule ECHO?    Terri, can you check prior auth?

## 2024-07-02 NOTE — TELEPHONE ENCOUNTER
All instructions and follow-ups reviewed with patient.  All questions answered.  Instruction packet and follow-ups mailed to patient at this time.

## 2024-07-23 ENCOUNTER — PREP FOR PROCEDURE (OUTPATIENT)
Dept: CARDIOLOGY | Age: 89
End: 2024-07-23

## 2024-07-23 RX ORDER — CHLORHEXIDINE GLUCONATE 40 MG/ML
SOLUTION TOPICAL ONCE
Status: CANCELLED | OUTPATIENT
Start: 2024-07-23 | End: 2024-07-23

## 2024-07-23 RX ORDER — SODIUM CHLORIDE 9 MG/ML
INJECTION, SOLUTION INTRAVENOUS PRN
Status: CANCELLED | OUTPATIENT
Start: 2024-07-23

## 2024-07-23 RX ORDER — SODIUM CHLORIDE 0.9 % (FLUSH) 0.9 %
5-40 SYRINGE (ML) INJECTION EVERY 12 HOURS SCHEDULED
Status: CANCELLED | OUTPATIENT
Start: 2024-07-23

## 2024-07-23 RX ORDER — SODIUM CHLORIDE 9 MG/ML
INJECTION, SOLUTION INTRAVENOUS CONTINUOUS
Status: CANCELLED | OUTPATIENT
Start: 2024-07-23

## 2024-07-23 RX ORDER — SODIUM CHLORIDE 0.9 % (FLUSH) 0.9 %
5-40 SYRINGE (ML) INJECTION PRN
Status: CANCELLED | OUTPATIENT
Start: 2024-07-23

## 2024-07-24 ENCOUNTER — HOSPITAL ENCOUNTER (INPATIENT)
Age: 89
LOS: 1 days | Discharge: HOME | DRG: 267 | End: 2024-07-25
Attending: INTERNAL MEDICINE | Admitting: INTERNAL MEDICINE
Payer: MEDICARE

## 2024-07-24 DIAGNOSIS — I45.4 BUNDLE BRANCH BLOCK: ICD-10-CM

## 2024-07-24 DIAGNOSIS — Z95.2 S/P TAVR (TRANSCATHETER AORTIC VALVE REPLACEMENT): ICD-10-CM

## 2024-07-24 DIAGNOSIS — I45.5 OTHER SPECIFIED HEART BLOCK: ICD-10-CM

## 2024-07-24 DIAGNOSIS — I35.0 SEVERE AORTIC STENOSIS: ICD-10-CM

## 2024-07-24 DIAGNOSIS — Z95.2 HISTORY OF TRANSCATHETER AORTIC VALVE REPLACEMENT (TAVR): Primary | ICD-10-CM

## 2024-07-24 LAB
ABO: NORMAL
ACTIVATED CLOTTING TIME: 269 SECONDS (ref 1–150)
ALBUMIN SERPL BCG-MCNC: 4.2 G/DL (ref 3.5–5.1)
ALP SERPL-CCNC: 73 U/L (ref 38–126)
ALT SERPL W/O P-5'-P-CCNC: 9 U/L (ref 11–66)
ANION GAP SERPL CALC-SCNC: 11 MEQ/L (ref 8–16)
ANTIBODY SCREEN: NORMAL
APTT PPP: 31.8 SECONDS (ref 22–38)
AST SERPL-CCNC: 15 U/L (ref 5–40)
BILIRUB SERPL-MCNC: 0.3 MG/DL (ref 0.3–1.2)
BUN SERPL-MCNC: 21 MG/DL (ref 7–22)
CALCIUM SERPL-MCNC: 9.5 MG/DL (ref 8.5–10.5)
CHLORIDE SERPL-SCNC: 104 MEQ/L (ref 98–111)
CO2 SERPL-SCNC: 24 MEQ/L (ref 23–33)
CREAT SERPL-MCNC: 0.7 MG/DL (ref 0.4–1.2)
DEPRECATED RDW RBC AUTO: 46.5 FL (ref 35–45)
ECHO BSA: 1.56 M2
EKG ATRIAL RATE: 69 BPM
EKG ATRIAL RATE: 71 BPM
EKG ATRIAL RATE: 87 BPM
EKG ATRIAL RATE: 88 BPM
EKG P AXIS: 58 DEGREES
EKG P AXIS: 66 DEGREES
EKG P AXIS: 68 DEGREES
EKG P AXIS: 69 DEGREES
EKG P-R INTERVAL: 194 MS
EKG P-R INTERVAL: 216 MS
EKG P-R INTERVAL: 272 MS
EKG P-R INTERVAL: 274 MS
EKG Q-T INTERVAL: 380 MS
EKG Q-T INTERVAL: 396 MS
EKG Q-T INTERVAL: 398 MS
EKG Q-T INTERVAL: 404 MS
EKG QRS DURATION: 136 MS
EKG QRS DURATION: 138 MS
EKG QRS DURATION: 138 MS
EKG QRS DURATION: 72 MS
EKG QTC CALCULATION (BAZETT): 407 MS
EKG QTC CALCULATION (BAZETT): 439 MS
EKG QTC CALCULATION (BAZETT): 476 MS
EKG QTC CALCULATION (BAZETT): 481 MS
EKG R AXIS: -13 DEGREES
EKG R AXIS: -28 DEGREES
EKG R AXIS: 18 DEGREES
EKG R AXIS: 35 DEGREES
EKG T AXIS: 12 DEGREES
EKG T AXIS: 17 DEGREES
EKG T AXIS: 25 DEGREES
EKG T AXIS: 8 DEGREES
EKG VENTRICULAR RATE: 69 BPM
EKG VENTRICULAR RATE: 71 BPM
EKG VENTRICULAR RATE: 87 BPM
EKG VENTRICULAR RATE: 88 BPM
ERYTHROCYTE [DISTWIDTH] IN BLOOD BY AUTOMATED COUNT: 13.7 % (ref 11.5–14.5)
GFR SERPL CREATININE-BSD FRML MDRD: 83 ML/MIN/1.73M2
GLUCOSE SERPL-MCNC: 90 MG/DL (ref 70–108)
HCT VFR BLD AUTO: 36.3 % (ref 37–47)
HGB BLD-MCNC: 11.5 GM/DL (ref 12–16)
INR PPP: 0.99 (ref 0.85–1.13)
MCH RBC QN AUTO: 29.5 PG (ref 26–33)
MCHC RBC AUTO-ENTMCNC: 31.7 GM/DL (ref 32.2–35.5)
MCV RBC AUTO: 93.1 FL (ref 81–99)
NT-PROBNP SERPL IA-MCNC: 721.6 PG/ML (ref 0–449)
PLATELET # BLD AUTO: 155 THOU/MM3 (ref 130–400)
PMV BLD AUTO: 10.3 FL (ref 9.4–12.4)
POTASSIUM SERPL-SCNC: 4.3 MEQ/L (ref 3.5–5.2)
PROT SERPL-MCNC: 6.7 G/DL (ref 6.1–8)
RBC # BLD AUTO: 3.9 MILL/MM3 (ref 4.2–5.4)
RH FACTOR: NORMAL
SODIUM SERPL-SCNC: 139 MEQ/L (ref 135–145)
WBC # BLD AUTO: 4.8 THOU/MM3 (ref 4.8–10.8)

## 2024-07-24 PROCEDURE — C1894 INTRO/SHEATH, NON-LASER: HCPCS | Performed by: INTERNAL MEDICINE

## 2024-07-24 PROCEDURE — 02RF38Z REPLACEMENT OF AORTIC VALVE WITH ZOOPLASTIC TISSUE, PERCUTANEOUS APPROACH: ICD-10-PCS | Performed by: THORACIC SURGERY (CARDIOTHORACIC VASCULAR SURGERY)

## 2024-07-24 PROCEDURE — 33361 REPLACE AORTIC VALVE PERQ: CPT | Performed by: INTERNAL MEDICINE

## 2024-07-24 PROCEDURE — 85730 THROMBOPLASTIN TIME PARTIAL: CPT

## 2024-07-24 PROCEDURE — 2500000003 HC RX 250 WO HCPCS: Performed by: NURSE PRACTITIONER

## 2024-07-24 PROCEDURE — C1889 IMPLANT/INSERT DEVICE, NOC: HCPCS | Performed by: INTERNAL MEDICINE

## 2024-07-24 PROCEDURE — 6360000004 HC RX CONTRAST MEDICATION: Performed by: INTERNAL MEDICINE

## 2024-07-24 PROCEDURE — C1760 CLOSURE DEV, VASC: HCPCS | Performed by: INTERNAL MEDICINE

## 2024-07-24 PROCEDURE — 93005 ELECTROCARDIOGRAM TRACING: CPT | Performed by: INTERNAL MEDICINE

## 2024-07-24 PROCEDURE — 6360000002 HC RX W HCPCS: Performed by: INTERNAL MEDICINE

## 2024-07-24 PROCEDURE — 93010 ELECTROCARDIOGRAM REPORT: CPT | Performed by: INTERNAL MEDICINE

## 2024-07-24 PROCEDURE — 6360000002 HC RX W HCPCS: Performed by: PHYSICIAN ASSISTANT

## 2024-07-24 PROCEDURE — C1769 GUIDE WIRE: HCPCS | Performed by: INTERNAL MEDICINE

## 2024-07-24 PROCEDURE — 99153 MOD SED SAME PHYS/QHP EA: CPT | Performed by: INTERNAL MEDICINE

## 2024-07-24 PROCEDURE — 99152 MOD SED SAME PHYS/QHP 5/>YRS: CPT | Performed by: INTERNAL MEDICINE

## 2024-07-24 PROCEDURE — 86923 COMPATIBILITY TEST ELECTRIC: CPT

## 2024-07-24 PROCEDURE — 85610 PROTHROMBIN TIME: CPT

## 2024-07-24 PROCEDURE — G0269 OCCLUSIVE DEVICE IN VEIN ART: HCPCS | Performed by: INTERNAL MEDICINE

## 2024-07-24 PROCEDURE — 93005 ELECTROCARDIOGRAM TRACING: CPT | Performed by: PHYSICIAN ASSISTANT

## 2024-07-24 PROCEDURE — 2720000010 HC SURG SUPPLY STERILE: Performed by: INTERNAL MEDICINE

## 2024-07-24 PROCEDURE — 6370000000 HC RX 637 (ALT 250 FOR IP): Performed by: NURSE PRACTITIONER

## 2024-07-24 PROCEDURE — 86850 RBC ANTIBODY SCREEN: CPT

## 2024-07-24 PROCEDURE — 2000000000 HC ICU R&B

## 2024-07-24 PROCEDURE — 86901 BLOOD TYPING SEROLOGIC RH(D): CPT

## 2024-07-24 PROCEDURE — 2580000003 HC RX 258: Performed by: INTERNAL MEDICINE

## 2024-07-24 PROCEDURE — 33361 REPLACE AORTIC VALVE PERQ: CPT | Performed by: THORACIC SURGERY (CARDIOTHORACIC VASCULAR SURGERY)

## 2024-07-24 PROCEDURE — 36415 COLL VENOUS BLD VENIPUNCTURE: CPT

## 2024-07-24 PROCEDURE — 6370000000 HC RX 637 (ALT 250 FOR IP)

## 2024-07-24 PROCEDURE — 2709999900 HC NON-CHARGEABLE SUPPLY: Performed by: INTERNAL MEDICINE

## 2024-07-24 PROCEDURE — 2500000003 HC RX 250 WO HCPCS: Performed by: INTERNAL MEDICINE

## 2024-07-24 PROCEDURE — 86900 BLOOD TYPING SEROLOGIC ABO: CPT

## 2024-07-24 PROCEDURE — 80053 COMPREHEN METABOLIC PANEL: CPT

## 2024-07-24 PROCEDURE — 83880 ASSAY OF NATRIURETIC PEPTIDE: CPT

## 2024-07-24 PROCEDURE — 6370000000 HC RX 637 (ALT 250 FOR IP): Performed by: INTERNAL MEDICINE

## 2024-07-24 PROCEDURE — 2580000003 HC RX 258: Performed by: PHYSICIAN ASSISTANT

## 2024-07-24 PROCEDURE — 85027 COMPLETE CBC AUTOMATED: CPT

## 2024-07-24 PROCEDURE — 85347 COAGULATION TIME ACTIVATED: CPT

## 2024-07-24 RX ORDER — METHENAMINE HIPPURATE 1000 MG/1
1 TABLET ORAL 2 TIMES DAILY WITH MEALS
Status: DISCONTINUED | OUTPATIENT
Start: 2024-07-24 | End: 2024-07-25 | Stop reason: HOSPADM

## 2024-07-24 RX ORDER — SODIUM CHLORIDE 0.9 % (FLUSH) 0.9 %
5-40 SYRINGE (ML) INJECTION PRN
Status: DISCONTINUED | OUTPATIENT
Start: 2024-07-24 | End: 2024-07-24 | Stop reason: SDUPTHER

## 2024-07-24 RX ORDER — SODIUM CHLORIDE 9 MG/ML
INJECTION, SOLUTION INTRAVENOUS CONTINUOUS
Status: DISCONTINUED | OUTPATIENT
Start: 2024-07-24 | End: 2024-07-24 | Stop reason: SDUPTHER

## 2024-07-24 RX ORDER — SODIUM CHLORIDE 0.9 % (FLUSH) 0.9 %
5-40 SYRINGE (ML) INJECTION EVERY 12 HOURS SCHEDULED
Status: DISCONTINUED | OUTPATIENT
Start: 2024-07-24 | End: 2024-07-24 | Stop reason: SDUPTHER

## 2024-07-24 RX ORDER — HEPARIN SODIUM 10000 [USP'U]/ML
INJECTION, SOLUTION INTRAVENOUS; SUBCUTANEOUS PRN
Status: DISCONTINUED | OUTPATIENT
Start: 2024-07-24 | End: 2024-07-24 | Stop reason: HOSPADM

## 2024-07-24 RX ORDER — ATROPINE SULFATE 0.1 MG/ML
INJECTION INTRAVENOUS PRN
Status: DISCONTINUED | OUTPATIENT
Start: 2024-07-24 | End: 2024-07-24 | Stop reason: HOSPADM

## 2024-07-24 RX ORDER — ASPIRIN 81 MG/1
81 TABLET ORAL DAILY
Status: DISCONTINUED | OUTPATIENT
Start: 2024-07-25 | End: 2024-07-25 | Stop reason: HOSPADM

## 2024-07-24 RX ORDER — SODIUM CHLORIDE 9 MG/ML
INJECTION, SOLUTION INTRAVENOUS PRN
Status: DISCONTINUED | OUTPATIENT
Start: 2024-07-24 | End: 2024-07-24 | Stop reason: SDUPTHER

## 2024-07-24 RX ORDER — CHLORHEXIDINE GLUCONATE 40 MG/ML
SOLUTION TOPICAL ONCE
Status: DISCONTINUED | OUTPATIENT
Start: 2024-07-24 | End: 2024-07-25 | Stop reason: HOSPADM

## 2024-07-24 RX ORDER — ONDANSETRON 2 MG/ML
4 INJECTION INTRAMUSCULAR; INTRAVENOUS EVERY 6 HOURS PRN
Status: DISCONTINUED | OUTPATIENT
Start: 2024-07-24 | End: 2024-07-25 | Stop reason: HOSPADM

## 2024-07-24 RX ORDER — ASPIRIN 81 MG/1
81 TABLET ORAL DAILY
Status: DISCONTINUED | OUTPATIENT
Start: 2024-07-24 | End: 2024-07-24 | Stop reason: SDUPTHER

## 2024-07-24 RX ORDER — CYCLOBENZAPRINE HCL 10 MG
5 TABLET ORAL EVERY 8 HOURS PRN
Status: DISCONTINUED | OUTPATIENT
Start: 2024-07-24 | End: 2024-07-25 | Stop reason: HOSPADM

## 2024-07-24 RX ORDER — ACETAMINOPHEN 325 MG/1
650 TABLET ORAL EVERY 4 HOURS PRN
Status: DISCONTINUED | OUTPATIENT
Start: 2024-07-24 | End: 2024-07-25 | Stop reason: HOSPADM

## 2024-07-24 RX ORDER — OXYCODONE HYDROCHLORIDE AND ACETAMINOPHEN 5; 325 MG/1; MG/1
1 TABLET ORAL EVERY 6 HOURS PRN
Status: DISCONTINUED | OUTPATIENT
Start: 2024-07-24 | End: 2024-07-25 | Stop reason: HOSPADM

## 2024-07-24 RX ORDER — PRAMIPEXOLE DIHYDROCHLORIDE 1 MG/1
1 TABLET ORAL NIGHTLY
Status: ON HOLD | COMMUNITY
End: 2024-07-25 | Stop reason: HOSPADM

## 2024-07-24 RX ORDER — SODIUM CHLORIDE 0.9 % (FLUSH) 0.9 %
5-40 SYRINGE (ML) INJECTION PRN
Status: DISCONTINUED | OUTPATIENT
Start: 2024-07-24 | End: 2024-07-25 | Stop reason: HOSPADM

## 2024-07-24 RX ORDER — PROTAMINE SULFATE 10 MG/ML
INJECTION, SOLUTION INTRAVENOUS PRN
Status: DISCONTINUED | OUTPATIENT
Start: 2024-07-24 | End: 2024-07-24 | Stop reason: HOSPADM

## 2024-07-24 RX ORDER — SODIUM CHLORIDE 9 MG/ML
INJECTION, SOLUTION INTRAVENOUS CONTINUOUS
Status: DISCONTINUED | OUTPATIENT
Start: 2024-07-24 | End: 2024-07-25

## 2024-07-24 RX ORDER — SODIUM CHLORIDE 0.9 % (FLUSH) 0.9 %
5-40 SYRINGE (ML) INJECTION EVERY 12 HOURS SCHEDULED
Status: DISCONTINUED | OUTPATIENT
Start: 2024-07-24 | End: 2024-07-25 | Stop reason: HOSPADM

## 2024-07-24 RX ORDER — FENTANYL CITRATE 50 UG/ML
INJECTION, SOLUTION INTRAMUSCULAR; INTRAVENOUS PRN
Status: DISCONTINUED | OUTPATIENT
Start: 2024-07-24 | End: 2024-07-24 | Stop reason: HOSPADM

## 2024-07-24 RX ORDER — DIPHENHYDRAMINE HYDROCHLORIDE 50 MG/ML
25 INJECTION INTRAMUSCULAR; INTRAVENOUS ONCE
Status: COMPLETED | OUTPATIENT
Start: 2024-07-24 | End: 2024-07-24

## 2024-07-24 RX ORDER — HYDRALAZINE HYDROCHLORIDE 20 MG/ML
10 INJECTION INTRAMUSCULAR; INTRAVENOUS EVERY 10 MIN PRN
Status: DISCONTINUED | OUTPATIENT
Start: 2024-07-24 | End: 2024-07-25 | Stop reason: HOSPADM

## 2024-07-24 RX ORDER — MIDAZOLAM HYDROCHLORIDE 1 MG/ML
INJECTION INTRAMUSCULAR; INTRAVENOUS PRN
Status: DISCONTINUED | OUTPATIENT
Start: 2024-07-24 | End: 2024-07-24 | Stop reason: HOSPADM

## 2024-07-24 RX ORDER — HYDRALAZINE HYDROCHLORIDE 25 MG/1
25 TABLET, FILM COATED ORAL EVERY 8 HOURS SCHEDULED
Status: DISCONTINUED | OUTPATIENT
Start: 2024-07-24 | End: 2024-07-25 | Stop reason: HOSPADM

## 2024-07-24 RX ORDER — ATROPINE SULFATE 0.4 MG/ML
0.5 INJECTION, SOLUTION INTRAVENOUS
Status: ACTIVE | OUTPATIENT
Start: 2024-07-24 | End: 2024-07-25

## 2024-07-24 RX ORDER — POLYETHYLENE GLYCOL 3350 17 G/17G
17 POWDER, FOR SOLUTION ORAL DAILY PRN
Status: DISCONTINUED | OUTPATIENT
Start: 2024-07-24 | End: 2024-07-25 | Stop reason: HOSPADM

## 2024-07-24 RX ORDER — HYDROCODONE BITARTRATE AND ACETAMINOPHEN 5; 325 MG/1; MG/1
1 TABLET ORAL ONCE
Status: COMPLETED | OUTPATIENT
Start: 2024-07-24 | End: 2024-07-24

## 2024-07-24 RX ORDER — PRAMIPEXOLE DIHYDROCHLORIDE 1 MG/1
1 TABLET ORAL NIGHTLY
Status: DISCONTINUED | OUTPATIENT
Start: 2024-07-24 | End: 2024-07-25 | Stop reason: HOSPADM

## 2024-07-24 RX ORDER — SODIUM CHLORIDE 9 MG/ML
INJECTION, SOLUTION INTRAVENOUS PRN
Status: DISCONTINUED | OUTPATIENT
Start: 2024-07-24 | End: 2024-07-25 | Stop reason: HOSPADM

## 2024-07-24 RX ORDER — ALPRAZOLAM 0.5 MG/1
0.5 TABLET ORAL 2 TIMES DAILY PRN
Status: DISCONTINUED | OUTPATIENT
Start: 2024-07-24 | End: 2024-07-25 | Stop reason: HOSPADM

## 2024-07-24 RX ORDER — BISACODYL 5 MG/1
5 TABLET, DELAYED RELEASE ORAL DAILY PRN
Status: DISCONTINUED | OUTPATIENT
Start: 2024-07-24 | End: 2024-07-25 | Stop reason: HOSPADM

## 2024-07-24 RX ORDER — ONDANSETRON 4 MG/1
4 TABLET, ORALLY DISINTEGRATING ORAL 3 TIMES DAILY PRN
Status: DISCONTINUED | OUTPATIENT
Start: 2024-07-24 | End: 2024-07-25 | Stop reason: HOSPADM

## 2024-07-24 RX ORDER — DEXMEDETOMIDINE HYDROCHLORIDE 4 UG/ML
.1-1.5 INJECTION, SOLUTION INTRAVENOUS CONTINUOUS
Status: DISCONTINUED | OUTPATIENT
Start: 2024-07-24 | End: 2024-07-25

## 2024-07-24 RX ORDER — LORAZEPAM 2 MG/ML
1 INJECTION INTRAMUSCULAR ONCE
Status: DISCONTINUED | OUTPATIENT
Start: 2024-07-24 | End: 2024-07-25

## 2024-07-24 RX ADMIN — WATER 125 MG: 1 INJECTION INTRAMUSCULAR; INTRAVENOUS; SUBCUTANEOUS at 12:11

## 2024-07-24 RX ADMIN — Medication 0.2 MCG/KG/HR: at 18:10

## 2024-07-24 RX ADMIN — HYDRALAZINE HYDROCHLORIDE 25 MG: 25 TABLET ORAL at 16:02

## 2024-07-24 RX ADMIN — HYDROCODONE BITARTRATE AND ACETAMINOPHEN 1 TABLET: 5; 325 TABLET ORAL at 18:33

## 2024-07-24 RX ADMIN — ACETAMINOPHEN 650 MG: 325 TABLET ORAL at 15:45

## 2024-07-24 RX ADMIN — CYCLOBENZAPRINE 5 MG: 10 TABLET, FILM COATED ORAL at 15:45

## 2024-07-24 RX ADMIN — HYDRALAZINE HYDROCHLORIDE 10 MG: 20 INJECTION INTRAMUSCULAR; INTRAVENOUS at 15:07

## 2024-07-24 RX ADMIN — SODIUM CHLORIDE: 9 INJECTION, SOLUTION INTRAVENOUS at 06:48

## 2024-07-24 RX ADMIN — WATER 2000 MG: 1 INJECTION INTRAMUSCULAR; INTRAVENOUS; SUBCUTANEOUS at 06:49

## 2024-07-24 RX ADMIN — SODIUM CHLORIDE: 9 INJECTION, SOLUTION INTRAVENOUS at 18:08

## 2024-07-24 RX ADMIN — DIPHENHYDRAMINE HYDROCHLORIDE 25 MG: 50 INJECTION INTRAMUSCULAR; INTRAVENOUS at 11:58

## 2024-07-24 RX ADMIN — PRAMIPEXOLE DIHYDROCHLORIDE 1 MG: 1 TABLET ORAL at 15:17

## 2024-07-24 RX ADMIN — ALPRAZOLAM 0.5 MG: 0.5 TABLET ORAL at 12:26

## 2024-07-24 NOTE — OP NOTE
DATE: 07/24/24    PATIENT: Yenny Chandler    MRN: [id number]     Acct: [id number]    PREOP DIAGNOSIS:   Severe aortic stenosis    Postop diagnosis:  Severe aortic stenosis    Procedure:  Transcatheter aortic valve replacement with a [employer] 29 mm Evolut FX prosthesis    OPERATORS:  Ruslan Oreilly MD; Marco Cisse MD    ESTIMATED BLOOD LOSS: 50 ml    A pre-procedure discussion was conducted with the patient to confirm/exclude candidacy for open surgical salvage in case of procedure failure.    After informed consent was obtained from the patient, the patient was brought to the hybrid operating room and prepped in sterile fashion. Infiltration of the bilateral inguinal regions was accomplished with 2% lidocaine. Using micropuncture and modified Seldinger technique, a 6 Fr sheath was inserted into the right internal jugular vein. A 5 Fr pacemaker was inserted into position under fluoroscopic guidance into the right ventricle, with verification of appropriate pacing thresholds.    Using the same technique under fluoroscopic guidance, a 5 Fr sheath was inserted into the right common femoral artery.  Similarly, a 4 Fr sheath, followed after contrast verification by a 5 Fr sheath was inserted into the left common femoral artery. This was replaced with a 6 Fr J4 catheter over a guidewire, and a Supra Core wire was used to traverse the aortic arch.  We then performed standard preclose technique by deploying a Perclose in orthogonal fashion and leaving it incomplete. A 14 Fr sheath was inserted over the wire under guidance. The patient was heparinized to an ACT above 250 seconds.     A straight pigtail was placed via the right femoral artery and aortography was performed in a coplanar view to ensure alignment.  The aortic valve was crossed using a straight wire through an AL1 catheter. The guidewire was changed to a J-tipped wire, on which was inserted an angled pigtail.  Direct hemodynamic measurements were obtained. The

## 2024-07-24 NOTE — PROCEDURES
PROCEDURE NOTE  Date: 7/24/2024   Name: Yenny Chandler  YOB: 1935    Procedures  EKG completed

## 2024-07-24 NOTE — H&P
auscultation    Abdomen: BS present, without HSM, masses, or tenderness    Extremities: without C,C,E.  Pulses 2+ bilaterally  Mental Status: Alert & Oriented        PLANNED PROCEDURE   []Cath  []PCI                []Pacemaker/AICD  []YAMILEX             []Cardioversion []Peripheral angiography/PTA  [x]Other: TAVR     SEDATION  Planned agent:[x]Midazolam []Meperidine [x]Sublimaze []Morphine  []Diazepam  []Other:     ASA Classification:  []1 []2 [x]3 []4 []5  Class 1: A normal healthy patient  Class 2: Pt with mild to moderate systemic disease  Class 3: Severe systemic disease or disturbance  Class 4: Severe systemic disorders that are already life threatening.  Class 5: Moribund pt with little chances of survival, for more than 24 hours.  Mallampati I Airway Classification:   []1 []2 [x]3 []4    [x]Pre-procedure diagnostic studies complete and results available.   Comment:    [x]Previous sedation/anesthesia experiences assessed.   Comment:    [x]The patient is an appropriate candidate to undergo the planned procedure sedation and anesthesia. (Refer to nursing sedation/analgesia documentation record)  [x]Formulation and discussion of sedation/procedure plan, risks, and expectations with patient and/or responsible adult completed.  [x]Patient examined immediately prior to the procedure. (Refer to nursing sedation/analgesia documentation record)    Ruslan Oreilly MD MD   Electronically signed 7/24/2024 at 7:41 AM

## 2024-07-25 ENCOUNTER — TELEPHONE (OUTPATIENT)
Dept: CARDIOLOGY CLINIC | Age: 89
End: 2024-07-25

## 2024-07-25 ENCOUNTER — HOSPITAL ENCOUNTER (INPATIENT)
Age: 89
Discharge: HOME | DRG: 267 | End: 2024-07-27
Attending: INTERNAL MEDICINE
Payer: MEDICARE

## 2024-07-25 ENCOUNTER — APPOINTMENT (OUTPATIENT)
Age: 89
DRG: 267 | End: 2024-07-25
Attending: INTERNAL MEDICINE
Payer: MEDICARE

## 2024-07-25 VITALS
HEIGHT: 61 IN | RESPIRATION RATE: 10 BRPM | SYSTOLIC BLOOD PRESSURE: 143 MMHG | TEMPERATURE: 98.2 F | HEART RATE: 84 BPM | BODY MASS INDEX: 22.89 KG/M2 | OXYGEN SATURATION: 97 % | WEIGHT: 121.25 LBS | DIASTOLIC BLOOD PRESSURE: 60 MMHG

## 2024-07-25 DIAGNOSIS — I45.4 BUNDLE BRANCH BLOCK: ICD-10-CM

## 2024-07-25 DIAGNOSIS — Z95.2 S/P TAVR (TRANSCATHETER AORTIC VALVE REPLACEMENT): ICD-10-CM

## 2024-07-25 DIAGNOSIS — I45.5 OTHER SPECIFIED HEART BLOCK: ICD-10-CM

## 2024-07-25 DIAGNOSIS — Z95.2 S/P TAVR (TRANSCATHETER AORTIC VALVE REPLACEMENT): Primary | ICD-10-CM

## 2024-07-25 DIAGNOSIS — I35.0 SEVERE AORTIC STENOSIS: Primary | ICD-10-CM

## 2024-07-25 LAB
ANION GAP SERPL CALC-SCNC: 11 MEQ/L (ref 8–16)
APTT PPP: 31 SECONDS (ref 22–38)
BUN SERPL-MCNC: 17 MG/DL (ref 7–22)
CALCIUM SERPL-MCNC: 9.2 MG/DL (ref 8.5–10.5)
CHLORIDE SERPL-SCNC: 103 MEQ/L (ref 98–111)
CO2 SERPL-SCNC: 22 MEQ/L (ref 23–33)
CREAT SERPL-MCNC: 0.7 MG/DL (ref 0.4–1.2)
DEPRECATED RDW RBC AUTO: 45.1 FL (ref 35–45)
ECHO AO ASC DIAM: 4.1 CM
ECHO AO ASCENDING AORTA INDEX: 2.68 CM/M2
ECHO AV AREA PEAK VELOCITY: 1.9 CM2
ECHO AV AREA VTI: 2 CM2
ECHO AV AREA/BSA PEAK VELOCITY: 1.2 CM2/M2
ECHO AV AREA/BSA VTI: 1.3 CM2/M2
ECHO AV MEAN GRADIENT: 9 MMHG
ECHO AV MEAN VELOCITY: 1.4 M/S
ECHO AV PEAK GRADIENT: 19 MMHG
ECHO AV PEAK VELOCITY: 2.2 M/S
ECHO AV VELOCITY RATIO: 0.68
ECHO AV VTI: 39.5 CM
ECHO BSA: 1.56 M2
ECHO BSA: 1.56 M2
ECHO EST RA PRESSURE: 5 MMHG
ECHO LA AREA 2C: 22.7 CM2
ECHO LA AREA 4C: 25.3 CM2
ECHO LA DIAMETER INDEX: 1.96 CM/M2
ECHO LA DIAMETER: 3 CM
ECHO LA MAJOR AXIS: 6.2 CM
ECHO LA MINOR AXIS: 6 CM
ECHO LA VOL BP: 78 ML (ref 22–52)
ECHO LA VOL MOD A2C: 70 ML (ref 22–52)
ECHO LA VOL MOD A4C: 84 ML (ref 22–52)
ECHO LA VOL/BSA BIPLANE: 51 ML/M2 (ref 16–34)
ECHO LA VOLUME INDEX MOD A2C: 46 ML/M2 (ref 16–34)
ECHO LA VOLUME INDEX MOD A4C: 55 ML/M2 (ref 16–34)
ECHO LV E' LATERAL VELOCITY: 7 CM/S
ECHO LV E' SEPTAL VELOCITY: 8 CM/S
ECHO LV FRACTIONAL SHORTENING: 43 % (ref 28–44)
ECHO LV INTERNAL DIMENSION DIASTOLE INDEX: 2.29 CM/M2
ECHO LV INTERNAL DIMENSION DIASTOLIC: 3.5 CM (ref 3.9–5.3)
ECHO LV INTERNAL DIMENSION SYSTOLIC INDEX: 1.31 CM/M2
ECHO LV INTERNAL DIMENSION SYSTOLIC: 2 CM
ECHO LV ISOVOLUMETRIC RELAXATION TIME (IVRT): 95 MS
ECHO LV IVSD: 1.2 CM (ref 0.6–0.9)
ECHO LV MASS 2D: 135.8 G (ref 67–162)
ECHO LV MASS INDEX 2D: 88.8 G/M2 (ref 43–95)
ECHO LV POSTERIOR WALL DIASTOLIC: 1.2 CM (ref 0.6–0.9)
ECHO LV RELATIVE WALL THICKNESS RATIO: 0.69
ECHO LVOT AREA: 2.8 CM2
ECHO LVOT AV VTI INDEX: 0.7
ECHO LVOT DIAM: 1.9 CM
ECHO LVOT MEAN GRADIENT: 6 MMHG
ECHO LVOT PEAK GRADIENT: 9 MMHG
ECHO LVOT PEAK VELOCITY: 1.5 M/S
ECHO LVOT STROKE VOLUME INDEX: 51.3 ML/M2
ECHO LVOT SV: 78.5 ML
ECHO LVOT VTI: 27.7 CM
ECHO MV A VELOCITY: 1.36 M/S
ECHO MV E DECELERATION TIME (DT): 326 MS
ECHO MV E VELOCITY: 0.87 M/S
ECHO MV E/A RATIO: 0.64
ECHO MV E/E' LATERAL: 12.43
ECHO MV E/E' RATIO (AVERAGED): 11.65
ECHO MV E/E' SEPTAL: 10.88
ECHO PV MAX VELOCITY: 0.8 M/S
ECHO PV PEAK GRADIENT: 3 MMHG
ECHO RIGHT VENTRICULAR SYSTOLIC PRESSURE (RVSP): 37 MMHG
ECHO RV FREE WALL PEAK S': 17 CM/S
ECHO RV INTERNAL DIMENSION: 3.1 CM
ECHO RV TAPSE: 2.4 CM (ref 1.7–?)
ECHO TV E WAVE: 0.4 M/S
ECHO TV REGURGITANT MAX VELOCITY: 2.84 M/S
ECHO TV REGURGITANT PEAK GRADIENT: 32 MMHG
EKG ATRIAL RATE: 65 BPM
EKG P AXIS: 68 DEGREES
EKG P-R INTERVAL: 204 MS
EKG Q-T INTERVAL: 440 MS
EKG QRS DURATION: 142 MS
EKG QTC CALCULATION (BAZETT): 457 MS
EKG R AXIS: -3 DEGREES
EKG T AXIS: 8 DEGREES
EKG VENTRICULAR RATE: 65 BPM
ERYTHROCYTE [DISTWIDTH] IN BLOOD BY AUTOMATED COUNT: 13.5 % (ref 11.5–14.5)
GFR SERPL CREATININE-BSD FRML MDRD: 83 ML/MIN/1.73M2
GLUCOSE SERPL-MCNC: 120 MG/DL (ref 70–108)
HCT VFR BLD AUTO: 34.2 % (ref 37–47)
HGB BLD-MCNC: 11.3 GM/DL (ref 12–16)
INR PPP: 1.04 (ref 0.85–1.13)
MCH RBC QN AUTO: 30.2 PG (ref 26–33)
MCHC RBC AUTO-ENTMCNC: 33 GM/DL (ref 32.2–35.5)
MCV RBC AUTO: 91.4 FL (ref 81–99)
PLATELET # BLD AUTO: 141 THOU/MM3 (ref 130–400)
PMV BLD AUTO: 10.8 FL (ref 9.4–12.4)
POTASSIUM SERPL-SCNC: 4.4 MEQ/L (ref 3.5–5.2)
RBC # BLD AUTO: 3.74 MILL/MM3 (ref 4.2–5.4)
SODIUM SERPL-SCNC: 136 MEQ/L (ref 135–145)
WBC # BLD AUTO: 6.3 THOU/MM3 (ref 4.8–10.8)

## 2024-07-25 PROCEDURE — 80048 BASIC METABOLIC PNL TOTAL CA: CPT

## 2024-07-25 PROCEDURE — 97166 OT EVAL MOD COMPLEX 45 MIN: CPT

## 2024-07-25 PROCEDURE — 99291 CRITICAL CARE FIRST HOUR: CPT | Performed by: INTERNAL MEDICINE

## 2024-07-25 PROCEDURE — 93005 ELECTROCARDIOGRAM TRACING: CPT | Performed by: INTERNAL MEDICINE

## 2024-07-25 PROCEDURE — 93306 TTE W/DOPPLER COMPLETE: CPT

## 2024-07-25 PROCEDURE — 93306 TTE W/DOPPLER COMPLETE: CPT | Performed by: INTERNAL MEDICINE

## 2024-07-25 PROCEDURE — 85730 THROMBOPLASTIN TIME PARTIAL: CPT

## 2024-07-25 PROCEDURE — 85610 PROTHROMBIN TIME: CPT

## 2024-07-25 PROCEDURE — 97530 THERAPEUTIC ACTIVITIES: CPT

## 2024-07-25 PROCEDURE — 6370000000 HC RX 637 (ALT 250 FOR IP): Performed by: NURSE PRACTITIONER

## 2024-07-25 PROCEDURE — 85027 COMPLETE CBC AUTOMATED: CPT

## 2024-07-25 PROCEDURE — 2580000003 HC RX 258: Performed by: INTERNAL MEDICINE

## 2024-07-25 PROCEDURE — 93270 REMOTE 30 DAY ECG REV/REPORT: CPT

## 2024-07-25 PROCEDURE — 36415 COLL VENOUS BLD VENIPUNCTURE: CPT

## 2024-07-25 PROCEDURE — 97162 PT EVAL MOD COMPLEX 30 MIN: CPT

## 2024-07-25 PROCEDURE — 6370000000 HC RX 637 (ALT 250 FOR IP): Performed by: INTERNAL MEDICINE

## 2024-07-25 RX ORDER — PRAMIPEXOLE DIHYDROCHLORIDE 1 MG/1
1 TABLET ORAL NIGHTLY
Qty: 90 TABLET | Refills: 3 | Status: SHIPPED | OUTPATIENT
Start: 2024-07-25

## 2024-07-25 RX ORDER — CYCLOBENZAPRINE HCL 5 MG
5 TABLET ORAL EVERY 8 HOURS PRN
Qty: 30 TABLET | Refills: 0 | Status: SHIPPED | OUTPATIENT
Start: 2024-07-25 | End: 2024-08-04

## 2024-07-25 RX ADMIN — HYDRALAZINE HYDROCHLORIDE 25 MG: 25 TABLET ORAL at 05:24

## 2024-07-25 RX ADMIN — SODIUM CHLORIDE, PRESERVATIVE FREE 10 ML: 5 INJECTION INTRAVENOUS at 08:17

## 2024-07-25 RX ADMIN — METHENAMINE HIPPURATE 1 G: 1 TABLET ORAL at 08:16

## 2024-07-25 RX ADMIN — LANSOPRAZOLE 15 MG: 15 TABLET, ORALLY DISINTEGRATING, DELAYED RELEASE ORAL at 08:16

## 2024-07-25 RX ADMIN — SODIUM CHLORIDE: 9 INJECTION, SOLUTION INTRAVENOUS at 04:36

## 2024-07-25 RX ADMIN — ASPIRIN 81 MG: 81 TABLET, COATED ORAL at 08:16

## 2024-07-25 RX ADMIN — OXYCODONE HYDROCHLORIDE AND ACETAMINOPHEN 1 TABLET: 5; 325 TABLET ORAL at 08:16

## 2024-07-25 NOTE — CARE COORDINATION
7/25/24, 1:15 PM EDT    Patient goals/plan/ treatment preferences discussed by  and .  Patient goals/plan/ treatment preferences reviewed with patient/ family.  Patient/ family verbalize understanding of discharge plan and are in agreement with goal/plan/treatment preferences.  Understanding was demonstrated using the teach back method.  AVS provided by RN at time of discharge, which includes all necessary medical information pertaining to the patients current course of illness, treatment, post-discharge goals of care, and treatment preferences.     Services At/After Discharge: none         
Pt transferred to ICU, handoff report given to Radha, ICU CM.    Electronically signed by Caitlin Mcdaniels RN on 7/25/2024 at 7:34 AM    
(Comment)  (HF clinic and cardiac rehab)   Social/Functional History   Active  Yes   Discharge Planning   Type of Residence House   Living Arrangements Alone   Current Services Prior To Admission Durable Medical Equipment   Current DME Prior to Arrival Walker  (has but does not use)   Potential Assistance Needed N/A   DME Ordered? No   Potential Assistance Purchasing Medications No   Type of Home Care Services None   Patient expects to be discharged to: House   Services At/After Discharge   Confirm Follow Up Transport Family   Condition of Participation: Discharge Planning   The Plan for Transition of Care is related to the following treatment goals: \"recover from TAVR and get home\"

## 2024-07-25 NOTE — DISCHARGE SUMMARY
Ventricle Right ventricle size is normal. Normal wall thickness. Normal systolic function.   Right Atrium Right atrium size is normal.   Aortic Valve Appropriately positioned transcatheter bioprosthetic valve that is well-seated. AV mean gradient is 9 mmHg. No regurgitation. No stenosis. AV mean gradient is 9 mmHg. AV peak gradient is 19 mmHg. AV peak velocity is 2.2 m/s. AV area by continuity VTI is 2.0 cm2.   Mitral Valve Valve structure is normal. No regurgitation. No stenosis noted.   Tricuspid Valve Valve structure is normal. Trace regurgitation. The estimated RVSP is 37 mmHg. No stenosis noted. The estimated RVSP is 37 mmHg.   Pulmonic Valve The pulmonic valve visualization is suboptimal but appears to be functioning normally. Trace regurgitation. No stenosis noted.   Aorta Dilated ascending aorta. Ao ascending diameter is 4.1 cm.   IVC/Hepatic Veins IVC diameter is less than or equal to 21 mm and decreases greater than 50% during inspiration; therefore the estimated right atrial pressure is normal (~3 mmHg).   Pericardium The pericardium is normal. No pericardial effusion.     Hospital Course: Mrs. Chandler was admitted for elective TAVR. She underwent successful TAVR (Evolut FX 29) on 7/24/2024. She remained hemodynamically stable post procedure but suffered from extreme back muscle spasms due to her long-standing chronic back pain and muscle spasms associated with her scoliosis. Her routine pain medications and muscle relaxants were administered with suboptimal effect. She was transferred to the ICU for the ability to increase her comfort with the addition of Precedex. Yenny remained stable OVN and Precedex infusion was utilized for a few hours at low dose with continued pain medications and muscle relaxants. She rested well and her condition was greatly improved by morning. Her Hgb and renal function remained stable post procedure.  No rhythm issues post-procedure and the temporary pacemaker was removed

## 2024-07-25 NOTE — PLAN OF CARE
Problem: Discharge Planning  Goal: Discharge to home or other facility with appropriate resources  Outcome: Progressing     Problem: Safety - Adult  Goal: Free from fall injury  Outcome: Progressing     Problem: Pain  Goal: Verbalizes/displays adequate comfort level or baseline comfort level  Outcome: Progressing     Problem: Cardiovascular - Adult  Goal: Maintains optimal cardiac output and hemodynamic stability  Outcome: Progressing  Goal: Absence of cardiac dysrhythmias or at baseline  Outcome: Progressing     Problem: Skin/Tissue Integrity - Adult  Goal: Skin integrity remains intact  Outcome: Progressing  Goal: Incisions, wounds, or drain sites healing without S/S of infection  Outcome: Progressing     Problem: Hematologic - Adult  Goal: Maintains hematologic stability  Outcome: Progressing     Problem: Musculoskeletal - Adult  Goal: Return mobility to safest level of function  Outcome: Progressing  Goal: Return ADL status to a safe level of function  Outcome: Progressing

## 2024-07-25 NOTE — DISCHARGE INSTRUCTIONS
Post Transcatheter Aortic Valve Replacement (TAVR) Discharge Instructions    Your follow-up appointments and echocardiogram will be scheduled by Dr. Oreilly's office and their office staff and will call you with the date and time.  We are here for you! If you have any questions, please call:   Fiordaliza DE LEÓN, [phone]    Do you have the help you need at home? Someone must be with you for the first 7 days or until you are seen in the office post TAVR.    Do not take the Metoprolol until after you have been seen in the office for your 1 week follow-up.     Wear the event monitor for 30 days as prescribed to assess for any issues with heart rate and rhythm. If you would be experiencing lightheadedness, dizziness, or just not feeling as well as you would expect then call the office.     ACTIVITY:  Weigh yourself every day in the morning after using the bathroom.    NO DRIVING UNTIL YOU RETURN TO THE DOCTOR'S OFFICE.  You may ride in a car.   Do not lift more than five to ten pounds for the first week (A gallon of milk is 7 lbs)  Get up and get dressed every day. Do not stay in bed. Set a daily routine. This is an important step in re-gaining your strength.  You may walk up and down a few stairs.   Walk as much as you can.  This will help facilitate the healing process.  Plan rest periods during the day.  Deep breathe and use your incentive spirometer 10 times every hour while you are awake.   Avoid work that increases muscle tension (straining with bowel movements, moving furniture)      WOUND CARE:  Remove Band-Aids after 48 hours of placement.  May shower once Band-Aids are removed. No bathtubs, pools, or hot tubs for the first week you are home.   Cleanse wounds with Hibiclens soap and water. Pat incision dry. Keep wounds open to air after Band-Aids are removed and keep dry.   A small amount of bloody or clear drainage is normal. Call if there is any extensive bruising, oozing or hematoma.  Place manual

## 2024-07-25 NOTE — PROCEDURES
PROCEDURE NOTE  Date: 7/25/2024   Name: Yenny Chandler  YOB: 1935    Procedures  12 lead EKG completed. Results handed to Neha LEUNG.

## 2024-07-25 NOTE — PROGRESS NOTES
0600 Pt admitted to  2E09 ambulatory for TAVR.  Pt NPO. Patient accompanied by 2 sons.   Vital signs obtained.  Assessment and data collection initiated.   Oriented to room.   Policies and procedures for 2E explained   All questions answered with no further questions at this time.   Fall prevention and safety precautions discussed with patient.   0700 Optiview coccyx pad applied for preventive   Data collection and medication review per L [place] LPN   0750 last doses of home medications reviewed with Dr Oreilly    to procedure per bed  Family will bring pt personal belongings to 3B 24 post procedure     
Attempted PAT call, no answer.   
Inpatient Cardiac Rehabilitation Consult    Received consult for Phase II Cardiac Rehabilitation.  Patient needs cardiac rehab due to TAVR on 7/24/24.  Importance of Cardiac Rehab discussed with patient.  Reviewed cardiac rehab class times.  Patient questions answered.  We will contact patient at home to schedule evaluation appointment.    Cardiac Rehab brochure given.          
Patient returned call, states no questions, states office has given her instructions.   
Patient transferred to 4D7 with all belongings and family present at bedside. Report given to Estela LEUNG.   
[facility]  INPATIENT OCCUPATIONAL THERAPY  STR ICU 4D  EVALUATION    Time:   Time In: 09  Time Out: 1026  Timed Code Treatment Minutes: 31 Minutes  Minutes: 39          Date: 2024  Patient Name: Yenny Chandler,   Gender: female      MRN: [id number]  : 1935  (89 y.o.)  Referring Practitioner: Jesse Correia MD  Diagnosis: Severe aortic stenosis  Additional Pertinent Hx: Per EMR: \"Patient was admitted  due to chest pain while in a vehicle she was found to have severe symptomatic aortic stenosis and underwent preop workup for TAVR with valve area 0.88 cm².  She presented to the hospital today for a planned TAVR with Dr. Oreilly.  Postop the patient was sent back to cardiac stepdown where the patient was restless, itching, back pain.  She received Solu-Medrol, Xanax and her Mirapex for her RLS without improvement.  During this time -200.  The patient was subsequently transferred to the ICU for Precedex.\"Pt underwent TAVR on  for severe aortic stenosis    Restrictions/Precautions:       Subjective  Chart Reviewed: Yes, Orders, Progress Notes, Operative Notes  Patient assessed for rehabilitation services?: Yes  Family / Caregiver Present: Yes (pt's son)    Subjective: RN approved therapy. Upon OT arrival, pt was lying in bed and agreeable to therapy. Pt demonstrates a pleasant demeanor throughout session and is very motivated to complete her cardiac rehab exercises and return home.     Pain: Pt does not report    Vitals: Orthostatic Blood Pressure: Supine: 112/61 (74) , Sittin/73 (93)  Oxygen: % on RA  Heart Rate: 82 BPM at rest, low 90s with activity.    Social/Functional History:  Lives With: Alone  Type of Home: House  Home Layout: One level, Laundry in basement  Home Access: Stairs to enter without rails  Entrance Stairs - Number of Steps: 2 DEEPIKA  Home Equipment: Walker - Rolling, Walker - 4-Wheeled   Bathroom Shower/Tub: Tub/Shower unit  Bathroom Toilet: Handicap 
[facility]  INPATIENT PHYSICAL THERAPY  EVALUATION  [facility] ICU 4D - 4D-07/007-A    Time In: 1409  Time Out: 1423  Timed Code Treatment Minutes: 5 Minutes  Minutes: 14          Date: 2024  Patient Name: Yenny Chandler,  Gender:  female        MRN: [id number]  : 1935  (89 y.o.)      Referring Practitioner: Glenn Granados RN  Diagnosis: Severe aortic stenosis  Additional Pertinent Hx: Per EMR: \"Patient was admitted  due to chest pain while in a vehicle she was found to have severe symptomatic aortic stenosis and underwent preop workup for TAVR with valve area 0.88 cm².  She presented to the hospital today for a planned TAVR with Dr. Oreilly.  Postop the patient was sent back to cardiac stepdown where the patient was restless, itching, back pain.  She received Solu-Medrol, Xanax and her Mirapex for her RLS without improvement.  During this time -200.  The patient was subsequently transferred to the ICU for Precedex.\"Pt underwent TAVR on  for severe aortic stenosis\"     Restrictions/Precautions:  Restrictions/Precautions: General Precautions, Fall Risk    Subjective:  Chart Reviewed: Yes  Patient assessed for rehabilitation services?: Yes  Family / Caregiver Present: Yes  Subjective: Pt OK to see per nursing. Pt agreeable and cooperative to therapy. Son and daughter present during session.    General:  Overall Orientation Status: Within Normal Limits  Orientation Level: Oriented X4  Overall Cognitive Status: WNL  Vision: Impaired  Hearing: Within functional limits       Pain: No pain reported    Vitals: Heart Rate: 87 in chair, 88 end of session  Respiratory Rate: 23 in chair, 18 end of session    Social/Functional History:    Lives With: Alone  Type of Home: House  Home Layout: One level, Laundry in basement  Home Access: Stairs to enter with rails  Entrance Stairs - Number of Steps: 2 DEEPIKA  Entrance Stairs - Rails: Right (R grab bar)  Home Equipment: Walker - Rolling, Walker - 
To 3B at 1530 hours to assess patient. Writer received call from TAVR nurse coordinatory Fiordaliza DE LEÓN regarding patient very restless; periods of itching, back pain - unable initially to give adequate account of what she was feeling. Treated with Solumedrol, Xanax and her routine Mirapex. No improvement. Reportedly pulling at line site dressings and pacer site. HR and rhythm stable. Found sitting at side of bed with staff and family at bedside. Lidoderm patch to R shoulder blade. Requesting back rub. Back rub administered with some relief. Assisted to chair at bedside. Tolerated well. Still with spasms and inability to relax. Warm blanket to shoulder blade/back. Bilateral groin sites without bleeding, swelling or hematoma. R IJ site without bleeding or swelling; pacer intact.   1615 hours; patient somewhat calmer - then will begin to cry out with spasms again. Has history of scoliosis with severe muscle spasms. Has been HTN with the pain. /100 - now into 180/90 after Hydralazine 10 mg IV x 1. PO Hydralazine 25 mg q8h started. Flexeril 5 mg po administered at 1545 hours. Patient may benefit from Precedex infusion to help keep her calm. Was extremely restless and pulling at lines at intervals.   Discussed with FRANCIS Vargas CNP in ICU - agreeable to transfer of patient. Dr. Oreilly updated. Family at bedside - updated and agreeable to plan of care.   Deisy Fritz, ZACHARY - CNP    
history of restless leg syndrome that was well-controlled with ropinirole.  No other complaints otherwise.  Asking when she can eat.  No dietary restriction per cardiology team.  Has not passed a bowel movement yet, is urinating appropriately.      Past Medical History: AS, CAD, GERD, hyperlipidemia, hypertension..  Family History: Mother: Alzheimer's, heart disease.  Father: Arthritis.  Sister 1: Heart disease.  Sister 2: Hemorrhagic stroke, heart disease.  Brother 1: Esophageal cancer, heart disease.  Brother 2: Heart disease..  Social History: History of smoking, unclear chronology..    ROS   Denies headache, fever, chills, SOB, new onset chest pain, abdominal pain, N/V/D, difficulty voiding, extremity pain, swelling, numbness/tingling.    Scheduled Meds:   chlorhexidine gluconate   Topical Once    sodium chloride flush  5-40 mL IntraVENous 2 times per day    aspirin  81 mg Oral Daily    pramipexole  1 mg Oral Nightly    methenamine  1 g Oral BID WC    lansoprazole  15 mg Oral Daily    hydrALAZINE  25 mg Oral 3 times per day     Continuous Infusions:   sodium chloride 50 mL/hr at 07/25/24 0526    sodium chloride 50 mL/hr at 07/25/24 0439       PHYSICAL EXAMINATION:  T: 97.7.  P:75 RR:  17 B/P:  155/77.  FiO2:  RA. O2 Sat:  99%.  I/O: 1209.3/850.  Body mass index is 22.91 kg/m².   GCS:   15    General:   Well-appearing female in postop setting.  HEENT:  normocephalic and atraumatic.  No scleral icterus. PERR  Neck: supple.  No Thyromegaly.  Lungs: clear to auscultation.  No retractions  Cardiac: RRR.  No JVD.  Abdomen: soft.  Nontender.  Extremities:  No clubbing, cyanosis, or edema x 4.    Vasculature: capillary refill < 3 seconds. Palpable dorsalis pedis pulses.  Skin:  warm and dry.  Psych:  Alert and oriented x3.  Affect appropriate  Lymph:  No supraclavicular adenopathy.  Neurologic:  No focal deficit. No seizures.    Data: (All radiographs, tracings, PFTs, and imaging are personally viewed and interpreted

## 2024-07-27 PROBLEM — Z95.2 HISTORY OF TRANSCATHETER AORTIC VALVE REPLACEMENT (TAVR): Status: ACTIVE | Noted: 2024-07-27

## 2024-07-29 NOTE — PATIENT INSTRUCTIONS
You may receive a survey regarding the care you received during your visit.  Your input is valuable to us.  We encourage you to complete and return your survey.  We hope you will choose us in the future for your healthcare needs.    Thank you for choosing [facility]!    Your Medical Assistant Today:  Terri FRAZIER   Your Provider for Today: Jun Mendoza PA-C, Ph. [phone]    Have a Great Day!         RESTART the Metoprolol

## 2024-07-30 ENCOUNTER — OFFICE VISIT (OUTPATIENT)
Dept: CARDIOTHORACIC SURGERY | Age: 89
End: 2024-07-30
Payer: MEDICARE

## 2024-07-30 ENCOUNTER — TELEPHONE (OUTPATIENT)
Dept: CARDIAC REHAB | Age: 89
End: 2024-07-30

## 2024-07-30 VITALS
SYSTOLIC BLOOD PRESSURE: 162 MMHG | HEART RATE: 86 BPM | DIASTOLIC BLOOD PRESSURE: 98 MMHG | BODY MASS INDEX: 23.56 KG/M2 | HEIGHT: 61 IN | OXYGEN SATURATION: 98 % | WEIGHT: 124.8 LBS

## 2024-07-30 DIAGNOSIS — Z95.2 S/P TAVR (TRANSCATHETER AORTIC VALVE REPLACEMENT): Primary | ICD-10-CM

## 2024-07-30 PROCEDURE — G8420 CALC BMI NORM PARAMETERS: HCPCS | Performed by: PHYSICIAN ASSISTANT

## 2024-07-30 PROCEDURE — G8427 DOCREV CUR MEDS BY ELIG CLIN: HCPCS | Performed by: PHYSICIAN ASSISTANT

## 2024-07-30 PROCEDURE — 99214 OFFICE O/P EST MOD 30 MIN: CPT | Performed by: PHYSICIAN ASSISTANT

## 2024-07-30 PROCEDURE — 1111F DSCHRG MED/CURRENT MED MERGE: CPT | Performed by: PHYSICIAN ASSISTANT

## 2024-07-30 PROCEDURE — 1036F TOBACCO NON-USER: CPT | Performed by: PHYSICIAN ASSISTANT

## 2024-07-30 PROCEDURE — 1123F ACP DISCUSS/DSCN MKR DOCD: CPT | Performed by: PHYSICIAN ASSISTANT

## 2024-07-30 PROCEDURE — 1090F PRES/ABSN URINE INCON ASSESS: CPT | Performed by: PHYSICIAN ASSISTANT

## 2024-07-30 RX ORDER — TRAMADOL HYDROCHLORIDE 50 MG/1
50 TABLET ORAL EVERY 6 HOURS PRN
COMMUNITY

## 2024-07-30 RX ORDER — ACETAMINOPHEN 500 MG
500 TABLET ORAL EVERY 6 HOURS PRN
COMMUNITY

## 2024-07-30 NOTE — PROGRESS NOTES
Patient states that when she lays down her \"chest doesn't feel right\" possibly described as heavy   Feels like her heart is racing , hard heartbeat  BP running high     Patient is also having backpain when experiencing these sensations .   Backpain is not new    
    acetaminophen (TYLENOL) 500 MG tablet, Take 1 tablet by mouth every 6 hours as needed for Pain, Disp: , Rfl:     traMADol (ULTRAM) 50 MG tablet, Take 1 tablet by mouth every 6 hours as needed for Pain. Max Daily Amount: 200 mg, Disp: , Rfl:     pramipexole (MIRAPEX) 1 MG tablet, Take 1 tablet by mouth nightly, Disp: 90 tablet, Rfl: 3    lansoprazole (GOODSENSE LANSOPRAZOLE) 15 MG disintegrating tablet, Take 1 tablet by mouth daily, Disp: , Rfl:     Magnesium Glycinate 120 MG CAPS, Take 120 mg by mouth daily, Disp: , Rfl:     Lidocaine 1.8 % PTCH, Apply topically, Disp: , Rfl:     PERCOCET 5-325 MG per tablet, 1 tablet Orally every 6 hrs for 7 days, Disp: , Rfl:     methenamine (HIPREX) 1 g tablet, Take 1 tablet by mouth 2 times daily (with meals), Disp: 180 tablet, Rfl: 3    ibuprofen (ADVIL;MOTRIN) 800 MG tablet, Take 1 tablet by mouth 2 times daily as needed for Pain, Disp: 180 tablet, Rfl: 0    Cholecalciferol (VITAMIN D-3) 5000 UNIT/ML LIQD, Place 1 mL under the tongue daily, Disp: , Rfl:     Polyethylene Glycol 3350 (MIRALAX PO), Take by mouth as needed, Disp: , Rfl:     aspirin 81 MG EC tablet, Take 1 tablet by mouth daily, Disp: , Rfl:     [START ON 8/1/2024] metoprolol tartrate (LOPRESSOR) 25 MG tablet, Take 1 tablet by mouth 2 times daily Do not take this medication until you have been seen in the office for your 1 week follow-up TAVR appointment. (Patient not taking: Reported on 7/30/2024), Disp: 180 tablet, Rfl: 3    cyclobenzaprine (FLEXERIL) 5 MG tablet, Take 1 tablet by mouth every 8 hours as needed for Muscle spasms (Patient not taking: Reported on 7/30/2024), Disp: 30 tablet, Rfl: 0    ondansetron (ZOFRAN-ODT) 4 MG disintegrating tablet, Take 1 tablet by mouth 3 times daily as needed for Nausea or Vomiting (Patient not taking: Reported on 7/30/2024), Disp: 21 tablet, Rfl: 0    conjugated estrogens (PREMARIN) 0.625 MG/GM vaginal cream, Place 0.5 grams vaginally twice weekly., Disp: 1 Tube, Rfl:

## 2024-08-06 ENCOUNTER — OFFICE VISIT (OUTPATIENT)
Dept: CARDIOLOGY CLINIC | Age: 89
End: 2024-08-06
Payer: MEDICARE

## 2024-08-06 VITALS
BODY MASS INDEX: 24.51 KG/M2 | OXYGEN SATURATION: 99 % | HEIGHT: 61 IN | SYSTOLIC BLOOD PRESSURE: 178 MMHG | WEIGHT: 129.8 LBS | DIASTOLIC BLOOD PRESSURE: 92 MMHG | HEART RATE: 71 BPM

## 2024-08-06 DIAGNOSIS — I50.32 CHRONIC HEART FAILURE WITH PRESERVED EJECTION FRACTION (HCC): ICD-10-CM

## 2024-08-06 DIAGNOSIS — I10 ESSENTIAL HYPERTENSION: ICD-10-CM

## 2024-08-06 DIAGNOSIS — Z95.2 S/P TAVR (TRANSCATHETER AORTIC VALVE REPLACEMENT): Primary | ICD-10-CM

## 2024-08-06 PROCEDURE — 99214 OFFICE O/P EST MOD 30 MIN: CPT | Performed by: NURSE PRACTITIONER

## 2024-08-06 PROCEDURE — 1036F TOBACCO NON-USER: CPT | Performed by: NURSE PRACTITIONER

## 2024-08-06 PROCEDURE — 1123F ACP DISCUSS/DSCN MKR DOCD: CPT | Performed by: NURSE PRACTITIONER

## 2024-08-06 PROCEDURE — 1111F DSCHRG MED/CURRENT MED MERGE: CPT | Performed by: NURSE PRACTITIONER

## 2024-08-06 PROCEDURE — G8428 CUR MEDS NOT DOCUMENT: HCPCS | Performed by: NURSE PRACTITIONER

## 2024-08-06 PROCEDURE — G8420 CALC BMI NORM PARAMETERS: HCPCS | Performed by: NURSE PRACTITIONER

## 2024-08-06 PROCEDURE — 1090F PRES/ABSN URINE INCON ASSESS: CPT | Performed by: NURSE PRACTITIONER

## 2024-08-06 ASSESSMENT — ENCOUNTER SYMPTOMS
SHORTNESS OF BREATH: 0
ABDOMINAL DISTENTION: 0
COUGH: 0

## 2024-08-06 NOTE — PATIENT INSTRUCTIONS
You may receive a survey regarding the care you received during your visit.  Your input is valuable to us.  We encourage you to complete and return your survey.  We hope you will choose us in the future for your healthcare needs.    Your nurses today were Marycarmen.  Office hours:   Mon-Thurs 8-4:30  Friday 8-12  Phone: [phone]    Continue:  Continue current medications  Daily weights and record  Fluid restriction of 2 Liters per day  Limit sodium in diet to around 5449-8826 mg/day  Monitor BP  Activity as tolerated     Call the [facility] for any of the following symptoms:   Weight gain of 3 pounds in 1 day or 5 pounds in 1 week  Increased shortness of breath  Shortness of breath while laying down  Cough  Chest pain  Swelling in feet, ankles or legs  Bloating in abdomen  Fatigue          Start Checking BP 2-3 hr after meds.  Bring BP readings to next OV.    Get labs w/ ECHO in several weeks.     Ok to use Lasix AS NEEDED for increased swelling, weight gain.

## 2024-08-06 NOTE — PROGRESS NOTES
103 07/25/2024 03:13 AM    CO2 22 07/25/2024 03:13 AM    BUN 17 07/25/2024 03:13 AM    CREATININE 0.7 07/25/2024 03:13 AM    AGRATIO 2.0 11/01/2014 09:45 AM    LABGLOM 83 07/25/2024 03:13 AM    LABGLOM 70 04/15/2024 08:34 AM    GLUCOSE 120 07/25/2024 03:13 AM    GLUCOSE 86 11/01/2014 09:45 AM    CALCIUM 9.2 07/25/2024 03:13 AM     Hepatic Function Panel:    Lab Results   Component Value Date/Time    ALKPHOS 73 07/24/2024 06:30 AM    ALT 9 07/24/2024 06:30 AM    AST 15 07/24/2024 06:30 AM    BILITOT 0.3 07/24/2024 06:30 AM    BILIDIR <0.2 06/05/2024 12:15 PM     Magnesium:    Lab Results   Component Value Date/Time    MG 1.9 06/05/2024 05:32 PM     PT/INR:    Lab Results   Component Value Date/Time    INR 1.04 07/25/2024 03:13 AM     Lipids:    Lab Results   Component Value Date/Time    TRIG 188 04/15/2024 08:34 AM    HDL 49 04/15/2024 08:34 AM       ASSESSMENT AND PLAN:      Diagnosis Orders   1. S/P TAVR (transcatheter aortic valve replacement)        2. Chronic heart failure with preserved ejection fraction (HCC)        3. Essential hypertension          Continue:  GDMT:   ACE/ARB/ARNI - no   BB - Lopressor 25 BID   SGLT2 -  no  AA - no  Diuretic - Lasix stopped IP - ok utilize once daily PRN  Vasodilator - no  Other - no    HFpEF, NYHA II   ECHO 6/2024 - EF 55-60%, RVSP 37mmHg    7/2024 - post TAVR - EF 65%, diastolic dysfunction, RVSP 37  No fluid on exam  Not on Lasix  Severe AS s/p TAVR   Post ECHO reviewed - valve looks good, no paravalvular leak   30 day f/u ECHO in 2 weeks   BMP, CBC in 2 weeks.     F/U w/ Oreilly/Deisy in 2 weeks.   HTN - little elevated today. Had meds about an hour ago - start checking BP 2-3 hr after meds = bring readings to next OV.  May need higher dose Metoprolol.   Check BP this afternoon - call if still in 170s.   Has Holter currently (30 day)    Stable, appears Euvolemic  Lab reviewed - stable  BP/HR stable   No med changes today   Continue diet/fluid adherence  Continue daily

## 2024-08-22 ENCOUNTER — APPOINTMENT (OUTPATIENT)
Dept: GENERAL RADIOLOGY | Age: 89
End: 2024-08-22
Payer: MEDICARE

## 2024-08-22 ENCOUNTER — HOSPITAL ENCOUNTER (OUTPATIENT)
Dept: CARDIAC REHAB | Age: 89
Setting detail: THERAPIES SERIES
Discharge: HOME | End: 2024-08-22
Payer: MEDICARE

## 2024-08-22 ENCOUNTER — APPOINTMENT (OUTPATIENT)
Dept: CT IMAGING | Age: 89
End: 2024-08-22
Payer: MEDICARE

## 2024-08-22 ENCOUNTER — HOSPITAL ENCOUNTER (EMERGENCY)
Age: 89
Discharge: HOME | End: 2024-08-22
Attending: EMERGENCY MEDICINE
Payer: MEDICARE

## 2024-08-22 VITALS
RESPIRATION RATE: 16 BRPM | WEIGHT: 138 LBS | OXYGEN SATURATION: 96 % | SYSTOLIC BLOOD PRESSURE: 178 MMHG | TEMPERATURE: 97.8 F | DIASTOLIC BLOOD PRESSURE: 81 MMHG | BODY MASS INDEX: 25.24 KG/M2 | HEART RATE: 71 BPM

## 2024-08-22 DIAGNOSIS — R42 DIZZINESS: Primary | ICD-10-CM

## 2024-08-22 LAB
ALBUMIN SERPL BCG-MCNC: 4 G/DL (ref 3.5–5.1)
ALP SERPL-CCNC: 79 U/L (ref 38–126)
ALT SERPL W/O P-5'-P-CCNC: 8 U/L (ref 11–66)
ANION GAP SERPL CALC-SCNC: 13 MEQ/L (ref 8–16)
AST SERPL-CCNC: 14 U/L (ref 5–40)
BACTERIA URNS QL MICRO: ABNORMAL /HPF
BASOPHILS ABSOLUTE: 0 THOU/MM3 (ref 0–0.1)
BASOPHILS NFR BLD AUTO: 0.7 %
BILIRUB CONJ SERPL-MCNC: < 0.1 MG/DL (ref 0.1–13.8)
BILIRUB SERPL-MCNC: 0.2 MG/DL (ref 0.3–1.2)
BILIRUB UR QL STRIP.AUTO: NEGATIVE
BUN SERPL-MCNC: 21 MG/DL (ref 7–22)
CALCIUM SERPL-MCNC: 9.6 MG/DL (ref 8.5–10.5)
CALCIUM SERPL-MCNC: 9.8 MG/DL (ref 8.5–10.5)
CASTS #/AREA URNS LPF: ABNORMAL /LPF
CASTS 2: ABNORMAL /LPF
CHARACTER UR: CLEAR
CHLORIDE SERPL-SCNC: 101 MEQ/L (ref 98–111)
CO2 SERPL-SCNC: 23 MEQ/L (ref 23–33)
COLOR, UA: YELLOW
CREAT SERPL-MCNC: 0.9 MG/DL (ref 0.4–1.2)
CRYSTALS URNS MICRO: ABNORMAL
DEPRECATED RDW RBC AUTO: 44.7 FL (ref 35–45)
EKG ATRIAL RATE: 75 BPM
EKG P AXIS: 53 DEGREES
EKG P-R INTERVAL: 202 MS
EKG Q-T INTERVAL: 374 MS
EKG QRS DURATION: 92 MS
EKG QTC CALCULATION (BAZETT): 417 MS
EKG R AXIS: -29 DEGREES
EKG T AXIS: 7 DEGREES
EKG VENTRICULAR RATE: 75 BPM
EOSINOPHIL NFR BLD AUTO: 5.4 %
EOSINOPHILS ABSOLUTE: 0.3 THOU/MM3 (ref 0–0.4)
EPITHELIAL CELLS, UA: ABNORMAL /HPF
ERYTHROCYTE [DISTWIDTH] IN BLOOD BY AUTOMATED COUNT: 13.2 % (ref 11.5–14.5)
GFR SERPL CREATININE-BSD FRML MDRD: 61 ML/MIN/1.73M2
GLUCOSE SERPL-MCNC: 87 MG/DL (ref 70–108)
GLUCOSE UR QL STRIP.AUTO: NEGATIVE MG/DL
HCT VFR BLD AUTO: 33.9 % (ref 37–47)
HGB BLD-MCNC: 10.7 GM/DL (ref 12–16)
HGB UR QL STRIP.AUTO: NEGATIVE
IMM GRANULOCYTES # BLD AUTO: 0.01 THOU/MM3 (ref 0–0.07)
IMM GRANULOCYTES NFR BLD AUTO: 0.2 %
KETONES UR QL STRIP.AUTO: NEGATIVE
LYMPHOCYTES ABSOLUTE: 1.3 THOU/MM3 (ref 1–4.8)
LYMPHOCYTES NFR BLD AUTO: 22.4 %
MAGNESIUM SERPL-MCNC: 2 MG/DL (ref 1.6–2.4)
MCH RBC QN AUTO: 29 PG (ref 26–33)
MCHC RBC AUTO-ENTMCNC: 31.6 GM/DL (ref 32.2–35.5)
MCV RBC AUTO: 91.9 FL (ref 81–99)
MISCELLANEOUS 2: ABNORMAL
MONOCYTES ABSOLUTE: 0.4 THOU/MM3 (ref 0.4–1.3)
MONOCYTES NFR BLD AUTO: 7.5 %
NEUTROPHILS ABSOLUTE: 3.6 THOU/MM3 (ref 1.8–7.7)
NEUTROPHILS NFR BLD AUTO: 63.8 %
NITRITE UR QL STRIP: NEGATIVE
NRBC BLD AUTO-RTO: 0 /100 WBC
OSMOLALITY SERPL CALC.SUM OF ELEC: 276.2 MOSMOL/KG (ref 275–300)
PH UR STRIP.AUTO: 5.5 [PH] (ref 5–9)
PLATELET # BLD AUTO: 192 THOU/MM3 (ref 130–400)
PMV BLD AUTO: 10.2 FL (ref 9.4–12.4)
POTASSIUM SERPL-SCNC: 3.9 MEQ/L (ref 3.5–5.2)
PROT SERPL-MCNC: 6.8 G/DL (ref 6.1–8)
PROT UR STRIP.AUTO-MCNC: NEGATIVE MG/DL
RBC # BLD AUTO: 3.69 MILL/MM3 (ref 4.2–5.4)
RBC URINE: ABNORMAL /HPF
RENAL EPI CELLS #/AREA URNS HPF: ABNORMAL /[HPF]
SODIUM SERPL-SCNC: 137 MEQ/L (ref 135–145)
SP GR UR REFRACT.AUTO: 1.01 (ref 1–1.03)
TROPONIN, HIGH SENSITIVITY: 12 NG/L (ref 0–12)
UROBILINOGEN, URINE: 0.2 EU/DL (ref 0–1)
WBC # BLD AUTO: 5.6 THOU/MM3 (ref 4.8–10.8)
WBC #/AREA URNS HPF: ABNORMAL /HPF
WBC #/AREA URNS HPF: ABNORMAL /[HPF]
YEAST LIKE FUNGI URNS QL MICRO: ABNORMAL

## 2024-08-22 PROCEDURE — 36415 COLL VENOUS BLD VENIPUNCTURE: CPT

## 2024-08-22 PROCEDURE — 99285 EMERGENCY DEPT VISIT HI MDM: CPT

## 2024-08-22 PROCEDURE — 84484 ASSAY OF TROPONIN QUANT: CPT

## 2024-08-22 PROCEDURE — 81001 URINALYSIS AUTO W/SCOPE: CPT

## 2024-08-22 PROCEDURE — 80053 COMPREHEN METABOLIC PANEL: CPT

## 2024-08-22 PROCEDURE — 82310 ASSAY OF CALCIUM: CPT

## 2024-08-22 PROCEDURE — 93005 ELECTROCARDIOGRAM TRACING: CPT | Performed by: STUDENT IN AN ORGANIZED HEALTH CARE EDUCATION/TRAINING PROGRAM

## 2024-08-22 PROCEDURE — 70450 CT HEAD/BRAIN W/O DYE: CPT

## 2024-08-22 PROCEDURE — 93010 ELECTROCARDIOGRAM REPORT: CPT | Performed by: INTERNAL MEDICINE

## 2024-08-22 PROCEDURE — 83735 ASSAY OF MAGNESIUM: CPT

## 2024-08-22 PROCEDURE — 85025 COMPLETE CBC W/AUTO DIFF WBC: CPT

## 2024-08-22 PROCEDURE — 82248 BILIRUBIN DIRECT: CPT

## 2024-08-22 PROCEDURE — G0422 INTENS CARDIAC REHAB W/EXERC: HCPCS

## 2024-08-22 PROCEDURE — 71046 X-RAY EXAM CHEST 2 VIEWS: CPT

## 2024-08-22 NOTE — ED PROVIDER NOTES
ATTENDING NOTE:    I supervised and discussed the history, physical exam and the management of this patient with the resident. I reviewed the resident's note and agree with the documented findings and plan of care.  Please see my additional note.    Patient presents to the emergency department today due to an episode of dizziness.  This first occurred 2 days ago when she bent forward while doing some laundry.  No LOC.  She underwent her usual cardiac rehab, was driving home and again felt dizzy.  This was described as a spinning sensation.  No lightheadedness or syncope.  She stopped driving and EMS was called.  Denies any chest pain or shortness of breath.  Denies any fever or cough.  Denies dysuria or hematuria.  Denies recent illness.  Her dizziness spontaneously resolved prior to arrival at the hospital.  NIH is 0 upon arrival.  EKG, labs and studies reviewed.    I personally saw and examined the patient.  I have reviewed and agree with the resident's findings, including all diagnostic interpretations and treatment plans as written.  I was present for the key portion of any procedures performed and the inclusive time noted in any critical care statement.    Electronically verified by Gabby Robison MD  08/23/24 0149    
providers: no  Decide to obtain previous medical records or to obtain history from someone other than the patient: yes  Obtain history from someone other than the patient: yes  Review and summarize past medical records: yes  Discuss the patient with other providers: no  Independent visualization of images, tracings, or specimens: yes    Risk of Complications, Morbidity, and/or Mortality  Presenting problems: low  Diagnostic procedures: low  Management options: low    /  ED Course as of 08/22/24 2045   Thu Aug 22, 2024   1721 Patient was seen and examined at bedside, with no acute complaints or concerns.  Patient notes several bruises on left side from recent fall 2 days prior.  Bruise on left thigh is nontender and negative of any red flag signs. [GO]   1721 Troponin, EKG negative for any acute finding [GO]   1722 Will order CT of the head given dizziness episodes and recent fall. [GO]   1723 EKG Dizziness [GO]   1723 Chest x-ray negative of any acute cardiopulmonary findings [GO]   1746 Labs negative of any acute findings.  Pending CT head and pacemaker interrogation. [GO]   2008 Unable to perform pacemaker interrogation  [GO]   2010 Discussed findings with patient with intent plan for discharge.  Patient in agreement with plan. Planned appointment with cardiology on thrusday by 11.15am  [GO]      ED Course User Index  [GO] Jamila Franco MD     Strict return precautions and follow up instructions were discussed with the patient with which the patient agreed.    ED Medications administered this visit:  Medications - No data to display      Procedures: (None if blank)    Differential Diagnosis:  Considering the patient's presenting symptoms and physical examination findings, it was imperative to evaluate and/or consider multiple emergent medical conditions, such as    1.) Vertigo  2.) COVID  3.) Arrhythmia  4.) Dehydration  5.)  Anemia      Although some of these diagnoses may be less likely, they were factored

## 2024-08-22 NOTE — PLAN OF CARE
after completing 15 reps with an RPE of <12/13. Continue Strength Training at home   [] Exercise Log & Strength training handout given     Wt: #       Reps:  8-15    *Increase wt. after completing 15 reps with an RPE of <12/13.   Flexibility Flexibility Flexibility Flexibility Flexibility Flexibility   [x] Yes      [] No  25 min session of Core Strength & Flexibility 1x/per week  Attends Core Strength & Flexibility   [] Yes      [] No Attends Core Strength & Flexibility   [] Yes      [] No Attends Core Strength & Flexibility   [] Yes      [] No Attends Core Strength & Flexibility   [] Yes      [] No Continue Core Strength & Flexibility at home   Home Exercise  *Yenny verbalizes planning to initiate home exercise as tolerated on days not in rehab. Home Exercise  *Yenny plans to ** ** days/week for ** min on days not in rehab. Home Exercise  *Yenny plans to ** ** days/week for ** min on days not in rehab. Home Exercise  *Yenny plans to ** ** days/week for ** min on days not in rehab. Home Exercise  *Yenny plans to ** ** days/week for ** min on days not in rehab. Home Exercise  *Yenny plans to    *Education* *Education* *Education* *Education* *Education* *Education*   RPE Scale  [x] Yes      [] No  Exercise Safety  [x] Yes      [] No  Equipment Orientation  [x] Yes      [] No  S/S to Report  [x] Yes      [] No  Warm Up/Cool Down  [x] Yes      [] No  Home Exercise  [x] Yes      [] No     All Exercise Education Completed  [] Yes      [] No   Exercise Education Recommended    Workshops  [x] Exercise Basics  [x] Balance Training & Fall Prevention  [x] Managing Heart Disease   [x] Yoga & Heart Health Exercise Education Attended/Date     Exercise Education Attended/Date Exercise Education Attended/Date Exercise Education Attended/Date All Sessions Completed?    [] Yes  [] No    If no, please explain:   *Goals* *Goals* *Goals* *Goals* *Goals* *Goals*   Initial Exercise Goals Exercise Goals  Exercise Goals

## 2024-08-22 NOTE — ED TRIAGE NOTES
Pt to rm 14 per EMS states she was driving home from cardiac rehab this afternoon when she became dizzy and had to pull over. Pt denies LOC, states she felt like she was spinning. Reports that something simiar happened a few days ago where she became dizzy after bending over and states she fell into a laundry basket. Denies LOC at at that time as well. Pt states she is 1 month post TAVR, has a cardiac event monitor in place. On arrival pt states she feels back to her normal self, denies dizziness at this time, no recent CP/SOB- states eating and drinking well and had no complaints prior to these events. EKG, orthostatic VS and assessment complete.

## 2024-08-23 VITALS — WEIGHT: 126 LBS | HEIGHT: 61 IN | BODY MASS INDEX: 23.79 KG/M2

## 2024-08-23 NOTE — DISCHARGE INSTRUCTIONS
You were seen for dizziness. Your labs and imaging were negative for any acute findings. You are to refrain from strenuous activities, and driving until you are seen by your cardiologist on Thursday. Keep your appt. With Dr. Oreilly on Thursday by 11.15am.

## 2024-08-26 ENCOUNTER — HOSPITAL ENCOUNTER (OUTPATIENT)
Age: 89
Discharge: HOME | End: 2024-08-28
Attending: INTERNAL MEDICINE
Payer: MEDICARE

## 2024-08-26 DIAGNOSIS — I35.0 SEVERE AORTIC STENOSIS: ICD-10-CM

## 2024-08-26 LAB
ECHO AO ASC DIAM: 4.1 CM
ECHO AR MAX VEL PISA: 4 M/S
ECHO AV AREA PEAK VELOCITY: 2.1 CM2
ECHO AV AREA VTI: 2.5 CM2
ECHO AV MEAN GRADIENT: 4 MMHG
ECHO AV MEAN VELOCITY: 0.9 M/S
ECHO AV PEAK GRADIENT: 6 MMHG
ECHO AV PEAK VELOCITY: 1.2 M/S
ECHO AV REGURGITANT PHT: 493 MS
ECHO AV VELOCITY RATIO: 0.75
ECHO AV VTI: 29.8 CM
ECHO LA AREA 2C: 15.1 CM2
ECHO LA AREA 4C: 12.7 CM2
ECHO LA DIAMETER: 2.7 CM
ECHO LA MAJOR AXIS: 4.7 CM
ECHO LA MINOR AXIS: 5.1 CM
ECHO LA VOL BP: 33 ML (ref 22–52)
ECHO LA VOL MOD A2C: 37 ML (ref 22–52)
ECHO LA VOL MOD A4C: 27 ML (ref 22–52)
ECHO LV E' LATERAL VELOCITY: 6 CM/S
ECHO LV E' SEPTAL VELOCITY: 9 CM/S
ECHO LV EJECTION FRACTION BIPLANE: 55 % (ref 55–100)
ECHO LV FRACTIONAL SHORTENING: 30 % (ref 28–44)
ECHO LV INTERNAL DIMENSION DIASTOLIC: 4.7 CM (ref 3.9–5.3)
ECHO LV INTERNAL DIMENSION SYSTOLIC: 3.3 CM
ECHO LV IVSD: 0.7 CM (ref 0.6–0.9)
ECHO LV MASS 2D: 112.5 G (ref 67–162)
ECHO LV POSTERIOR WALL DIASTOLIC: 0.8 CM (ref 0.6–0.9)
ECHO LV RELATIVE WALL THICKNESS RATIO: 0.34
ECHO LVOT AREA: 2.8 CM2
ECHO LVOT AV VTI INDEX: 0.87
ECHO LVOT DIAM: 1.9 CM
ECHO LVOT MEAN GRADIENT: 2 MMHG
ECHO LVOT PEAK GRADIENT: 3 MMHG
ECHO LVOT PEAK VELOCITY: 0.9 M/S
ECHO LVOT SV: 73.4 ML
ECHO LVOT VTI: 25.9 CM
ECHO MV A VELOCITY: 1.21 M/S
ECHO MV E DECELERATION TIME (DT): 251 MS
ECHO MV E VELOCITY: 0.76 M/S
ECHO MV E/A RATIO: 0.63
ECHO MV E/E' LATERAL: 12.67
ECHO MV E/E' RATIO (AVERAGED): 10.56
ECHO MV E/E' SEPTAL: 8.44
ECHO MV REGURGITANT PEAK GRADIENT: 104 MMHG
ECHO MV REGURGITANT PEAK VELOCITY: 5.1 M/S
ECHO PULMONARY ARTERY END DIASTOLIC PRESSURE: 2 MMHG
ECHO PV MAX VELOCITY: 0.6 M/S
ECHO PV PEAK GRADIENT: 1 MMHG
ECHO PV REGURGITANT MAX VELOCITY: 0.7 M/S
ECHO RV INTERNAL DIMENSION: 2 CM
ECHO RV TAPSE: 2.4 CM (ref 1.7–?)
ECHO TV E WAVE: 0.6 M/S
ECHO TV REGURGITANT MAX VELOCITY: 2.62 M/S
ECHO TV REGURGITANT PEAK GRADIENT: 27 MMHG

## 2024-08-26 PROCEDURE — 93306 TTE W/DOPPLER COMPLETE: CPT | Performed by: INTERNAL MEDICINE

## 2024-08-26 PROCEDURE — 93306 TTE W/DOPPLER COMPLETE: CPT

## 2024-08-27 ENCOUNTER — HOSPITAL ENCOUNTER (OUTPATIENT)
Dept: CARDIAC REHAB | Age: 89
Setting detail: THERAPIES SERIES
End: 2024-08-27
Payer: MEDICARE

## 2024-08-29 ENCOUNTER — OFFICE VISIT (OUTPATIENT)
Dept: CARDIOLOGY CLINIC | Age: 89
End: 2024-08-29
Payer: MEDICARE

## 2024-08-29 ENCOUNTER — APPOINTMENT (OUTPATIENT)
Dept: CARDIAC REHAB | Age: 89
End: 2024-08-29
Payer: MEDICARE

## 2024-08-29 VITALS
DIASTOLIC BLOOD PRESSURE: 82 MMHG | SYSTOLIC BLOOD PRESSURE: 178 MMHG | WEIGHT: 124.8 LBS | HEART RATE: 67 BPM | BODY MASS INDEX: 21.31 KG/M2 | HEIGHT: 64 IN

## 2024-08-29 DIAGNOSIS — I25.10 CORONARY ARTERY DISEASE INVOLVING NATIVE CORONARY ARTERY OF NATIVE HEART WITHOUT ANGINA PECTORIS: ICD-10-CM

## 2024-08-29 DIAGNOSIS — I50.22 CHF (CONGESTIVE HEART FAILURE), NYHA CLASS II, CHRONIC, SYSTOLIC (HCC): ICD-10-CM

## 2024-08-29 DIAGNOSIS — I45.4 BUNDLE BRANCH BLOCK: ICD-10-CM

## 2024-08-29 DIAGNOSIS — Z79.899 ON POTASSIUM WASTING DIURETIC THERAPY: ICD-10-CM

## 2024-08-29 DIAGNOSIS — Z95.820 S/P ANGIOPLASTY WITH STENT: ICD-10-CM

## 2024-08-29 DIAGNOSIS — I50.32 CHRONIC HEART FAILURE WITH PRESERVED EJECTION FRACTION (HCC): ICD-10-CM

## 2024-08-29 DIAGNOSIS — R42 DIZZINESS: ICD-10-CM

## 2024-08-29 DIAGNOSIS — I10 PRIMARY HYPERTENSION: ICD-10-CM

## 2024-08-29 DIAGNOSIS — Z95.2 S/P TAVR (TRANSCATHETER AORTIC VALVE REPLACEMENT): Primary | ICD-10-CM

## 2024-08-29 PROCEDURE — 1036F TOBACCO NON-USER: CPT | Performed by: NURSE PRACTITIONER

## 2024-08-29 PROCEDURE — 1123F ACP DISCUSS/DSCN MKR DOCD: CPT | Performed by: NURSE PRACTITIONER

## 2024-08-29 PROCEDURE — 1090F PRES/ABSN URINE INCON ASSESS: CPT | Performed by: NURSE PRACTITIONER

## 2024-08-29 PROCEDURE — 93000 ELECTROCARDIOGRAM COMPLETE: CPT | Performed by: NURSE PRACTITIONER

## 2024-08-29 PROCEDURE — G8420 CALC BMI NORM PARAMETERS: HCPCS | Performed by: NURSE PRACTITIONER

## 2024-08-29 PROCEDURE — 99214 OFFICE O/P EST MOD 30 MIN: CPT | Performed by: NURSE PRACTITIONER

## 2024-08-29 PROCEDURE — G8427 DOCREV CUR MEDS BY ELIG CLIN: HCPCS | Performed by: NURSE PRACTITIONER

## 2024-08-29 RX ORDER — POTASSIUM CHLORIDE 750 MG/1
10 TABLET, EXTENDED RELEASE ORAL DAILY PRN
Qty: 30 TABLET | Refills: 1 | Status: SHIPPED | OUTPATIENT
Start: 2024-08-29

## 2024-08-29 RX ORDER — AMLODIPINE BESYLATE 2.5 MG/1
2.5 TABLET ORAL DAILY
Qty: 30 TABLET | Refills: 3 | Status: SHIPPED | OUTPATIENT
Start: 2024-08-29

## 2024-08-29 NOTE — PROGRESS NOTES
Pt C/O dizziness, swelling in ankles, fatigued       Pt denies CP, SOB, Headache,  heart palpitations,   
 Once in position, working in a tandem,   we slowly and steadily began to unsheath the valve.  We paced the patient between 120-160 beats  per minute to ensure no significant ventricular ectopy.  We did limited contrast injections to confirm  the position of the valve.  We were able to unsheathe the valve to 80%.  Valve was slightly lower, thus we partially recaptured and redeployed the valve landing at the marker.  I performed aortography in the cusp overlap and then the three-cusp view.  Coronaries were patent.  We landed the valve in the appropriate position, thus we centralized the nose cone, and under pacing once again, we deployed the rest of the valve.  Delivery system was pulled back to the descending aorta. Nose-cone  was recaptured.      TTE was done.  No significant LV to AO systolic  gradient invasively; no change in left ventricular EDP was noted.  No gradient was noted on the transthoracic  echocardiogram.  No significant perivalvular leak.  No regional wall  motion.  No pericardial effusion.  No change in the mitral valve  apparatus.  Thus, given these findings, no further intervention was  undertaken.      I removed the delivery catheter from the left femoral artery.  I completed the Perclose suture and I then used  an 6-Fr Angio-Seal for further hemostasis.  For the right femoral artery , we used a 6-F Angio-Seal. Intravenous  Protamine was given.  ECG was done demonstrating mild widening of the QRS.  Pacemaker was left         in place for overnight observation.  Patient tolerated the procedure well.  Patient was neurologically intact and at baseline.  Vascular examination was done at the end of the case and determined to be similar to prior to procedure.         Pressures Rest     Systolic (mmHg) Diastolic (mmHg) Mean (mmHg) EDP (mmHg)       73    102        134    72    100        132    71    99        124    55    75        103    47    72       LV -2    -5     -5       170    2     12

## 2024-08-29 NOTE — PATIENT INSTRUCTIONS
Continue to monitor the blood pressure - if improves and staying less than 150 as top number then just continue current medications.   If blood pressure running 150 or above on regular basis then start the Norvasc 2.5 mg daily.    Continue other current medications as prescribed.    Use the Lasix if swelling persists - or if weight goes up more than 3 pounds overnight or 7 pounds in a week. If you take the Lasix then use the Potassium as prescribed.     Stay as active as you can.     Eat heart healthy diet.     Drink at least 32 ounces of water a day - aim for 48 ounces.     Follow-up with your PCP as scheduled.    Follow-up with Dr. Oreilly on 5/19/2025 as scheduled or sooner if need.

## 2024-09-03 ENCOUNTER — APPOINTMENT (OUTPATIENT)
Dept: CARDIAC REHAB | Age: 89
End: 2024-09-03
Payer: MEDICARE

## 2024-09-05 ENCOUNTER — TELEPHONE (OUTPATIENT)
Dept: CARDIOLOGY CLINIC | Age: 89
End: 2024-09-05

## 2024-09-05 ENCOUNTER — HOSPITAL ENCOUNTER (OUTPATIENT)
Dept: CARDIAC REHAB | Age: 89
Setting detail: THERAPIES SERIES
Discharge: HOME | End: 2024-09-05
Payer: MEDICARE

## 2024-09-05 PROCEDURE — G0422 INTENS CARDIAC REHAB W/EXERC: HCPCS

## 2024-09-05 NOTE — TELEPHONE ENCOUNTER
Lia from cardiac rehab called and LM on nurse line stating patient was in ER then saw Deisy.   They are needing documentation that patient is okay to exercise at cardiac rehab.     Deisy-is patient okay for patient to exercise?  [facility] ext [phone].

## 2024-09-10 ENCOUNTER — APPOINTMENT (OUTPATIENT)
Dept: CARDIAC REHAB | Age: 89
End: 2024-09-10
Payer: MEDICARE

## 2024-09-11 LAB — ECHO BSA: 1.56 M2

## 2024-09-12 ENCOUNTER — TELEPHONE (OUTPATIENT)
Dept: CARDIAC REHAB | Age: 89
End: 2024-09-12

## 2024-09-12 ENCOUNTER — HOSPITAL ENCOUNTER (OUTPATIENT)
Dept: CARDIAC REHAB | Age: 89
Setting detail: THERAPIES SERIES
End: 2024-09-12
Payer: MEDICARE

## 2024-09-17 ENCOUNTER — HOSPITAL ENCOUNTER (OUTPATIENT)
Dept: CARDIAC REHAB | Age: 89
Setting detail: THERAPIES SERIES
Discharge: HOME | End: 2024-09-17
Payer: MEDICARE

## 2024-09-17 PROCEDURE — G0422 INTENS CARDIAC REHAB W/EXERC: HCPCS

## 2024-09-19 ENCOUNTER — HOSPITAL ENCOUNTER (OUTPATIENT)
Dept: CARDIAC REHAB | Age: 89
Setting detail: THERAPIES SERIES
Discharge: HOME | End: 2024-09-19
Payer: MEDICARE

## 2024-09-19 PROCEDURE — G0422 INTENS CARDIAC REHAB W/EXERC: HCPCS

## 2024-09-24 ENCOUNTER — OFFICE VISIT (OUTPATIENT)
Dept: UROLOGY | Age: 89
End: 2024-09-24
Payer: MEDICARE

## 2024-09-24 ENCOUNTER — TELEPHONE (OUTPATIENT)
Dept: UROLOGY | Age: 89
End: 2024-09-24

## 2024-09-24 ENCOUNTER — HOSPITAL ENCOUNTER (OUTPATIENT)
Dept: CARDIAC REHAB | Age: 89
Setting detail: THERAPIES SERIES
Discharge: HOME | End: 2024-09-24
Payer: MEDICARE

## 2024-09-24 VITALS — RESPIRATION RATE: 14 BRPM | WEIGHT: 124 LBS | BODY MASS INDEX: 21.17 KG/M2 | HEIGHT: 64 IN

## 2024-09-24 DIAGNOSIS — R35.0 URINARY FREQUENCY: Primary | ICD-10-CM

## 2024-09-24 DIAGNOSIS — N39.0 RECURRENT UTI: ICD-10-CM

## 2024-09-24 LAB
BILIRUBIN, URINE: NEGATIVE
BLOOD URINE, POC: ABNORMAL
CHARACTER, URINE: CLEAR
COLOR, UA: YELLOW
GLUCOSE URINE: NEGATIVE MG/DL
KETONES, URINE: NEGATIVE
LEUKOCYTE CLUMPS, URINE: ABNORMAL
NITRITE, URINE: NEGATIVE
PH, URINE: 6 (ref 5–9)
POST VOID RESIDUAL (PVR): 139 ML
PROTEIN, URINE: NEGATIVE MG/DL
SPECIFIC GRAVITY UA: 1.01 (ref 1–1.03)
UROBILINOGEN, URINE: 0.2 EU/DL (ref 0–1)

## 2024-09-24 PROCEDURE — G0422 INTENS CARDIAC REHAB W/EXERC: HCPCS

## 2024-09-24 PROCEDURE — 51798 US URINE CAPACITY MEASURE: CPT | Performed by: NURSE PRACTITIONER

## 2024-09-24 PROCEDURE — 99214 OFFICE O/P EST MOD 30 MIN: CPT | Performed by: NURSE PRACTITIONER

## 2024-09-24 PROCEDURE — 81003 URINALYSIS AUTO W/O SCOPE: CPT | Performed by: NURSE PRACTITIONER

## 2024-09-24 PROCEDURE — 1123F ACP DISCUSS/DSCN MKR DOCD: CPT | Performed by: NURSE PRACTITIONER

## 2024-09-24 PROCEDURE — 1090F PRES/ABSN URINE INCON ASSESS: CPT | Performed by: NURSE PRACTITIONER

## 2024-09-24 PROCEDURE — 1036F TOBACCO NON-USER: CPT | Performed by: NURSE PRACTITIONER

## 2024-09-24 PROCEDURE — G8427 DOCREV CUR MEDS BY ELIG CLIN: HCPCS | Performed by: NURSE PRACTITIONER

## 2024-09-24 PROCEDURE — G8420 CALC BMI NORM PARAMETERS: HCPCS | Performed by: NURSE PRACTITIONER

## 2024-09-24 RX ORDER — METHENAMINE HIPPURATE 1000 MG/1
1 TABLET ORAL 2 TIMES DAILY WITH MEALS
Qty: 180 TABLET | Refills: 3 | Status: SHIPPED | OUTPATIENT
Start: 2024-09-24

## 2024-09-24 ASSESSMENT — PATIENT HEALTH QUESTIONNAIRE - PHQ9
2. FEELING DOWN, DEPRESSED OR HOPELESS: NOT AT ALL
6. FEELING BAD ABOUT YOURSELF - OR THAT YOU ARE A FAILURE OR HAVE LET YOURSELF OR YOUR FAMILY DOWN: NOT AT ALL
SUM OF ALL RESPONSES TO PHQ QUESTIONS 1-9: 2
8. MOVING OR SPEAKING SO SLOWLY THAT OTHER PEOPLE COULD HAVE NOTICED. OR THE OPPOSITE, BEING SO FIGETY OR RESTLESS THAT YOU HAVE BEEN MOVING AROUND A LOT MORE THAN USUAL: NOT AT ALL
SUM OF ALL RESPONSES TO PHQ QUESTIONS 1-9: 2
SUM OF ALL RESPONSES TO PHQ QUESTIONS 1-9: 2
SUM OF ALL RESPONSES TO PHQ9 QUESTIONS 1 & 2: 0
3. TROUBLE FALLING OR STAYING ASLEEP: SEVERAL DAYS
4. FEELING TIRED OR HAVING LITTLE ENERGY: SEVERAL DAYS
10. IF YOU CHECKED OFF ANY PROBLEMS, HOW DIFFICULT HAVE THESE PROBLEMS MADE IT FOR YOU TO DO YOUR WORK, TAKE CARE OF THINGS AT HOME, OR GET ALONG WITH OTHER PEOPLE: NOT DIFFICULT AT ALL
SUM OF ALL RESPONSES TO PHQ QUESTIONS 1-9: 2
5. POOR APPETITE OR OVEREATING: NOT AT ALL
7. TROUBLE CONCENTRATING ON THINGS, SUCH AS READING THE NEWSPAPER OR WATCHING TELEVISION: NOT AT ALL
9. THOUGHTS THAT YOU WOULD BE BETTER OFF DEAD, OR OF HURTING YOURSELF: NOT AT ALL
1. LITTLE INTEREST OR PLEASURE IN DOING THINGS: NOT AT ALL

## 2024-09-24 ASSESSMENT — ENCOUNTER SYMPTOMS
ABDOMINAL PAIN: 0
NAUSEA: 0
VOMITING: 0
BACK PAIN: 0

## 2024-09-26 ENCOUNTER — HOSPITAL ENCOUNTER (OUTPATIENT)
Dept: CARDIAC REHAB | Age: 89
Setting detail: THERAPIES SERIES
Discharge: HOME | End: 2024-09-26
Payer: MEDICARE

## 2024-09-26 PROCEDURE — G0422 INTENS CARDIAC REHAB W/EXERC: HCPCS

## 2024-10-01 ENCOUNTER — HOSPITAL ENCOUNTER (OUTPATIENT)
Dept: CARDIAC REHAB | Age: 89
Setting detail: THERAPIES SERIES
Discharge: HOME | End: 2024-10-01
Payer: MEDICARE

## 2024-10-01 PROCEDURE — G0422 INTENS CARDIAC REHAB W/EXERC: HCPCS

## 2024-10-03 ENCOUNTER — HOSPITAL ENCOUNTER (OUTPATIENT)
Dept: CARDIAC REHAB | Age: 89
Setting detail: THERAPIES SERIES
Discharge: HOME | End: 2024-10-03
Payer: MEDICARE

## 2024-10-08 ENCOUNTER — HOSPITAL ENCOUNTER (OUTPATIENT)
Dept: CARDIAC REHAB | Age: 89
Setting detail: THERAPIES SERIES
Discharge: HOME | End: 2024-10-08
Payer: MEDICARE

## 2024-10-08 DIAGNOSIS — Z95.2 S/P TAVR (TRANSCATHETER AORTIC VALVE REPLACEMENT): ICD-10-CM

## 2024-10-08 DIAGNOSIS — I10 PRIMARY HYPERTENSION: ICD-10-CM

## 2024-10-08 PROCEDURE — G0422 INTENS CARDIAC REHAB W/EXERC: HCPCS

## 2024-10-08 NOTE — TELEPHONE ENCOUNTER
Pt's resting BP has been elevated in cardiac rehab.    8/22/24   192/90  9/5/24     170/72  9/17/24   190/90    9/19/24   176/82  9/24/24   162/80  9/26/24   174/90  10/1/24   186/90  10/8/24   186/90

## 2024-10-09 RX ORDER — AMLODIPINE BESYLATE 5 MG/1
5 TABLET ORAL DAILY
Qty: 90 TABLET | Refills: 3 | Status: SHIPPED | OUTPATIENT
Start: 2024-10-09

## 2024-10-09 NOTE — TELEPHONE ENCOUNTER
Add norvasc 5  Mg po q day and monitor   PCP should  Be able to follow up BP to ensure appropriate titration

## 2024-10-10 ENCOUNTER — HOSPITAL ENCOUNTER (OUTPATIENT)
Dept: CARDIAC REHAB | Age: 89
Setting detail: THERAPIES SERIES
Discharge: HOME | End: 2024-10-10
Payer: MEDICARE

## 2024-10-10 PROCEDURE — G0422 INTENS CARDIAC REHAB W/EXERC: HCPCS

## 2024-10-15 ENCOUNTER — APPOINTMENT (OUTPATIENT)
Dept: CARDIAC REHAB | Age: 89
End: 2024-10-15
Payer: MEDICARE

## 2024-10-17 ENCOUNTER — APPOINTMENT (OUTPATIENT)
Dept: CARDIAC REHAB | Age: 89
End: 2024-10-17
Payer: MEDICARE

## 2024-10-22 ENCOUNTER — HOSPITAL ENCOUNTER (OUTPATIENT)
Dept: CARDIAC REHAB | Age: 89
Setting detail: THERAPIES SERIES
Discharge: HOME | End: 2024-10-22
Payer: MEDICARE

## 2024-10-22 PROCEDURE — G0422 INTENS CARDIAC REHAB W/EXERC: HCPCS

## 2024-10-24 ENCOUNTER — HOSPITAL ENCOUNTER (OUTPATIENT)
Dept: CARDIAC REHAB | Age: 89
Setting detail: THERAPIES SERIES
Discharge: HOME | End: 2024-10-24
Payer: MEDICARE

## 2024-10-24 PROCEDURE — G0422 INTENS CARDIAC REHAB W/EXERC: HCPCS

## 2024-10-29 ENCOUNTER — HOSPITAL ENCOUNTER (OUTPATIENT)
Dept: CARDIAC REHAB | Age: 89
Setting detail: THERAPIES SERIES
Discharge: HOME | End: 2024-10-29
Payer: MEDICARE

## 2024-10-29 PROCEDURE — G0422 INTENS CARDIAC REHAB W/EXERC: HCPCS

## 2024-10-31 ENCOUNTER — HOSPITAL ENCOUNTER (OUTPATIENT)
Dept: CARDIAC REHAB | Age: 89
Setting detail: THERAPIES SERIES
Discharge: HOME | End: 2024-10-31
Payer: MEDICARE

## 2024-10-31 PROCEDURE — G0422 INTENS CARDIAC REHAB W/EXERC: HCPCS

## 2024-11-05 ENCOUNTER — HOSPITAL ENCOUNTER (OUTPATIENT)
Dept: CARDIAC REHAB | Age: 89
Setting detail: THERAPIES SERIES
Discharge: HOME | End: 2024-11-05
Payer: MEDICARE

## 2024-11-05 PROCEDURE — G0422 INTENS CARDIAC REHAB W/EXERC: HCPCS

## 2024-11-07 ENCOUNTER — HOSPITAL ENCOUNTER (OUTPATIENT)
Dept: CARDIAC REHAB | Age: 89
Setting detail: THERAPIES SERIES
Discharge: HOME | End: 2024-11-07
Payer: MEDICARE

## 2024-11-07 PROCEDURE — G0422 INTENS CARDIAC REHAB W/EXERC: HCPCS

## 2024-11-12 ENCOUNTER — HOSPITAL ENCOUNTER (OUTPATIENT)
Dept: CARDIAC REHAB | Age: 88
Setting detail: THERAPIES SERIES
End: 2024-11-12
Payer: MEDICARE

## 2024-11-14 ENCOUNTER — HOSPITAL ENCOUNTER (OUTPATIENT)
Dept: CARDIAC REHAB | Age: 89
Setting detail: THERAPIES SERIES
Discharge: HOME | End: 2024-11-14
Payer: MEDICARE

## 2024-11-14 PROCEDURE — G0422 INTENS CARDIAC REHAB W/EXERC: HCPCS

## 2024-11-14 NOTE — PLAN OF CARE
PM William Barba MD Physician Cosign   9/19/2024  1:36 PM Susan Esteves EP Exercise Physiologist Sign     Date/Time User Provider Type Action   10/17/2024  4:33 PM William Barba MD Physician Cosign   10/17/2024 10:25 AM Dwight Shook EP Exercise Physiologist Sign

## 2024-11-19 ENCOUNTER — HOSPITAL ENCOUNTER (OUTPATIENT)
Dept: CARDIAC REHAB | Age: 89
Setting detail: THERAPIES SERIES
Discharge: HOME | End: 2024-11-19
Payer: MEDICARE

## 2024-11-19 PROCEDURE — G0422 INTENS CARDIAC REHAB W/EXERC: HCPCS

## 2024-11-21 ENCOUNTER — APPOINTMENT (OUTPATIENT)
Dept: CARDIAC REHAB | Age: 88
End: 2024-11-21
Payer: MEDICARE

## 2024-11-26 ENCOUNTER — APPOINTMENT (OUTPATIENT)
Dept: CARDIAC REHAB | Age: 88
End: 2024-11-26
Payer: MEDICARE

## 2024-11-28 ENCOUNTER — APPOINTMENT (OUTPATIENT)
Dept: CARDIAC REHAB | Age: 88
End: 2024-11-28
Payer: MEDICARE

## 2024-12-03 ENCOUNTER — HOSPITAL ENCOUNTER (OUTPATIENT)
Dept: CARDIAC REHAB | Age: 88
Setting detail: THERAPIES SERIES
Discharge: HOME | End: 2024-12-03

## 2024-12-03 NOTE — PLAN OF CARE
dietary changes     Yenny is encouraged to initiate one diet change to progress toward established goals. Progress towards goals:  Yenny  has made the following changes to their diet: **    Yenny plans to ** to progress toward established goals. Progress towards goals:  Yenny  has made the following changes to their diet: **    Yenny has achieved set goals/Has not achieved set goal due to        Individual Cardiac Treatment Plan - Psychosocial  PSYCHOSOCIAL  ASSESSMENT/PLAN PSYCHOSOCIAL  REASSESSMENT PSYCHOSOCIAL   REASSESSMENT PSYCHOSOCIAL   REASSESSMENT PSYCHOSOCIAL  DISCHARGE/FOLLOW-UP PSYCHOSOCIAL  DISCHARGE/FOLLOW-UP   Stages of Change Stages of Change Stages of Change Stages of Change Stages of Change Stages of Change   [] Pre Contemplation  [x] Contemplation  [] Preparation  [] Action  [] Maintenance  [] Relapse [] Pre Contemplation  [] Contemplation  [x] Preparation  [] Action  [] Maintenance  [] Relapse [] Pre Contemplation  [] Contemplation  [x] Preparation  [] Action  [] Maintenance  [] Relapse [] Pre Contemplation  [] Contemplation  [x] Preparation  [] Action  [] Maintenance  [] Relapse [] Pre Contemplation  [] Contemplation  [] Preparation  [] Action  [] Maintenance  [] Relapse [] Pre Contemplation  [] Contemplation  [] Preparation  [] Action  [] Maintenance  [] Relapse   PSYCHOSOCIAL ASSESSMENT PSYCHOSOCIAL ASSESSMENT PSYCHOSOCIAL ASSESSMENT PSYCHOSOCIAL ASSESSMENT PSYCHOSOCIAL ASSESSMENT PSYCHOSOCIAL ASSESSMENT   Behavioral Outcomes Behavioral Outcomes Behavioral Outcomes Behavioral Outcomes Behavioral Outcomes Behavioral Outcomes       Did not complete survey during evaluation appt, pt will complete at first exercise visit.   Patient has not completed survey PHQ-9 score   Depression Severity  [x]Minimal  []Mild   []Moderate  []Moderately Severe  []Severe    *If score is Mild - Severe, goal is to improve discharge score by one level.    *If Score is 15-27, what action was taken? **   PHQ-9

## 2025-01-17 ENCOUNTER — LAB (OUTPATIENT)
Dept: LAB | Age: 89
End: 2025-01-17

## 2025-01-28 ENCOUNTER — TRANSCRIBE ORDERS (OUTPATIENT)
Dept: ADMINISTRATIVE | Age: 89
End: 2025-01-28

## 2025-01-28 ENCOUNTER — TRANSCRIBE ORDERS (OUTPATIENT)
Facility: HOSPITAL | Age: 89
End: 2025-01-28

## 2025-01-28 DIAGNOSIS — Z78.0 ASYMPTOMATIC MENOPAUSAL STATE: Primary | ICD-10-CM

## 2025-01-28 LAB — CYTOLOGY THIN PREP PAP: NORMAL

## 2025-05-09 NOTE — CONSULTS
CRITICAL CARE PROGRESS NOTE      Patient:  Yenny Chandler    Unit/Bed:4D-07/007-A  YOB: 1935  MRN: [id number]   PCP: Artur Brar III, MD  Date of Admission: 7/24/2024  Chief Complaint:- anxiety s/p TAVR    Assessment and Plan:    Severe aortic stenosis: Echo 6/6/2024 shows EF 55-60% with mildly increased LV wall thickness with diastolic dysfunction.  Aortic valve area 0.88 cm². S/P TAVR 7/24/24.    ASA  Will likely remove pacing tomorrow  Agitation: Postop the patient was restless, itching, had back pain was constantly moving.  She was given Solu-Medrol, Xanax, Mirapex and Benadryl without relief.  The decision was subsequently to take her to the ICU.  Precedex, patient has shown improvement  As needed Ativan  Pain with as needed oxycodone  RLS: Mirapex  GERD: Lansoprazole    INITIAL H AND P AND ICU COURSE:  Patient was admitted 6/5 due to chest pain while in a vehicle she was found to have severe symptomatic aortic stenosis and underwent preop workup for TAVR with valve area 0.88 cm².  She presented to the hospital today for a planned TAVR with Dr. Oreilly.  Postop the patient was sent back to cardiac stepdown where the patient was restless, itching, back pain.  She received Solu-Medrol, Xanax and her Mirapex for her RLS without improvement.  During this time -200.  The patient was subsequently transferred to the ICU for Precedex.    Past Medical History: Aortic stenosis, CAD, GERD, HLD, HTN.  Family History: Mother had Alzheimer's and heart disease, father had arthritis, sister 1 had heart disease, sister to had hemorrhagic stroke and heart disease, brother 1 had esophageal cancer and heart disease, brother 2 had heart disease.  Social History: Previous smoker.    ROS   Patient states she has back pain and feels anxious.  Denies fever, chills, headache, chest pain, SOB, abdominal pain, dysuria.    Scheduled Meds:   chlorhexidine gluconate   Topical Once    sodium chloride flush  5-40  Outreach attempt was made to schedule a Medicare Wellness Visit. This was the first attempt. Contact was not made, Nursing Home patient.

## 2025-05-19 ENCOUNTER — OFFICE VISIT (OUTPATIENT)
Dept: CARDIOLOGY CLINIC | Age: 89
End: 2025-05-19
Payer: MEDICARE

## 2025-05-19 VITALS
HEART RATE: 82 BPM | OXYGEN SATURATION: 98 % | HEIGHT: 64 IN | BODY MASS INDEX: 21.51 KG/M2 | SYSTOLIC BLOOD PRESSURE: 148 MMHG | DIASTOLIC BLOOD PRESSURE: 82 MMHG | WEIGHT: 126 LBS

## 2025-05-19 DIAGNOSIS — I10 PRIMARY HYPERTENSION: ICD-10-CM

## 2025-05-19 DIAGNOSIS — Z95.2 S/P TAVR (TRANSCATHETER AORTIC VALVE REPLACEMENT): Primary | ICD-10-CM

## 2025-05-19 PROCEDURE — 1123F ACP DISCUSS/DSCN MKR DOCD: CPT | Performed by: INTERNAL MEDICINE

## 2025-05-19 PROCEDURE — 1159F MED LIST DOCD IN RCRD: CPT | Performed by: INTERNAL MEDICINE

## 2025-05-19 PROCEDURE — 1090F PRES/ABSN URINE INCON ASSESS: CPT | Performed by: INTERNAL MEDICINE

## 2025-05-19 PROCEDURE — G8427 DOCREV CUR MEDS BY ELIG CLIN: HCPCS | Performed by: INTERNAL MEDICINE

## 2025-05-19 PROCEDURE — 1036F TOBACCO NON-USER: CPT | Performed by: INTERNAL MEDICINE

## 2025-05-19 PROCEDURE — 99214 OFFICE O/P EST MOD 30 MIN: CPT | Performed by: INTERNAL MEDICINE

## 2025-05-19 PROCEDURE — G8420 CALC BMI NORM PARAMETERS: HCPCS | Performed by: INTERNAL MEDICINE

## 2025-05-19 NOTE — PROGRESS NOTES
[facility] [place] [facility]  [facility]  [address]  [address]  [place] [address]  Dept: [phone]  Dept Fax: [phone]  Loc: [phone]    Visit Date: 5/19/2025    Ms. Chandler is a 89 y.o. female  who presented for:  F/u TAVR      HPI:     History of Present Illness  The patient is an 89-year-old female who presents for a follow-up visit.    She reports no chest pain or discomfort and maintains that her respiratory function is satisfactory. She has not sought emergency care recently. Although she has not participated in formal cardiac rehabilitation, she engages in regular cycling at home with a bicycle provided by her daughter. She is compliant with her medication regimen, which includes baby aspirin and antihypertensive drugs, and reports no adverse effects. She is not on any lipid-lowering therapy as per her physician's advice, despite a recent cholesterol level of 92 mg/dL. She believes her cholesterol levels are within acceptable limits.    She experiences persistent back pain which is chronic.    SOCIAL HISTORY  Exercise: Rides a bicycle at home, not regularly but some.       TTE 8/2024    Left Ventricle: Normal left ventricular systolic function with a visually estimated EF of 55 - 60%. EF by 2D Simpsons Biplane is 55%. Left ventricle size is normal. Normal wall thickness. Normal wall motion. Diastolic dysfunction present with normal LV EF.    Aortic Valve: Appropriately positioned transcatheter bioprosthetic valve that is well-seated. AV mean gradient is 4 mmHg. No regurgitation. Mild paravalvular regurgitation. No stenosis. Normal prosthetic gradient. AV mean gradient is 4 mmHg. AV peak gradient is 6 mmHg. AV peak velocity is 1.2 m/s. AV area by continuity VTI is 2.5 cm2.    Mitral Valve: Mild regurgitation.    Tricuspid Valve: Mild regurgitation.    IVC/SVC: IVC diameter is less than or equal to 21 mm and decreases greater than 50% during inspiration;

## 2025-05-19 NOTE — PATIENT INSTRUCTIONS
Your staff today were Iveth  Your provider today was Dr. Oreilly  Phone number: [phone]

## 2025-06-17 ENCOUNTER — LAB (OUTPATIENT)
Dept: LAB | Age: 89
End: 2025-06-17

## 2025-06-17 LAB
ALBUMIN SERPL BCG-MCNC: 4.2 G/DL (ref 3.4–4.9)
ALP SERPL-CCNC: 74 U/L (ref 38–126)
ALT SERPL W/O P-5'-P-CCNC: 11 U/L (ref 10–35)
ANION GAP SERPL CALC-SCNC: 10 MEQ/L (ref 8–16)
AST SERPL-CCNC: 18 U/L (ref 10–35)
BACTERIA: ABNORMAL
BASOPHILS ABSOLUTE: 0 THOU/MM3 (ref 0–0.1)
BASOPHILS NFR BLD AUTO: 0.6 %
BILIRUB SERPL-MCNC: 0.3 MG/DL (ref 0.3–1.2)
BILIRUB UR QL STRIP: NEGATIVE
BUN SERPL-MCNC: 27 MG/DL (ref 8–23)
CALCIUM SERPL-MCNC: 10 MG/DL (ref 8.2–9.6)
CASTS #/AREA URNS LPF: ABNORMAL /LPF
CASTS #/AREA URNS LPF: ABNORMAL /LPF
CHARACTER UR: CLEAR
CHARCOAL URNS QL MICRO: ABNORMAL
CHLORIDE SERPL-SCNC: 105 MEQ/L (ref 98–111)
CHOLESTEROL, FASTING: 182 MG/DL (ref 100–199)
CO2 SERPL-SCNC: 26 MEQ/L (ref 22–29)
COLOR UR: YELLOW
CREAT SERPL-MCNC: 0.7 MG/DL (ref 0.5–0.9)
CRYSTALS URNS QL MICRO: ABNORMAL
DEPRECATED RDW RBC AUTO: 46.4 FL (ref 35–45)
EOSINOPHIL NFR BLD AUTO: 2.9 %
EOSINOPHILS ABSOLUTE: 0.2 THOU/MM3 (ref 0–0.4)
EPITHELIAL CELLS, UA: ABNORMAL /HPF
ERYTHROCYTE [DISTWIDTH] IN BLOOD BY AUTOMATED COUNT: 13.5 % (ref 11.5–14.5)
GFR SERPL CREATININE-BSD FRML MDRD: 82 ML/MIN/1.73M2
GLUCOSE SERPL-MCNC: 87 MG/DL (ref 74–109)
GLUCOSE UR QL STRIP.AUTO: NEGATIVE MG/DL
HCT VFR BLD AUTO: 37.7 % (ref 37–47)
HDLC SERPL-MCNC: 50 MG/DL
HGB BLD-MCNC: 11.8 GM/DL (ref 12–16)
HGB UR QL STRIP.AUTO: NEGATIVE
IMM GRANULOCYTES # BLD AUTO: 0.02 THOU/MM3 (ref 0–0.07)
IMM GRANULOCYTES NFR BLD AUTO: 0.4 %
KETONES UR QL STRIP.AUTO: NEGATIVE
LDLC SERPL CALC-MCNC: 104 MG/DL
LEUKOCYTE ESTERASE UR QL STRIP.AUTO: ABNORMAL
LYMPHOCYTES ABSOLUTE: 1.1 THOU/MM3 (ref 1–4.8)
LYMPHOCYTES NFR BLD AUTO: 21.3 %
MCH RBC QN AUTO: 29.4 PG (ref 26–33)
MCHC RBC AUTO-ENTMCNC: 31.3 GM/DL (ref 32.2–35.5)
MCV RBC AUTO: 93.8 FL (ref 81–99)
MONOCYTES ABSOLUTE: 0.5 THOU/MM3 (ref 0.4–1.3)
MONOCYTES NFR BLD AUTO: 8.9 %
NEUTROPHILS ABSOLUTE: 3.4 THOU/MM3 (ref 1.8–7.7)
NEUTROPHILS NFR BLD AUTO: 65.9 %
NITRITE UR QL STRIP.AUTO: NEGATIVE
NRBC BLD AUTO-RTO: 0 /100 WBC
PH UR STRIP.AUTO: 5.5 [PH] (ref 5–9)
PLATELET # BLD AUTO: 175 THOU/MM3 (ref 130–400)
PMV BLD AUTO: 10.8 FL (ref 9.4–12.4)
POTASSIUM SERPL-SCNC: 4 MEQ/L (ref 3.5–5.2)
PROT SERPL-MCNC: 6.9 G/DL (ref 6.4–8.3)
PROT UR STRIP.AUTO-MCNC: NEGATIVE MG/DL
RBC # BLD AUTO: 4.02 MILL/MM3 (ref 4.2–5.4)
RBC #/AREA URNS HPF: ABNORMAL /HPF
RENAL EPI CELLS #/AREA URNS HPF: ABNORMAL /[HPF]
SODIUM SERPL-SCNC: 141 MEQ/L (ref 135–145)
SPECIFIC GRAVITY UA: 1.02 (ref 1–1.03)
TRIGLYCERIDE, FASTING: 138 MG/DL (ref 0–199)
UROBILINOGEN, URINE: 0.2 EU/DL (ref 0–1)
WBC # BLD AUTO: 5.2 THOU/MM3 (ref 4.8–10.8)
WBC #/AREA URNS HPF: ABNORMAL /HPF
YEAST LIKE FUNGI URNS QL MICRO: ABNORMAL

## 2025-07-24 ENCOUNTER — HOSPITAL ENCOUNTER (OUTPATIENT)
Age: 89
Discharge: HOME | End: 2025-07-26
Attending: INTERNAL MEDICINE
Payer: MEDICARE

## 2025-07-24 VITALS
WEIGHT: 126 LBS | SYSTOLIC BLOOD PRESSURE: 148 MMHG | DIASTOLIC BLOOD PRESSURE: 82 MMHG | HEIGHT: 64 IN | BODY MASS INDEX: 21.51 KG/M2

## 2025-07-24 DIAGNOSIS — Z95.2 S/P TAVR (TRANSCATHETER AORTIC VALVE REPLACEMENT): ICD-10-CM

## 2025-07-24 PROCEDURE — 93306 TTE W/DOPPLER COMPLETE: CPT

## 2025-07-25 ENCOUNTER — LAB (OUTPATIENT)
Dept: LAB | Age: 89
End: 2025-07-25

## 2025-07-25 DIAGNOSIS — Z95.2 S/P TAVR (TRANSCATHETER AORTIC VALVE REPLACEMENT): ICD-10-CM

## 2025-07-25 LAB
ANION GAP SERPL CALC-SCNC: 13 MEQ/L (ref 8–16)
BUN SERPL-MCNC: 30 MG/DL (ref 8–23)
CALCIUM SERPL-MCNC: 9.8 MG/DL (ref 8.2–9.6)
CHLORIDE SERPL-SCNC: 104 MEQ/L (ref 98–111)
CO2 SERPL-SCNC: 24 MEQ/L (ref 22–29)
CREAT SERPL-MCNC: 0.9 MG/DL (ref 0.5–0.9)
DEPRECATED RDW RBC AUTO: 44.5 FL (ref 35–45)
ECHO AO ASC DIAM: 3 CM
ECHO AO ASCENDING AORTA INDEX: 1.86 CM/M2
ECHO AR MAX VEL PISA: 4.1 M/S
ECHO AV AREA PEAK VELOCITY: 1.7 CM2
ECHO AV AREA VTI: 1.8 CM2
ECHO AV AREA/BSA PEAK VELOCITY: 1.1 CM2/M2
ECHO AV AREA/BSA VTI: 1.1 CM2/M2
ECHO AV MEAN GRADIENT: 9 MMHG
ECHO AV MEAN VELOCITY: 1.4 M/S
ECHO AV PEAK GRADIENT: 16 MMHG
ECHO AV PEAK VELOCITY: 2 M/S
ECHO AV REGURGITANT PHT: 541 MS
ECHO AV VELOCITY RATIO: 0.6
ECHO AV VTI: 46.1 CM
ECHO BSA: 1.61 M2
ECHO EST RA PRESSURE: 3 MMHG
ECHO LA AREA 2C: 22.8 CM2
ECHO LA AREA 4C: 19.9 CM2
ECHO LA DIAMETER INDEX: 1.68 CM/M2
ECHO LA DIAMETER: 2.7 CM
ECHO LA MAJOR AXIS: 5.7 CM
ECHO LA MINOR AXIS: 6.8 CM
ECHO LA VOL BP: 63 ML (ref 22–52)
ECHO LA VOL MOD A2C: 62 ML (ref 22–52)
ECHO LA VOL MOD A4C: 54 ML (ref 22–52)
ECHO LA VOL/BSA BIPLANE: 39 ML/M2 (ref 16–34)
ECHO LA VOLUME INDEX MOD A2C: 39 ML/M2 (ref 16–34)
ECHO LA VOLUME INDEX MOD A4C: 34 ML/M2 (ref 16–34)
ECHO LV E' LATERAL VELOCITY: 4.5 CM/S
ECHO LV E' SEPTAL VELOCITY: 3.3 CM/S
ECHO LV EF PHYSICIAN: 60 %
ECHO LV FRACTIONAL SHORTENING: 36 % (ref 28–44)
ECHO LV INTERNAL DIMENSION DIASTOLE INDEX: 2.8 CM/M2
ECHO LV INTERNAL DIMENSION DIASTOLIC: 4.5 CM (ref 3.9–5.3)
ECHO LV INTERNAL DIMENSION SYSTOLIC INDEX: 1.8 CM/M2
ECHO LV INTERNAL DIMENSION SYSTOLIC: 2.9 CM
ECHO LV ISOVOLUMETRIC RELAXATION TIME (IVRT): 70 MS
ECHO LV IVSD: 1 CM (ref 0.6–0.9)
ECHO LV MASS 2D: 153.3 G (ref 67–162)
ECHO LV MASS INDEX 2D: 95.2 G/M2 (ref 43–95)
ECHO LV POSTERIOR WALL DIASTOLIC: 1 CM (ref 0.6–0.9)
ECHO LV RELATIVE WALL THICKNESS RATIO: 0.44
ECHO LVOT AREA: 2.8 CM2
ECHO LVOT AV VTI INDEX: 0.63
ECHO LVOT DIAM: 1.9 CM
ECHO LVOT MEAN GRADIENT: 3 MMHG
ECHO LVOT PEAK GRADIENT: 6 MMHG
ECHO LVOT PEAK VELOCITY: 1.2 M/S
ECHO LVOT STROKE VOLUME INDEX: 51 ML/M2
ECHO LVOT SV: 82.2 ML
ECHO LVOT VTI: 29 CM
ECHO MV A VELOCITY: 1.11 M/S
ECHO MV E DECELERATION TIME (DT): 354 MS
ECHO MV E VELOCITY: 1.01 M/S
ECHO MV E/A RATIO: 0.91
ECHO MV E/E' LATERAL: 22.44
ECHO MV E/E' RATIO (AVERAGED): 26.53
ECHO MV E/E' SEPTAL: 30.61
ECHO MV REGURGITANT PEAK GRADIENT: 96 MMHG
ECHO MV REGURGITANT PEAK VELOCITY: 4.9 M/S
ECHO PULMONARY ARTERY END DIASTOLIC PRESSURE: 7 MMHG
ECHO PV MAX VELOCITY: 0.8 M/S
ECHO PV PEAK GRADIENT: 2 MMHG
ECHO PV REGURGITANT MAX VELOCITY: 1.3 M/S
ECHO RIGHT VENTRICULAR SYSTOLIC PRESSURE (RVSP): 48 MMHG
ECHO RV INTERNAL DIMENSION: 2.5 CM
ECHO RV TAPSE: 2.1 CM (ref 1.7–?)
ECHO TV E WAVE: 0.5 M/S
ECHO TV REGURGITANT MAX VELOCITY: 3.34 M/S
ECHO TV REGURGITANT PEAK GRADIENT: 45 MMHG
ERYTHROCYTE [DISTWIDTH] IN BLOOD BY AUTOMATED COUNT: 13.2 % (ref 11.5–14.5)
GFR SERPL CREATININE-BSD FRML MDRD: 61 ML/MIN/1.73M2
GLUCOSE SERPL-MCNC: 89 MG/DL (ref 74–109)
HCT VFR BLD AUTO: 36.9 % (ref 37–47)
HGB BLD-MCNC: 11.7 GM/DL (ref 12–16)
MCH RBC QN AUTO: 29.2 PG (ref 26–33)
MCHC RBC AUTO-ENTMCNC: 31.7 GM/DL (ref 32.2–35.5)
MCV RBC AUTO: 92 FL (ref 81–99)
PLATELET # BLD AUTO: 182 THOU/MM3 (ref 130–400)
PMV BLD AUTO: 10.7 FL (ref 9.4–12.4)
POTASSIUM SERPL-SCNC: 4 MEQ/L (ref 3.5–5.2)
RBC # BLD AUTO: 4.01 MILL/MM3 (ref 4.2–5.4)
SODIUM SERPL-SCNC: 141 MEQ/L (ref 135–145)
WBC # BLD AUTO: 6 THOU/MM3 (ref 4.8–10.8)

## 2025-07-29 ENCOUNTER — TELEPHONE (OUTPATIENT)
Dept: CARDIOLOGY CLINIC | Age: 89
End: 2025-07-29

## 2025-07-29 ENCOUNTER — OFFICE VISIT (OUTPATIENT)
Dept: CARDIOLOGY CLINIC | Age: 89
End: 2025-07-29

## 2025-07-29 VITALS
OXYGEN SATURATION: 98 % | SYSTOLIC BLOOD PRESSURE: 140 MMHG | HEART RATE: 65 BPM | WEIGHT: 127.8 LBS | DIASTOLIC BLOOD PRESSURE: 78 MMHG | BODY MASS INDEX: 21.94 KG/M2

## 2025-07-29 DIAGNOSIS — Z95.820 S/P ANGIOPLASTY WITH STENT: ICD-10-CM

## 2025-07-29 DIAGNOSIS — I10 PRIMARY HYPERTENSION: ICD-10-CM

## 2025-07-29 DIAGNOSIS — I35.0 SEVERE AORTIC STENOSIS: ICD-10-CM

## 2025-07-29 DIAGNOSIS — Z95.2 S/P TAVR (TRANSCATHETER AORTIC VALVE REPLACEMENT): Primary | ICD-10-CM

## 2025-07-29 DIAGNOSIS — I50.32 CHRONIC HEART FAILURE WITH PRESERVED EJECTION FRACTION (HCC): ICD-10-CM

## 2025-07-29 DIAGNOSIS — I25.10 CORONARY ARTERY DISEASE INVOLVING NATIVE CORONARY ARTERY OF NATIVE HEART WITHOUT ANGINA PECTORIS: ICD-10-CM

## 2025-07-29 RX ORDER — FUROSEMIDE 20 MG/1
20 TABLET ORAL DAILY PRN
COMMUNITY

## 2025-07-29 RX ORDER — PANTOPRAZOLE SODIUM 40 MG/1
40 TABLET, DELAYED RELEASE ORAL 2 TIMES DAILY
COMMUNITY
Start: 2025-07-20

## 2025-07-29 RX ORDER — METOPROLOL TARTRATE 25 MG/1
25 TABLET, FILM COATED ORAL 2 TIMES DAILY
Qty: 180 TABLET | Refills: 3 | Status: SHIPPED | OUTPATIENT
Start: 2025-07-29

## (undated) DEVICE — PINNACLE INTRODUCER SHEATH: Brand: PINNACLE

## (undated) DEVICE — SOLUTION SURG PREP POV IOD 7.5% 4 OZ

## (undated) DEVICE — MEDTRONIC JL4.0 DIAGNOSTIC CATHETER

## (undated) DEVICE — SOLUTION IV 1000ML 0.45% SOD CHL PH 5 INJ USP VIAFLX PLAS

## (undated) DEVICE — 260 CM J TIP WIRE .035

## (undated) DEVICE — SPONGE GZ W4XL4IN COT 12 PLY TYP VII WVN C FLD DSGN

## (undated) DEVICE — KIT ANGIO W/ AT P65 PREM HND CTRL FOR CNTRST DEL ANGIOTOUCH

## (undated) DEVICE — DECANTER BAG 9": Brand: MEDLINE INDUSTRIES, INC.

## (undated) DEVICE — GLOVE ORANGE PI 8   MSG9080

## (undated) DEVICE — CONNECTOR TBNG AUX H2O JET DISP FOR OLY 160/180 SER

## (undated) DEVICE — GLOVE ORANGE PI 7   MSG9070

## (undated) DEVICE — SET LNR RED GRN W/ BASE CLEANASCOPE

## (undated) DEVICE — CATHETER ETER IV 22GA L1IN POLYUR STR RADPQ INTROCAN SFTY

## (undated) DEVICE — FORCEPS BX L L240CM DIA2.4MM RAD JAW 4 HOT FOR POLYP DISP

## (undated) DEVICE — PREP SOL PVP IODINE 4%  4 OZ/BTL

## (undated) DEVICE — GLIDESHEATH SLENDER ACCESS KIT: Brand: GLIDESHEATH SLENDER

## (undated) DEVICE — BAND COMPR L24CM REG CLR PLAS HEMSTAT EXT HK AND LOOP RETEN

## (undated) DEVICE — HYPODERMIC SAFETY NEEDLE: Brand: MAGELLAN

## (undated) DEVICE — GUIDEWIRE 25/260/FC/PTFE/3J: Brand: GUIDEWIRE

## (undated) DEVICE — Device

## (undated) DEVICE — INTEGRA® JARIT® MANCUSI-UNGARO SCISSORS, 5-1/8", CURVED, SEMI-SHARP: Brand: INTEGRA® JARIT®

## (undated) DEVICE — SUTURE VCRL + SZ 4-0 L27IN ABSRB WHT FS-2 3/8 CIR REV CUT VCP422H

## (undated) DEVICE — TIBURON NEONATAL DRAPE: Brand: CONVERTORS

## (undated) DEVICE — CARDIAC CATH LAB PACK LF

## (undated) DEVICE — PATIENT RETURN ELECTRODE, SINGLE-USE, CONTACT QUALITY MONITORING, ADULT, WITH 9FT CORD, FOR PATIENTS WEIGING OVER 33LBS. (15KG): Brand: MEGADYNE

## (undated) DEVICE — TUBING IV STOPCOCK 48 CM 3 W

## (undated) DEVICE — GOWN,SIRUS,NON REINFRCD,LARGE,SET IN SL: Brand: MEDLINE

## (undated) DEVICE — FORCEPS BX L240CM JAW DIA3.2MM L CAP W/ NDL MIC MESH TOOTH

## (undated) DEVICE — PAD,ABDOMINAL,5"X9",ST,LF,25/BX: Brand: MEDLINE INDUSTRIES, INC.

## (undated) DEVICE — Device: Brand: NOMOLINE™ LH ADULT NASAL CO2 CANNULA WITH O2 4M

## (undated) DEVICE — BASIC SINGLE BASIN BTC-LF: Brand: MEDLINE INDUSTRIES, INC.

## (undated) DEVICE — SWAN-GANZ DOUBLE LUMEN MONITORING CATHETER: Brand: SWAN-GANZ

## (undated) DEVICE — KIT MFLD ISOLATN NACL CNTRST PRT TBNG SPIK W/ PRSS TRNSDUC

## (undated) DEVICE — IV START KIT: Brand: MEDLINE INDUSTRIES, INC.

## (undated) DEVICE — SNARE POLYP SM W13MMXL240CM SHTH DIA2.4MM OVL FLX DISP

## (undated) DEVICE — 150CM STANDARD JWIRE

## (undated) DEVICE — DRESSING TRNSPAR W5XL4.5IN FLM SHT SEMIPERMEABLE WIND

## (undated) DEVICE — KIT INTRO 6FR L6CM NDL 21GA L4CM 0018IN NIT COR PLAT TIP

## (undated) DEVICE — SYRINGE MED 10ML LUERLOCK TIP W/O SFTY DISP

## (undated) DEVICE — PUNCH BX DIA2MM S STL SEAMLESS RIB HNDL DISP

## (undated) DEVICE — TRAP POLYP ETRAP

## (undated) DEVICE — SHEET, T, LAPAROTOMY, STERILE: Brand: MEDLINE

## (undated) DEVICE — DRAPE KIT RAMAPR RADIATION SHIELD

## (undated) DEVICE — Z DISCONTINUED BY MEDLINE USE 2711682 TRAY SKIN PREP DRY W/ PREM GLV

## (undated) DEVICE — 4F (1.0MM ID) X 9CM STIFF4F (1.0 MICRO-STICK®INTRODUCER SE WITH NITINOL GUIDEWIREWITH NITIN WITH RADIOPAQUE TIPWITH RADIOPAQ: Brand: MICRO-STICK SETMICRO-STICK SET

## (undated) DEVICE — GAUZE,SPONGE,8"X4",12PLY,XRAY,STRL,LF: Brand: MEDLINE

## (undated) DEVICE — SET ADMIN 25ML L117IN PMP MOD CK VLV RLER CLMP 2 SMRTSITE